# Patient Record
Sex: MALE | Race: WHITE | Employment: FULL TIME | URBAN - METROPOLITAN AREA
[De-identification: names, ages, dates, MRNs, and addresses within clinical notes are randomized per-mention and may not be internally consistent; named-entity substitution may affect disease eponyms.]

---

## 2017-01-30 ENCOUNTER — OFFICE VISIT (OUTPATIENT)
Dept: FAMILY MEDICINE CLINIC | Age: 37
End: 2017-01-30

## 2017-01-30 DIAGNOSIS — E11.9 TYPE 2 DIABETES MELLITUS WITHOUT COMPLICATION, WITHOUT LONG-TERM CURRENT USE OF INSULIN (HCC): Primary | ICD-10-CM

## 2017-01-30 DIAGNOSIS — I10 ESSENTIAL HYPERTENSION: ICD-10-CM

## 2017-01-30 DIAGNOSIS — F32.89 OTHER DEPRESSION: ICD-10-CM

## 2017-01-30 DIAGNOSIS — D69.3 ACUTE ITP (HCC): ICD-10-CM

## 2017-01-30 PROCEDURE — 99214 OFFICE O/P EST MOD 30 MIN: CPT | Performed by: FAMILY MEDICINE

## 2017-01-30 RX ORDER — BENAZEPRIL HYDROCHLORIDE 40 MG/1
40 TABLET, FILM COATED ORAL DAILY
Qty: 90 TABLET | Refills: 1 | Status: SHIPPED | OUTPATIENT
Start: 2017-01-30 | End: 2017-08-25 | Stop reason: SDUPTHER

## 2017-01-30 RX ORDER — AMLODIPINE BESYLATE 5 MG/1
5 TABLET ORAL DAILY
Qty: 90 TABLET | Refills: 1 | Status: SHIPPED | OUTPATIENT
Start: 2017-01-30 | End: 2017-02-28 | Stop reason: SDUPTHER

## 2017-01-31 VITALS
HEART RATE: 82 BPM | HEIGHT: 76 IN | TEMPERATURE: 98 F | OXYGEN SATURATION: 97 % | SYSTOLIC BLOOD PRESSURE: 150 MMHG | DIASTOLIC BLOOD PRESSURE: 90 MMHG | BODY MASS INDEX: 38.36 KG/M2 | WEIGHT: 315 LBS

## 2017-01-31 ASSESSMENT — ENCOUNTER SYMPTOMS
DIARRHEA: 0
ABDOMINAL DISTENTION: 0
ABDOMINAL PAIN: 0
BLOOD IN STOOL: 0
SHORTNESS OF BREATH: 0
COUGH: 0
CONSTIPATION: 0
CHEST TIGHTNESS: 0
ANAL BLEEDING: 0

## 2017-02-28 ENCOUNTER — OFFICE VISIT (OUTPATIENT)
Dept: FAMILY MEDICINE CLINIC | Age: 37
End: 2017-02-28

## 2017-02-28 VITALS
WEIGHT: 315 LBS | SYSTOLIC BLOOD PRESSURE: 170 MMHG | HEART RATE: 88 BPM | OXYGEN SATURATION: 97 % | DIASTOLIC BLOOD PRESSURE: 102 MMHG | HEIGHT: 76 IN | BODY MASS INDEX: 38.36 KG/M2

## 2017-02-28 DIAGNOSIS — E11.65 TYPE 2 DIABETES MELLITUS WITH HYPERGLYCEMIA, WITH LONG-TERM CURRENT USE OF INSULIN (HCC): Primary | ICD-10-CM

## 2017-02-28 DIAGNOSIS — Z79.4 TYPE 2 DIABETES MELLITUS WITH HYPERGLYCEMIA, WITH LONG-TERM CURRENT USE OF INSULIN (HCC): Primary | ICD-10-CM

## 2017-02-28 DIAGNOSIS — I10 ESSENTIAL HYPERTENSION: ICD-10-CM

## 2017-02-28 PROCEDURE — 99214 OFFICE O/P EST MOD 30 MIN: CPT | Performed by: FAMILY MEDICINE

## 2017-02-28 RX ORDER — CLONIDINE HYDROCHLORIDE 0.1 MG/1
0.1 TABLET ORAL 2 TIMES DAILY
Qty: 60 TABLET | Refills: 1 | Status: SHIPPED | OUTPATIENT
Start: 2017-02-28 | End: 2017-04-07

## 2017-02-28 RX ORDER — AMLODIPINE BESYLATE 10 MG/1
10 TABLET ORAL DAILY
Qty: 30 TABLET | Refills: 1 | Status: SHIPPED | OUTPATIENT
Start: 2017-02-28 | End: 2017-08-25

## 2017-02-28 ASSESSMENT — ENCOUNTER SYMPTOMS
VISUAL CHANGE: 0
BLURRED VISION: 0

## 2017-03-01 RX ORDER — BLOOD-GLUCOSE METER
EACH MISCELLANEOUS
Qty: 1 DEVICE | Refills: 0 | Status: SHIPPED | OUTPATIENT
Start: 2017-03-01 | End: 2019-02-18

## 2017-03-02 ENCOUNTER — TELEPHONE (OUTPATIENT)
Dept: FAMILY MEDICINE CLINIC | Age: 37
End: 2017-03-02

## 2017-04-05 ENCOUNTER — OFFICE VISIT (OUTPATIENT)
Dept: FAMILY MEDICINE CLINIC | Age: 37
End: 2017-04-05

## 2017-04-05 DIAGNOSIS — I10 ESSENTIAL HYPERTENSION: Primary | ICD-10-CM

## 2017-04-05 PROCEDURE — 99213 OFFICE O/P EST LOW 20 MIN: CPT | Performed by: FAMILY MEDICINE

## 2017-04-07 VITALS
BODY MASS INDEX: 38.36 KG/M2 | HEART RATE: 93 BPM | OXYGEN SATURATION: 97 % | HEIGHT: 76 IN | WEIGHT: 315 LBS | SYSTOLIC BLOOD PRESSURE: 130 MMHG | DIASTOLIC BLOOD PRESSURE: 80 MMHG

## 2017-04-07 ASSESSMENT — ENCOUNTER SYMPTOMS
CHEST TIGHTNESS: 0
SHORTNESS OF BREATH: 0
GASTROINTESTINAL NEGATIVE: 1
COUGH: 0

## 2017-04-24 ENCOUNTER — TELEPHONE (OUTPATIENT)
Dept: FAMILY MEDICINE CLINIC | Age: 37
End: 2017-04-24

## 2017-04-26 ENCOUNTER — OFFICE VISIT (OUTPATIENT)
Dept: FAMILY MEDICINE CLINIC | Age: 37
End: 2017-04-26

## 2017-04-26 VITALS
HEART RATE: 83 BPM | OXYGEN SATURATION: 96 % | DIASTOLIC BLOOD PRESSURE: 88 MMHG | SYSTOLIC BLOOD PRESSURE: 138 MMHG | HEIGHT: 76 IN | BODY MASS INDEX: 38.36 KG/M2 | WEIGHT: 315 LBS

## 2017-04-26 DIAGNOSIS — F32.89 OTHER DEPRESSION: Primary | ICD-10-CM

## 2017-04-26 DIAGNOSIS — E11.65 TYPE 2 DIABETES MELLITUS WITH HYPERGLYCEMIA, WITH LONG-TERM CURRENT USE OF INSULIN (HCC): ICD-10-CM

## 2017-04-26 DIAGNOSIS — Z79.4 TYPE 2 DIABETES MELLITUS WITH HYPERGLYCEMIA, WITH LONG-TERM CURRENT USE OF INSULIN (HCC): ICD-10-CM

## 2017-04-26 DIAGNOSIS — D69.3 CHRONIC ITP (IDIOPATHIC THROMBOCYTOPENIA) (HCC): ICD-10-CM

## 2017-04-26 LAB
A/G RATIO: 1.6 (ref 1.1–2.2)
ALBUMIN SERPL-MCNC: 4 G/DL (ref 3.4–5)
ALP BLD-CCNC: 76 U/L (ref 40–129)
ALT SERPL-CCNC: 50 U/L (ref 10–40)
ANION GAP SERPL CALCULATED.3IONS-SCNC: 14 MMOL/L (ref 3–16)
AST SERPL-CCNC: 43 U/L (ref 15–37)
BILIRUB SERPL-MCNC: 1 MG/DL (ref 0–1)
BUN BLDV-MCNC: 12 MG/DL (ref 7–20)
CALCIUM SERPL-MCNC: 8.6 MG/DL (ref 8.3–10.6)
CHLORIDE BLD-SCNC: 99 MMOL/L (ref 99–110)
CO2: 26 MMOL/L (ref 21–32)
CREAT SERPL-MCNC: 0.5 MG/DL (ref 0.9–1.3)
CREATININE URINE: 183.5 MG/DL (ref 39–259)
GFR AFRICAN AMERICAN: >60
GFR NON-AFRICAN AMERICAN: >60
GLOBULIN: 2.5 G/DL
GLUCOSE BLD-MCNC: 151 MG/DL (ref 70–99)
MICROALBUMIN UR-MCNC: <1.2 MG/DL
MICROALBUMIN/CREAT UR-RTO: NORMAL MG/G (ref 0–30)
POTASSIUM SERPL-SCNC: 4 MMOL/L (ref 3.5–5.1)
SODIUM BLD-SCNC: 139 MMOL/L (ref 136–145)
TOTAL PROTEIN: 6.5 G/DL (ref 6.4–8.2)

## 2017-04-26 PROCEDURE — 99214 OFFICE O/P EST MOD 30 MIN: CPT | Performed by: FAMILY MEDICINE

## 2017-04-26 PROCEDURE — 36415 COLL VENOUS BLD VENIPUNCTURE: CPT | Performed by: FAMILY MEDICINE

## 2017-04-26 RX ORDER — VENLAFAXINE HYDROCHLORIDE 150 MG/1
150 CAPSULE, EXTENDED RELEASE ORAL DAILY
Qty: 30 CAPSULE | Refills: 1 | Status: SHIPPED | OUTPATIENT
Start: 2017-04-26 | End: 2017-08-08 | Stop reason: SDUPTHER

## 2017-04-26 ASSESSMENT — ENCOUNTER SYMPTOMS
SHORTNESS OF BREATH: 0
ABDOMINAL PAIN: 0
CHEST TIGHTNESS: 0
CONSTIPATION: 0
DIARRHEA: 0
ABDOMINAL DISTENTION: 0
BLOOD IN STOOL: 0
ANAL BLEEDING: 0
COUGH: 0

## 2017-04-27 LAB
ESTIMATED AVERAGE GLUCOSE: 157.1 MG/DL
HBA1C MFR BLD: 7.1 %

## 2017-05-25 ENCOUNTER — OFFICE VISIT (OUTPATIENT)
Dept: FAMILY MEDICINE CLINIC | Age: 37
End: 2017-05-25

## 2017-05-25 VITALS
DIASTOLIC BLOOD PRESSURE: 88 MMHG | BODY MASS INDEX: 43.46 KG/M2 | WEIGHT: 315 LBS | HEART RATE: 78 BPM | OXYGEN SATURATION: 96 % | SYSTOLIC BLOOD PRESSURE: 160 MMHG

## 2017-05-25 DIAGNOSIS — E11.65 TYPE 2 DIABETES MELLITUS WITH HYPERGLYCEMIA, WITH LONG-TERM CURRENT USE OF INSULIN (HCC): Primary | ICD-10-CM

## 2017-05-25 DIAGNOSIS — I10 ESSENTIAL HYPERTENSION: ICD-10-CM

## 2017-05-25 DIAGNOSIS — Z79.4 TYPE 2 DIABETES MELLITUS WITH HYPERGLYCEMIA, WITH LONG-TERM CURRENT USE OF INSULIN (HCC): Primary | ICD-10-CM

## 2017-05-25 LAB — HBA1C MFR BLD: 6.9 %

## 2017-05-25 PROCEDURE — 99214 OFFICE O/P EST MOD 30 MIN: CPT | Performed by: FAMILY MEDICINE

## 2017-05-25 PROCEDURE — 83036 HEMOGLOBIN GLYCOSYLATED A1C: CPT | Performed by: FAMILY MEDICINE

## 2017-05-25 RX ORDER — CHLORTHALIDONE 25 MG/1
25 TABLET ORAL DAILY
Qty: 30 TABLET | Refills: 1 | Status: SHIPPED | OUTPATIENT
Start: 2017-05-25 | End: 2017-08-25

## 2017-05-25 ASSESSMENT — ENCOUNTER SYMPTOMS
VISUAL CHANGE: 0
BLURRED VISION: 0

## 2017-06-09 ENCOUNTER — TELEPHONE (OUTPATIENT)
Dept: FAMILY MEDICINE CLINIC | Age: 37
End: 2017-06-09

## 2017-06-09 DIAGNOSIS — F32.89 OTHER DEPRESSION: Primary | ICD-10-CM

## 2017-08-09 RX ORDER — VENLAFAXINE HYDROCHLORIDE 150 MG/1
CAPSULE, EXTENDED RELEASE ORAL
Qty: 30 CAPSULE | Refills: 0 | Status: SHIPPED | OUTPATIENT
Start: 2017-08-09 | End: 2017-08-25

## 2017-08-25 ENCOUNTER — TELEPHONE (OUTPATIENT)
Dept: FAMILY MEDICINE CLINIC | Age: 37
End: 2017-08-25

## 2017-08-25 ENCOUNTER — OFFICE VISIT (OUTPATIENT)
Dept: FAMILY MEDICINE CLINIC | Age: 37
End: 2017-08-25

## 2017-08-25 VITALS
BODY MASS INDEX: 42.97 KG/M2 | OXYGEN SATURATION: 98 % | DIASTOLIC BLOOD PRESSURE: 88 MMHG | HEART RATE: 88 BPM | SYSTOLIC BLOOD PRESSURE: 136 MMHG | WEIGHT: 315 LBS

## 2017-08-25 DIAGNOSIS — E11.65 TYPE 2 DIABETES MELLITUS WITH HYPERGLYCEMIA, WITH LONG-TERM CURRENT USE OF INSULIN (HCC): Primary | ICD-10-CM

## 2017-08-25 DIAGNOSIS — Z79.4 TYPE 2 DIABETES MELLITUS WITH HYPERGLYCEMIA, WITH LONG-TERM CURRENT USE OF INSULIN (HCC): Primary | ICD-10-CM

## 2017-08-25 LAB
A/G RATIO: 1.7 (ref 1.1–2.2)
ALBUMIN SERPL-MCNC: 4.3 G/DL (ref 3.4–5)
ALP BLD-CCNC: 76 U/L (ref 40–129)
ALT SERPL-CCNC: 55 U/L (ref 10–40)
ANION GAP SERPL CALCULATED.3IONS-SCNC: 15 MMOL/L (ref 3–16)
AST SERPL-CCNC: 45 U/L (ref 15–37)
BILIRUB SERPL-MCNC: 1.5 MG/DL (ref 0–1)
BUN BLDV-MCNC: 12 MG/DL (ref 7–20)
CALCIUM SERPL-MCNC: 9 MG/DL (ref 8.3–10.6)
CHLORIDE BLD-SCNC: 98 MMOL/L (ref 99–110)
CO2: 26 MMOL/L (ref 21–32)
CREAT SERPL-MCNC: 0.6 MG/DL (ref 0.9–1.3)
GFR AFRICAN AMERICAN: >60
GFR NON-AFRICAN AMERICAN: >60
GLOBULIN: 2.6 G/DL
GLUCOSE BLD-MCNC: 181 MG/DL (ref 70–99)
POTASSIUM SERPL-SCNC: 4.5 MMOL/L (ref 3.5–5.1)
SODIUM BLD-SCNC: 139 MMOL/L (ref 136–145)
TOTAL PROTEIN: 6.9 G/DL (ref 6.4–8.2)

## 2017-08-25 PROCEDURE — 36415 COLL VENOUS BLD VENIPUNCTURE: CPT | Performed by: FAMILY MEDICINE

## 2017-08-25 PROCEDURE — 99213 OFFICE O/P EST LOW 20 MIN: CPT | Performed by: FAMILY MEDICINE

## 2017-08-25 RX ORDER — BENAZEPRIL HYDROCHLORIDE 40 MG/1
40 TABLET, FILM COATED ORAL DAILY
Qty: 90 TABLET | Refills: 3 | Status: SHIPPED | OUTPATIENT
Start: 2017-08-25 | End: 2019-03-19 | Stop reason: SDUPTHER

## 2017-08-25 RX ORDER — VENLAFAXINE HYDROCHLORIDE 37.5 MG/1
CAPSULE, EXTENDED RELEASE ORAL
Qty: 42 CAPSULE | Refills: 0 | Status: SHIPPED | OUTPATIENT
Start: 2017-08-25 | End: 2017-09-21 | Stop reason: SDUPTHER

## 2017-08-25 ASSESSMENT — ENCOUNTER SYMPTOMS
BLOOD IN STOOL: 0
ANAL BLEEDING: 0
DIARRHEA: 0
ABDOMINAL PAIN: 0
CHEST TIGHTNESS: 0
SHORTNESS OF BREATH: 0
ABDOMINAL DISTENTION: 0
COUGH: 0
CONSTIPATION: 0

## 2017-08-26 LAB
ESTIMATED AVERAGE GLUCOSE: 162.8 MG/DL
HBA1C MFR BLD: 7.3 %

## 2017-09-21 ENCOUNTER — TELEPHONE (OUTPATIENT)
Dept: FAMILY MEDICINE CLINIC | Age: 37
End: 2017-09-21

## 2017-09-21 RX ORDER — VENLAFAXINE HYDROCHLORIDE 37.5 MG/1
CAPSULE, EXTENDED RELEASE ORAL
Qty: 42 CAPSULE | Refills: 0 | Status: CANCELLED | OUTPATIENT
Start: 2017-09-21 | End: 2017-10-21

## 2017-09-22 RX ORDER — VENLAFAXINE HYDROCHLORIDE 37.5 MG/1
75 CAPSULE, EXTENDED RELEASE ORAL DAILY
Qty: 60 CAPSULE | Refills: 2 | Status: SHIPPED | OUTPATIENT
Start: 2017-09-22 | End: 2017-09-25 | Stop reason: SDUPTHER

## 2017-09-25 ENCOUNTER — OFFICE VISIT (OUTPATIENT)
Dept: FAMILY MEDICINE CLINIC | Age: 37
End: 2017-09-25

## 2017-09-25 VITALS
DIASTOLIC BLOOD PRESSURE: 102 MMHG | WEIGHT: 315 LBS | TEMPERATURE: 98 F | OXYGEN SATURATION: 98 % | BODY MASS INDEX: 43.38 KG/M2 | SYSTOLIC BLOOD PRESSURE: 142 MMHG | HEART RATE: 83 BPM

## 2017-09-25 DIAGNOSIS — E11.65 TYPE 2 DIABETES MELLITUS WITH HYPERGLYCEMIA, WITH LONG-TERM CURRENT USE OF INSULIN (HCC): Primary | ICD-10-CM

## 2017-09-25 DIAGNOSIS — Z79.4 TYPE 2 DIABETES MELLITUS WITH HYPERGLYCEMIA, WITH LONG-TERM CURRENT USE OF INSULIN (HCC): Primary | ICD-10-CM

## 2017-09-25 PROCEDURE — 99214 OFFICE O/P EST MOD 30 MIN: CPT | Performed by: FAMILY MEDICINE

## 2017-09-25 RX ORDER — VENLAFAXINE HYDROCHLORIDE 75 MG/1
75 CAPSULE, EXTENDED RELEASE ORAL DAILY
Qty: 30 CAPSULE | Refills: 11 | Status: SHIPPED | OUTPATIENT
Start: 2017-09-25 | End: 2020-02-14

## 2017-09-26 ASSESSMENT — ENCOUNTER SYMPTOMS
BLURRED VISION: 0
VISUAL CHANGE: 0

## 2017-10-26 ENCOUNTER — OFFICE VISIT (OUTPATIENT)
Dept: FAMILY MEDICINE CLINIC | Age: 37
End: 2017-10-26

## 2017-10-26 VITALS
BODY MASS INDEX: 42.6 KG/M2 | WEIGHT: 315 LBS | HEART RATE: 81 BPM | SYSTOLIC BLOOD PRESSURE: 152 MMHG | DIASTOLIC BLOOD PRESSURE: 98 MMHG | OXYGEN SATURATION: 97 %

## 2017-10-26 DIAGNOSIS — F32.89 OTHER DEPRESSION: ICD-10-CM

## 2017-10-26 DIAGNOSIS — Z79.4 TYPE 2 DIABETES MELLITUS WITH HYPERGLYCEMIA, WITH LONG-TERM CURRENT USE OF INSULIN (HCC): Primary | ICD-10-CM

## 2017-10-26 DIAGNOSIS — E11.65 TYPE 2 DIABETES MELLITUS WITH HYPERGLYCEMIA, WITH LONG-TERM CURRENT USE OF INSULIN (HCC): Primary | ICD-10-CM

## 2017-10-26 DIAGNOSIS — I10 ESSENTIAL HYPERTENSION: ICD-10-CM

## 2017-10-26 DIAGNOSIS — D69.3 ACUTE ITP (HCC): ICD-10-CM

## 2017-10-26 LAB — HBA1C MFR BLD: 6.7 %

## 2017-10-26 PROCEDURE — 83036 HEMOGLOBIN GLYCOSYLATED A1C: CPT | Performed by: FAMILY MEDICINE

## 2017-10-26 PROCEDURE — 1036F TOBACCO NON-USER: CPT | Performed by: FAMILY MEDICINE

## 2017-10-26 PROCEDURE — G8484 FLU IMMUNIZE NO ADMIN: HCPCS | Performed by: FAMILY MEDICINE

## 2017-10-26 PROCEDURE — G8427 DOCREV CUR MEDS BY ELIG CLIN: HCPCS | Performed by: FAMILY MEDICINE

## 2017-10-26 PROCEDURE — G8417 CALC BMI ABV UP PARAM F/U: HCPCS | Performed by: FAMILY MEDICINE

## 2017-10-26 PROCEDURE — 3044F HG A1C LEVEL LT 7.0%: CPT | Performed by: FAMILY MEDICINE

## 2017-10-26 PROCEDURE — 99214 OFFICE O/P EST MOD 30 MIN: CPT | Performed by: FAMILY MEDICINE

## 2017-10-26 RX ORDER — NAPROXEN 500 MG/1
500 TABLET ORAL 2 TIMES DAILY WITH MEALS
Qty: 60 TABLET | Refills: 3 | Status: SHIPPED | OUTPATIENT
Start: 2017-10-26 | End: 2019-02-18

## 2017-10-26 RX ORDER — METHOCARBAMOL 750 MG/1
750 TABLET, FILM COATED ORAL 3 TIMES DAILY PRN
Qty: 30 TABLET | Refills: 1 | Status: SHIPPED | OUTPATIENT
Start: 2017-10-26 | End: 2017-11-05

## 2017-10-26 NOTE — PROGRESS NOTES
Subjective:      Patient ID: Emely Mcnally is a 42 y.o. male. HPI: 42 y.o. in for   Chief Complaint   Patient presents with    Diabetes    the patient is currently taking 100 units of [place] daily. His sugar stays around 160. Last A1c was 7.3. His urine microalbumin is up-to-date. He is not getting hypoglycemic episodes. His foot exam is up-to-date. But he is complaining of worsening neuropathy. The patient weighs 350 pounds and his BMI is 42.60 kg/m². His weight is rather stable. He has tried partially lose weight. History of ITP, acute: Seems to have resolved. His last platelet count was decent. He is still following up with hematology. No bleeds. He recently cut his hand. It was a pretty good cut and stopped bleeding on its own. Hypertension: Blood pressure is still elevated. The patient is asymptomatic. He definitely thinks he gets white coat. His numbers look better at home. He is not consistently running less than 140 over less than 90 though. No chest pain. No shortness of breath. No vision changes. No stroke symptoms. No leg swelling. Depression: He is getting used to all the new diagnoses. The Effexor XR is helping. He is still taking it. There is no SI/HI. Review of Systems   Constitutional: Negative for chills, fatigue and fever. HENT: Negative for ear pain, hearing loss and postnasal drip. Eyes: Negative for discharge. Respiratory: Negative for cough, chest tightness and shortness of breath. Cardiovascular: Negative for chest pain and palpitations. Gastrointestinal: Negative for abdominal distention, abdominal pain, anal bleeding, blood in stool, constipation and diarrhea. Genitourinary: Negative for difficulty urinating and dysuria. Musculoskeletal: Positive for back pain. Skin: Negative for rash.      BP (!) 152/98 (Site: Left Arm, Position: Sitting)   Pulse 81   Wt (!) 350 lb (158.8 kg)   SpO2 97%   BMI 42.60 kg/m²    Objective:   Physical Exam Constitutional: He appears well-developed and well-nourished. No distress. HENT:   Head: Normocephalic and atraumatic. Mouth/Throat: No oropharyngeal exudate. Eyes: Conjunctivae and EOM are normal. Right eye exhibits no discharge. Left eye exhibits no discharge. No scleral icterus. Neck: Normal range of motion. Neck supple. No tracheal deviation present. No thyromegaly present. Cardiovascular: Normal rate, regular rhythm and normal heart sounds. Exam reveals no gallop and no friction rub. No murmur heard. Pulmonary/Chest: Effort normal and breath sounds normal. No respiratory distress. He has no wheezes. He has no rales. He exhibits no tenderness. Lymphadenopathy:     He has no cervical adenopathy. Skin: No rash noted. He is not diaphoretic. Nursing note and vitals reviewed. Assessment:      Greater than 3 chronic issues, see below      Plan:        Jey Pickett was seen today for diabetes. Diagnoses and all orders for this visit:    Type 2 diabetes mellitus with hyperglycemia, with long-term current use of insulin ([address]): the patient's fasting glucose in the morning is not ideal but it is good enough. His repeat A1c today is 6.7  He needs to solely focus on losing weight. After discussion with the patient about the Bydureon and causing mild bruising on his belly and getting the  \"Bydureon  Bump,\"  I'm going to switch him to Trulicity if his insurance will cover it. -     POCT glycosylated hemoglobin (Hb A1C)    Essential hypertension: elevated today but blood pressures running better at home. No adjustments for now. I want him to work on weight loss. I want him to work on low-salt diet. Patient's back was also bothering him today in part of the reason why his blood pressure is likely elevated    Other depression: doing well on Effexor. No changes. Acute ITP ([address]): stable. Last platelet count was much improved. Keep follow-up with hematology.     Other orders  -     Dulaglutide

## 2017-10-27 ASSESSMENT — ENCOUNTER SYMPTOMS
CHEST TIGHTNESS: 0
ABDOMINAL PAIN: 0
BACK PAIN: 1
EYE DISCHARGE: 0
CONSTIPATION: 0
BLOOD IN STOOL: 0
ABDOMINAL DISTENTION: 0
SHORTNESS OF BREATH: 0
DIARRHEA: 0
ANAL BLEEDING: 0
COUGH: 0

## 2017-11-14 ENCOUNTER — OFFICE VISIT (OUTPATIENT)
Dept: FAMILY MEDICINE CLINIC | Age: 37
End: 2017-11-14

## 2017-11-14 DIAGNOSIS — R11.2 NON-INTRACTABLE VOMITING WITH NAUSEA, UNSPECIFIED VOMITING TYPE: ICD-10-CM

## 2017-11-14 DIAGNOSIS — D69.3 ACUTE ITP (HCC): ICD-10-CM

## 2017-11-14 DIAGNOSIS — F32.89 OTHER DEPRESSION: ICD-10-CM

## 2017-11-14 DIAGNOSIS — R19.7 DIARRHEA, UNSPECIFIED TYPE: Primary | ICD-10-CM

## 2017-11-14 DIAGNOSIS — E11.65 TYPE 2 DIABETES MELLITUS WITH HYPERGLYCEMIA, WITH LONG-TERM CURRENT USE OF INSULIN (HCC): ICD-10-CM

## 2017-11-14 DIAGNOSIS — I10 ESSENTIAL HYPERTENSION: ICD-10-CM

## 2017-11-14 DIAGNOSIS — Z23 NEED FOR INFLUENZA VACCINATION: ICD-10-CM

## 2017-11-14 DIAGNOSIS — Z79.4 TYPE 2 DIABETES MELLITUS WITH HYPERGLYCEMIA, WITH LONG-TERM CURRENT USE OF INSULIN (HCC): ICD-10-CM

## 2017-11-14 PROCEDURE — G8417 CALC BMI ABV UP PARAM F/U: HCPCS | Performed by: FAMILY MEDICINE

## 2017-11-14 PROCEDURE — 3044F HG A1C LEVEL LT 7.0%: CPT | Performed by: FAMILY MEDICINE

## 2017-11-14 PROCEDURE — G8484 FLU IMMUNIZE NO ADMIN: HCPCS | Performed by: FAMILY MEDICINE

## 2017-11-14 PROCEDURE — 1036F TOBACCO NON-USER: CPT | Performed by: FAMILY MEDICINE

## 2017-11-14 PROCEDURE — 90630 INFLUENZA, QUADV, 18-64 YRS, ID, PF, MICRO INJ, 0.1ML (FLUZONE QUADV, PF): CPT | Performed by: FAMILY MEDICINE

## 2017-11-14 PROCEDURE — G8427 DOCREV CUR MEDS BY ELIG CLIN: HCPCS | Performed by: FAMILY MEDICINE

## 2017-11-14 PROCEDURE — 99214 OFFICE O/P EST MOD 30 MIN: CPT | Performed by: FAMILY MEDICINE

## 2017-11-14 PROCEDURE — 90471 IMMUNIZATION ADMIN: CPT | Performed by: FAMILY MEDICINE

## 2017-11-14 RX ORDER — ONDANSETRON 4 MG/1
4 TABLET, ORALLY DISINTEGRATING ORAL EVERY 8 HOURS PRN
Qty: 20 TABLET | Refills: 0 | Status: SHIPPED | OUTPATIENT
Start: 2017-11-14 | End: 2017-12-04

## 2017-11-16 VITALS
HEART RATE: 101 BPM | OXYGEN SATURATION: 97 % | WEIGHT: 315 LBS | BODY MASS INDEX: 40.8 KG/M2 | TEMPERATURE: 98.3 F | SYSTOLIC BLOOD PRESSURE: 130 MMHG | DIASTOLIC BLOOD PRESSURE: 80 MMHG

## 2017-11-16 ASSESSMENT — ENCOUNTER SYMPTOMS
CHEST TIGHTNESS: 0
ANAL BLEEDING: 0
VOMITING: 1
ABDOMINAL DISTENTION: 0
EYE DISCHARGE: 0
SHORTNESS OF BREATH: 0
ABDOMINAL PAIN: 0
COUGH: 0
DIARRHEA: 1
NAUSEA: 1
BLOOD IN STOOL: 0
CONSTIPATION: 0

## 2017-11-16 NOTE — PROGRESS NOTES
Vaccine Information Sheet, \"Influenza - Inactivated\"  given to Eduardo Wisdom, or parent/legal guardian of  Eduardo Wisdom and verbalized understanding. Patient responses:    Have you ever had a reaction to a flu vaccine? No  Are you able to eat eggs without adverse effects? Yes  Do you have any current illness? No  Have you ever had [place] Syndrome? No    Flu vaccine given per order. Please see immunization tab.
pain, hearing loss and postnasal drip. Eyes: Negative for discharge. Respiratory: Negative for cough, chest tightness and shortness of breath. Cardiovascular: Negative for chest pain and palpitations. Gastrointestinal: Positive for diarrhea, nausea and vomiting. Negative for abdominal distention, abdominal pain, anal bleeding, blood in stool and constipation. Genitourinary: Negative for difficulty urinating and dysuria. Musculoskeletal: Negative. Skin: Negative for rash. /80   Pulse 101   Temp 98.3 °F (36.8 °C) (Oral)   Wt (!) 335 lb 3.2 oz (152 kg)   SpO2 97%   BMI 40.80 kg/m²    Objective:   Physical Exam   Constitutional: He appears well-developed and well-nourished. No distress. HENT:   Head: Normocephalic and atraumatic. Mouth/Throat: No oropharyngeal exudate. Eyes: Conjunctivae and EOM are normal. Right eye exhibits no discharge. Left eye exhibits no discharge. No scleral icterus. Neck: Normal range of motion. Neck supple. No tracheal deviation present. No thyromegaly present. Cardiovascular: Normal rate, regular rhythm and normal heart sounds. Exam reveals no gallop and no friction rub. No murmur heard. Pulmonary/Chest: Effort normal and breath sounds normal. No respiratory distress. He has no wheezes. He has no rales. He exhibits no tenderness. Abdominal: Soft. Bowel sounds are normal. He exhibits no distension and no mass. There is no tenderness. There is no rebound and no guarding. Lymphadenopathy:     He has no cervical adenopathy. Skin: No rash noted. He is not diaphoretic. Nursing note and vitals reviewed. Assessment:      Greater than 3 chronic medical conditions with GI problems likely associated with Bydureon      Plan:          Keny Miller was seen today for nausea & vomiting, diarrhea and cough. Diagnoses and all orders for this visit:    Diarrhea, unspecified type: I suspect this is secondary to the Bydureon. DC this medication now.  Okay to

## 2017-12-04 ENCOUNTER — OFFICE VISIT (OUTPATIENT)
Dept: FAMILY MEDICINE CLINIC | Age: 37
End: 2017-12-04

## 2017-12-04 VITALS
OXYGEN SATURATION: 97 % | SYSTOLIC BLOOD PRESSURE: 116 MMHG | DIASTOLIC BLOOD PRESSURE: 72 MMHG | BODY MASS INDEX: 40.66 KG/M2 | WEIGHT: 315 LBS | HEART RATE: 78 BPM

## 2017-12-04 DIAGNOSIS — Z79.4 TYPE 2 DIABETES MELLITUS WITH HYPERGLYCEMIA, WITH LONG-TERM CURRENT USE OF INSULIN (HCC): Primary | ICD-10-CM

## 2017-12-04 DIAGNOSIS — E11.65 TYPE 2 DIABETES MELLITUS WITH HYPERGLYCEMIA, WITH LONG-TERM CURRENT USE OF INSULIN (HCC): Primary | ICD-10-CM

## 2017-12-04 LAB
A/G RATIO: 1.4 (ref 1.1–2.2)
ALBUMIN SERPL-MCNC: 4.3 G/DL (ref 3.4–5)
ALP BLD-CCNC: 77 U/L (ref 40–129)
ALT SERPL-CCNC: 52 U/L (ref 10–40)
ANION GAP SERPL CALCULATED.3IONS-SCNC: 15 MMOL/L (ref 3–16)
AST SERPL-CCNC: 50 U/L (ref 15–37)
BILIRUB SERPL-MCNC: 1.5 MG/DL (ref 0–1)
BUN BLDV-MCNC: 8 MG/DL (ref 7–20)
CALCIUM SERPL-MCNC: 9.4 MG/DL (ref 8.3–10.6)
CHLORIDE BLD-SCNC: 97 MMOL/L (ref 99–110)
CO2: 30 MMOL/L (ref 21–32)
CREAT SERPL-MCNC: 0.7 MG/DL (ref 0.9–1.3)
CREATININE URINE: 112 MG/DL (ref 39–259)
GFR AFRICAN AMERICAN: >60
GFR NON-AFRICAN AMERICAN: >60
GLOBULIN: 3.1 G/DL
GLUCOSE BLD-MCNC: 103 MG/DL (ref 70–99)
MICROALBUMIN UR-MCNC: <1.2 MG/DL
MICROALBUMIN/CREAT UR-RTO: NORMAL MG/G (ref 0–30)
POTASSIUM SERPL-SCNC: 4.2 MMOL/L (ref 3.5–5.1)
SODIUM BLD-SCNC: 142 MMOL/L (ref 136–145)
TOTAL PROTEIN: 7.4 G/DL (ref 6.4–8.2)
TSH REFLEX: 1.09 UIU/ML (ref 0.27–4.2)

## 2017-12-04 PROCEDURE — 3044F HG A1C LEVEL LT 7.0%: CPT | Performed by: FAMILY MEDICINE

## 2017-12-04 PROCEDURE — 36415 COLL VENOUS BLD VENIPUNCTURE: CPT | Performed by: FAMILY MEDICINE

## 2017-12-04 PROCEDURE — G8484 FLU IMMUNIZE NO ADMIN: HCPCS | Performed by: FAMILY MEDICINE

## 2017-12-04 PROCEDURE — G8427 DOCREV CUR MEDS BY ELIG CLIN: HCPCS | Performed by: FAMILY MEDICINE

## 2017-12-04 PROCEDURE — G8417 CALC BMI ABV UP PARAM F/U: HCPCS | Performed by: FAMILY MEDICINE

## 2017-12-04 PROCEDURE — 1036F TOBACCO NON-USER: CPT | Performed by: FAMILY MEDICINE

## 2017-12-04 PROCEDURE — 99213 OFFICE O/P EST LOW 20 MIN: CPT | Performed by: FAMILY MEDICINE

## 2017-12-04 ASSESSMENT — ENCOUNTER SYMPTOMS
CHEST TIGHTNESS: 0
NAUSEA: 1
SHORTNESS OF BREATH: 0
ABDOMINAL PAIN: 1
COUGH: 0
DIARRHEA: 1
ABDOMINAL DISTENTION: 0
ANAL BLEEDING: 0
CONSTIPATION: 0
BLOOD IN STOOL: 0
EYE DISCHARGE: 0

## 2017-12-04 NOTE — PROGRESS NOTES
Negative for rash. /72 (Site: Right Arm, Position: Sitting)   Pulse 78   Wt (!) 334 lb (151.5 kg)   SpO2 97%   BMI 40.66 kg/m²    Objective:   Physical Exam   Constitutional: He appears well-developed and well-nourished. No distress. HENT:   Head: Normocephalic and atraumatic. Mouth/Throat: No oropharyngeal exudate. Eyes: Conjunctivae and EOM are normal. Right eye exhibits no discharge. Left eye exhibits no discharge. No scleral icterus. Neck: Normal range of motion. Neck supple. No tracheal deviation present. No thyromegaly present. Cardiovascular: Normal rate, regular rhythm and normal heart sounds. Exam reveals no gallop and no friction rub. No murmur heard. Pulmonary/Chest: Effort normal and breath sounds normal. No respiratory distress. He has no wheezes. He has no rales. He exhibits no tenderness. Lymphadenopathy:     He has no cervical adenopathy. Skin: No rash noted. He is not diaphoretic. Nursing note and vitals reviewed. Assessment:      Diabetes and morbid obesity. Plan:        The patient has lost about 25 pounds in the last several months. But in the last month he has only lost 1 pound. We discussed this at length. Start counting calories. And cut back by a maximum of 10% per day per week until losing 1 pound per week. Far as the diabetes goes: Weight loss is going to improve things further. Stay off the trulicity for now. Continue the metformin and basal bolus insulin therapy. Anju Phan was seen today for other.     Diagnoses and all orders for this visit:    Type 2 diabetes mellitus with hyperglycemia, with long-term current use of insulin ([facility])  -     Hemoglobin A1C  -     Comprehensive Metabolic Panel  -     TSH with Reflex  -     MICROALBUMIN / CREATININE URINE RATIO

## 2017-12-05 LAB
ESTIMATED AVERAGE GLUCOSE: 134.1 MG/DL
HBA1C MFR BLD: 6.3 %

## 2018-01-18 ENCOUNTER — TELEPHONE (OUTPATIENT)
Dept: FAMILY MEDICINE CLINIC | Age: 38
End: 2018-01-18

## 2019-02-18 ENCOUNTER — APPOINTMENT (OUTPATIENT)
Dept: GENERAL RADIOLOGY | Age: 39
End: 2019-02-18
Payer: COMMERCIAL

## 2019-02-18 ENCOUNTER — HOSPITAL ENCOUNTER (EMERGENCY)
Age: 39
Discharge: HOME | End: 2019-02-18
Attending: EMERGENCY MEDICINE
Payer: COMMERCIAL

## 2019-02-18 VITALS
RESPIRATION RATE: 18 BRPM | TEMPERATURE: 98.4 F | BODY MASS INDEX: 38.36 KG/M2 | DIASTOLIC BLOOD PRESSURE: 120 MMHG | WEIGHT: 315 LBS | HEART RATE: 70 BPM | SYSTOLIC BLOOD PRESSURE: 177 MMHG | HEIGHT: 76 IN | OXYGEN SATURATION: 92 %

## 2019-02-18 DIAGNOSIS — Z86.2 HISTORY OF ITP: ICD-10-CM
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
## 2019-02-18 LAB
A/G RATIO: 1.2 (ref 1.1–2.2)
ALBUMIN SERPL-MCNC: 3.9 G/DL (ref 3.4–5)
ALP BLD-CCNC: 86 U/L (ref 40–129)
ALT SERPL-CCNC: 48 U/L (ref 10–40)
ANION GAP SERPL CALCULATED.3IONS-SCNC: 9 MMOL/L (ref 3–16)
AST SERPL-CCNC: 39 U/L (ref 15–37)
BASOPHILS ABSOLUTE: 0 K/UL (ref 0–0.2)
BASOPHILS RELATIVE PERCENT: 0.9 %
BILIRUB SERPL-MCNC: 1.5 MG/DL (ref 0–1)
BUN BLDV-MCNC: 9 MG/DL (ref 7–20)
CALCIUM SERPL-MCNC: 9.2 MG/DL (ref 8.3–10.6)
CHLORIDE BLD-SCNC: 97 MMOL/L (ref 99–110)
CO2: 30 MMOL/L (ref 21–32)
CREAT SERPL-MCNC: 0.6 MG/DL (ref 0.9–1.3)
EOSINOPHILS ABSOLUTE: 0.2 K/UL (ref 0–0.6)
EOSINOPHILS RELATIVE PERCENT: 5.5 %
GFR AFRICAN AMERICAN: >60
GFR NON-AFRICAN AMERICAN: >60
GLOBULIN: 3.3 G/DL
GLUCOSE BLD-MCNC: 358 MG/DL (ref 70–99)
HCT VFR BLD CALC: 48 % (ref 40.5–52.5)
HEMATOLOGY PATH CONSULT: NORMAL
HEMATOLOGY PATH CONSULT: YES
HEMOGLOBIN: 16.6 G/DL (ref 13.5–17.5)
LYMPHOCYTES ABSOLUTE: 1.3 K/UL (ref 1–5.1)
LYMPHOCYTES RELATIVE PERCENT: 33.2 %
MCH RBC QN AUTO: 29 PG (ref 26–34)
MCHC RBC AUTO-ENTMCNC: 34.6 G/DL (ref 31–36)
MCV RBC AUTO: 83.8 FL (ref 80–100)
MONOCYTES ABSOLUTE: 0.4 K/UL (ref 0–1.3)
MONOCYTES RELATIVE PERCENT: 10.8 %
NEUTROPHILS ABSOLUTE: 1.9 K/UL (ref 1.7–7.7)
NEUTROPHILS RELATIVE PERCENT: 49.6 %
PDW BLD-RTO: 14.3 % (ref 12.4–15.4)
PLATELET # BLD: 17 K/UL (ref 135–450)
PLATELET SLIDE REVIEW: ABNORMAL
PMV BLD AUTO: 9 FL (ref 5–10.5)
POTASSIUM REFLEX MAGNESIUM: 4.5 MMOL/L (ref 3.5–5.1)
RBC # BLD: 5.73 M/UL (ref 4.2–5.9)
SLIDE REVIEW: ABNORMAL
SODIUM BLD-SCNC: 136 MMOL/L (ref 136–145)
TOTAL PROTEIN: 7.2 G/DL (ref 6.4–8.2)
WBC # BLD: 3.9 K/UL (ref 4–11)

## 2019-02-18 PROCEDURE — 6370000000 HC RX 637 (ALT 250 FOR IP): Performed by: PHYSICIAN ASSISTANT

## 2019-02-18 PROCEDURE — 73610 X-RAY EXAM OF ANKLE: CPT

## 2019-02-18 PROCEDURE — 99283 EMERGENCY DEPT VISIT LOW MDM: CPT

## 2019-02-18 PROCEDURE — 80053 COMPREHEN METABOLIC PANEL: CPT

## 2019-02-18 PROCEDURE — 85025 COMPLETE CBC W/AUTO DIFF WBC: CPT

## 2019-02-18 RX ORDER — DOXYCYCLINE HYCLATE 100 MG
100 TABLET ORAL ONCE
Status: COMPLETED | OUTPATIENT
Start: 2019-02-18 | End: 2019-02-18

## 2019-02-18 RX ORDER — DOXYCYCLINE 100 MG/1
100 TABLET ORAL 2 TIMES DAILY
Qty: 20 TABLET | Refills: 0 | Status: SHIPPED | OUTPATIENT
Start: 2019-02-18 | End: 2019-02-28

## 2019-02-18 RX ADMIN — DOXYCYCLINE HYCLATE 100 MG: 100 TABLET, COATED ORAL at 14:26

## 2019-02-18 ASSESSMENT — ENCOUNTER SYMPTOMS
EYES NEGATIVE: 1
COLOR CHANGE: 1
RESPIRATORY NEGATIVE: 1
GASTROINTESTINAL NEGATIVE: 1

## 2019-02-18 ASSESSMENT — PAIN SCALES - GENERAL: PAINLEVEL_OUTOF10: 4

## 2019-03-01 ENCOUNTER — HOSPITAL ENCOUNTER (EMERGENCY)
Age: 39
Discharge: HOME | End: 2019-03-01
Attending: EMERGENCY MEDICINE
Payer: COMMERCIAL

## 2019-03-01 VITALS
RESPIRATION RATE: 17 BRPM | SYSTOLIC BLOOD PRESSURE: 155 MMHG | DIASTOLIC BLOOD PRESSURE: 92 MMHG | OXYGEN SATURATION: 98 % | HEIGHT: 76 IN | HEART RATE: 72 BPM | WEIGHT: 311 LBS | BODY MASS INDEX: 37.87 KG/M2 | TEMPERATURE: 98.4 F

## 2019-03-01 DIAGNOSIS — R73.9 HYPERGLYCEMIA: Primary | ICD-10-CM

## 2019-03-01 DIAGNOSIS — Z86.2 HISTORY OF ITP: ICD-10-CM

## 2019-03-01 LAB
A/G RATIO: 1.1 (ref 1.1–2.2)
ALBUMIN SERPL-MCNC: 3.5 G/DL (ref 3.4–5)
ALP BLD-CCNC: 82 U/L (ref 40–129)
ALT SERPL-CCNC: 38 U/L (ref 10–40)
ANION GAP SERPL CALCULATED.3IONS-SCNC: 11 MMOL/L (ref 3–16)
ANION GAP SERPL CALCULATED.3IONS-SCNC: 9 MMOL/L (ref 3–16)
AST SERPL-CCNC: 46 U/L (ref 15–37)
BASOPHILS ABSOLUTE: 0.1 K/UL (ref 0–0.2)
BASOPHILS RELATIVE PERCENT: 2.2 %
BILIRUB SERPL-MCNC: 1.6 MG/DL (ref 0–1)
BILIRUBIN URINE: NEGATIVE
BLOOD, URINE: NEGATIVE
BUN BLDV-MCNC: 22 MG/DL (ref 7–20)
BUN BLDV-MCNC: 22 MG/DL (ref 7–20)
CALCIUM SERPL-MCNC: 8.1 MG/DL (ref 8.3–10.6)
CALCIUM SERPL-MCNC: 8.3 MG/DL (ref 8.3–10.6)
CHLORIDE BLD-SCNC: 95 MMOL/L (ref 99–110)
CHLORIDE BLD-SCNC: 95 MMOL/L (ref 99–110)
CHP ED QC CHECK: NORMAL
CHP ED QC CHECK: NORMAL
CLARITY: CLEAR
CO2: 23 MMOL/L (ref 21–32)
CO2: 26 MMOL/L (ref 21–32)
COLOR: YELLOW
CREAT SERPL-MCNC: 0.6 MG/DL (ref 0.9–1.3)
CREAT SERPL-MCNC: 0.6 MG/DL (ref 0.9–1.3)
EOSINOPHILS ABSOLUTE: 0.1 K/UL (ref 0–0.6)
EOSINOPHILS RELATIVE PERCENT: 1 %
GFR AFRICAN AMERICAN: >60
GFR AFRICAN AMERICAN: >60
GFR NON-AFRICAN AMERICAN: >60
GFR NON-AFRICAN AMERICAN: >60
GLOBULIN: 3.3 G/DL
GLUCOSE BLD-MCNC: 249 MG/DL
GLUCOSE BLD-MCNC: 249 MG/DL (ref 70–99)
GLUCOSE BLD-MCNC: 255 MG/DL (ref 70–99)
GLUCOSE BLD-MCNC: 269 MG/DL
GLUCOSE BLD-MCNC: 269 MG/DL (ref 70–99)
GLUCOSE BLD-MCNC: 314 MG/DL (ref 70–99)
GLUCOSE BLD-MCNC: 316 MG/DL (ref 70–99)
GLUCOSE URINE: >=1000 MG/DL
HCT VFR BLD CALC: 50.9 % (ref 40.5–52.5)
HEMOGLOBIN: 17.6 G/DL (ref 13.5–17.5)
KETONES, URINE: 15 MG/DL
LEUKOCYTE ESTERASE, URINE: NEGATIVE
LYMPHOCYTES ABSOLUTE: 1.4 K/UL (ref 1–5.1)
LYMPHOCYTES RELATIVE PERCENT: 22.3 %
MCH RBC QN AUTO: 29.5 PG (ref 26–34)
MCHC RBC AUTO-ENTMCNC: 34.5 G/DL (ref 31–36)
MCV RBC AUTO: 85.3 FL (ref 80–100)
MICROSCOPIC EXAMINATION: ABNORMAL
MONOCYTES ABSOLUTE: 0.4 K/UL (ref 0–1.3)
MONOCYTES RELATIVE PERCENT: 6.9 %
NEUTROPHILS ABSOLUTE: 4.1 K/UL (ref 1.7–7.7)
NEUTROPHILS RELATIVE PERCENT: 67.6 %
NITRITE, URINE: NEGATIVE
PDW BLD-RTO: 14.5 % (ref 12.4–15.4)
PERFORMED ON: ABNORMAL
PH UA: 6
PLATELET # BLD: 26 K/UL (ref 135–450)
PLATELET SLIDE REVIEW: ABNORMAL
PMV BLD AUTO: 9.4 FL (ref 5–10.5)
POTASSIUM REFLEX MAGNESIUM: 5.3 MMOL/L (ref 3.5–5.1)
POTASSIUM SERPL-SCNC: 5.2 MMOL/L (ref 3.5–5.1)
PROTEIN UA: NEGATIVE MG/DL
RBC # BLD: 5.97 M/UL (ref 4.2–5.9)
SLIDE REVIEW: ABNORMAL
SODIUM BLD-SCNC: 129 MMOL/L (ref 136–145)
SODIUM BLD-SCNC: 130 MMOL/L (ref 136–145)
SPECIFIC GRAVITY UA: 1.02
TOTAL PROTEIN: 6.8 G/DL (ref 6.4–8.2)
URINE REFLEX TO CULTURE: ABNORMAL
URINE TYPE: ABNORMAL
UROBILINOGEN, URINE: 1 E.U./DL
WBC # BLD: 6.1 K/UL (ref 4–11)

## 2019-03-01 PROCEDURE — 2580000003 HC RX 258: Performed by: PHYSICIAN ASSISTANT

## 2019-03-01 PROCEDURE — 80053 COMPREHEN METABOLIC PANEL: CPT

## 2019-03-01 PROCEDURE — 85025 COMPLETE CBC W/AUTO DIFF WBC: CPT

## 2019-03-01 PROCEDURE — 96372 THER/PROPH/DIAG INJ SC/IM: CPT

## 2019-03-01 PROCEDURE — 99285 EMERGENCY DEPT VISIT HI MDM: CPT

## 2019-03-01 PROCEDURE — 81003 URINALYSIS AUTO W/O SCOPE: CPT

## 2019-03-01 PROCEDURE — 6370000000 HC RX 637 (ALT 250 FOR IP): Performed by: EMERGENCY MEDICINE

## 2019-03-01 PROCEDURE — 96360 HYDRATION IV INFUSION INIT: CPT

## 2019-03-01 PROCEDURE — 93005 ELECTROCARDIOGRAM TRACING: CPT | Performed by: PHYSICIAN ASSISTANT

## 2019-03-01 RX ORDER — 0.9 % SODIUM CHLORIDE 0.9 %
1000 INTRAVENOUS SOLUTION INTRAVENOUS ONCE
Status: COMPLETED | OUTPATIENT
Start: 2019-03-01 | End: 2019-03-01

## 2019-03-01 RX ORDER — PREDNISONE 20 MG/1
60 TABLET ORAL DAILY
COMMUNITY
End: 2019-04-04 | Stop reason: ALTCHOICE

## 2019-03-01 RX ADMIN — INSULIN HUMAN 6 UNITS: 100 INJECTION, SOLUTION PARENTERAL at 20:55

## 2019-03-01 RX ADMIN — SODIUM CHLORIDE 1000 ML: 9 INJECTION, SOLUTION INTRAVENOUS at 17:53

## 2019-03-01 RX ADMIN — INSULIN HUMAN 4 UNITS: 100 INJECTION, SOLUTION PARENTERAL at 18:32

## 2019-03-01 ASSESSMENT — ENCOUNTER SYMPTOMS
GASTROINTESTINAL NEGATIVE: 1
RESPIRATORY NEGATIVE: 1
EYES NEGATIVE: 1

## 2019-03-02 LAB
EKG ATRIAL RATE: 65 BPM
EKG DIAGNOSIS: NORMAL
EKG P AXIS: 41 DEGREES
EKG P-R INTERVAL: 136 MS
EKG Q-T INTERVAL: 436 MS
EKG QRS DURATION: 108 MS
EKG QTC CALCULATION (BAZETT): 453 MS
EKG R AXIS: 14 DEGREES
EKG T AXIS: 0 DEGREES
EKG VENTRICULAR RATE: 65 BPM

## 2019-03-02 PROCEDURE — 93010 ELECTROCARDIOGRAM REPORT: CPT | Performed by: INTERNAL MEDICINE

## 2019-03-04 ENCOUNTER — NURSE TRIAGE (OUTPATIENT)
Dept: OTHER | Facility: CLINIC | Age: 39
End: 2019-03-04

## 2019-03-05 ENCOUNTER — OFFICE VISIT (OUTPATIENT)
Dept: FAMILY MEDICINE CLINIC | Age: 39
End: 2019-03-05
Payer: COMMERCIAL

## 2019-03-05 VITALS
HEART RATE: 78 BPM | BODY MASS INDEX: 37.75 KG/M2 | WEIGHT: 310 LBS | HEIGHT: 76 IN | SYSTOLIC BLOOD PRESSURE: 150 MMHG | DIASTOLIC BLOOD PRESSURE: 80 MMHG | OXYGEN SATURATION: 98 %

## 2019-03-05 DIAGNOSIS — Z79.4 TYPE 2 DIABETES MELLITUS WITH HYPERGLYCEMIA, WITH LONG-TERM CURRENT USE OF INSULIN (HCC): Primary | ICD-10-CM

## 2019-03-05 DIAGNOSIS — E11.65 TYPE 2 DIABETES MELLITUS WITH HYPERGLYCEMIA, WITH LONG-TERM CURRENT USE OF INSULIN (HCC): Primary | ICD-10-CM

## 2019-03-05 DIAGNOSIS — I10 ESSENTIAL HYPERTENSION: ICD-10-CM

## 2019-03-05 DIAGNOSIS — D69.3 ACUTE ITP (HCC): ICD-10-CM

## 2019-03-05 LAB — HBA1C MFR BLD: 13.1 %

## 2019-03-05 PROCEDURE — 2022F DILAT RTA XM EVC RTNOPTHY: CPT | Performed by: FAMILY MEDICINE

## 2019-03-05 PROCEDURE — 83036 HEMOGLOBIN GLYCOSYLATED A1C: CPT | Performed by: FAMILY MEDICINE

## 2019-03-05 PROCEDURE — G8419 CALC BMI OUT NRM PARAM NOF/U: HCPCS | Performed by: FAMILY MEDICINE

## 2019-03-05 PROCEDURE — 3046F HEMOGLOBIN A1C LEVEL >9.0%: CPT | Performed by: FAMILY MEDICINE

## 2019-03-05 PROCEDURE — 99214 OFFICE O/P EST MOD 30 MIN: CPT | Performed by: FAMILY MEDICINE

## 2019-03-05 PROCEDURE — 1036F TOBACCO NON-USER: CPT | Performed by: FAMILY MEDICINE

## 2019-03-05 PROCEDURE — G8427 DOCREV CUR MEDS BY ELIG CLIN: HCPCS | Performed by: FAMILY MEDICINE

## 2019-03-05 PROCEDURE — G8484 FLU IMMUNIZE NO ADMIN: HCPCS | Performed by: FAMILY MEDICINE

## 2019-03-05 RX ORDER — HYDROCHLOROTHIAZIDE 25 MG/1
12.5 TABLET ORAL EVERY MORNING
Qty: 30 TABLET | Refills: 1 | Status: SHIPPED | OUTPATIENT
Start: 2019-03-05 | End: 2019-07-03 | Stop reason: SDUPTHER

## 2019-03-05 ASSESSMENT — ENCOUNTER SYMPTOMS: NAUSEA: 0

## 2019-03-06 ENCOUNTER — TELEPHONE (OUTPATIENT)
Dept: FAMILY MEDICINE CLINIC | Age: 39
End: 2019-03-06

## 2019-03-09 PROBLEM — E66.9 OBESITY: Status: ACTIVE | Noted: 2019-03-09

## 2019-03-19 ENCOUNTER — OFFICE VISIT (OUTPATIENT)
Dept: FAMILY MEDICINE CLINIC | Age: 39
End: 2019-03-19
Payer: COMMERCIAL

## 2019-03-19 VITALS
WEIGHT: 303 LBS | BODY MASS INDEX: 41.04 KG/M2 | DIASTOLIC BLOOD PRESSURE: 80 MMHG | RESPIRATION RATE: 16 BRPM | OXYGEN SATURATION: 99 % | HEART RATE: 77 BPM | HEIGHT: 72 IN | SYSTOLIC BLOOD PRESSURE: 136 MMHG

## 2019-03-19 DIAGNOSIS — Z79.4 TYPE 2 DIABETES MELLITUS WITH HYPERGLYCEMIA, WITH LONG-TERM CURRENT USE OF INSULIN (HCC): Primary | ICD-10-CM

## 2019-03-19 DIAGNOSIS — E11.65 TYPE 2 DIABETES MELLITUS WITH HYPERGLYCEMIA, WITH LONG-TERM CURRENT USE OF INSULIN (HCC): Primary | ICD-10-CM

## 2019-03-19 DIAGNOSIS — I10 ESSENTIAL HYPERTENSION: ICD-10-CM

## 2019-03-19 DIAGNOSIS — E66.01 CLASS 3 SEVERE OBESITY DUE TO EXCESS CALORIES WITHOUT SERIOUS COMORBIDITY WITH BODY MASS INDEX (BMI) OF 40.0 TO 44.9 IN ADULT (HCC): ICD-10-CM

## 2019-03-19 PROCEDURE — 99214 OFFICE O/P EST MOD 30 MIN: CPT | Performed by: FAMILY MEDICINE

## 2019-03-19 PROCEDURE — 3046F HEMOGLOBIN A1C LEVEL >9.0%: CPT | Performed by: FAMILY MEDICINE

## 2019-03-19 PROCEDURE — 2022F DILAT RTA XM EVC RTNOPTHY: CPT | Performed by: FAMILY MEDICINE

## 2019-03-19 PROCEDURE — 1036F TOBACCO NON-USER: CPT | Performed by: FAMILY MEDICINE

## 2019-03-19 PROCEDURE — G8427 DOCREV CUR MEDS BY ELIG CLIN: HCPCS | Performed by: FAMILY MEDICINE

## 2019-03-19 PROCEDURE — G8417 CALC BMI ABV UP PARAM F/U: HCPCS | Performed by: FAMILY MEDICINE

## 2019-03-19 PROCEDURE — G8484 FLU IMMUNIZE NO ADMIN: HCPCS | Performed by: FAMILY MEDICINE

## 2019-03-19 RX ORDER — BENAZEPRIL HYDROCHLORIDE 40 MG/1
40 TABLET, FILM COATED ORAL DAILY
Qty: 90 TABLET | Refills: 3 | Status: SHIPPED | OUTPATIENT
Start: 2019-03-19 | End: 2019-06-06 | Stop reason: SDUPTHER

## 2019-03-20 LAB
CREATININE URINE: 135.1 MG/DL (ref 39–259)
MICROALBUMIN UR-MCNC: <1.2 MG/DL
MICROALBUMIN/CREAT UR-RTO: NORMAL MG/G (ref 0–30)

## 2019-03-21 ENCOUNTER — HOSPITAL ENCOUNTER (OUTPATIENT)
Age: 39
Discharge: HOME | End: 2019-03-21
Payer: COMMERCIAL

## 2019-03-21 ENCOUNTER — HOSPITAL ENCOUNTER (OUTPATIENT)
Dept: CT IMAGING | Age: 39
Discharge: HOME | End: 2019-03-21
Payer: COMMERCIAL

## 2019-03-21 DIAGNOSIS — D69.3 IMMUNE THROMBOCYTOPENIC PURPURA (HCC): ICD-10-CM

## 2019-03-21 PROCEDURE — 74177 CT ABD & PELVIS W/CONTRAST: CPT

## 2019-03-21 PROCEDURE — 6360000004 HC RX CONTRAST MEDICATION: Performed by: INTERNAL MEDICINE

## 2019-03-21 PROCEDURE — 71260 CT THORAX DX C+: CPT

## 2019-03-21 RX ADMIN — IOPAMIDOL 75 ML: 755 INJECTION, SOLUTION INTRAVENOUS at 11:10

## 2019-03-28 ENCOUNTER — TELEPHONE (OUTPATIENT)
Dept: PULMONOLOGY | Age: 39
End: 2019-03-28

## 2019-03-28 DIAGNOSIS — R91.1 RIGHT UPPER LOBE PULMONARY NODULE: Primary | ICD-10-CM

## 2019-03-28 NOTE — TELEPHONE ENCOUNTER
Scheduled patient for PET CT on 4/2/19 1:00 scan time 12:30 arrival time @ [facility]. Will need to fax order, notes, testing to [phone]. Patient will need to be informed on time/date/prep of PET and fua that is scheduled for 4/4/19 @ 215. I called [phone] (H) Left message asking patient to return call to office. I called [phone] (M) Left message asking patient to return call to office. Order pending.

## 2019-03-28 NOTE — TELEPHONE ENCOUNTER
Order pended. Patient's wife returned call and notified of date, time, prep, and location. Wife verbalized understanding.

## 2019-03-28 NOTE — TELEPHONE ENCOUNTER
Received referral from [facility]. Dr. Radha Ashley reviewed and wants pt to have PET CT and see after PET if [facility] has not already ordered. Staff spoke with the pt and no he has not had PEt ct ordered.  Will need to schedule at [facility] or On license of [facility] and schedule f/u after

## 2019-04-01 NOTE — TELEPHONE ENCOUNTER
I called and spoke with pt's wife and let her know to keep appt as scheduled with Dr. Mandy Mejia. He will examine pt and determine if PET need to be completed. I called and cancelled PET ct.

## 2019-04-01 NOTE — TELEPHONE ENCOUNTER
I called [phone] and tried to do peer to peer. Insurance wants to know exact reasoning why ct chest is not sufficient to determine medical management.

## 2019-04-01 NOTE — TELEPHONE ENCOUNTER
[facility] called states that [place] denied needs peer to peer. Gully is faxing over denial for peer to peer info. PET is [facility] for 4/2/19.

## 2019-04-04 ENCOUNTER — OFFICE VISIT (OUTPATIENT)
Dept: PULMONOLOGY | Age: 39
End: 2019-04-04
Payer: COMMERCIAL

## 2019-04-04 VITALS
SYSTOLIC BLOOD PRESSURE: 134 MMHG | BODY MASS INDEX: 37.8 KG/M2 | RESPIRATION RATE: 18 BRPM | HEART RATE: 84 BPM | TEMPERATURE: 98.2 F | DIASTOLIC BLOOD PRESSURE: 82 MMHG | WEIGHT: 310.4 LBS | OXYGEN SATURATION: 97 % | HEIGHT: 76 IN

## 2019-04-04 DIAGNOSIS — D69.3 IDIOPATHIC THROMBOCYTOPENIA (HCC): ICD-10-CM

## 2019-04-04 DIAGNOSIS — R91.1 RIGHT UPPER LOBE PULMONARY NODULE: Primary | ICD-10-CM

## 2019-04-04 PROCEDURE — 99243 OFF/OP CNSLTJ NEW/EST LOW 30: CPT | Performed by: INTERNAL MEDICINE

## 2019-04-04 PROCEDURE — G8417 CALC BMI ABV UP PARAM F/U: HCPCS | Performed by: INTERNAL MEDICINE

## 2019-04-04 PROCEDURE — G8427 DOCREV CUR MEDS BY ELIG CLIN: HCPCS | Performed by: INTERNAL MEDICINE

## 2019-04-04 NOTE — LETTER
[facility] Pulmonary, Critical Care, and Sleep  [address], [address] [place] [address]  Phone: [phone]  Fax: [phone]    April 4, 2019     Patient: Boyd Velazco   MR Number: [id number]   YOB: 1980   Date of Visit: 4/4/2019     Dear Dr. Tuan Holbrook: Thank you for the request for consultation for Olu Kitchen to me for the evaluation. Below are the relevant portions of my assessment and plan of care. Assessment:       · RUL 9 mm nodule on CT 3/21/19. DDx granuloma,  infection and malignancy. Patient is low risk for malignancy given age and lack of significant smoking history. Will follow-up radiographically  · ITP followed by Dr. Tuan Holbrook  · No significant smoking history         Plan:       Options of Bx, resection and watchful waiting were discussed with patient. I recommend radiographic follow up CT chest 3 months. Patient feels comfortable with this approach   If you have questions, please do not hesitate to call me. I look forward to following Toy Moritz along with you.     Sincerely,        Marcus Sifuentes MD

## 2019-04-04 NOTE — PROGRESS NOTES
[facility]                                                                    CHIEF COMPLAINT: Pulmonary nodules    Consulting provider: Dr. Idalia Cameron    HPI:   Patient has CT chest/abdomen 3/21/19 to further evaluate thrombocytopenia. CT reviewed by me and showed 9 mm right upper lobe nodule, cirrhosis with splenomegaly. PET scan was not approved. Not sure what makes better or worse. Not able to assess severity. No FH for lung cancer and no personal history of cancer   Smoked for few months as teenage in the past   No weight loss  Good appetite  No IBD or O2    No FH for lung cancer    Past Medical History:   Diagnosis Date    Arthritis     left knee    Chronic lumbar pain     DDD (degenerative disc disease), lumbar     MRI 2012    Depression     Glucose intolerance (impaired glucose tolerance)     History of ITP     Hypertension     Idiopathic thrombocytopenic purpura (HCC)     Type 2 diabetes mellitus without complication, without long-term current use of insulin ([facility] 08/30/2016       Past Surgical History:    No past surgical history on file. Allergies:  is allergic to other and pcn [penicillins]. Social History:    TOBACCO:   reports that he has never smoked. He has never used smokeless tobacco.  ETOH:   reports that he does not drink alcohol.       Family History:       Problem Relation Age of Onset    Diabetes Mother     High Blood Pressure Mother     Depression Mother     Depression Father     High Blood Pressure Father     Other Father         sleep apnea       Current Medications:    Current Outpatient Medications:     benazepril (LOTENSIN) 40 MG tablet, Take 1 tablet by mouth daily, Disp: 90 tablet, Rfl: 3    Insulin Pen Needle ([employer] PEN NEEDLES) 31G X 6 MM MISC, 1 each by Does not apply route daily, Disp: 100 each, Rfl: 3    insulin glargine (LANTUS SOLOSTAR) 100 UNIT/ML injection pen, Inject 35 Units into the skin nightly, Disp: 5 pen, Rfl: 3    insulin aspart (NOVOLOG PENFILL) 100 UNIT/ML injection cartridge, Inject 11 Units into the skin 3 times daily (before meals), Disp: 5 Cartridge, Rfl: 3    hydrochlorothiazide (HYDRODIURIL) 25 MG tablet, Take 0.5 tablets by mouth every morning (Patient taking differently: Take 25 mg by mouth every morning ), Disp: 30 tablet, Rfl: 1    venlafaxine (EFFEXOR XR) 75 MG extended release capsule, Take 1 capsule by mouth daily (Patient taking differently: Take 37.5 mg by mouth daily ), Disp: 30 capsule, Rfl: 11    metFORMIN (GLUCOPHAGE) 500 MG tablet, TAKE ONE TABLET BY MOUTH TWICE A DAY WITH MEALS (Patient taking differently: Take 1,000 mg by mouth 2 times daily TAKE ONE TABLET BY MOUTH TWICE A DAY WITH MEALS), Disp: 180 tablet, Rfl: 3      REVIEW OF SYSTEMS:  Constitutional: Negative for fever  HENT: Negative for sore throat  Eyes: Negative for redness   Respiratory: Negative for dyspnea, cough  Cardiovascular: Negative for chest pain  Gastrointestinal: Negative for vomiting, diarrhea   Genitourinary: Negative for hematuria   Musculoskeletal: Negative for arthralgias   Skin: Negative for rash  Neurological: Negative for syncope  Hematological: Negative for adenopathy  Psychiatric/Behavorial: Negative for anxiety      Objective:   PHYSICAL EXAM:    Blood pressure 134/82, pulse 84, temperature 98.2 °F (36.8 °C), temperature source Oral, resp. rate 18, height 6' 4\" (1.93 m), weight (!) 310 lb 6.4 oz (140.8 kg), SpO2 97 %.' on RA  Gen: No distress. Eyes: PERRL. No sclera icterus. No conjunctival injection. ENT: No discharge. Pharynx clear. Neck: Trachea midline. No obvious mass. Resp: No accessory muscle use. No crackles. No wheezes. No rhonchi. No dullness on percussion. Good air entry. CV: Regular rate. Regular rhythm. No murmur or rub. No edema. GI: Non-tender. Non-distended. No hernia. Skin: Warm and dry. No nodule on exposed extremities.    Lymph: No cervical LAD. No supraclavicular LAD. M/S: No cyanosis. No joint deformity. No clubbing. Neuro: Awake. Alert. Moves all four extremities. Psych: Oriented x 3. No anxiety. DATA reviewed by me:   CT chest 3/21/19 imaging reviewed by me and showed  9 mm right upper lobe nodule      Assessment:       · RUL 9 mm nodule on CT 3/21/19. DDx granuloma,  infection and malignancy. Patient is low risk for malignancy given age and lack of significant smoking history. Will follow-up radiographically  · ITP followed by Dr. Ramon Can  · No significant smoking history         Plan:       Options of Bx, resection and watchful waiting were discussed with patient. I recommend radiographic follow up CT chest 3 months.   Patient feels comfortable with this approach

## 2019-04-09 ENCOUNTER — INITIAL CONSULT (OUTPATIENT)
Dept: GASTROENTEROLOGY | Age: 39
End: 2019-04-09
Payer: COMMERCIAL

## 2019-04-09 VITALS
BODY MASS INDEX: 38.11 KG/M2 | DIASTOLIC BLOOD PRESSURE: 80 MMHG | SYSTOLIC BLOOD PRESSURE: 128 MMHG | HEIGHT: 76 IN | WEIGHT: 313 LBS

## 2019-04-09 DIAGNOSIS — K74.60 CIRRHOSIS OF LIVER WITHOUT ASCITES, UNSPECIFIED HEPATIC CIRRHOSIS TYPE (HCC): Primary | ICD-10-CM

## 2019-04-09 PROCEDURE — 99204 OFFICE O/P NEW MOD 45 MIN: CPT | Performed by: INTERNAL MEDICINE

## 2019-04-09 PROCEDURE — G8427 DOCREV CUR MEDS BY ELIG CLIN: HCPCS | Performed by: INTERNAL MEDICINE

## 2019-04-09 PROCEDURE — 1036F TOBACCO NON-USER: CPT | Performed by: INTERNAL MEDICINE

## 2019-04-09 PROCEDURE — G8417 CALC BMI ABV UP PARAM F/U: HCPCS | Performed by: INTERNAL MEDICINE

## 2019-04-09 NOTE — PATIENT INSTRUCTIONS
[address] ,  [address] [facility]  Phone: [phone]   TOF:[phone]  [address] [facility],  [address] [facility], [address] [place] [address]  Phone: [phone]   NHD:[phone]    CIRRHOSIS OVERVIEW - Cirrhosis is the term used to describe a diseased liver that has been severely scarred, usually due to many years of continuous injury. The most common causes include longstanding alcohol abuse, chronic hepatitis B or hepatitis C, and nonalcoholic steatohepatitis (a condition in which fat builds up in the liver, and the liver becomes inflamed). Although cirrhosis cannot be cured, there are a number of things you can do to prevent the disease from worsening. This topic discusses the symptoms, diagnosis, and treatment of cirrhosis. Related topics are discussed separately. (See \"Patient information: Hepatitis B\" and \"Patient information: Hepatitis C\" and \"Patient information: Alcohol use - when is drinking a problem? \".)    CIRRHOSIS CAUSES - The liver is a large organ (weighing about three pounds) that is located in the right upper abdomen, beneath the rib cage (figure 1). It performs many functions that are essential to life. The liver is able to repair itself when it has been injured. However, the process of healing involves the creation of scar tissue. Thus, repeated or continuous injury to the liver (such as occurs with heavy alcohol use) can cause significant scarring in the liver. The body is able to tolerate a partially scarred liver without serious consequences. Eventually, the scarring can become so severe that the liver is no longer able to perform its normal functions. Some of the most common causes of liver injury include:        Longstanding alcohol abuse (see \"Patient information: Alcohol use - when is drinking a problem? \")      Chronic hepatitis (B or C) (see \"Patient information: Hepatitis B\" and \"Patient information: Hepatitis C\")      Nonalcoholic steatohepatitis (see \"Patient information: Nonalcoholic steatohepatitis (OWEN)\")    CIRRHOSIS SYMPTOMS - People with cirrhosis may or may not have symptoms early in the course of the disease. Some of the more common symptoms include:        Scarring makes it difficult for blood to flow through the liver. As a result, veins in other areas outside of the liver become abnormally expanded. Abnormally expanded blood vessels are referred to as varices. One place where varices are commonly found is in the esophagus, the swallowing tube connecting the mouth with the stomach (figure 2). When the pressure in the varices reaches a certain level, the varices can burst, which can cause massive bleeding (known as variceal bleeding). Body fluids accumulate as a result of liver scarring and a decreased ability to manufacture blood proteins. Fluid is typically seen in the legs (edema) and abdomen (ascites) and sometimes in the lung (pleural effusion). (See \"Patient information: Edema (swelling)\". )        Ascites causes the abdomen to enlarge as fluid accumulates, which can cause shortness of breath and a feeling of fullness. The fluid provides an environment where bacteria can grow, increasing the risk of infection. Patients with cirrhosis can have easy bruising and bleeding. Once bleeding starts (such as with variceal bleeding), it can be severe. Hepatic encephalopathy is a condition that develops when the liver is unable to break down toxins normally found in the bloodstream, such as ammonia. In this condition, confusion or even coma are caused by toxins that build up in the blood. In the early stages, there may be mild symptoms, such as difficulty sleeping or sleeping too much. Advanced hepatic encephalopathy can cause confusion, delirium, and even coma. Hepatic encephalopathy can develop suddenly and may become a medical emergency.       Patients with cirrhosis have a weakened immune system and are at increased risk of infections. Malnutrition is common in patients with cirrhosis. Malnutrition can cause loss of muscle in various areas of the body. Many people with advanced cirrhosis have jaundice (yellowed skin or whites of the eyes). People with cirrhosis are at increased risk for developing liver cancer (hepatocellular carcinoma). Cirrhosis can cause fatigue and in some cases itching. CIRRHOSIS DIAGNOSIS - Testing is performed to confirm the diagnosis of cirrhosis, determine the underlying cause, determine the severity of cirrhosis, and monitor for complications. (See \"Diagnostic approach to the patient with cirrhosis\". )    Liver biopsy - The best way to confirm the diagnosis of cirrhosis is a liver biopsy. This procedure is discussed in depth in a separate article. (See \"Patient information: Liver biopsy\". )    Imaging tests - Imaging tests, such as ultrasound, may be recommended to evaluate the condition of the liver or determine if there are cirrhosis-related complications. However, imaging tests are not usually performed to diagnose cirrhosis. Blood tests - Blood tests may be performed to help determine the underlying cause of cirrhosis and to monitor the liver function over time. CIRRHOSIS TREATMENT - There have been major advances in the treatment of cirrhosis in the past few decades. In particular, it has become easier to recognize, prevent, and treat complications of cirrhosis. (See \"Overview of the complications, prognosis, and management of cirrhosis\". )    People with cirrhosis should see their healthcare provider regularly for monitoring and treatment of cirrhosis complications. Although cirrhosis cannot be cured, several treatments are available to minimize cirrhosis-related complications. Other treatments are recommended to help prevent complications.     Avoid substances that can injure the liver - People with cirrhosis should avoid consuming substances that can further damage the liver. The most common of these is alcohol. You should talk to your healthcare provider before taking any new medication (including prescription and non-prescription drugs, herbs, vitamins, or dietary supplements). Acetaminophen (Tylenol®) is a nonprescription medication that can further injure the liver in people with cirrhosis. The exact amount of acetaminophen that is safe in cirrhosis is uncertain; some experts recommend that patients not use more than 650 mg per dose every 4 to 6 hours, with no more than 2000 mg per day. However, even low doses may not be safe for those who drink alcohol. Use of acetaminophen should be discussed with a healthcare provider. Screen for and treat varices - People with cirrhosis should undergo an upper endoscopy to determine if varices (expanded blood vessels) are present in the esophagus, the tube that connects the mouth and stomach (figure 2). Screening for and treatment of varices is discussed in detail in a separate topic review. (See \"Patient information: Screening for esophageal varices\" and \"Prediction of variceal hemorrhage in patients with cirrhosis\" and \"Primary prophylaxis against variceal hemorrhage in patients with cirrhosis\".)    Vaccination - People with cirrhosis should be vaccinated against hepatitis A and B. Pneumococcal vaccine and yearly influenza vaccine are also recommended. (See \"Patient information: Adult vaccines\" and \"Patient information: Influenza prevention\" and \"Patient information: Pneumonia prevention\". )    Treat ascites and edema - Ascites and edema can lead to complications, particularly infection. Ascites can also cause a person to feel short of breath or full. Thus, treatment is usually recommended to reduce the amount of fluid that collects in the lower legs and abdomen. Treatment usually involves taking one or more diuretic pills (fluid pills) and following a low-sodium (salt) diet.  (See 'Dietary advice' below and \"Initial therapy of ascites in patients with cirrhosis\". )    Paracentesis - Some people do not get adequate relief from edema and ascites with fluid pills alone. In this case, periodic drainage of the fluid (paracentesis) may be required. This is done by inserting a needle into the abdomen and withdrawing a large amount of fluid from the space around the abdominal organs. The procedure can usually be performed in a doctor's office. Following paracentesis, it is important to continue taking your diuretic medication and to limit the amount of sodium you consume (see 'Dietary advice' below). TIPS - A TIPS (transjugular intrahepatic portosystemic shunts) procedure may be recommended to treat ascites if diuretics, paracentesis, and changes in diet are not completely successful in relieving ascites. (See \"Indications and contraindications to the use of transjugular intrahepatic portosystemic shunts\".)    During the procedure, a radiologist places a device within the liver to reduce the blood pressure in the portal vein (in the liver) (figure 2). The procedure is usually performed with local anesthesia and sedation, and takes between one and three hours. Most patients remain in the hospital for one to three nights after the procedure. Screen for hepatocellular carcinoma - People with cirrhosis should have tests to detect hepatocellular carcinoma (cancer of the liver). Testing usually requires an ultrasound examination of the liver every six months. Consider liver transplantation - Not all patients with cirrhosis will require a liver transplantation, and many are not eligible for one. However, because the waiting list for liver transplantation is lengthy (up to two years in some regions), it is important to know if liver transplantation is a reasonable option while you are still relatively healthy.  (See 'Liver transplantation for cirrhosis' below.)    Screen for encephalopathy - People with cirrhosis can develop confusion, which is sometimes subtle. Detecting confusion is important since treatment is available, and the confusion itself can lead to serious problems (eg, an automobile accident). A change in the sleep pattern (insomnia or sleeping too much) may be an early sign. Treatment may include a medication called lactulose (which will cause softening of the stool) or an antibiotic. Dietary advice - People with advanced cirrhosis may require a specialized diet that includes lower amounts of salt. Salt restriction is usually recommended for people with early cirrhosis who tend to accumulate fluid. Protein restriction is usually not necessary. A healthcare provider or dietitian can help to determine if dietary changes are needed. A detailed discussion about a low-sodium diet is available separately. (See \"Patient information: Low sodium diet\". )    The benefit of vitamins, antioxidants and other supplements on the underlying liver disease has not been established. Several herbal therapies have been reported as having a benefit in patients with cirrhosis. None have clearly been proven to be effective, although some continue to be studied. Most experts do not recommend vitamins, herbs, or other supplements for people with cirrhosis. Exercise - Exercise is generally safe for people without advanced-stage cirrhosis. Exercise may increase the risk of variceal bleeding in patients with advanced disease (such as those who have ascites or varices). Thus, check with your healthcare provider regarding the risks and benefits of exercise. LIVER TRANSPLANTATION FOR CIRRHOSIS - Liver transplantation involves replacing a diseased liver with a healthy liver. The majority of donated livers come from people who have suffered brain death for one reason or another. More recently, living donors have been able to donate a portion of their liver.     More than 80 percent of people will be alive one year after liver transplantation, and the majority of these will be alive five years after transplantation. This is compared with almost certain death in patients with very advanced cirrhosis who do not undergo transplantation. (See \"Model for End-stage Liver Disease (MELD)\". )    The prognosis after transplantation depends in part upon the underlying cause of the liver disease, some of which recur following transplantation. As an example, most people who undergo transplantation for hepatitis C will develop recurrent hepatitis C after transplantation. Other major concerns following transplantation are the risk of anti-rejection drugs used to suppress the immune system, which have many side effects, and the risk of rejection of the transplanted organ. The transplantation process is elaborate, involving an extensive screening process for eligibility. Thus, not all patients with cirrhosis are eligible, and only those with the most advanced, severe cirrhosis are placed on the transplant registry. Furthermore, not all patients with cirrhosis will require a transplantation since the disease can remain relatively stable for many years, particularly if the cause of the cirrhosis (eg, alcohol) is eliminated. Treatment centers that perform liver transplantation are listed in table 1 (table 1). (See \"Patient selection for liver transplantation\".)    WHERE TO GET MORE INFORMATION - Your healthcare provider is the best source of information for questions and concerns related to your medical problem. This article will be updated as needed every four months on our Web site ([email or web address]). Related topics for patients, as well as selected articles written for healthcare professionals, are also available. Some of the most relevant are listed below. The following organizations also provide reliable health information. [employer]          (www.nlm.nih.gov/medlineplus/healthtopics. html)        [employer]omatic Data of Diabetes and Digestive and Kidney Diseases          (www.niddk.nih.gov/)        American Gastroenterological Association          (medicalance.com)        The [address] [facility]          ([email or web address] [email or web address]. [email or web address] PHYSICIANS    [facility] ,  [address] [facility]  Phone: [phone] [facility],  [address] [facility], [address] [place] [address]  Phone: [phone]    What is nonalcoholic steatohepatitis? - Nonalcoholic steatohepatitis (OWEN) is a long-term condition that affects the liver. The liver is a big organ in the upper right side of the belly (figure 1). When people have OWEN, fat tissue builds up in their liver, and the liver gets inflamed. People who drink too much alcohol can get a condition similar to OWEN. But OWEN happens in people who do not drink alcohol or drink only a little alcohol. What causes OWEN? - Doctors do not know what causes OWEN. They do know that OWEN happens more often in some people, such as those who:        Are overweight      Have a condition called diabetes mellitus, which causes blood sugar levels to get too high      Have high cholesterol      Take certain medicines    What are the symptoms of OWEN? - Most people with OWEN have no symptoms. Your doctor or nurse might suspect that you have OWEN from the results of your routine blood tests. Will I need more tests? - Yes. If your doctor or nurse suspects that you have OWEN, you will likely have:        More blood tests      An imaging test of the liver, such as an ultrasound, CT, or MRI scan - Imaging tests create pictures of the inside of the body. A liver biopsy - During this test, a doctor removes a small sample of tissue from the liver. Then another doctor looks at the sample under a microscope to see if OWEN is present.  A liver biopsy is the only test that can tell for sure if you have OWEN. How is OWEN treated? - There is no cure for OWEN. If you take a medicine that could be causing OWEN, your doctor will stop or change that medicine. In addition, your doctor will treat your other medical conditions. For example, he or she can:        Help you create a weight loss plan, if you are overweight - If your doctor recommends that you lose weight, it's important to do this slowly. Do not lose more than 3.5 pounds (1.6 kilograms) a week. Treat your high blood sugar, if you have high blood sugar      Treat your high cholesterol, if you have high cholesterol    Does OWEN get worse over time? - OWEN does not usually get worse over time. But sometimes, it leads to serious scarring of the liver, called \"cirrhosis. \" Cirrhosis can cause different symptoms, such as swelling in the legs, trouble breathing, or feeling tired. If you get cirrhosis, your doctor will talk with you about different possible treatments. Do I need to follow up with my doctor? - Yes. People who have OWEN need to see their doctor for regular check-ups. Your doctor will do follow-up tests on a regular basis. These usually include blood tests. INTRODUCTION - Nonalcoholic steatohepatitis (OWEN) is a condition that causes inflammation and accumulation of fat and fibrous tissue in the liver. Although a similar condition can occur in people who abuse alcohol, OWEN occurs in those who do drink little to no alcohol. The exact cause of OWEN is unknown. However, it is seen more frequently in people with certain medical conditions such as diabetes, obesity, and insulin resistance. It is not clear how many people have OWEN because it causes no symptoms. However, OWEN is diagnosed in about 7 to 9 percent of people in the [place] who have a liver biopsy. Most people are between the ages of 36 and 61 years, although the condition can also occur in children over the age of 8 years.  OWEN is seen more often in women than in men. The cause of OWEN is not clear, although research is ongoing in an attempt to find effective treatments. At the present time, treatment of OWEN focuses on controlling some of the medical conditions associated with it (such as diabetes and obesity) and monitoring for progression. CONDITIONS ASSOCIATED WITH NONALCOHOLIC STEATOHEPATITIS - Although the cause of OWEN is unknown, it is most frequently seen in people with one of more of the following conditions. Obesity - More than 70 percent of people with OWEN are obese. Most obese people with OWEN are between 10 and 40 percent heavier than their ideal body weight. Diabetes - Up to 76 percent of people with OWEN have type 2 diabetes. Hyperlipidemia - About 20 to 80 percent of people with OWEN have hyperlipidemia (high blood triglyceride levels and/or high blood cholesterol levels). Insulin resistance - Insulin resistance refers to a state in which the body does not respond adequately to insulin. Insulin resistance often occurs in people with hyperlipidemia who are obese; this group of symptoms is known as the metabolic syndrome and is frequently seen in people with OWEN. Drugs and toxins - Several drugs used to treat medical conditions have been linked to OWEN, including amiodarone (Corderone®, Pacerone®), tamoxifen (Nolvadex®, Tamone®), perhexiline maleate (Pexhid®), steroids (eg, prednisone, hydrocortisone), and synthetic estrogens. Pesticides that are toxic to cells have also been linked to OWEN. NONALCOHOLIC STEATOHEPATITIS SYMPTOMS - Most people with OWEN have no symptoms. Rarely, OWEN is diagnosed in people with fatigue, a general feeling of being unwell, and a vague discomfort in their upper right abdomen, although it is not clear if these symptoms are related to OWEN. NONALCOHOLIC STEATOHEPATITIS DIAGNOSIS - OWEN is most often discovered during routine laboratory testing.  Additional tests help confirm the presence disease. A healthcare provider or nutritionist can provide an individualized weight loss plan. Treatment of insulin resistance - Several drugs are available for people with insulin resistance, and they are being studied in patients with OWEN. Their role is not yet proven. More information about treatments for insulin resistance is available in a separate topic review. Miscellaneous drugs - Several new drugs are being tested in patients with OWEN but none has yet proven to be beneficial in large, long-term studies. NONALCOHOLIC STEATOHEPATITIS PROGNOSIS - OWEN is typically a chronic condition (ie, it persists for many years). It is difficult to predict the course of OWEN in an individual. Few factors have been useful in predicting the course of this condition, although features in the liver biopsy can be helpful. The good news is most people with OWEN will not develop serious liver problems. One study showed that most people with OWEN live as long as those without it. Furthermore, liver function tests are stable over time in most people with OWEN. However, OWEN can progress in some people. One study that tracked liver damage over time showed that the condition improved in about 3 percent of people, remained stable in 54 percent of people, and worsened in 43 percent of people [1]. The most serious complication of OWEN is cirrhosis, which occurs when the liver becomes severely scarred. In one study, between 8 and 26 percent of people with WOEN developed cirrhosis [1]. Older diabetic women may be at increased risk. WHERE TO GET MORE INFORMATION - Your healthcare provider is the best source of information for questions and concerns related to your medical problem. This article will be updated as needed every four months on our web site ([email or web address]). The following organizations also provide reliable health information.         [employer] (www.nlm.nih.gov/medlineplus/healthtopics. html)        [employer] Data of Diabetes and Digestive and Kidney Diseases          (www.niddk.nih.gov)        The American Association for the Study of Liver Diseases          (www.aasld. org)        The [address] [place] [address]          (www.liverfoundation. org)

## 2019-04-09 NOTE — LETTER
Standing Expiration Date:   5/9/2019    TSH with Reflex     Standing Status:   Future     Standing Expiration Date:   5/9/2019    Mitochondrial antibodies, M2     Standing Status:   Future     Standing Expiration Date:   5/9/2019    Hepatitis Panel, Acute     Standing Status:   Future     Standing Expiration Date:   5/9/2019       If you have questions, please do not hesitate to call me. I look forward to following Bailey Wooten along with you.     Sincerely,        Carisa Thao [facility] 4/9/19 3:39 PM

## 2019-04-09 NOTE — LETTER
[address] ,  [address] [facility]  Phone: [phone]   [phone] [facility] [address], [address]  [place], [place]  Phone: [phone]   PQS:[phone]    04/10/19    Patient:Antoni Whiting  MR FAMHDO:[id number]  YOB: 1980  Date of Visit:4/10/19    Dear Dr. Dayna Huang MD    Thank you for the request for consultation for Heidy Janusz to me for the evaluation of   Chief Complaint   Patient presents with   [address]     NP- cirrhosis   . Below are the relevant portions of my assessment and plan of care. FINAL DIAGNOSIS/Assessment   Diagnosis Orders   1. Cirrhosis of liver without ascites, unspecified hepatic cirrhosis type (HCC)  Maricarmen Titer IgG by IFA Reflex    Ceruloplasmin    F-actin (smooth muscle) antibody w/ reflex to titer    Ferritin    Iron and TIBC    IgA    Tissue Transglutaminase, IgA    TSH with Reflex    US Gallbladder Ruq    Mitochondrial antibodies, M2    Hepatitis Panel, Acute    Protime-INR       VISIT ORDERS/Plan  Orders Placed This Encounter   Procedures    US Gallbladder Ruq     Standing Status:   Future     Standing Expiration Date:   2019    Maricarmen Titer IgG by IFA Reflex     Standing Status:   Future     Standing Expiration Date:   2019    Ceruloplasmin     Standing Status:   Future     Standing Expiration Date:   2019    F-actin (smooth muscle) antibody w/ reflex to titer     Standing Status:   Future     Standing Expiration Date:   2019    Ferritin     Standing Status:   Future     Standing Expiration Date:   2019    Iron and TIBC     Standing Status:   Future     Standing Expiration Date:   2019     Order Specific Question:   Is Patient Fasting? Answer:   no     Order Specific Question:   No of Hours?      Answer:   4    IgA     Standing Status:   Future     Standing Expiration Date:   2019    Tissue Transglutaminase, IgA     Standing Status:   Future Standing Expiration Date:   5/9/2019    TSH with Reflex     Standing Status:   Future     Standing Expiration Date:   5/9/2019    Mitochondrial antibodies, M2     Standing Status:   Future     Standing Expiration Date:   5/9/2019    Hepatitis Panel, Acute     Standing Status:   Future     Standing Expiration Date:   5/9/2019    Protime-INR     Standing Status:   Future     Standing Expiration Date:   5/10/2019     Order Specific Question:   Daily Coumadin Dose? Answer:   none-elevated lft's   Discussed the need for routine follow up for cirrhosis at a minimum of every 6 months to monitor for risk factor such as ascites, bleeding, hepatoma, encephalopathy including need for routing labs, imaging and endoscopy. I suggested diagnostic EGD to screen for varices as long as his plt count is > 40k. If significant varices then could repeat with banding using preprocedural doptelet to decrease bleeding risk. Since this generally requires multiple sessions to decrease variceal size, repeated prescriptions for doptelet would be very expensive. Thrombocytopenia secondary to portal hypertensive related splenomegaly has been shown to increase plt count but likely not become common or standard of care given high morbidity in these patients. If you have questions, please do not hesitate to call me. I look forward to following Bailey Wooten along with you.     Sincerely,        Carisa Thao [facility] 4/10/19 8:26 AM

## 2019-04-09 NOTE — PROGRESS NOTES
09/29/16 (!) 347 lb (157.4 kg)   09/14/16 (!) 337 lb (152.9 kg)   09/07/16 (!) 347 lb (157.4 kg)   08/19/16 (!) 338 lb (153.3 kg)   08/30/16 (!) 341 lb (154.7 kg)   08/26/16 (!) 338 lb (153.3 kg)   08/05/16 (!) 339 lb 9.6 oz (154 kg)   08/01/16 (!) 342 lb (155.1 kg)   07/27/16 (!) 342 lb (155.1 kg)   07/26/16 (!) 347 lb (157.4 kg)   07/22/16 (!) 347 lb (157.4 kg)   07/18/16 (!) 347 lb (157.4 kg)   07/12/16 (!) 353 lb 12.8 oz (160.5 kg)   07/12/16 (!) 356 lb (161.5 kg)   09/10/15 (!) 375 lb (170.1 kg)   03/23/15 (!) 361 lb (163.7 kg)   02/25/14 (!) 360 lb (163.3 kg)   03/19/13 (!) 374 lb (169.6 kg)   08/07/12 (!) 358 lb (162.4 kg)   04/23/12 (!) 370 lb 9.6 oz (168.1 kg)   03/01/12 (!) 374 lb (169.6 kg)       No components found for: HGBA1C  BP Readings from Last 3 Encounters:   04/09/19 128/80   04/04/19 134/82   03/19/19 136/80     Health Maintenance   Topic Date Due    Pneumococcal 0-64 years Vaccine (1 of 1 - PPSV23) 01/12/1984    Diabetic foot exam  01/12/1988    Diabetic retinal exam  01/12/1988    Lipid screen  01/12/1988    HIV screen  01/12/1993    Hepatitis B Vaccine (1 of 3 - Risk 3-dose series) 01/12/1997    DTaP/Tdap/Td vaccine (1 - Tdap) 01/12/1997    A1C test (Diabetic or Prediabetic)  06/05/2019    Flu vaccine (Season Ended) 09/01/2019    Potassium monitoring  03/01/2020    Creatinine monitoring  03/01/2020    Diabetic microalbuminuria test  03/19/2020       No components found for: [facility]     PAST MEDICAL HISTORY     Past Medical History:   Diagnosis Date    Arthritis     left knee    Chronic lumbar pain     DDD (degenerative disc disease), lumbar     MRI 2012    Depression     Glucose intolerance (impaired glucose tolerance)     History of ITP     Hypertension     Idiopathic thrombocytopenic purpura (HCC)     Type 2 diabetes mellitus without complication, without long-term current use of insulin ([facility] [address] 08/30/2016     FAMILY HISTORY     Family History   Problem Relation Age each by Does not apply route daily 100 each 3    insulin glargine (LANTUS SOLOSTAR) 100 UNIT/ML injection pen Inject 35 Units into the skin nightly 5 pen 3    insulin aspart (NOVOLOG PENFILL) 100 UNIT/ML injection cartridge Inject 11 Units into the skin 3 times daily (before meals) 5 Cartridge 3    hydrochlorothiazide (HYDRODIURIL) 25 MG tablet Take 0.5 tablets by mouth every morning (Patient taking differently: Take 25 mg by mouth every morning ) 30 tablet 1    venlafaxine (EFFEXOR XR) 75 MG extended release capsule Take 1 capsule by mouth daily (Patient taking differently: Take 37.5 mg by mouth daily ) 30 capsule 11    metFORMIN (GLUCOPHAGE) 500 MG tablet TAKE ONE TABLET BY MOUTH TWICE A DAY WITH MEALS (Patient taking differently: Take 1,000 mg by mouth 2 times daily TAKE ONE TABLET BY MOUTH TWICE A DAY WITH MEALS) 180 tablet 3     ALLERGIES     Allergies   Allergen Reactions    Other      Insulin shot but pt is unsure the name    Pcn [Penicillins] Hives and Rash     IMMUNIZATIONS     Immunization History   Administered Date(s) Administered    Influenza, Intradermal, Quadrivalent, Preservative Free 11/14/2017     REVIEW OF SYSTEMS   See HPI for further details and pertinent postiives. Negative for the following:  Constitutional: Negative for weight change. Negative for appetite change and fatigue. HENT: Negative for nosebleeds, sore throat, mouth sores, and voice change. Respiratory: Negative for cough, choking and chest tightness. Cardiovascular: Negative for chest pain   Gastrointestinal: See HPI  Musculoskeletal: Negative for arthralgias. Skin: Negative for pallor. Neurological: Negative for weakness and light-headedness. Hematological: Negative for adenopathy. Does not bruise/bleed easily. Psychiatric/Behavioral: Negative for suicidal ideas.    PHYSICAL EXAM   VITAL SIGNS: /80 (Site: Right Upper Arm)   Ht 6' 4\" (1.93 m)   Wt (!) 313 lb (142 kg)   BMI 38.10 kg/m²   Wt Readings from Last 3 Encounters:   04/09/19 (!) 313 lb (142 kg)   04/04/19 (!) 310 lb 6.4 oz (140.8 kg)   03/19/19 (!) 303 lb (137.4 kg)     Constitutional: Well developed, Well nourished, No acute distress, Non-toxic appearance. HENT: Normocephalic, Atraumatic, Bilateral external ears normal, Oropharynx moist, No oral exudates, Nose normal.   Eyes: Conjunctiva normal, No discharge. Neck: Normal range of motion, No tenderness, Supple, No stridor. Lymphatic: No cervical, subclavian, or axillary lymphadenopathy. Cardiovascular: Normal heart rate, Normal rhythm, No murmurs, No rubs, No gallops. Thorax & Lungs: Normal breath sounds, No respiratory distress, No wheezing, No chest tenderness. No gynecomastia. Abdomen: scars consistent with stated surgeries, no hernias, no HSM, soft NTND    Rectal:  Deferred. Skin: Warm, Dry, No erythema, No rash. No bruising. No spider hemangiomas. Back: No tenderness, No CVA tenderness. Lower Extremities: Intact distal pulses, No edema, No tenderness, No cyanosis, No clubbing. Neurologic: Alert & oriented x 3, Normal motor function, Normal sensory function, No focal deficits noted. No asterixis. RADIOLOGY/PROCEDURES       FINAL IMPRESSION     Orders Placed This Encounter   Procedures    US Gallbladder Ruq     Standing Status:   Future     Standing Expiration Date:   5/9/2019    Maricarmen Titer IgG by IFA Reflex     Standing Status:   Future     Standing Expiration Date:   5/9/2019    Ceruloplasmin     Standing Status:   Future     Standing Expiration Date:   5/9/2019    F-actin (smooth muscle) antibody w/ reflex to titer     Standing Status:   Future     Standing Expiration Date:   5/9/2019    Ferritin     Standing Status:   Future     Standing Expiration Date:   5/9/2019    Iron and TIBC     Standing Status:   Future     Standing Expiration Date:   5/9/2019     Order Specific Question:   Is Patient Fasting? Answer:   no     Order Specific Question:   No of Hours?      Answer:   4  IgA     Standing Status:   Future     Standing Expiration Date:   5/9/2019    Tissue Transglutaminase, IgA     Standing Status:   Future     Standing Expiration Date:   5/9/2019    TSH with Reflex     Standing Status:   Future     Standing Expiration Date:   5/9/2019    Mitochondrial antibodies, M2     Standing Status:   Future     Standing Expiration Date:   5/9/2019    Hepatitis Panel, Acute     Standing Status:   Future     Standing Expiration Date:   5/9/2019    Protime-INR     Standing Status:   Future     Standing Expiration Date:   5/10/2019     Order Specific Question:   Daily Coumadin Dose? Answer:   none-elevated lft's     Vijay Orellana was seen today for establish care. Diagnoses and all orders for this visit:    Cirrhosis of liver without ascites, unspecified hepatic cirrhosis type [facility]  -     Maricarmen Titer IgG by IFA Reflex; Future  -     Ceruloplasmin; Future  -     F-actin (smooth muscle) antibody w/ reflex to titer; Future  -     Ferritin; Future  -     Iron and TIBC; Future  -     IgA; Future  -     Tissue Transglutaminase, IgA; Future  -     TSH with Reflex; Future  -     US Gallbladder Ruq; Future  -     Mitochondrial antibodies, M2; Future  -     Hepatitis Panel, Acute; Future  -     Protime-INR; Future      Discussed the need for routine follow up for cirrhosis at a minimum of every 6 months to monitor for risk factor such as ascites, bleeding, hepatoma, encephalopathy including need for routing labs, imaging and endoscopy. I suggested diagnostic EGD to screen for varices as long as his plt count is > 40k. If significant varices then could repeat with banding using preprocedural doptelet to decrease bleeding risk. Since this generally requires multiple sessions to decrease variceal size, repeated prescriptions for doptelet would be very expensive.   Thrombocytopenia secondary to portal hypertensive related splenomegaly has been shown to increase plt count but likely not become common or standard of care given high morbidity in these patients. ORDERED FUTURE/PENDING TESTS     Lab Frequency Next Occurrence   PET CT SKULL BASE TO MID THIGH Once 04/02/2019   CT Chest WO Contrast Once 06/21/2019       FOLLOWUP   Return in about 6 months (around 10/9/2019), or probable egd if platlet count ok.           Liliana CARTAGENA 4/9/19 3:13 PM    CC:  Loraine Reid MD

## 2019-05-02 ENCOUNTER — OFFICE VISIT (OUTPATIENT)
Dept: FAMILY MEDICINE CLINIC | Age: 39
End: 2019-05-02
Payer: COMMERCIAL

## 2019-05-02 VITALS
HEART RATE: 73 BPM | WEIGHT: 315 LBS | DIASTOLIC BLOOD PRESSURE: 90 MMHG | OXYGEN SATURATION: 98 % | SYSTOLIC BLOOD PRESSURE: 142 MMHG | TEMPERATURE: 97.9 F | BODY MASS INDEX: 39.12 KG/M2

## 2019-05-02 DIAGNOSIS — E11.65 TYPE 2 DIABETES MELLITUS WITH HYPERGLYCEMIA, WITH LONG-TERM CURRENT USE OF INSULIN (HCC): Primary | ICD-10-CM

## 2019-05-02 DIAGNOSIS — Z79.4 TYPE 2 DIABETES MELLITUS WITH HYPERGLYCEMIA, WITH LONG-TERM CURRENT USE OF INSULIN (HCC): Primary | ICD-10-CM

## 2019-05-02 DIAGNOSIS — E66.01 CLASS 3 SEVERE OBESITY DUE TO EXCESS CALORIES WITHOUT SERIOUS COMORBIDITY WITH BODY MASS INDEX (BMI) OF 40.0 TO 44.9 IN ADULT (HCC): ICD-10-CM

## 2019-05-02 DIAGNOSIS — I10 ESSENTIAL HYPERTENSION: ICD-10-CM

## 2019-05-02 PROCEDURE — 2022F DILAT RTA XM EVC RTNOPTHY: CPT | Performed by: FAMILY MEDICINE

## 2019-05-02 PROCEDURE — G8427 DOCREV CUR MEDS BY ELIG CLIN: HCPCS | Performed by: FAMILY MEDICINE

## 2019-05-02 PROCEDURE — G8417 CALC BMI ABV UP PARAM F/U: HCPCS | Performed by: FAMILY MEDICINE

## 2019-05-02 PROCEDURE — 1036F TOBACCO NON-USER: CPT | Performed by: FAMILY MEDICINE

## 2019-05-02 PROCEDURE — 99214 OFFICE O/P EST MOD 30 MIN: CPT | Performed by: FAMILY MEDICINE

## 2019-05-02 PROCEDURE — 3046F HEMOGLOBIN A1C LEVEL >9.0%: CPT | Performed by: FAMILY MEDICINE

## 2019-05-02 RX ORDER — AMLODIPINE BESYLATE 5 MG/1
5 TABLET ORAL DAILY
Qty: 30 TABLET | Refills: 3 | Status: SHIPPED | OUTPATIENT
Start: 2019-05-02 | End: 2019-09-05 | Stop reason: SDUPTHER

## 2019-05-02 NOTE — PROGRESS NOTES
differently: Take 1,000 mg by mouth 2 times daily TAKE ONE TABLET BY MOUTH TWICE A DAY WITH MEALS Yes Arben MD Regulo        Social History     Tobacco Use    Smoking status: Never Smoker    Smokeless tobacco: Never Used   Substance Use Topics    Alcohol use: No        Vitals:    05/02/19 1051   BP: (!) 142/90   Site: Left Upper Arm   Pulse: 73   Temp: 97.9 °F (36.6 °C)   TempSrc: Oral   SpO2: 98%   Weight: (!) 321 lb 6.4 oz (145.8 kg)     Estimated body mass index is 39.12 kg/m² as calculated from the following:    Height as of 4/9/19: 6' 4\" (1.93 m). Weight as of this encounter: 321 lb 6.4 oz (145.8 kg). Physical Exam  Constitutional: appears well-developed and well-nourished. No distress. HENT:   Head: Normocephalic and atraumatic   Eyes: EOM are normal. Right eye exhibits no discharge. Left eye exhibits no discharge   Pulmonary/Chest: Effort normal   Skin: Patient is not diaphoretic     ASSESSMENT/PLAN:  Dalila Harley was seen today for 1 month follow-up. Diagnoses and all orders for this visit:    Type 2 diabetes mellitus with hyperglycemia, with long-term current use of insulin (HCC) increase lantus, cont metformin, had a low of 70 so keeping novolog the same, if a1c above goal in 1 month consider adding an oral medication  -     insulin glargine (LANTUS SOLOSTAR) 100 UNIT/ML injection pen; Inject 37 Units into the skin nightly  - return in 1 month for a1c check    Essential hypertension  uncontrolled, adding norvasc    -     amLODIPine (NORVASC) 5 MG tablet; Take 1 tablet by mouth daily    Class 3 severe obesity due to excess calories without serious comorbidity with body mass index (BMI) of 40.0 to 44.9 in [facility])  Diet, exercise      Return in about 1 month (around 6/2/2019). An electronic signature was used to authenticate this note.     --Julianne Burdick MD on 5/2/2019 at 1:13 PM

## 2019-06-06 ENCOUNTER — OFFICE VISIT (OUTPATIENT)
Dept: FAMILY MEDICINE CLINIC | Age: 39
End: 2019-06-06
Payer: COMMERCIAL

## 2019-06-06 VITALS
HEIGHT: 76 IN | DIASTOLIC BLOOD PRESSURE: 88 MMHG | RESPIRATION RATE: 16 BRPM | WEIGHT: 315 LBS | OXYGEN SATURATION: 97 % | TEMPERATURE: 98.1 F | HEART RATE: 82 BPM | SYSTOLIC BLOOD PRESSURE: 138 MMHG | BODY MASS INDEX: 38.36 KG/M2

## 2019-06-06 DIAGNOSIS — E11.65 TYPE 2 DIABETES MELLITUS WITH HYPERGLYCEMIA, WITH LONG-TERM CURRENT USE OF INSULIN (HCC): Primary | ICD-10-CM

## 2019-06-06 DIAGNOSIS — I10 ESSENTIAL HYPERTENSION: ICD-10-CM

## 2019-06-06 DIAGNOSIS — E66.01 CLASS 3 SEVERE OBESITY DUE TO EXCESS CALORIES WITHOUT SERIOUS COMORBIDITY WITH BODY MASS INDEX (BMI) OF 40.0 TO 44.9 IN ADULT (HCC): ICD-10-CM

## 2019-06-06 DIAGNOSIS — Z79.4 TYPE 2 DIABETES MELLITUS WITH HYPERGLYCEMIA, WITH LONG-TERM CURRENT USE OF INSULIN (HCC): Primary | ICD-10-CM

## 2019-06-06 LAB — HBA1C MFR BLD: 7.3 %

## 2019-06-06 PROCEDURE — G8427 DOCREV CUR MEDS BY ELIG CLIN: HCPCS | Performed by: FAMILY MEDICINE

## 2019-06-06 PROCEDURE — G8417 CALC BMI ABV UP PARAM F/U: HCPCS | Performed by: FAMILY MEDICINE

## 2019-06-06 PROCEDURE — 99214 OFFICE O/P EST MOD 30 MIN: CPT | Performed by: FAMILY MEDICINE

## 2019-06-06 PROCEDURE — 3045F PR MOST RECENT HEMOGLOBIN A1C LEVEL 7.0-9.0%: CPT | Performed by: FAMILY MEDICINE

## 2019-06-06 PROCEDURE — 83036 HEMOGLOBIN GLYCOSYLATED A1C: CPT | Performed by: FAMILY MEDICINE

## 2019-06-06 PROCEDURE — 1036F TOBACCO NON-USER: CPT | Performed by: FAMILY MEDICINE

## 2019-06-06 PROCEDURE — 2022F DILAT RTA XM EVC RTNOPTHY: CPT | Performed by: FAMILY MEDICINE

## 2019-06-06 RX ORDER — BENAZEPRIL HYDROCHLORIDE 40 MG/1
40 TABLET, FILM COATED ORAL DAILY
Qty: 90 TABLET | Refills: 3 | Status: SHIPPED | OUTPATIENT
Start: 2019-06-06 | End: 2020-04-07

## 2019-06-06 ASSESSMENT — ENCOUNTER SYMPTOMS: BACK PAIN: 1

## 2019-06-26 ENCOUNTER — TELEPHONE (OUTPATIENT)
Dept: PULMONOLOGY | Age: 39
End: 2019-06-26

## 2019-07-01 ENCOUNTER — HOSPITAL ENCOUNTER (OUTPATIENT)
Dept: CT IMAGING | Age: 39
Discharge: HOME | End: 2019-07-01
Payer: COMMERCIAL

## 2019-07-01 DIAGNOSIS — R91.1 RIGHT UPPER LOBE PULMONARY NODULE: ICD-10-CM

## 2019-07-01 PROCEDURE — 71250 CT THORAX DX C-: CPT

## 2019-07-02 NOTE — TELEPHONE ENCOUNTER
Notes recorded by Virgilio Bence, MA on 7/1/2019 at 2:26 PM EDT  Patient notified stable nodule and will discuss at next visit on 9/23/19 and repeat ct in 6 months patient verbalized understanding.  ------    Notes recorded by Virgilio Bence, MA on 7/1/2019 at 2:19 PM EDT  Patient called with message left for patient to call back to office.      ------    Notes recorded by Tonia Kaiser MD on 7/1/2019 at 1:43 PM EDT  Notify patient stable nodules  Will discuss next visit  Repeat CT chest in 6 months

## 2019-07-03 DIAGNOSIS — E87.1 HYPONATREMIA: Primary | ICD-10-CM

## 2019-09-05 ENCOUNTER — OFFICE VISIT (OUTPATIENT)
Dept: FAMILY MEDICINE CLINIC | Age: 39
End: 2019-09-05
Payer: COMMERCIAL

## 2019-09-05 VITALS
OXYGEN SATURATION: 98 % | HEIGHT: 76 IN | BODY MASS INDEX: 38.36 KG/M2 | WEIGHT: 315 LBS | HEART RATE: 71 BPM | DIASTOLIC BLOOD PRESSURE: 92 MMHG | SYSTOLIC BLOOD PRESSURE: 148 MMHG

## 2019-09-05 DIAGNOSIS — I10 ESSENTIAL HYPERTENSION: ICD-10-CM

## 2019-09-05 DIAGNOSIS — E11.65 TYPE 2 DIABETES MELLITUS WITH HYPERGLYCEMIA, WITH LONG-TERM CURRENT USE OF INSULIN (HCC): Primary | ICD-10-CM

## 2019-09-05 DIAGNOSIS — E66.01 CLASS 3 SEVERE OBESITY DUE TO EXCESS CALORIES WITHOUT SERIOUS COMORBIDITY WITH BODY MASS INDEX (BMI) OF 40.0 TO 44.9 IN ADULT (HCC): ICD-10-CM

## 2019-09-05 DIAGNOSIS — Z79.4 TYPE 2 DIABETES MELLITUS WITH HYPERGLYCEMIA, WITH LONG-TERM CURRENT USE OF INSULIN (HCC): Primary | ICD-10-CM

## 2019-09-05 DIAGNOSIS — M79.671 PAIN OF RIGHT HEEL: ICD-10-CM

## 2019-09-05 LAB — HBA1C MFR BLD: 8.7 %

## 2019-09-05 PROCEDURE — 1036F TOBACCO NON-USER: CPT | Performed by: FAMILY MEDICINE

## 2019-09-05 PROCEDURE — G8427 DOCREV CUR MEDS BY ELIG CLIN: HCPCS | Performed by: FAMILY MEDICINE

## 2019-09-05 PROCEDURE — G8417 CALC BMI ABV UP PARAM F/U: HCPCS | Performed by: FAMILY MEDICINE

## 2019-09-05 PROCEDURE — 3045F PR MOST RECENT HEMOGLOBIN A1C LEVEL 7.0-9.0%: CPT | Performed by: FAMILY MEDICINE

## 2019-09-05 PROCEDURE — 99214 OFFICE O/P EST MOD 30 MIN: CPT | Performed by: FAMILY MEDICINE

## 2019-09-05 PROCEDURE — 83036 HEMOGLOBIN GLYCOSYLATED A1C: CPT | Performed by: FAMILY MEDICINE

## 2019-09-05 PROCEDURE — 2022F DILAT RTA XM EVC RTNOPTHY: CPT | Performed by: FAMILY MEDICINE

## 2019-09-05 RX ORDER — AMLODIPINE BESYLATE 5 MG/1
5 TABLET ORAL DAILY
Qty: 90 TABLET | Refills: 3 | Status: SHIPPED | OUTPATIENT
Start: 2019-09-05 | End: 2021-01-11

## 2019-09-12 ENCOUNTER — OFFICE VISIT (OUTPATIENT)
Dept: FAMILY MEDICINE CLINIC | Age: 39
End: 2019-09-12
Payer: COMMERCIAL

## 2019-09-12 VITALS
HEIGHT: 76 IN | WEIGHT: 315 LBS | OXYGEN SATURATION: 98 % | RESPIRATION RATE: 16 BRPM | DIASTOLIC BLOOD PRESSURE: 78 MMHG | BODY MASS INDEX: 38.36 KG/M2 | HEART RATE: 74 BPM | SYSTOLIC BLOOD PRESSURE: 140 MMHG

## 2019-09-12 DIAGNOSIS — E11.65 TYPE 2 DIABETES MELLITUS WITH HYPERGLYCEMIA, WITH LONG-TERM CURRENT USE OF INSULIN (HCC): Primary | ICD-10-CM

## 2019-09-12 DIAGNOSIS — Z79.4 TYPE 2 DIABETES MELLITUS WITH HYPERGLYCEMIA, WITH LONG-TERM CURRENT USE OF INSULIN (HCC): Primary | ICD-10-CM

## 2019-09-12 DIAGNOSIS — E66.01 CLASS 2 SEVERE OBESITY DUE TO EXCESS CALORIES WITH SERIOUS COMORBIDITY AND BODY MASS INDEX (BMI) OF 39.0 TO 39.9 IN ADULT (HCC): ICD-10-CM

## 2019-09-12 DIAGNOSIS — I10 ESSENTIAL HYPERTENSION: ICD-10-CM

## 2019-09-12 PROCEDURE — 2022F DILAT RTA XM EVC RTNOPTHY: CPT | Performed by: FAMILY MEDICINE

## 2019-09-12 PROCEDURE — G8427 DOCREV CUR MEDS BY ELIG CLIN: HCPCS | Performed by: FAMILY MEDICINE

## 2019-09-12 PROCEDURE — G8417 CALC BMI ABV UP PARAM F/U: HCPCS | Performed by: FAMILY MEDICINE

## 2019-09-12 PROCEDURE — 1036F TOBACCO NON-USER: CPT | Performed by: FAMILY MEDICINE

## 2019-09-12 PROCEDURE — 99214 OFFICE O/P EST MOD 30 MIN: CPT | Performed by: FAMILY MEDICINE

## 2019-09-12 PROCEDURE — 3045F PR MOST RECENT HEMOGLOBIN A1C LEVEL 7.0-9.0%: CPT | Performed by: FAMILY MEDICINE

## 2019-09-23 ENCOUNTER — TELEPHONE (OUTPATIENT)
Dept: PULMONOLOGY | Age: 39
End: 2019-09-23

## 2020-02-14 ENCOUNTER — OFFICE VISIT (OUTPATIENT)
Dept: FAMILY MEDICINE CLINIC | Age: 40
End: 2020-02-14
Payer: COMMERCIAL

## 2020-02-14 VITALS
WEIGHT: 303 LBS | OXYGEN SATURATION: 97 % | HEART RATE: 83 BPM | DIASTOLIC BLOOD PRESSURE: 74 MMHG | HEIGHT: 76 IN | SYSTOLIC BLOOD PRESSURE: 120 MMHG | BODY MASS INDEX: 36.9 KG/M2

## 2020-02-14 LAB — HBA1C MFR BLD: 9.3 %

## 2020-02-14 PROCEDURE — G8417 CALC BMI ABV UP PARAM F/U: HCPCS | Performed by: FAMILY MEDICINE

## 2020-02-14 PROCEDURE — 1036F TOBACCO NON-USER: CPT | Performed by: FAMILY MEDICINE

## 2020-02-14 PROCEDURE — 3046F HEMOGLOBIN A1C LEVEL >9.0%: CPT | Performed by: FAMILY MEDICINE

## 2020-02-14 PROCEDURE — 2022F DILAT RTA XM EVC RTNOPTHY: CPT | Performed by: FAMILY MEDICINE

## 2020-02-14 PROCEDURE — G8484 FLU IMMUNIZE NO ADMIN: HCPCS | Performed by: FAMILY MEDICINE

## 2020-02-14 PROCEDURE — 83036 HEMOGLOBIN GLYCOSYLATED A1C: CPT | Performed by: FAMILY MEDICINE

## 2020-02-14 PROCEDURE — 99214 OFFICE O/P EST MOD 30 MIN: CPT | Performed by: FAMILY MEDICINE

## 2020-02-14 PROCEDURE — G8427 DOCREV CUR MEDS BY ELIG CLIN: HCPCS | Performed by: FAMILY MEDICINE

## 2020-02-14 RX ORDER — DULOXETIN HYDROCHLORIDE 30 MG/1
30 CAPSULE, DELAYED RELEASE ORAL DAILY
Qty: 30 CAPSULE | Refills: 3 | Status: SHIPPED | OUTPATIENT
Start: 2020-02-14 | End: 2020-06-25

## 2020-02-14 RX ORDER — VENLAFAXINE 25 MG/1
25 TABLET ORAL DAILY
Qty: 90 TABLET | Refills: 3 | Status: SHIPPED | OUTPATIENT
Start: 2020-02-14 | End: 2021-02-26 | Stop reason: SDUPTHER

## 2020-02-14 NOTE — PROGRESS NOTES
2020     Eva Patel (:  1980)is a 41 y.o. male, here for evaluation of the following medical concerns:    CC: DMII    HPI    Type 2 diabetes mellitus with hyperglycemia, with long-term current use of insulin ([facility]  Quit taking short and long insulin and metformin  A1c of 9.3 on today's visit    Essential hypertension  Controlled, taking BP meds as prescribed    Class 2 severe obesity due to excess calories with serious comorbidity and body mass index (BMI) of 39.0 to 39.9 in adult ([facility] [address]  BMI of 36    Depression  would like to try a different medication, has a hard time coming off of effexor    Review of Systems   Endocrine: Negative for polyuria. Neurological: Positive for numbness. In feet, bilaterally      Prior to Visit Medications    Medication Sig Taking?  Authorizing Provider   insulin glargine (LANTUS SOLOSTAR) 100 UNIT/ML injection pen Inject 41 Units into the skin nightly Yes Renetta Matute MD   insulin aspart (NOVOLOG FLEXPEN) 100 UNIT/ML injection pen Inject 11 Units into the skin 3 times daily (before meals) Yes Renetta Matute MD   metFORMIN (GLUCOPHAGE) 1000 MG tablet Take 2 tablets by mouth daily Shahnaz Chaitanya Yes Renetta Matute MD   DULoxetine (CYMBALTA) 30 MG extended release capsule Take 1 capsule by mouth daily Yes Renetta Matute MD   venlafaxine (EFFEXOR) 25 MG tablet Take 1 tablet by mouth daily Yes Renetta Matute MD   amLODIPine (NORVASC) 5 MG tablet Take 1 tablet by mouth daily Yes Renetta Matute MD   hydrochlorothiazide (HYDRODIURIL) 25 MG tablet Take 1 tablet by mouth every morning Yes Renetta Matute MD   benazepril (LOTENSIN) 40 MG tablet Take 1 tablet by mouth daily Yes Renetta Matute MD   Insulin Pen Needle ([employer] PEN NEEDLES) 31G X 6 MM MISC 1 each by Does not apply route daily Yes Renetta Matute MD        Social History     Tobacco Use    Smoking status: Never Smoker    Smokeless tobacco: Never Used   Substance Use Topics    Alcohol use: No        Vitals:    02/14/20 1411   BP: 120/74   Site: Right Upper Arm   Position: Sitting   Cuff Size: Large Adult   Pulse: 83   SpO2: 97%   Weight: (!) 303 lb (137.4 kg)   Height: 6' 4\" (1.93 m)     Estimated body mass index is 36.88 kg/m² as calculated from the following:    Height as of this encounter: 6' 4\" (1.93 m). Weight as of this encounter: 303 lb (137.4 kg). Physical Exam  Constitutional: appears obese. No distress. HENT:   Head: Normocephalic and atraumatic   Eyes: EOM are normal. Right eye exhibits no discharge. Left eye exhibits no discharge   Pulmonary: Effort normal, CTAB, no W/R/R  Cardio: RRR, no M/R/G  Skin: Patient is not diaphoretic     ASSESSMENT/PLAN:  Blu Chavez was seen today for 3 month follow-up, hypertension and diabetes. Diagnoses and all orders for this visit:    Type 2 diabetes mellitus with hyperglycemia, with long-term current use of insulin ([address]  Not at goal, restarting insulin and metformin  -     insulin glargine (LANTUS SOLOSTAR) 100 UNIT/ML injection pen; Inject 41 Units into the skin nightly  -     insulin aspart (NOVOLOG FLEXPEN) 100 UNIT/ML injection pen; Inject 11 Units into the skin 3 times daily (before meals)  -     metFORMIN (GLUCOPHAGE) 1000 MG tablet; Take 2 tablets by mouth daily TAKE ONE TABLET BY MOUTH TWICE A DAY WITH MEALS    Essential hypertension  Controlled, continue taking BP medications as prescribed    Class 2 severe obesity due to excess calories with serious comorbidity and body mass index (BMI) of 39.0 to 39.9 in adult [facility])    Depression, unspecified depression type  Ok to start 30mg of cymbalta daily, and decrease to 25mg of effexor daily. After 1 wk cut effexor pill in half and take for 1 week, then stop effexor  -     DULoxetine (CYMBALTA) 30 MG extended release capsule; Take 1 capsule by mouth daily  -     venlafaxine (EFFEXOR) 25 MG tablet;  Take 1 tablet by mouth daily    Return in about 3 months (around 5/14/2020) for a1c check. An electronic signature was used to authenticate this note.     --Renetta Matute MD on 2/14/2020 at 2:54 PM

## 2020-10-26 RX ORDER — BENAZEPRIL HYDROCHLORIDE 40 MG/1
TABLET, FILM COATED ORAL
Qty: 30 TABLET | Refills: 4 | Status: SHIPPED | OUTPATIENT
Start: 2020-10-26 | End: 2021-05-10

## 2020-10-26 NOTE — TELEPHONE ENCOUNTER
Refill Request     Last Seen: 2/14/2020    Last Written: 4/7/2020    Next Appointment:   No future appointments.           Requested Prescriptions     Pending Prescriptions Disp Refills    benazepril (LOTENSIN) 40 MG tablet [Pharmacy Med Name: BENAZEPRIL HCL 40 MG TABLET] 30 tablet 4     Sig: TAKE ONE TABLET BY MOUTH DAILY

## 2021-01-11 ENCOUNTER — OFFICE VISIT (OUTPATIENT)
Dept: FAMILY MEDICINE CLINIC | Age: 41
End: 2021-01-11
Payer: COMMERCIAL

## 2021-01-11 VITALS
HEIGHT: 76 IN | DIASTOLIC BLOOD PRESSURE: 78 MMHG | BODY MASS INDEX: 35.95 KG/M2 | WEIGHT: 295.2 LBS | TEMPERATURE: 97.7 F | HEART RATE: 82 BPM | OXYGEN SATURATION: 99 % | SYSTOLIC BLOOD PRESSURE: 128 MMHG

## 2021-01-11 DIAGNOSIS — Z79.4 TYPE 2 DIABETES MELLITUS WITH HYPERGLYCEMIA, WITH LONG-TERM CURRENT USE OF INSULIN (HCC): Primary | ICD-10-CM

## 2021-01-11 DIAGNOSIS — Z11.59 ENCOUNTER FOR HEPATITIS C SCREENING TEST FOR LOW RISK PATIENT: ICD-10-CM

## 2021-01-11 DIAGNOSIS — Z11.4 SCREENING FOR HIV (HUMAN IMMUNODEFICIENCY VIRUS): ICD-10-CM

## 2021-01-11 DIAGNOSIS — I10 ESSENTIAL HYPERTENSION: ICD-10-CM

## 2021-01-11 DIAGNOSIS — Z13.29 SCREENING FOR THYROID DISORDER: ICD-10-CM

## 2021-01-11 DIAGNOSIS — E11.65 TYPE 2 DIABETES MELLITUS WITH HYPERGLYCEMIA, WITH LONG-TERM CURRENT USE OF INSULIN (HCC): Primary | ICD-10-CM

## 2021-01-11 PROCEDURE — G8417 CALC BMI ABV UP PARAM F/U: HCPCS | Performed by: NURSE PRACTITIONER

## 2021-01-11 PROCEDURE — G8484 FLU IMMUNIZE NO ADMIN: HCPCS | Performed by: NURSE PRACTITIONER

## 2021-01-11 PROCEDURE — 1036F TOBACCO NON-USER: CPT | Performed by: NURSE PRACTITIONER

## 2021-01-11 PROCEDURE — G8427 DOCREV CUR MEDS BY ELIG CLIN: HCPCS | Performed by: NURSE PRACTITIONER

## 2021-01-11 PROCEDURE — 36415 COLL VENOUS BLD VENIPUNCTURE: CPT | Performed by: NURSE PRACTITIONER

## 2021-01-11 PROCEDURE — 99213 OFFICE O/P EST LOW 20 MIN: CPT | Performed by: NURSE PRACTITIONER

## 2021-01-11 PROCEDURE — 3046F HEMOGLOBIN A1C LEVEL >9.0%: CPT | Performed by: NURSE PRACTITIONER

## 2021-01-11 PROCEDURE — 2022F DILAT RTA XM EVC RTNOPTHY: CPT | Performed by: NURSE PRACTITIONER

## 2021-01-11 ASSESSMENT — PATIENT HEALTH QUESTIONNAIRE - PHQ9
SUM OF ALL RESPONSES TO PHQ9 QUESTIONS 1 & 2: 0
SUM OF ALL RESPONSES TO PHQ QUESTIONS 1-9: 0
SUM OF ALL RESPONSES TO PHQ QUESTIONS 1-9: 0
1. LITTLE INTEREST OR PLEASURE IN DOING THINGS: 0
2. FEELING DOWN, DEPRESSED OR HOPELESS: 0

## 2021-01-11 NOTE — PROGRESS NOTES
Patient: Zay Freeman is a 41 y.o. male who presents today with the following Chief Complaint(s):  Chief Complaint   Patient presents with   Sangeetha Baker Established New Doctor     Follow up          HPI-this is a 42-year-old male patient establishing care here today. He has a history of diabetes in which he told me he is not taking any of his insulin or oral antidiabetic's for quite some time now due to weight loss. He does not check his sugars and I explained to him the risks of this behavior. He does have a history also of hypertension in which he was taking Benzapril 40 mg tablet, hydrochlorothiazide tablet, amlodipine. He stopped taking the hydrochlorothiazide and the amlodipine and is only taking the Benzapril due to his current weight loss. His blood pressure is stable but again I explained to him the risks of his behavior of just discontinuing medication without notifying his primary care physician. He stated he is eating good and getting great exercise.   We talked about his current care gaps he does not want the Tdap or flu shot today  He states that he gets his eyes checked  Current Outpatient Medications   Medication Sig Dispense Refill    benazepril (LOTENSIN) 40 MG tablet TAKE ONE TABLET BY MOUTH DAILY 30 tablet 4    venlafaxine (EFFEXOR) 25 MG tablet Take 1 tablet by mouth daily 90 tablet 3    insulin glargine (LANTUS SOLOSTAR) 100 UNIT/ML injection pen Inject 41 Units into the skin nightly (Patient not taking: Reported on 1/11/2021) 5 pen 5    insulin aspart (NOVOLOG FLEXPEN) 100 UNIT/ML injection pen Inject 11 Units into the skin 3 times daily (before meals) (Patient not taking: Reported on 1/11/2021) 5 pen 3    metFORMIN (GLUCOPHAGE) 1000 MG tablet Take 2 tablets by mouth daily TAKE ONE TABLET BY MOUTH TWICE A DAY WITH MEALS (Patient not taking: Reported on 1/11/2021) 180 tablet 3    Insulin Pen Needle ([employer] PEN NEEDLES) 31G X 6 MM MISC 1 each by Does not apply route daily (Patient not taking: Reported on 1/11/2021) 100 each 3     No current facility-administered medications for this visit. Patient's past medical history, surgical history, family history, medications,  and allergies  were all reviewed and updated as appropriate today. Review of Systems   All other systems reviewed and are negative. Physical Exam  Vitals signs and nursing note reviewed. Constitutional:       Appearance: He is well-developed. He is obese. HENT:      Head: Normocephalic. Right Ear: Tympanic membrane and external ear normal.      Left Ear: Tympanic membrane and external ear normal.      Nose: Nose normal.      Mouth/Throat:      Mouth: Mucous membranes are moist.      Pharynx: Oropharynx is clear. Eyes:      Conjunctiva/sclera: Conjunctivae normal.      Pupils: Pupils are equal, round, and reactive to light. Neck:      Musculoskeletal: Normal range of motion. Cardiovascular:      Rate and Rhythm: Normal rate and regular rhythm. Heart sounds: Normal heart sounds. No murmur. Pulmonary:      Effort: Pulmonary effort is normal.      Breath sounds: Normal breath sounds. No wheezing. Abdominal:      General: Bowel sounds are normal.      Palpations: Abdomen is soft. There is no mass. Musculoskeletal: Normal range of motion. Lymphadenopathy:      Cervical: No cervical adenopathy. Skin:     General: Skin is warm and dry. Neurological:      General: No focal deficit present. Mental Status: He is alert and oriented to person, place, and time. Psychiatric:         Mood and Affect: Mood normal.         Behavior: Behavior normal.         Thought Content: Thought content normal.         Judgment: Judgment normal.       Vitals:    01/11/21 1601   BP: 128/78   Pulse: 82   Temp: 97.7 °F (36.5 °C)   SpO2: 99%       Assessment:  Encounter Diagnoses   Name Primary?     Type 2 diabetes mellitus with hyperglycemia, with long-term current use of insulin ([facility] [address] Yes    Essential hypertension     Screening for HIV (human immunodeficiency virus)     Screening for thyroid disorder        Controlled SubstancesMonitoring:  NA    Plan:  1. Type 2 diabetes mellitus with hyperglycemia, with long-term current use of insulin (HCC)  Noncompliant-took himself all all medications  - Hemoglobin A1C  - Lipid Panel  Depending on his lab work if he is willing to go back on his medications he will need to be seen within a month or 2    2. Essential hypertension  Noncompliant-took himself off hydrochlorothiazide and amlodipine  - CBC Auto Differential  - Comprehensive Metabolic Panel    3. Screening for HIV (human immunodeficiency virus)  - HIV Screen    4. Screening for thyroid disorder  - TSH with Reflex    5. Screening for Hep C  Hep C BETTY Arango    Reviewed treatment plan with patient. Patient verbalized understanding to treatment plan and questions were answered. [address]  [place], [address] [place] [address]

## 2021-01-12 LAB
A/G RATIO: 1.3 (ref 1.1–2.2)
ALBUMIN SERPL-MCNC: 3.7 G/DL (ref 3.4–5)
ALP BLD-CCNC: 90 U/L (ref 40–129)
ALT SERPL-CCNC: 46 U/L (ref 10–40)
ANION GAP SERPL CALCULATED.3IONS-SCNC: 14 MMOL/L (ref 3–16)
AST SERPL-CCNC: 41 U/L (ref 15–37)
BILIRUB SERPL-MCNC: 1.5 MG/DL (ref 0–1)
BUN BLDV-MCNC: 13 MG/DL (ref 7–20)
CALCIUM SERPL-MCNC: 9 MG/DL (ref 8.3–10.6)
CHLORIDE BLD-SCNC: 96 MMOL/L (ref 99–110)
CHOLESTEROL, TOTAL: 184 MG/DL (ref 0–199)
CO2: 21 MMOL/L (ref 21–32)
CREAT SERPL-MCNC: 0.6 MG/DL (ref 0.9–1.3)
GFR AFRICAN AMERICAN: >60
GFR NON-AFRICAN AMERICAN: >60
GLOBULIN: 2.9 G/DL
GLUCOSE BLD-MCNC: 371 MG/DL (ref 70–99)
HDLC SERPL-MCNC: 40 MG/DL (ref 40–60)
HIV AG/AB: NORMAL
HIV ANTIGEN: NORMAL
HIV-1 ANTIBODY: NORMAL
HIV-2 AB: NORMAL
LDL CHOLESTEROL CALCULATED: 110 MG/DL
POTASSIUM SERPL-SCNC: 4.6 MMOL/L (ref 3.5–5.1)
SODIUM BLD-SCNC: 131 MMOL/L (ref 136–145)
TOTAL PROTEIN: 6.6 G/DL (ref 6.4–8.2)
TRIGL SERPL-MCNC: 170 MG/DL (ref 0–150)
TSH REFLEX: 1.54 UIU/ML (ref 0.27–4.2)
VLDLC SERPL CALC-MCNC: 34 MG/DL

## 2021-01-13 DIAGNOSIS — Z79.4 TYPE 2 DIABETES MELLITUS WITH HYPERGLYCEMIA, WITH LONG-TERM CURRENT USE OF INSULIN (HCC): ICD-10-CM

## 2021-01-13 DIAGNOSIS — E11.65 TYPE 2 DIABETES MELLITUS WITH HYPERGLYCEMIA, WITH LONG-TERM CURRENT USE OF INSULIN (HCC): ICD-10-CM

## 2021-01-13 RX ORDER — INSULIN ASPART 100 [IU]/ML
11 INJECTION, SOLUTION INTRAVENOUS; SUBCUTANEOUS
Qty: 5 PEN | Refills: 0 | Status: SHIPPED | OUTPATIENT
Start: 2021-01-13 | End: 2021-03-23 | Stop reason: DRUGHIGH

## 2021-01-13 RX ORDER — INSULIN GLARGINE 100 [IU]/ML
41 INJECTION, SOLUTION SUBCUTANEOUS NIGHTLY
Qty: 5 PEN | Refills: 0 | Status: SHIPPED | OUTPATIENT
Start: 2021-01-13 | End: 2021-03-04 | Stop reason: SDUPTHER

## 2021-01-13 NOTE — PROGRESS NOTES
Spoke to spouse per result note. Spouse is going to relay message and have patient start back on medication. We scheduled patient back for a follow up in 1 month. Patient needs new Rx's due to previous Rx's being outdated. Per spouse patient had previously discussed changing medications. Will discuss at [address]

## 2021-02-23 ENCOUNTER — TELEPHONE (OUTPATIENT)
Dept: FAMILY MEDICINE CLINIC | Age: 41
End: 2021-02-23

## 2021-02-23 NOTE — TELEPHONE ENCOUNTER
Left message on patients machine to have fasting labs drawn prior to VV with [facility] on Thursday 2/25/21. [facility] Nothing to eat or drink 8 hours prior (may have water/black coffee). (Ok to leave message on machine per HIPPA).

## 2021-02-26 ENCOUNTER — TELEPHONE (OUTPATIENT)
Dept: FAMILY MEDICINE CLINIC | Age: 41
End: 2021-02-26

## 2021-02-26 DIAGNOSIS — F32.A DEPRESSION, UNSPECIFIED DEPRESSION TYPE: ICD-10-CM

## 2021-02-26 RX ORDER — VENLAFAXINE 25 MG/1
25 TABLET ORAL DAILY
Qty: 90 TABLET | Refills: 3 | Status: SHIPPED | OUTPATIENT
Start: 2021-02-26 | End: 2021-03-23 | Stop reason: ALTCHOICE

## 2021-03-03 DIAGNOSIS — E11.65 TYPE 2 DIABETES MELLITUS WITH HYPERGLYCEMIA, WITH LONG-TERM CURRENT USE OF INSULIN (HCC): ICD-10-CM

## 2021-03-03 DIAGNOSIS — Z79.4 TYPE 2 DIABETES MELLITUS WITH HYPERGLYCEMIA, WITH LONG-TERM CURRENT USE OF INSULIN (HCC): ICD-10-CM

## 2021-03-03 NOTE — TELEPHONE ENCOUNTER
----- Message from Trang George sent at 3/3/2021  4:38 PM EST -----  Subject: Refill Request    QUESTIONS  Name of Medication? insulin glargine (LANTUS SOLOSTAR) 100 UNIT/ML   injection pen  Patient-reported dosage and instructions? 1 a night   How many days do you have left? 0  Preferred Pharmacy? [facility]  Pharmacy phone number (if available)? [phone]  ---------------------------------------------------------------------------  --------------    Name of Medication? insulin aspart (NOVOLOG FLEXPEN) 100 UNIT/ML injection   pen  Patient-reported dosage and instructions? 3 times a day   How many days do you have left? 0  Preferred Pharmacy? [facility]  Pharmacy phone number (if available)? [phone]  ---------------------------------------------------------------------------  --------------  CALL BACK INFO  What is the best way for the office to contact you? OK to leave message on   voicemail  Preferred Call Back Phone Number?  [phone]

## 2021-03-04 RX ORDER — INSULIN GLARGINE 100 [IU]/ML
41 INJECTION, SOLUTION SUBCUTANEOUS NIGHTLY
Qty: 5 PEN | Refills: 0 | Status: SHIPPED | OUTPATIENT
Start: 2021-03-04 | End: 2021-03-23 | Stop reason: DRUGHIGH

## 2021-03-05 NOTE — TELEPHONE ENCOUNTER
Left message on both cell and home phone numbers to return call and schedule video visit and also to inform Dalila Farnsworth if he is checking his BS and what the results are.  ([facility] per [email or web address])

## 2021-03-12 ENCOUNTER — HOSPITAL ENCOUNTER (OUTPATIENT)
Age: 41
Discharge: HOME | End: 2021-03-12
Payer: COMMERCIAL

## 2021-03-12 DIAGNOSIS — I10 ESSENTIAL HYPERTENSION: ICD-10-CM

## 2021-03-12 DIAGNOSIS — Z11.59 ENCOUNTER FOR HEPATITIS C SCREENING TEST FOR LOW RISK PATIENT: ICD-10-CM

## 2021-03-12 DIAGNOSIS — E11.65 TYPE 2 DIABETES MELLITUS WITH HYPERGLYCEMIA, WITH LONG-TERM CURRENT USE OF INSULIN (HCC): ICD-10-CM

## 2021-03-12 DIAGNOSIS — Z11.4 SCREENING FOR HIV (HUMAN IMMUNODEFICIENCY VIRUS): ICD-10-CM

## 2021-03-12 DIAGNOSIS — Z13.29 SCREENING FOR THYROID DISORDER: ICD-10-CM

## 2021-03-12 DIAGNOSIS — Z79.4 TYPE 2 DIABETES MELLITUS WITH HYPERGLYCEMIA, WITH LONG-TERM CURRENT USE OF INSULIN (HCC): ICD-10-CM

## 2021-03-12 LAB
A/G RATIO: 1.2 (ref 1.1–2.2)
ALBUMIN SERPL-MCNC: 3.7 G/DL (ref 3.4–5)
ALP BLD-CCNC: 72 U/L (ref 40–129)
ALT SERPL-CCNC: 39 U/L (ref 10–40)
ANION GAP SERPL CALCULATED.3IONS-SCNC: 8 MMOL/L (ref 3–16)
AST SERPL-CCNC: 35 U/L (ref 15–37)
BASOPHILS ABSOLUTE: 0 K/UL (ref 0–0.2)
BASOPHILS RELATIVE PERCENT: 0.8 %
BILIRUB SERPL-MCNC: 1.5 MG/DL (ref 0–1)
BUN BLDV-MCNC: 11 MG/DL (ref 7–20)
CALCIUM SERPL-MCNC: 8.3 MG/DL (ref 8.3–10.6)
CHLORIDE BLD-SCNC: 98 MMOL/L (ref 99–110)
CO2: 28 MMOL/L (ref 21–32)
CREAT SERPL-MCNC: 0.7 MG/DL (ref 0.9–1.3)
EOSINOPHILS ABSOLUTE: 0.2 K/UL (ref 0–0.6)
EOSINOPHILS RELATIVE PERCENT: 4.6 %
GFR AFRICAN AMERICAN: >60
GFR NON-AFRICAN AMERICAN: >60
GLOBULIN: 3 G/DL
GLUCOSE BLD-MCNC: 249 MG/DL (ref 70–99)
HCT VFR BLD CALC: 46.5 % (ref 40.5–52.5)
HEMOGLOBIN: 16.1 G/DL (ref 13.5–17.5)
HEPATITIS C ANTIBODY INTERPRETATION: NORMAL
LYMPHOCYTES ABSOLUTE: 1.3 K/UL (ref 1–5.1)
LYMPHOCYTES RELATIVE PERCENT: 31 %
MCH RBC QN AUTO: 29.9 PG (ref 26–34)
MCHC RBC AUTO-ENTMCNC: 34.6 G/DL (ref 31–36)
MCV RBC AUTO: 86.4 FL (ref 80–100)
MONOCYTES ABSOLUTE: 0.4 K/UL (ref 0–1.3)
MONOCYTES RELATIVE PERCENT: 9.6 %
NEUTROPHILS ABSOLUTE: 2.3 K/UL (ref 1.7–7.7)
NEUTROPHILS RELATIVE PERCENT: 54 %
PDW BLD-RTO: 13.8 % (ref 12.4–15.4)
PLATELET # BLD: 30 K/UL (ref 135–450)
PMV BLD AUTO: 9 FL (ref 5–10.5)
POTASSIUM SERPL-SCNC: 4.2 MMOL/L (ref 3.5–5.1)
RBC # BLD: 5.38 M/UL (ref 4.2–5.9)
SODIUM BLD-SCNC: 134 MMOL/L (ref 136–145)
TOTAL PROTEIN: 6.7 G/DL (ref 6.4–8.2)
TSH REFLEX FT4: 0.73 UIU/ML (ref 0.27–4.2)
WBC # BLD: 4.3 K/UL (ref 4–11)

## 2021-03-12 PROCEDURE — 36415 COLL VENOUS BLD VENIPUNCTURE: CPT

## 2021-03-12 PROCEDURE — 86803 HEPATITIS C AB TEST: CPT

## 2021-03-12 PROCEDURE — 85025 COMPLETE CBC W/AUTO DIFF WBC: CPT

## 2021-03-12 PROCEDURE — 80053 COMPREHEN METABOLIC PANEL: CPT

## 2021-03-12 PROCEDURE — 87390 HIV-1 AG IA: CPT

## 2021-03-12 PROCEDURE — 86702 HIV-2 ANTIBODY: CPT

## 2021-03-12 PROCEDURE — 84443 ASSAY THYROID STIM HORMONE: CPT

## 2021-03-12 PROCEDURE — 83036 HEMOGLOBIN GLYCOSYLATED A1C: CPT

## 2021-03-12 PROCEDURE — 86701 HIV-1ANTIBODY: CPT

## 2021-03-13 LAB
ESTIMATED AVERAGE GLUCOSE: 211.6 MG/DL
HBA1C MFR BLD: 9 %
HIV AG/AB: NORMAL
HIV ANTIGEN: NORMAL
HIV-1 ANTIBODY: NORMAL
HIV-2 AB: NORMAL

## 2021-03-23 ENCOUNTER — OFFICE VISIT (OUTPATIENT)
Dept: FAMILY MEDICINE CLINIC | Age: 41
End: 2021-03-23
Payer: COMMERCIAL

## 2021-03-23 VITALS
SYSTOLIC BLOOD PRESSURE: 130 MMHG | HEART RATE: 89 BPM | BODY MASS INDEX: 37.1 KG/M2 | DIASTOLIC BLOOD PRESSURE: 84 MMHG | WEIGHT: 304.8 LBS | OXYGEN SATURATION: 98 % | TEMPERATURE: 97.3 F

## 2021-03-23 DIAGNOSIS — E11.65 TYPE 2 DIABETES MELLITUS WITH HYPERGLYCEMIA, WITH LONG-TERM CURRENT USE OF INSULIN (HCC): Primary | ICD-10-CM

## 2021-03-23 DIAGNOSIS — F32.89 OTHER DEPRESSION: ICD-10-CM

## 2021-03-23 DIAGNOSIS — Z79.4 TYPE 2 DIABETES MELLITUS WITH HYPERGLYCEMIA, WITH LONG-TERM CURRENT USE OF INSULIN (HCC): Primary | ICD-10-CM

## 2021-03-23 PROBLEM — E78.6 LOW HDL (UNDER 40): Status: ACTIVE | Noted: 2018-05-05

## 2021-03-23 PROBLEM — Z86.2 HISTORY OF ITP: Status: ACTIVE | Noted: 2018-05-05

## 2021-03-23 PROBLEM — D69.6 THROMBOCYTOPENIA (HCC): Status: ACTIVE | Noted: 2018-05-05

## 2021-03-23 PROBLEM — R06.83 SNORING: Status: ACTIVE | Noted: 2018-06-05

## 2021-03-23 PROCEDURE — 1036F TOBACCO NON-USER: CPT | Performed by: NURSE PRACTITIONER

## 2021-03-23 PROCEDURE — 99214 OFFICE O/P EST MOD 30 MIN: CPT | Performed by: NURSE PRACTITIONER

## 2021-03-23 PROCEDURE — G8484 FLU IMMUNIZE NO ADMIN: HCPCS | Performed by: NURSE PRACTITIONER

## 2021-03-23 PROCEDURE — 2022F DILAT RTA XM EVC RTNOPTHY: CPT | Performed by: NURSE PRACTITIONER

## 2021-03-23 PROCEDURE — G8417 CALC BMI ABV UP PARAM F/U: HCPCS | Performed by: NURSE PRACTITIONER

## 2021-03-23 PROCEDURE — G8427 DOCREV CUR MEDS BY ELIG CLIN: HCPCS | Performed by: NURSE PRACTITIONER

## 2021-03-23 PROCEDURE — 3052F HG A1C>EQUAL 8.0%<EQUAL 9.0%: CPT | Performed by: NURSE PRACTITIONER

## 2021-03-23 RX ORDER — BUPROPION HYDROCHLORIDE 150 MG/1
150 TABLET, EXTENDED RELEASE ORAL DAILY
Qty: 30 TABLET | Refills: 2 | Status: SHIPPED | OUTPATIENT
Start: 2021-03-23 | End: 2021-06-28

## 2021-03-23 RX ORDER — INSULIN GLARGINE 100 [IU]/ML
50 INJECTION, SOLUTION SUBCUTANEOUS NIGHTLY
Qty: 5 PEN | Refills: 2 | Status: SHIPPED | OUTPATIENT
Start: 2021-03-23 | End: 2021-05-04 | Stop reason: SDUPTHER

## 2021-03-23 RX ORDER — INSULIN ASPART 100 [IU]/ML
15 INJECTION, SOLUTION INTRAVENOUS; SUBCUTANEOUS
Qty: 5 PEN | Refills: 2 | Status: SHIPPED | OUTPATIENT
Start: 2021-03-23 | End: 2021-05-04 | Stop reason: SDUPTHER

## 2021-03-23 NOTE — PROGRESS NOTES
Patient: Nabil Wood is a 35 y.o. male who presents today with the following Chief Complaint(s):  Chief Complaint   Patient presents with    Diabetes     Follow Up         HPI-this is a 67-year-old male patient following up with his diabetes  He had labs completed back on March 12 I reviewed all those with him his hemoglobin A1c is 9 so I will be doing dose adjustments to his insulin. He denies no hypoglycemic reactions or any reactions from the medication that he takes currently. I did adjust his Metformin because he was not taking it as it was prescribed but it is correct now. He checks his sugar fasting in the morning he states and it runs from 235 to 245 mg/dL. The reason for him not taking the Metformin as prescribed he states he gets sick to his stomach so he only takes it once a day. He states that he will be getting insurance next month so I will be following up with him in 2 months and I will be adding either Jardiance or Januvia to his medication regimen. He also has a history of depression he was taking Effexor 25 mg tablet but he does not want to take this anymore he wants to go back on Wellbutrin in which he previously took so I will be making that switch today. He was instructed to dose reduce the Effexor but he can go ahead and start the Wellbutrin. He states that he does see a hematologist and he will be make an appointment because his platelets were at 30.   He was encouraged to follow through with this  Current Outpatient Medications   Medication Sig Dispense Refill    insulin glargine (LANTUS SOLOSTAR) 100 UNIT/ML injection pen Inject 50 Units into the skin nightly 5 pen 2    insulin aspart (NOVOLOG FLEXPEN) 100 UNIT/ML injection pen Inject 15 Units into the skin 3 times daily (before meals) 5 pen 2    buPROPion (WELLBUTRIN SR) 150 MG extended release tablet Take 1 tablet by mouth daily 30 tablet 2    metFORMIN (GLUCOPHAGE) 1000 MG tablet Take 1 tablet by mouth daily (with breakfast) 90 tablet 0    benazepril (LOTENSIN) 40 MG tablet TAKE ONE TABLET BY MOUTH DAILY 30 tablet 4     No current facility-administered medications for this visit. Patient's past medical history, surgical history, family history, medications,  and allergies  were all reviewed and updated as appropriate today. Review of Systems    Physical Exam  Vitals signs and nursing note reviewed. Constitutional:       Appearance: He is well-developed. He is obese. HENT:      Head: Normocephalic. Right Ear: Tympanic membrane and external ear normal.      Left Ear: Tympanic membrane and external ear normal.      Nose: Nose normal.      Mouth/Throat:      Mouth: Mucous membranes are moist.      Pharynx: Oropharynx is clear. No oropharyngeal exudate or posterior oropharyngeal erythema. Eyes:      Conjunctiva/sclera: Conjunctivae normal.      Pupils: Pupils are equal, round, and reactive to light. Neck:      Musculoskeletal: Normal range of motion. Cardiovascular:      Rate and Rhythm: Normal rate and regular rhythm. Heart sounds: Normal heart sounds. No murmur. Pulmonary:      Effort: Pulmonary effort is normal.      Breath sounds: Normal breath sounds. No wheezing. Abdominal:      General: Bowel sounds are normal.      Palpations: Abdomen is soft. There is no mass. Musculoskeletal: Normal range of motion. Lymphadenopathy:      Cervical: No cervical adenopathy. Skin:     General: Skin is warm and dry. Neurological:      Mental Status: He is alert and oriented to person, place, and time. Psychiatric:         Mood and Affect: Mood normal.         Behavior: Behavior normal.         Thought Content: Thought content normal.         Judgment: Judgment normal.       Vitals:    03/23/21 1501   BP: 130/84   Pulse: 89   Temp: 97.3 °F (36.3 °C)   SpO2: 98%       Assessment:  Encounter Diagnoses   Name Primary?     Type 2 diabetes mellitus with hyperglycemia, with long-term current use of

## 2021-04-27 DIAGNOSIS — E11.65 TYPE 2 DIABETES MELLITUS WITH HYPERGLYCEMIA, WITH LONG-TERM CURRENT USE OF INSULIN (HCC): ICD-10-CM

## 2021-04-27 DIAGNOSIS — Z79.4 TYPE 2 DIABETES MELLITUS WITH HYPERGLYCEMIA, WITH LONG-TERM CURRENT USE OF INSULIN (HCC): ICD-10-CM

## 2021-05-04 DIAGNOSIS — E11.65 TYPE 2 DIABETES MELLITUS WITH HYPERGLYCEMIA, WITH LONG-TERM CURRENT USE OF INSULIN (HCC): ICD-10-CM

## 2021-05-04 DIAGNOSIS — Z79.4 TYPE 2 DIABETES MELLITUS WITH HYPERGLYCEMIA, WITH LONG-TERM CURRENT USE OF INSULIN (HCC): ICD-10-CM

## 2021-05-04 RX ORDER — INSULIN ASPART 100 [IU]/ML
15 INJECTION, SOLUTION INTRAVENOUS; SUBCUTANEOUS
Qty: 5 PEN | Refills: 2 | Status: SHIPPED | OUTPATIENT
Start: 2021-05-04 | End: 2021-06-11 | Stop reason: DRUGHIGH

## 2021-05-04 RX ORDER — INSULIN GLARGINE 100 [IU]/ML
50 INJECTION, SOLUTION SUBCUTANEOUS NIGHTLY
Qty: 5 PEN | Refills: 2 | Status: SHIPPED | OUTPATIENT
Start: 2021-05-04 | End: 2021-06-11 | Stop reason: DRUGHIGH

## 2021-05-04 NOTE — TELEPHONE ENCOUNTER
LOV 3.23.2021    No future visit     The patient would like to refill insulin glargine (LANTUS SOLOSTAR) 100 UNIT/ML injection pen.     insulin aspart (NOVOLOG FLEXPEN) 100 UNIT/ML injection pen    Send to [address] [place] [address]

## 2021-05-08 DIAGNOSIS — I10 ESSENTIAL HYPERTENSION: ICD-10-CM

## 2021-05-10 RX ORDER — BENAZEPRIL HYDROCHLORIDE 40 MG/1
TABLET, FILM COATED ORAL
Qty: 30 TABLET | Refills: 3 | Status: SHIPPED | OUTPATIENT
Start: 2021-05-10 | End: 2021-09-17 | Stop reason: SDUPTHER

## 2021-06-04 ENCOUNTER — HOSPITAL ENCOUNTER (INPATIENT)
Age: 41
LOS: 2 days | Discharge: HOME | DRG: 813 | End: 2021-06-06
Attending: EMERGENCY MEDICINE | Admitting: HOSPITALIST
Payer: COMMERCIAL

## 2021-06-04 DIAGNOSIS — Z86.2 HISTORY OF ITP: ICD-10-CM

## 2021-06-04 DIAGNOSIS — D69.6 THROMBOCYTOPENIA (HCC): Primary | ICD-10-CM

## 2021-06-04 PROBLEM — D69.3 ACUTE IDIOPATHIC THROMBOCYTOPENIC PURPURA (HCC): Status: ACTIVE | Noted: 2021-06-04

## 2021-06-04 LAB
A/G RATIO: 1.3 (ref 1.1–2.2)
ABO/RH: NORMAL
ALBUMIN SERPL-MCNC: 3.9 G/DL (ref 3.4–5)
ALP BLD-CCNC: 91 U/L (ref 40–129)
ALT SERPL-CCNC: 37 U/L (ref 10–40)
ANION GAP SERPL CALCULATED.3IONS-SCNC: 7 MMOL/L (ref 3–16)
ANTIBODY SCREEN: NORMAL
AST SERPL-CCNC: 32 U/L (ref 15–37)
BASOPHILS ABSOLUTE: 0 K/UL (ref 0–0.2)
BASOPHILS RELATIVE PERCENT: 0.9 %
BILIRUB SERPL-MCNC: 1.5 MG/DL (ref 0–1)
BUN BLDV-MCNC: 12 MG/DL (ref 7–20)
CALCIUM SERPL-MCNC: 9 MG/DL (ref 8.3–10.6)
CHLORIDE BLD-SCNC: 96 MMOL/L (ref 99–110)
CO2: 31 MMOL/L (ref 21–32)
CREAT SERPL-MCNC: 0.7 MG/DL (ref 0.9–1.3)
EOSINOPHILS ABSOLUTE: 0.2 K/UL (ref 0–0.6)
EOSINOPHILS RELATIVE PERCENT: 4.2 %
GFR AFRICAN AMERICAN: >60
GFR NON-AFRICAN AMERICAN: >60
GLOBULIN: 3 G/DL
GLUCOSE BLD-MCNC: 404 MG/DL (ref 70–99)
GLUCOSE BLD-MCNC: 561 MG/DL (ref 70–99)
HCT VFR BLD CALC: 46.9 % (ref 40.5–52.5)
HEMATOLOGY PATH CONSULT: YES
HEMOGLOBIN: 16 G/DL (ref 13.5–17.5)
INFLUENZA A: NOT DETECTED
INFLUENZA B: NOT DETECTED
LYMPHOCYTES ABSOLUTE: 1.6 K/UL (ref 1–5.1)
LYMPHOCYTES RELATIVE PERCENT: 38.4 %
MCH RBC QN AUTO: 30.2 PG (ref 26–34)
MCHC RBC AUTO-ENTMCNC: 34.1 G/DL (ref 31–36)
MCV RBC AUTO: 88.4 FL (ref 80–100)
MONOCYTES ABSOLUTE: 0.5 K/UL (ref 0–1.3)
MONOCYTES RELATIVE PERCENT: 11.2 %
NEUTROPHILS ABSOLUTE: 1.9 K/UL (ref 1.7–7.7)
NEUTROPHILS RELATIVE PERCENT: 45.3 %
PDW BLD-RTO: 13.6 % (ref 12.4–15.4)
PERFORMED ON: ABNORMAL
PLATELET # BLD: 3 K/UL (ref 135–450)
PLATELET SLIDE REVIEW: ABNORMAL
PMV BLD AUTO: 12.1 FL (ref 5–10.5)
POIKILOCYTES: ABNORMAL
POTASSIUM REFLEX MAGNESIUM: 4.4 MMOL/L (ref 3.5–5.1)
RBC # BLD: 5.31 M/UL (ref 4.2–5.9)
SARS-COV-2 RNA, RT PCR: NOT DETECTED
SLIDE REVIEW: ABNORMAL
SODIUM BLD-SCNC: 134 MMOL/L (ref 136–145)
TOTAL PROTEIN: 6.9 G/DL (ref 6.4–8.2)
WBC # BLD: 4.2 K/UL (ref 4–11)

## 2021-06-04 PROCEDURE — 1200000000 HC SEMI PRIVATE

## 2021-06-04 PROCEDURE — 99282 EMERGENCY DEPT VISIT SF MDM: CPT

## 2021-06-04 PROCEDURE — P9035 PLATELET PHERES LEUKOREDUCED: HCPCS

## 2021-06-04 PROCEDURE — 80053 COMPREHEN METABOLIC PANEL: CPT

## 2021-06-04 PROCEDURE — 86900 BLOOD TYPING SEROLOGIC ABO: CPT

## 2021-06-04 PROCEDURE — 85025 COMPLETE CBC W/AUTO DIFF WBC: CPT

## 2021-06-04 PROCEDURE — 36415 COLL VENOUS BLD VENIPUNCTURE: CPT

## 2021-06-04 PROCEDURE — 86850 RBC ANTIBODY SCREEN: CPT

## 2021-06-04 PROCEDURE — 2580000003 HC RX 258: Performed by: EMERGENCY MEDICINE

## 2021-06-04 PROCEDURE — 86901 BLOOD TYPING SEROLOGIC RH(D): CPT

## 2021-06-04 PROCEDURE — 87636 SARSCOV2 & INF A&B AMP PRB: CPT

## 2021-06-04 PROCEDURE — 83036 HEMOGLOBIN GLYCOSYLATED A1C: CPT

## 2021-06-04 PROCEDURE — 6360000002 HC RX W HCPCS: Performed by: EMERGENCY MEDICINE

## 2021-06-04 RX ORDER — POLYETHYLENE GLYCOL 3350 17 G/17G
17 POWDER, FOR SOLUTION ORAL DAILY PRN
Status: DISCONTINUED | OUTPATIENT
Start: 2021-06-04 | End: 2021-06-04

## 2021-06-04 RX ORDER — DEXTROSE MONOHYDRATE 25 G/50ML
12.5 INJECTION, SOLUTION INTRAVENOUS PRN
Status: DISCONTINUED | OUTPATIENT
Start: 2021-06-04 | End: 2021-06-06 | Stop reason: HOSPADM

## 2021-06-04 RX ORDER — NICOTINE POLACRILEX 4 MG
15 LOZENGE BUCCAL PRN
Status: DISCONTINUED | OUTPATIENT
Start: 2021-06-04 | End: 2021-06-06 | Stop reason: HOSPADM

## 2021-06-04 RX ORDER — ACETAMINOPHEN 650 MG/1
650 SUPPOSITORY RECTAL EVERY 6 HOURS PRN
Status: DISCONTINUED | OUTPATIENT
Start: 2021-06-04 | End: 2021-06-06 | Stop reason: HOSPADM

## 2021-06-04 RX ORDER — BUPROPION HYDROCHLORIDE 150 MG/1
150 TABLET, EXTENDED RELEASE ORAL DAILY
Status: DISCONTINUED | OUTPATIENT
Start: 2021-06-05 | End: 2021-06-06 | Stop reason: HOSPADM

## 2021-06-04 RX ORDER — DEXTROSE MONOHYDRATE 50 MG/ML
100 INJECTION, SOLUTION INTRAVENOUS PRN
Status: DISCONTINUED | OUTPATIENT
Start: 2021-06-04 | End: 2021-06-06 | Stop reason: HOSPADM

## 2021-06-04 RX ORDER — ONDANSETRON 2 MG/ML
4 INJECTION INTRAMUSCULAR; INTRAVENOUS EVERY 6 HOURS PRN
Status: DISCONTINUED | OUTPATIENT
Start: 2021-06-04 | End: 2021-06-06 | Stop reason: HOSPADM

## 2021-06-04 RX ORDER — SODIUM CHLORIDE 0.9 % (FLUSH) 0.9 %
5-40 SYRINGE (ML) INJECTION EVERY 12 HOURS SCHEDULED
Status: DISCONTINUED | OUTPATIENT
Start: 2021-06-04 | End: 2021-06-06 | Stop reason: HOSPADM

## 2021-06-04 RX ORDER — ONDANSETRON 4 MG/1
4 TABLET, ORALLY DISINTEGRATING ORAL EVERY 8 HOURS PRN
Status: DISCONTINUED | OUTPATIENT
Start: 2021-06-04 | End: 2021-06-06 | Stop reason: HOSPADM

## 2021-06-04 RX ORDER — ACETAMINOPHEN 325 MG/1
650 TABLET ORAL EVERY 6 HOURS PRN
Status: DISCONTINUED | OUTPATIENT
Start: 2021-06-04 | End: 2021-06-06 | Stop reason: HOSPADM

## 2021-06-04 RX ORDER — SODIUM CHLORIDE 9 MG/ML
25 INJECTION, SOLUTION INTRAVENOUS PRN
Status: DISCONTINUED | OUTPATIENT
Start: 2021-06-04 | End: 2021-06-06 | Stop reason: HOSPADM

## 2021-06-04 RX ORDER — INSULIN GLARGINE 100 [IU]/ML
50 INJECTION, SOLUTION SUBCUTANEOUS NIGHTLY
Status: DISCONTINUED | OUTPATIENT
Start: 2021-06-04 | End: 2021-06-06 | Stop reason: HOSPADM

## 2021-06-04 RX ORDER — LISINOPRIL 20 MG/1
40 TABLET ORAL DAILY
Status: DISCONTINUED | OUTPATIENT
Start: 2021-06-05 | End: 2021-06-06 | Stop reason: HOSPADM

## 2021-06-04 RX ORDER — SODIUM CHLORIDE 0.9 % (FLUSH) 0.9 %
5-40 SYRINGE (ML) INJECTION PRN
Status: DISCONTINUED | OUTPATIENT
Start: 2021-06-04 | End: 2021-06-06 | Stop reason: HOSPADM

## 2021-06-04 RX ORDER — SODIUM CHLORIDE 9 MG/ML
INJECTION, SOLUTION INTRAVENOUS PRN
Status: COMPLETED | OUTPATIENT
Start: 2021-06-04 | End: 2021-06-05

## 2021-06-04 RX ADMIN — DEXAMETHASONE SODIUM PHOSPHATE 40 MG: 4 INJECTION, SOLUTION INTRAMUSCULAR; INTRAVENOUS at 22:57

## 2021-06-04 ASSESSMENT — ENCOUNTER SYMPTOMS
RESPIRATORY NEGATIVE: 1
GASTROINTESTINAL NEGATIVE: 1

## 2021-06-05 PROBLEM — E66.01 MORBID OBESITY (HCC): Status: ACTIVE | Noted: 2019-03-09

## 2021-06-05 PROBLEM — K75.81 NASH (NONALCOHOLIC STEATOHEPATITIS): Status: ACTIVE | Noted: 2021-06-05

## 2021-06-05 PROBLEM — K74.60 CIRRHOSIS OF LIVER (HCC): Status: ACTIVE | Noted: 2021-06-05

## 2021-06-05 LAB
ALBUMIN SERPL-MCNC: 3.7 G/DL (ref 3.4–5)
ALP BLD-CCNC: 87 U/L (ref 40–129)
ALT SERPL-CCNC: 38 U/L (ref 10–40)
ANION GAP SERPL CALCULATED.3IONS-SCNC: 8 MMOL/L (ref 3–16)
AST SERPL-CCNC: 33 U/L (ref 15–37)
BASOPHILS ABSOLUTE: 0 K/UL (ref 0–0.2)
BASOPHILS RELATIVE PERCENT: 0.6 %
BILIRUB SERPL-MCNC: 1.5 MG/DL (ref 0–1)
BILIRUBIN DIRECT: 0.3 MG/DL (ref 0–0.3)
BILIRUBIN, INDIRECT: 1.2 MG/DL (ref 0–1)
BLOOD BANK DISPENSE STATUS: NORMAL
BLOOD BANK DISPENSE STATUS: NORMAL
BLOOD BANK PRODUCT CODE: NORMAL
BLOOD BANK PRODUCT CODE: NORMAL
BPU ID: NORMAL
BPU ID: NORMAL
BUN BLDV-MCNC: 13 MG/DL (ref 7–20)
CALCIUM SERPL-MCNC: 8.7 MG/DL (ref 8.3–10.6)
CHLORIDE BLD-SCNC: 102 MMOL/L (ref 99–110)
CO2: 23 MMOL/L (ref 21–32)
CREAT SERPL-MCNC: 0.6 MG/DL (ref 0.9–1.3)
DESCRIPTION BLOOD BANK: NORMAL
DESCRIPTION BLOOD BANK: NORMAL
EOSINOPHILS ABSOLUTE: 0 K/UL (ref 0–0.6)
EOSINOPHILS RELATIVE PERCENT: 0.6 %
ESTIMATED AVERAGE GLUCOSE: 237.4 MG/DL
GFR AFRICAN AMERICAN: >60
GFR NON-AFRICAN AMERICAN: >60
GLUCOSE BLD-MCNC: 304 MG/DL (ref 70–99)
GLUCOSE BLD-MCNC: 325 MG/DL (ref 70–99)
GLUCOSE BLD-MCNC: 361 MG/DL (ref 70–99)
GLUCOSE BLD-MCNC: 363 MG/DL (ref 70–99)
GLUCOSE BLD-MCNC: 411 MG/DL (ref 70–99)
HBA1C MFR BLD: 9.9 %
HCT VFR BLD CALC: 45.8 % (ref 40.5–52.5)
HEMATOLOGY PATH CONSULT: NO
HEMOGLOBIN: 16 G/DL (ref 13.5–17.5)
LYMPHOCYTES ABSOLUTE: 0.9 K/UL (ref 1–5.1)
LYMPHOCYTES RELATIVE PERCENT: 23.4 %
MCH RBC QN AUTO: 30.1 PG (ref 26–34)
MCHC RBC AUTO-ENTMCNC: 34.9 G/DL (ref 31–36)
MCV RBC AUTO: 86.4 FL (ref 80–100)
MONOCYTES ABSOLUTE: 0.1 K/UL (ref 0–1.3)
MONOCYTES RELATIVE PERCENT: 1.8 %
NEUTROPHILS ABSOLUTE: 2.7 K/UL (ref 1.7–7.7)
NEUTROPHILS RELATIVE PERCENT: 73.6 %
PDW BLD-RTO: 13.8 % (ref 12.4–15.4)
PERFORMED ON: ABNORMAL
PLATELET # BLD: 5 K/UL (ref 135–450)
PMV BLD AUTO: 9.9 FL (ref 5–10.5)
POTASSIUM REFLEX MAGNESIUM: 4.2 MMOL/L (ref 3.5–5.1)
RBC # BLD: 5.3 M/UL (ref 4.2–5.9)
SODIUM BLD-SCNC: 133 MMOL/L (ref 136–145)
TOTAL PROTEIN: 6.7 G/DL (ref 6.4–8.2)
WBC # BLD: 3.7 K/UL (ref 4–11)

## 2021-06-05 PROCEDURE — 6370000000 HC RX 637 (ALT 250 FOR IP): Performed by: HOSPITALIST

## 2021-06-05 PROCEDURE — P9035 PLATELET PHERES LEUKOREDUCED: HCPCS

## 2021-06-05 PROCEDURE — 6360000002 HC RX W HCPCS: Performed by: INTERNAL MEDICINE

## 2021-06-05 PROCEDURE — 80076 HEPATIC FUNCTION PANEL: CPT

## 2021-06-05 PROCEDURE — 6360000002 HC RX W HCPCS: Performed by: EMERGENCY MEDICINE

## 2021-06-05 PROCEDURE — 2580000003 HC RX 258: Performed by: HOSPITALIST

## 2021-06-05 PROCEDURE — 2580000003 HC RX 258: Performed by: EMERGENCY MEDICINE

## 2021-06-05 PROCEDURE — 85025 COMPLETE CBC W/AUTO DIFF WBC: CPT

## 2021-06-05 PROCEDURE — 36430 TRANSFUSION BLD/BLD COMPNT: CPT

## 2021-06-05 PROCEDURE — 1200000000 HC SEMI PRIVATE

## 2021-06-05 PROCEDURE — 36415 COLL VENOUS BLD VENIPUNCTURE: CPT

## 2021-06-05 PROCEDURE — 80048 BASIC METABOLIC PNL TOTAL CA: CPT

## 2021-06-05 PROCEDURE — 99233 SBSQ HOSP IP/OBS HIGH 50: CPT | Performed by: INTERNAL MEDICINE

## 2021-06-05 RX ORDER — SODIUM CHLORIDE 9 MG/ML
INJECTION, SOLUTION INTRAVENOUS PRN
Status: DISCONTINUED | OUTPATIENT
Start: 2021-06-05 | End: 2021-06-06 | Stop reason: HOSPADM

## 2021-06-05 RX ADMIN — Medication 10 ML: at 22:10

## 2021-06-05 RX ADMIN — INSULIN GLARGINE 50 UNITS: 100 INJECTION, SOLUTION SUBCUTANEOUS at 20:34

## 2021-06-05 RX ADMIN — BUPROPION HYDROCHLORIDE 150 MG: 150 TABLET, EXTENDED RELEASE ORAL at 08:14

## 2021-06-05 RX ADMIN — Medication 10 ML: at 08:14

## 2021-06-05 RX ADMIN — SODIUM CHLORIDE: 9 INJECTION, SOLUTION INTRAVENOUS at 09:48

## 2021-06-05 RX ADMIN — SODIUM CHLORIDE 25 ML: 9 INJECTION, SOLUTION INTRAVENOUS at 11:31

## 2021-06-05 RX ADMIN — INSULIN LISPRO 3 UNITS: 100 INJECTION, SOLUTION INTRAVENOUS; SUBCUTANEOUS at 20:35

## 2021-06-05 RX ADMIN — LISINOPRIL 40 MG: 20 TABLET ORAL at 08:14

## 2021-06-05 RX ADMIN — INSULIN GLARGINE 50 UNITS: 100 INJECTION, SOLUTION SUBCUTANEOUS at 00:30

## 2021-06-05 RX ADMIN — IMMUNE GLOBULIN INTRAVENOUS (HUMAN) 30 G: 5 INJECTION, SOLUTION INTRAVENOUS at 11:35

## 2021-06-05 RX ADMIN — INSULIN LISPRO 3 UNITS: 100 INJECTION, SOLUTION INTRAVENOUS; SUBCUTANEOUS at 00:35

## 2021-06-05 RX ADMIN — DEXAMETHASONE SODIUM PHOSPHATE 40 MG: 4 INJECTION, SOLUTION INTRAMUSCULAR; INTRAVENOUS at 22:09

## 2021-06-05 ASSESSMENT — PAIN SCALES - GENERAL
PAINLEVEL_OUTOF10: 0

## 2021-06-05 NOTE — ED NOTES
2121- Hospitalist was perfect served regarding admission.    2133-  returned call and spoke with [facility]   06/04/21 2122       [facility]   06/04/21 2136

## 2021-06-05 NOTE — PROGRESS NOTES
IV site left upper arm has infiltrated; the area is red, swollen, cool to touch and patient is reporting pain. Stopped transfusion. Attempted to restart IV in left hand; unsuccessful. Patient states that his veins tend to collapse and almost always ultrasound has to be used. Contacted clinical nurse. IV removed; scant bleeding noted; dressing applied.

## 2021-06-05 NOTE — PROGRESS NOTES
Admitted to [place] [id number] at this time. Report received from Shannon Glasgow, [facility]. See admission information. Decadron infusing w/o diff. Platelets are ready and will be infused asap.

## 2021-06-05 NOTE — PROGRESS NOTES
This is a copy of the last [facility] note:        Patient Name: Mariela Tay  Patient : 1980  Patient MRN: [id number]    Primary Oncologist: Levy Cameron  Referring Physician: Althea Mayo ([facility])    Date of Service: 2021      Chief Complaint  Acute idiopathic thrombocytopenic purpura (disorder)      Problem List  Acute idiopathic thrombocytopenic purpura (disorder)  Chronic low back pain (finding)  Cirrhosis of liver (disorder)  Depressive disorder (disorder)  Diabetes mellitus type 2 (disorder)  Hypertensive disorder, systemic arterial (disorder)  Solitary pulmonary nodule  Splenomegaly    HPI      Hypertension I10   Depression F32.9   Chronic lumbar pain M54.5, G89.29   Acute ITP (HCC) D69.3   Type 2 diabetes mellitus without complication, without long-term current use of insulin (HCC) E11.9     Previous Therapies    1. Steroids  2. IVIG  3. Rituxan    Current Therapy  Rituximab IV Q7D Cycle Length: 7 Number Cycles: 4 Start: C1D1 on 2019 Assoc Dx: Acute idiopathic thrombocytopenic purpura (disorder) LOT: 2nd Line 2019 C4 D1  Rituximab IV,  mg  Interval History    Here for follow up and lab check. No bleeding or bruising. Review of Systems  Constitutional:  No weight loss, No fever, No chills, No night sweats. Energy level good.   Eyes:  No impairment or change in vision  ENT / Mouth:  No pain, abnormal ulceration, bleeding, nasal drip or change in voice or hearing  Cardiovascular:  No chest pain, palpitations, new edema, or calf discomfort  Respiratory:  No pain, hemoptysis, change to breathing  Breast:  No pain, discharge, change in appearance or texture  Gastrointestinal:  No pain, cramping, jaundice, change to eating and bowel habits  Urinary:  No pain, bleeding or change in continence  Genitalia: No pain, bleeding or discharge  Musculoskeletal:  No redness, pain, edema or weakness  Skin:  No pruritus, rash, change to nodules or lesions  Neurologic:  No discomfort, change in mental status, speech, sensory or motor activity  Psychiatric:  No change in concentration or change to affect or mood  Endocrine:  No hot flashes, increased thirst, or change to urine production  Hematologic: No petechiae, ecchymosis or bleeding  Lymphatic:  No lymphadenopathy or lymphedema  Allergy / Immunologic:  No eczema, hives, frequent or recurrent infections      Vital Signs  Blood pressure: 128/80, R arm, Regular, Pulse: 78, Temperature: 97.6 F, Respirations: 12, O2 sat: , Pain Scale: 0, Height: 76 in, Weight: 305.8 lb, BSA: 2.73, BMI: 37.22 kg/m2    Physical Exam    CONSTITUTIONAL: awake, alert, cooperative, no apparent distress   EYES: pupils equal, round and reactive to light, sclera clear and conjunctiva normal  ENT: Normocephalic, without obvious abnormality, atraumatic  NECK: supple, symmetrical, no jugular venous distension and no carotid bruits   HEMATOLOGIC/LYMPHATIC: no cervical, supraclavicular or axillary lymphadenopathy   LUNGS: no increased work of breathing and clear to auscultation   CARDIOVASCULAR: regular rate and rhythm, normal S1 and S2, no murmur noted  ABDOMEN: normal bowel sounds x 4, soft, non-distended, non-tender, no masses palpated, no hepatosplenomegaly   MUSCULOSKELETAL: full range of motion noted, tone is normal  NEUROLOGIC: awake, alert, oriented to name, place and time. Motor skills grossly intact.    SKIN: ecchymosis  EXTREMITIES: no LE edema       Labs  CBC  Lab Results 04/14/2021 03/12/2021 02/14/2020 02/11/2020 11/07/2019 08/09/2019    CBC                 WBC x 10^3/uL 5.1     5.6 4.9 4.3      RBC x 10^6/uL 5.56     5.89 5.67 5.75      HGB g/dL 16.5     17.4 16.9 16.8      HCT % 46.1     48.0 47.0 47.3      MCV fL 82.9     81.5 82.9 82.3      MCH pg 29.7     29.5 29.8 29.2      MCHC g/dL 35.8     36.3 36.0 35.5      RDW-CV, % 13.9     14.0 14.2 13.6      PLT x 10^3/uL 62.0 (L)     56.0 (L) 56.0 (L) 56.0 (L)      Anayeli % 49.5     55.2 57.4 52.3      LY % 35.5     29.9 28.0 31.9      MO % 9.9     8.6 9.9 11.2      EO % 4.5     5.9 4.3 4.4      BA % 0.6     0.4 0.4 0.2      Anayeli # (ANC) x 10^3/uL 2.51     3.09 2.83 2.23      LY # x 10^3/uL 1.80     1.67 1.38 1.36      MO # x 10^3/uL 0.50     0.48 0.49 0.48      EO # x 10^3/uL 0.23     0.33 0.21 0.19      BA # x 10^3/uL 0.03     0.02 0.02 0.01  CMP  Lab Results 04/14/2021 03/12/2021 02/14/2020 02/11/2020 11/07/2019 08/09/2019    Chemistries                                   Imaging    CT CAP 3/21/19: IMPRESSION:  1. Indeterminate 9 mm right upper lobe pulmonary nodule. 2. Cirrhosis with splenomegaly. OCM - Patient Care Management    Pain, if applicable:        Performance Status: ECOG 0 Normal activity. Fully active, able to carry on all pre-disease performance without restriction. (Date: 04/14/2021)     Depression Status: Was screened; Outcome positive: No; Screening Date: 04/14/2021; Screening Tool: Patient Health Questionnaire (PHQ9)    Psycho-social PHQ-9 Follow-up Plan (if applicable):     Research    Would you like this patient screened for clinical trial eligibility? If yes, please enter the order for \"Research: Screen for Eligibility\"    Assessment & Plan  1. Thrombocytopenia, suspected ITP  2. Strong (+) Anti-plt Ab     - plt count 3 on initial admission, no obvious bleeding  - WBC and Hgb normal, no known previous blood disorders  - suspect ITP based on overall clinical picture, strongly positive plt Ab  - initially transfused plts with count < 10  - received IVIG and solumederol while inpatient  - then received steroids and eventually Rituxan  - tapered off steroids and s/p Rituxan  - plts low but stable at 50 (baseline in 50-70 range) - 1/2019  - plt dropped to 15 2/2019, possibly exacerbated by infection/abx  - he had not responded at all to steroids  - Now s/p 4 weeks Rituxan   - Plts now stable at baseline, 62 today  - see pt in 6 months or sooner if symptomatic    3.  Diabetes  A1C 11.3  Has significant issues with glucose with steroids during first treatment course 3 years ago  Now managing diabetes with insulin, dietary changes  BS trending down     4. Cirrhosis/Splenomegaly  Noted on most recent CT scan  ? splenomegaly is ITP related vs Cirrhosis related  He may also have some underlying component of consumptive thrombocytopenia from cirrhosis   Now sees GI Dr. Ranjeet Draper    5. Pulmonary Nodule  - seen by pulmonary  - last chest CT stable per pt      Time Based Itemization    A total of  minutes was spent on today's patient encounter. If applicable, non-patient-facing activities:     (   )   Preparing to see the patient and reviewing records     (   )   Individual interpretation of results      (   )   Discussion or coordination of care with other health care professionals     (   )   Ordering of unique tests, medications, or procedures     (   )   Documentation within the EHR   . Recent imaging and labs were reviewed and discussed with the patient. I have recommended that the patient follow CDC guidelines for prevention of COVID-19 infection. Therese Cameron MD  [facility]  Phone: [phone]  Fax: [phone]  Online: [email or web address]     Note Recipients:          Electronically signed by Therese Cameron MD 04/14/2021 16:19 EDT

## 2021-06-05 NOTE — PLAN OF CARE
Problem: SAFETY  Goal: Free from accidental physical injury  6/5/2021 0959 by Rocky Moreno RN  Outcome: Ongoing  6/5/2021 0106 by Gisel Murdokc RN  Outcome: Ongoing     Problem: DAILY CARE  Goal: Daily care needs are met  6/5/2021 0959 by Rocky Moreno RN  Outcome: Ongoing  6/5/2021 0106 by Gisel Murdock RN  Outcome: Ongoing

## 2021-06-05 NOTE — CONSULTS
[facility]    Consult Note      Requesting Physician:  Dr. Guillermo Solano:  ITP        HISTORY OF PRESENT ILLNESS:      Mr. Shila Campos  is a 73-year-old male who is known to have ITP. Previously he received IgG and Solu-Medrol followed by Rituxan as an outpatient. It is also questionable whether he has cirrhosis and a component of consumption from cirrhosis of the liver. He was last seen in the office in April with a platelet count of 91,102. He came to the office late yesterday afternoon was noted to have a platelet count of 5904. Past Medical History:    Past Medical History:   Diagnosis Date    Arthritis     left knee    Chronic lumbar pain     DDD (degenerative disc disease), lumbar     MRI 2012    Depression     Glucose intolerance (impaired glucose tolerance)     History of ITP     Hypertension     Idiopathic thrombocytopenic purpura (HCC)     Type 2 diabetes mellitus without complication, without long-term current use of insulin ([facility] [address] 08/30/2016     Past Surgical History:    History reviewed. No pertinent surgical history.     Current Medications:    Current Facility-Administered Medications   Medication Dose Route Frequency Provider Last Rate Last Admin    0.9 % sodium chloride infusion   Intravenous PRN Maria Flower MD        0.9 % sodium chloride infusion   Intravenous PRN Maria Flower  mL/hr at 06/05/21 0948 New Bag at 06/05/21 0948    lisinopril (PRINIVIL;ZESTRIL) tablet 40 mg  40 mg Oral Daily Maria Flower MD   40 mg at 06/05/21 0814    buPROPion [facility]) extended release tablet 150 mg  150 mg Oral Daily Maria Flower MD   150 mg at 06/05/21 0814    insulin glargine (LANTUS) injection vial 50 Units  50 Units Subcutaneous Nightly Maria Flower MD   50 Units at 06/05/21 0030    glucose (GLUTOSE) 40 % oral gel 15 g  15 g Oral PRN Ricard Mcardle, MD        dextrose 50 % IV solution  12.5 g Intravenous PRN Ricard Mcardle, MD        glucagon (rDNA) injection 1 mg  1 mg Intramuscular PRN Ricard Mcardle, MD        dextrose 5 % solution  100 mL/hr Intravenous PRN Ricard Mcardle, MD        insulin lispro (HUMALOG) injection vial 0-6 Units  0-6 Units Subcutaneous TID WC Ricard Mcardle, MD   4 Units at 06/05/21 0814    insulin lispro (HUMALOG) injection vial 0-3 Units  0-3 Units Subcutaneous Nightly Ricard Mcardle, MD   3 Units at 06/05/21 0035    sodium chloride flush 0.9 % injection 5-40 mL  5-40 mL Intravenous 2 times per day Ricard Mcardle, MD   10 mL at 06/05/21 0814    sodium chloride flush 0.9 % injection 5-40 mL  5-40 mL Intravenous PRN Ricard Mcardle, MD        0.9 % sodium chloride infusion  25 mL Intravenous PRN Ricard Mcardle, MD        ondansetron (ZOFRAN-ODT) disintegrating tablet 4 mg  4 mg Oral Q8H PRN Ricard Mcardle, MD        Or    ondansetron [facility]) injection 4 mg  4 mg Intravenous Q6H PRN Ricard Mcardle, MD        acetaminophen (TYLENOL) tablet 650 mg  650 mg Oral Q6H PRN Ricard Mcardle, MD        Or    acetaminophen (TYLENOL) suppository 650 mg  650 mg Rectal Q6H PRN Ricard Mcardle, MD        dexamethasone (DECADRON) 40 mg in sodium chloride 0.9 % IVPB  40 mg Intravenous Daily Yael Resendez MD   Stopped at 06/04/21 3443     Allergies:     Allergies   Allergen Reactions    Trilyte [Peg 3350-Kcl-Na Bicarb-Nacl]     Trulicity [Dulaglutide]     Pcn [Penicillins] Hives and Rash       Social History:   Social History     Socioeconomic History    Marital status:      Spouse name: Not on file    Number of children: Not on file    Years of education: Not on file    Highest education level: Not on file   Occupational History    Not on file   Tobacco Use    Smoking status: Never Smoker    Smokeless tobacco: Never Used   Vaping Use    Vaping Use: Never used   Substance and Sexual Activity    Alcohol use: No    Drug use: No    Sexual activity: Yes     Partners: Female   Other Topics Concern    Not on file   Social History Narrative    Not on file     Social Determinants of Health     Financial Resource Strain:     Difficulty of Paying Living Expenses:    Food Insecurity:     Worried About Running Out of Food in the Last Year:     [address] in the Last Year:    Transportation Needs:     Lack of Transportation (Medical):  Lack of Transportation (Non-Medical):    Physical Activity:     Days of Exercise per Week:     Minutes of Exercise per Session:    Stress:     Feeling of Stress :    Social Connections:     Frequency of Communication with Friends and Family:     Frequency of Social Gatherings with Friends and Family:     Attends Jew Services:     Active Member of Clubs or Organizations:     Attends Club or Organization Meetings:     Marital Status:    Intimate Partner Violence:     Fear of Current or Ex-Partner:     Emotionally Abused:     Physically Abused:     Sexually Abused:           Family History:     Family History   Problem Relation Age of Onset    Diabetes Mother     High Blood Pressure Mother     Depression Mother     Depression Father     High Blood Pressure Father     Other Father         sleep apnea     REVIEW OF SYSTEMS:      Constitutional:  No weight loss, No fever, No chills, No night sweats. Energy level good.   Eyes:  No impairment or change in vision  ENT / Mouth:  No pain, abnormal ulceration, bleeding, nasal drip or change in voice or hearing  Cardiovascular:  No chest pain, palpitations, new edema, or calf discomfort  Respiratory:  No pain, hemoptysis, change to breathing  Breast:  No pain, discharge, change in appearance or texture  Gastrointestinal:  No pain, cramping, jaundice, change to eating and bowel habits  Urinary:  No pain, bleeding or change in continence  Genitalia: No pain, bleeding or discharge  Musculoskeletal:  No redness, pain, edema or weakness  Skin:  No pruritus, rash, change to nodules or lesions  Neurologic:  No discomfort, change in mental status, speech, sensory or motor activity  Psychiatric:  No change in concentration or change to affect or mood  Endocrine:  No hot flashes, increased thirst, or change to urine production  Hematologic: No petechiae, ecchymosis or bleeding  Lymphatic:  No lymphadenopathy or lymphedema  Allergy / Immunologic:  No eczema, hives, frequent or recurrent infections    PHYSICAL EXAM:      Vitals:  /87   Pulse 102   Temp 97.7 °F (36.5 °C) (Oral)   Resp 18   Ht 6' 4\" (1.93 m)   Wt 299 lb 1.6 oz (135.7 kg)   SpO2 95%   BMI 36.41 kg/m²   CONSTITUTIONAL: awake, alert, cooperative, no apparent distress   EYES: pupils equal, round and reactive to light, sclera clear and conjunctiva normal  ENT: Normocephalic, without obvious abnormality, atraumatic  NECK: supple, symmetrical, no jugular venous distension and no carotid bruits   HEMATOLOGIC/LYMPHATIC: no cervical, supraclavicular or axillary lymphadenopathy   LUNGS: no increased work of breathing and clear to auscultation   CARDIOVASCULAR: regular rate and rhythm, normal S1 and S2, no murmur noted  ABDOMEN: normal bowel sounds x 4, soft, non-distended, non-tender, no masses palpated, no hepatosplenomgaly   MUSCULOSKELETAL: full range of motion noted, tone is normal  NEUROLOGIC: awake, alert, oriented to name, place and time. Motor skills grossly intact. SKIN: Normal skin color, texture, turgor and no jaundice. appears intact   EXTREMITIES: no LE edema       DATA:    PT/INR:    Recent Labs     06/04/21 2030 06/05/21  0553   PROT 6.9 6.7     PTT:  No results for input(s): APTT in the last 72 hours.   CMP:    Lab Results   Component Value Date     06/05/2021    K 4.2 06/05/2021     06/05/2021    CO2 23 06/05/2021    BUN 13 06/05/2021    PROT 6.7 06/05/2021    PROT 6.7 10/27/2016     :  No results found for: MG  Phosphorus:  No components found for: PO4  Calcium:  No components found for: CA  CBC:    Lab Results   Component Value Date    WBC 3.7 06/05/2021    RBC 5.30 06/05/2021    RBC 5.35 07/27/2017    HGB 16.0 06/05/2021    HCT 45.8 06/05/2021    MCV 86.4 06/05/2021    RDW 13.8 06/05/2021    PLT 5 06/05/2021     DIFF:  Lab Results   Component Value Date    MCV 86.4 06/05/2021    RDW 13.8 06/05/2021      [email or web address]  Uric Acid:  @labcrnt(URIC)@    Radiology Review:          Problem List  Patient Active Problem List   Diagnosis    Hypertension    Depression    Chronic lumbar pain    Acute ITP (HCC)    Type 2 diabetes mellitus with hyperglycemia, with long-term current use of insulin (HCC)    Obesity    History of ITP    Low HDL (under 40)    MARCO ANTONIO (obstructive sleep apnea)    Snoring    Thrombocytopenia (HCC)    Acute idiopathic thrombocytopenic purpura ([facility] [address]       IMPRESSION/RECOMMENDATIONS:    ITP with a very strong antiplatelet antibody:  -Previous response to steroids, IgG and then Rituxan  -Unfortunately, his baseline platelet count is probably in the 60,000 range due to cirrhosis which certainly complicates matters  -I agree with steroids due to his low platelet count and will add IgG  -If this is ineffective, he may need Rituxan again as an outpatient  -Unlike traditional patients who may have their spleen removed for ITP, he is not a candidate due to his cirrhosis. OWEN:  -cause of cirrhosis    Diabetes:  -Sliding scale per hospitalist  -This will likely be somewhat of an issue given his high-dose steroids      Thank you for asking me to see the patient.        Raeksh Reid MD  Please Contact Through Perfect Serve

## 2021-06-05 NOTE — ED PROVIDER NOTES
[address] ED  EMERGENCY DEPARTMENT ENCOUNTER        Pt Name: Vernell Meneses  MRN: [id number]  [place] 1980  Date of evaluation: 6/4/2021  Provider: Cm Perkins PA-C  PCP: COLIN Dobbins CNP  Note Started: 8:11 PM EDT        I have seen and evaluated this patient with my supervising physician Sohail Monk MD.    [facility]       Chief Complaint   Patient presents with    Abnormal Lab     pt states he had labs done today was told to come to ER for abnormal platelet level       HISTORY OF PRESENT ILLNESS   (Location, Timing/Onset, Context/Setting, Quality, Duration, Modifying Factors, Severity, Associated Signs and Symptoms)  Note limiting factors. Vernell Meneses is a 35 y.o. male who presents with a Chief Complaint of \"low platelets\". Patient has a history of ITP. Also has a history of sleep apnea, diabetes hypertension depression. Patient brought in today by private vehicle with complaints of thrombocytopenia. States that he had routine blood work at his hematologist, Dr. Federico Street showed a low platelet level today. He was told to come in immediately to the ED. He does report petechiae to his upper and lower extremities. Onset of symptoms over the past several days. Duration symptoms have been persistent since onset. Context includes low platelet count. No aggravating complaints. No alleviating complaints. He otherwise denies any other complaints at this time. He reports that he has been on steroids in the past.  He has not tried anything at home for symptomatic relief. He states that he has had transfusions in the past.    Nursing Notes were all reviewed and agreed with or any disagreements were addressed in the HPI. REVIEW OF SYSTEMS    (2-9 systems for level 4, 10 or more for level 5)     Review of Systems   Constitutional: Negative. Respiratory: Negative. Cardiovascular: Negative. Gastrointestinal: Negative.     Genitourinary: 7 for level 4, 8 or more for level 5)     ED Triage Vitals [06/04/21 1910]   BP Temp Temp Source Pulse Resp SpO2 Height Weight   (!) 158/87 98.1 °F (36.7 °C) Oral 94 18 97 % 6' 4\" (1.93 m) (!) 302 lb (137 kg)       Physical Exam  Vitals and nursing note reviewed. Constitutional:       General: He is awake. He is not in acute distress. Appearance: Normal appearance. He is well-developed. He is not ill-appearing, toxic-appearing or diaphoretic. HENT:      Head: Normocephalic and atraumatic. Nose: Nose normal.   Eyes:      General:         Right eye: No discharge. Left eye: No discharge. Cardiovascular:      Rate and Rhythm: Normal rate and regular rhythm. Heart sounds: Normal heart sounds. No murmur heard. No gallop. Pulmonary:      Effort: Pulmonary effort is normal. No respiratory distress. Breath sounds: Normal breath sounds. No wheezing or rales. Chest:      Chest wall: No tenderness. Musculoskeletal:         General: No deformity. Normal range of motion. Cervical back: Normal range of motion and neck supple. Skin:     General: Skin is warm and dry. Findings: Petechiae present. Comments: Diffuse petechia noted to upper and lower extremities. Neurological:      General: No focal deficit present. Mental Status: He is alert and oriented to person, place, and time. GCS: GCS eye subscore is 4. GCS verbal subscore is 5. GCS motor subscore is 6. Psychiatric:         Behavior: Behavior normal. Behavior is cooperative.          DIAGNOSTIC RESULTS   LABS:    Labs Reviewed   CBC WITH AUTO DIFFERENTIAL - Abnormal; Notable for the following components:       Result Value    Platelets 3 (*)     MPV 12.1 (*)     Poikilocytes Occasional (*)     All other components within normal limits    Narrative:     Qian PINTO tel. [phone],  Hematology results called to and read back by Doc Bee RN, 06/04/2021  21:53, by [id number]  Performed at:  [facility]) - [facility] [address],  [place], [facility]   Phone [phone]   COMPREHENSIVE METABOLIC PANEL W/ REFLEX TO MG FOR LOW K - Abnormal; Notable for the following components:    Sodium 134 (*)     Chloride 96 (*)     Glucose 561 (*)     CREATININE 0.7 (*)     Total Bilirubin 1.5 (*)     All other components within normal limits    Narrative:     Performed at:  [facility] [address],  [place], [facility]   Phone [phone] & INFLUENZA COMBO   TYPE AND SCREEN    Narrative:     Performed at:  [facility] [address],  [place], [facility]   Phone [phone]   PREPARE PLATELETS       All other labs were within normal range or not returned as of this dictation. EKG: All EKG's are interpreted by the Emergency Department Physician in the absence of a cardiologist.  Please see their note for interpretation of EKG. RADIOLOGY:   Non-plain film images such as CT, Ultrasound and MRI are read by the radiologist. Plain radiographic images are visualized and preliminarily interpreted by the ED Provider with the below findings:        Interpretation per the Radiologist below, if available at the time of this note:    No orders to display     No results found.         PROCEDURES   Unless otherwise noted below, none     Procedures    CRITICAL CARE TIME   N/A    CONSULTS:  IP CONSULT TO HOSPITALIST  IP CONSULT TO HEM/ONC      EMERGENCY DEPARTMENT COURSE and DIFFERENTIAL DIAGNOSIS/MDM:   Vitals:    Vitals:    06/04/21 1910   BP: (!) 158/87   Pulse: 94   Resp: 18   Temp: 98.1 °F (36.7 °C)   TempSrc: Oral   SpO2: 97%   Weight: (!) 302 lb (137 kg)   Height: 6' 4\" (1.93 m)       Patient was given the following medications:  Medications   dexamethasone (DECADRON) 40 mg in sodium chloride 0.9 % IVPB (has no administration in time range)   0.9 % sodium chloride infusion (has no administration in time range) Patient brought in today by private vehicle after he had routine blood work done by his hematologist which showed thrombocytopenia. Patient does have a known history of ITP. On exam patient is alert oriented afebrile breathing on room air satting at 97%. Nontoxic. No acute respiratory distress. Old labs and records reviewed. Patient was seen by myself as well as my attending Dr. Kandy Kline. We did receive a call from patient's hematologist who recommended IV dexamethasone as well as platelets. Plan at this time will be to admit to the hospitalist.  I did consult the hospitalist, Dr. Janene Marte who did accept this admission. Patient received IV dexamethasone as well as platelet transfusion while here in the ED. BC shows no acute leukocytosis. He did have a platelet level of 3. No acute electrolyte abnormalities. He did have a blood sugar of 561. No anion gap. Kidney function unremarkable. Patient was stable at time of admission. FINAL IMPRESSION      1. Thrombocytopenia ([address]    2. History of ITP          DISPOSITION/PLAN   DISPOSITION Admitted 06/04/2021 09:49:50 PM      PATIENT REFERRED TO:  No follow-up provider specified.     DISCHARGE MEDICATIONS:  New Prescriptions    No medications on file       DISCONTINUED MEDICATIONS:  Discontinued Medications    No medications on file              (Please note that portions of this note were completed with a voice recognition program.  Efforts were made to edit the dictations but occasionally words are mis-transcribed.)    Maria C Jeff PA-C (electronically signed)            Maria C Jeff PA-C  06/04/21 2743

## 2021-06-05 NOTE — PROGRESS NOTES
Infusion of platelets resumed. IV restarted in R forearm via ultrasound. No s/s allergic reaction noted. Call light in reach.

## 2021-06-05 NOTE — PROGRESS NOTES
Patient admitted to room _203___ from ER. Patient oriented to room, call light, bed rails, phone, lights and bathroom. Patient instructed about the schedule of the day including: vital sign frequency, lab draws, possible tests, frequency of MD and staff rounds, daily weights, I &O's and prescribed diet. Bed alarm deferred patient low fall risk and refuses alarm. Bed locked, in lowest position, side rails up 2/4, call light within reach. Refusing SCD's    Recliner Assessment:     Patient is able to demonstrate the ability to move from a reclining position to an upright position within the recliner. 4 Eyes Skin Assessment     The patient is being assess for   Admission    I agree that 2 RN's have performed a thorough Head to Toe Skin Assessment on the patient. ALL assessment sites listed below have been assessed. Areas assessed for pressure by both nurses:   [x]   Head, Face, and Ears   [x]   Shoulders, Back, and Chest, Abdomen:  Petechiae/purpura to abdomen  [x]   Arms, Elbows, and Hands:  Petechiae to arms  [x]   Coccyx, Sacrum, and Ischium  [x]   Legs, Feet, and Heels:  Petechia to legs        Skin Assessed Under all Medical Devices by both nurses:  NA            All Mepilex Borders were peeled back and area peeked at by both nurses:  No: NA  Please list where Mepilex Borders are located:  NA             **SHARE this note so that the co-signing nurse is able to place an eSignature**    Co-signer eSignature: Electronically signed by Sudheer Hensley RN on 6/5/21 at 6:58 AM EDT    Does the Patient have Skin Breakdown related to pressure?   No        Gonzalo Prevention initiated:  No   Wound Care Orders initiated:  No      [facility] nurse consulted for Pressure Injury (Stage 3,4, Unstageable, DTI, NWPT, Complex wounds)and New or Established Ostomies:  No      Primary Nurse eSignature: Electronically signed by Elsy Aguilar RN on 6/5/21 at 2:11 AM EDT

## 2021-06-05 NOTE — ED PROVIDER NOTES
I independently examined and evaluated Jann Geronimo. In brief, patient is a 68-year-old male with history of ITP presents to the emergency department from oncology clinic for low platelet count. I received the call from Dr. Bishop Ross, who is his oncologist.  Patient has history of ITP and was found to have platelet of 2 at the clinic today and was sent over to the emergency department so he could receive dexamethasone 40 mg IV and 1 unit transfusion of platelet. Repeat labs obtained. Patient reports reports having received transfusions before and denies having any questions about the transfusion. Hospitalist consulted for admission. Admit. All diagnostic, treatment, and disposition decisions were made by myself in conjunction with the advanced practice provider. For all further details of the patient's emergency department visit, please see the advanced practice provider's documentation. Comment: Please note this report has been produced using speech recognition software and may contain errors related to that system including errors in grammar, punctuation, and spelling, as well as words and phrases that may be inappropriate. If there are any questions or concerns please feel free to contact the dictating provider for clarification.        Ethelle Barthel, MD  06/06/21 6859

## 2021-06-05 NOTE — PROGRESS NOTES
Pt a/o. Am assessment completed see flow sheet. Pt denies any pain at this time. Pt resting quietly, advised pt that we will do another unit of platelets today. Call light within reach. Will continue to monitor.

## 2021-06-05 NOTE — H&P
Hospital Medicine History & Physical      PCP: COLIN Mohan CNP    Date of Admission: 6/4/2021    Date of Service: Pt seen/examined on 6/4/2021 and Admitted to Inpatient with expected LOS greater than two midnights due to medical therapy. Chief Complaint:  petechia      History Of Present Illness:     35 y.o. malenotes onset of petechia last week, 2 days following a tattoo. He denies fever, chills, sob, urti, abdominal pain, n/v, change in stools , blood in urine. He noted diffuse petechia similar to those noted when he was first diagnosed with ITP 7 years ago. He contacted his Hematologist, who advised ER consult after outpatient labs revealed thrombocytopenia. Past Medical History:          Diagnosis Date    Arthritis     left knee    Chronic lumbar pain     DDD (degenerative disc disease), lumbar     MRI 2012    Depression     Glucose intolerance (impaired glucose tolerance)     History of ITP     Hypertension     Idiopathic thrombocytopenic purpura (HCC)     Type 2 diabetes mellitus without complication, without long-term current use of insulin ([facility] 08/30/2016       Past Surgical History:      History reviewed. No pertinent surgical history. Medications Prior to Admission:      Prior to Admission medications    Medication Sig Start Date End Date Taking?  Authorizing Provider   benazepril (LOTENSIN) 40 MG tablet TAKE ONE TABLET BY MOUTH DAILY 5/10/21   COLIN Mohan CNP   insulin aspart (NOVOLOG FLEXPEN) 100 UNIT/ML injection pen Inject 15 Units into the skin 3 times daily (before meals) 5/4/21   COLIN Mohan CNP   insulin glargine (LANTUS SOLOSTAR) 100 UNIT/ML injection pen Inject 50 Units into the skin nightly 5/4/21   COLIN Mohan CNP   metFORMIN (GLUCOPHAGE) 1000 MG tablet TAKE ONE TABLET BY MOUTH TWICE A DAY WITH MEALS 4/27/21   COLIN Mohan CNP   buPROPion [facility]) 150 MG extended release tablet Take 1 06/04/21 2030   AST 32   ALT 37   BILITOT 1.5*   ALKPHOS 91     No results for input(s): INR in the last 72 hours. No results for input(s): Meldon Kumar in the last 72 hours. Urinalysis:      Lab Results   Component Value Date    NITRU Negative 03/01/2019    BLOODU Negative 03/01/2019    SPECGRAV 1.020 03/01/2019    GLUCOSEU >=1000 03/01/2019       Radiology:          No orders to display       ASSESSMENT:    Active Hospital Problems    Diagnosis Date Noted    Acute idiopathic thrombocytopenic purpura ([facility] [address] [D69.3] 06/04/2021         PLAN:    1) ITP  - IV decadron  - plt transfusion  - no signs / symptoms of active bleeding    2) DM  - lantus with corrective ISS    3) HTN  - continue home meds    DVT Prophylaxis: scd  Diet: ADULT DIET; Regular; 3 carb choices (45 gm/meal)  Code Status: Full Elieser Wheat MD    Thank you COLIN Shell - DEEJAY for the opportunity to be involved in this patient's care. If you have any questions or concerns please feel free to contact me at [phone].

## 2021-06-05 NOTE — PROGRESS NOTES
Daily    insulin glargine  50 Units Subcutaneous Nightly    insulin lispro  0-6 Units Subcutaneous TID WC    insulin lispro  0-3 Units Subcutaneous Nightly    sodium chloride flush  5-40 mL Intravenous 2 times per day    dexamethasone  40 mg Intravenous Daily       Continuous Infusions:   sodium chloride      dextrose      sodium chloride         PRN Meds:  sodium chloride, glucose, dextrose, glucagon (rDNA), dextrose, sodium chloride flush, sodium chloride, ondansetron **OR** ondansetron, acetaminophen **OR** acetaminophen      Data:  CBC:   Recent Labs     06/04/21 2030 06/05/21  0553   WBC 4.2 3.7*   HGB 16.0 16.0   HCT 46.9 45.8   MCV 88.4 86.4   PLT 3* 5*     BMP:   Recent Labs     06/04/21 2030 06/05/21  0553   * 133*   K 4.4 4.2   CL 96* 102   CO2 31 23   BUN 12 13   CREATININE 0.7* 0.6*     LIVER PROFILE:   Recent Labs     06/04/21 2030 06/05/21  0553   AST 32 33   ALT 37 38   BILIDIR  --  0.3   BILITOT 1.5* 1.5*   ALKPHOS 91 87     PT/INR: No results for input(s): PROTIME, INR in the last 72 hours. Cultures:       Assessment:  Principal Problem:    Acute idiopathic thrombocytopenic purpura (HCC)  Active Problems:    Hypertension    Type 2 diabetes mellitus with hyperglycemia, with long-term current use of insulin (HCC)    Morbid obesity (HCC)    OWEN (nonalcoholic steatohepatitis)    Cirrhosis of liver (HCC)  Resolved Problems:    * No resolved hospital problems. *      Plan:    #Acute idiopathic thrombocytopenic purpura. Patient has had this for 7 years. His last platelet count was 34,016. He came to the ER with petechia and his platelet count was 5830. He was given 40 mg of IV Decadron and this has been continued. Hematology consultation was obtained. He will did receive a dose of IVIG. We will continue to monitor his platelet count closely. #Diabetes mellitus type 2 with hyperglycemia. He takes Lantus 50 units at night and 15 units of NovoLog 3 times a day.   I will add high-dose sliding scale. I expect his sugars to run high because of steroids. We will adjust appropriately. #Morbid Obesity  - Body mass index is 36.41 kg/m². - Complicating assessment and treatment. Placing patient at risk for multiple co-morbidities as well as early death and contributing to the patient's presentation.   - Counseled on weight loss. #Hypertension. We will continue with benazepril. #Major depression. Continue with Wellbutrin. #OWEN/cirrhosis of the liver. Patient has nonalcoholic steatohepatitis causing cirrhosis of the liver. He has mild splenomegaly. #SCD for DVT prophylaxis.     Prince Thomas MD, MD 6/5/2021 10:19 AM

## 2021-06-06 VITALS
TEMPERATURE: 97.2 F | HEIGHT: 76 IN | BODY MASS INDEX: 36.42 KG/M2 | HEART RATE: 94 BPM | WEIGHT: 299.1 LBS | OXYGEN SATURATION: 97 % | DIASTOLIC BLOOD PRESSURE: 83 MMHG | RESPIRATION RATE: 18 BRPM | SYSTOLIC BLOOD PRESSURE: 137 MMHG

## 2021-06-06 LAB
BASOPHILS ABSOLUTE: 0 K/UL (ref 0–0.2)
BASOPHILS RELATIVE PERCENT: 0.1 %
EOSINOPHILS ABSOLUTE: 0 K/UL (ref 0–0.6)
EOSINOPHILS RELATIVE PERCENT: 0 %
GLUCOSE BLD-MCNC: 299 MG/DL (ref 70–99)
GLUCOSE BLD-MCNC: 391 MG/DL (ref 70–99)
GLUCOSE BLD-MCNC: 409 MG/DL (ref 70–99)
HCT VFR BLD CALC: 44.2 % (ref 40.5–52.5)
HEMOGLOBIN: 15.4 G/DL (ref 13.5–17.5)
LYMPHOCYTES ABSOLUTE: 0.7 K/UL (ref 1–5.1)
LYMPHOCYTES RELATIVE PERCENT: 8.4 %
MCH RBC QN AUTO: 30.4 PG (ref 26–34)
MCHC RBC AUTO-ENTMCNC: 34.9 G/DL (ref 31–36)
MCV RBC AUTO: 87.1 FL (ref 80–100)
MONOCYTES ABSOLUTE: 0.1 K/UL (ref 0–1.3)
MONOCYTES RELATIVE PERCENT: 1.7 %
NEUTROPHILS ABSOLUTE: 7.2 K/UL (ref 1.7–7.7)
NEUTROPHILS RELATIVE PERCENT: 89.8 %
PDW BLD-RTO: 13.9 % (ref 12.4–15.4)
PERFORMED ON: ABNORMAL
PLATELET # BLD: 15 K/UL (ref 135–450)
PMV BLD AUTO: 9.6 FL (ref 5–10.5)
RBC # BLD: 5.07 M/UL (ref 4.2–5.9)
WBC # BLD: 8 K/UL (ref 4–11)

## 2021-06-06 PROCEDURE — 6360000002 HC RX W HCPCS: Performed by: INTERNAL MEDICINE

## 2021-06-06 PROCEDURE — 36415 COLL VENOUS BLD VENIPUNCTURE: CPT

## 2021-06-06 PROCEDURE — 6360000002 HC RX W HCPCS: Performed by: EMERGENCY MEDICINE

## 2021-06-06 PROCEDURE — 2580000003 HC RX 258: Performed by: HOSPITALIST

## 2021-06-06 PROCEDURE — 99239 HOSP IP/OBS DSCHRG MGMT >30: CPT | Performed by: INTERNAL MEDICINE

## 2021-06-06 PROCEDURE — 2580000003 HC RX 258: Performed by: EMERGENCY MEDICINE

## 2021-06-06 PROCEDURE — 6370000000 HC RX 637 (ALT 250 FOR IP): Performed by: HOSPITALIST

## 2021-06-06 PROCEDURE — 85025 COMPLETE CBC W/AUTO DIFF WBC: CPT

## 2021-06-06 RX ORDER — DEXAMETHASONE 4 MG/1
40 TABLET ORAL ONCE
Qty: 10 TABLET | Refills: 0 | Status: SHIPPED | OUTPATIENT
Start: 2021-06-07 | End: 2021-06-07

## 2021-06-06 RX ADMIN — IMMUNE GLOBULIN INTRAVENOUS (HUMAN) 30 G: 5 INJECTION, SOLUTION INTRAVENOUS at 09:36

## 2021-06-06 RX ADMIN — BUPROPION HYDROCHLORIDE 150 MG: 150 TABLET, EXTENDED RELEASE ORAL at 08:11

## 2021-06-06 RX ADMIN — Medication 10 ML: at 08:11

## 2021-06-06 RX ADMIN — DEXAMETHASONE SODIUM PHOSPHATE 40 MG: 4 INJECTION, SOLUTION INTRAMUSCULAR; INTRAVENOUS at 12:40

## 2021-06-06 RX ADMIN — LISINOPRIL 40 MG: 20 TABLET ORAL at 08:10

## 2021-06-06 RX ADMIN — SODIUM CHLORIDE 25 ML: 9 INJECTION, SOLUTION INTRAVENOUS at 12:38

## 2021-06-06 ASSESSMENT — PAIN SCALES - GENERAL
PAINLEVEL_OUTOF10: 0
PAINLEVEL_OUTOF10: 0

## 2021-06-06 NOTE — PROGRESS NOTES
See PM shift assessment. Decadron infusing at this time. States that when his platelets get to 10, he will likely be dc/d home. Pleasant and cooperative. Call light in reach.

## 2021-06-06 NOTE — CARE COORDINATION
Review of chart screened for potential discharge planning needs. Contact with __pt___(pt/support person)   Role of discharge planner explained with verbalized understanding. No Needs identified by pt/support person for barriers to discharge. Readmission Risk Score:7%  No discharge needs noted not following. MD and bedside RN please notify CM if needs arise for any discharge interventions.

## 2021-06-06 NOTE — PROGRESS NOTES
Decadron completed early per MD. Prescriptions and discharge instructions given. Pt verbalized understanding/ denies questions/ needs at this time. Transport called to transport pt to vehicle for discharge home.

## 2021-06-06 NOTE — PROGRESS NOTES
ONCOLOGY HEMATOLOGY CARE PROGRESS NOTE      SUBJECTIVE:     The patient states that he feels fine. He denies bruising or bleeding. ROS:     Constitutional:  No weight loss, No fever, No chills, No night sweats. Energy level good. Eyes:  No impairment or change in vision  ENT / Mouth:  No pain, abnormal ulceration, bleeding, nasal drip or change in voice or hearing  Cardiovascular:  No chest pain, palpitations, new edema, or calf discomfort  Respiratory:  No pain, hemoptysis, change to breathing  Breast:  No pain, discharge, change in appearance or texture  Gastrointestinal:  No pain, cramping, jaundice, change to eating and bowel habits  Urinary:  No pain, bleeding or change in continence  Genitalia: No pain, bleeding or discharge  Musculoskeletal:  No redness, pain, edema or weakness  Skin:  No pruritus, rash, change to nodules or lesions  Neurologic:  No discomfort, change in mental status, speech, sensory or motor activity  Psychiatric:  No change in concentration or change to affect or mood  Endocrine:  No hot flashes, increased thirst, or change to urine production  Hematologic: No petechiae, ecchymosis or bleeding  Lymphatic:  No lymphadenopathy or lymphedema  Allergy / Immunologic:  No eczema, hives, frequent or recurrent infections    OBJECTIVE        Physical    VITALS:  /85   Pulse 95   Temp 98 °F (36.7 °C) (Oral)   Resp 18   Ht 6' 4\" (1.93 m)   Wt 299 lb 1.6 oz (135.7 kg)   SpO2 95%   BMI 36.41 kg/m²   TEMPERATURE:  Current - Temp: 98 °F (36.7 °C); Max - Temp  Av.6 °F (36.4 °C)  Min: 97 °F (36.1 °C)  Max: 98.3 °F (36.8 °C)  PULSE OXIMETRY RANGE: SpO2  Av.5 %  Min: 94 %  Max: 97 %  24HR INTAKE/OUTPUT:      Intake/Output Summary (Last 24 hours) at 2021 0846  Last data filed at 2021 1003  Gross per 24 hour   Intake 333 ml   Output --   Net 333 ml       CONSTITUTIONAL: awake, alert, cooperative, no apparent distress.  He is morbidly obese. EYES: pupils equal, round and reactive to light, sclera clear and conjunctiva normal  ENT: Normocephalic, without obvious abnormality, atraumatic  NECK: supple, symmetrical, no jugular venous distension and no carotid bruits   HEMATOLOGIC/LYMPHATIC: no cervical, supraclavicular or axillary lymphadenopathy   LUNGS: no increased work of breathing and clear to auscultation   CARDIOVASCULAR: regular rate and rhythm, normal S1 and S2, no murmur noted  ABDOMEN: normal bowel sounds x 4, soft, non-distended, non-tender, no masses palpated, no hepatosplenomgaly   MUSCULOSKELETAL: full range of motion noted, tone is normal  NEUROLOGIC: awake, alert, oriented to name, place and time. Motor skills grossly intact. SKIN: Normal skin color, texture, turgor and no jaundice.  appears intact   EXTREMITIES: no LE edema       Data      Recent Labs     06/04/21 2030 06/05/21  0553   WBC 4.2 3.7*   HGB 16.0 16.0   HCT 46.9 45.8   PLT 3* 5*   MCV 88.4 86.4        Recent Labs     06/04/21 2030 06/05/21  0553   * 133*   K 4.4 4.2   CL 96* 102   CO2 31 23   BUN 12 13   CREATININE 0.7* 0.6*     Recent Labs     06/04/21 2030 06/05/21  0553   AST 32 33   ALT 37 45   BILIDIR  --  0.3   BILITOT 1.5* 1.5*   ALKPHOS 91 87       Magnesium:  No results found for: MG      Problem List  Patient Active Problem List   Diagnosis    Hypertension    Depression    Chronic lumbar pain    Acute ITP (HCC)    Type 2 diabetes mellitus with hyperglycemia, with long-term current use of insulin (HCC)    Morbid obesity (HCC)    History of ITP    Low HDL (under 40)    MARCO ANTONIO (obstructive sleep apnea)    Snoring    Thrombocytopenia (HCC)    Acute idiopathic thrombocytopenic purpura ([facility] [address]    OWEN (nonalcoholic steatohepatitis)    Cirrhosis of liver (HCC)       ASSESSMENT AND PLAN:    Thrombocytopenia:  -The patient has OWEN with a normal platelet count of around 60,000 due to cirrhosis  -He also has ITP and was admitted with a

## 2021-06-06 NOTE — DISCHARGE SUMMARY
liver.  He has mild splenomegaly.     #SCD for DVT prophylaxis. Procedures (Please Review Full Report for Details)  N/A    Consults    Hem/Onc      Physical Exam at Discharge:    BP (!) 145/88   Pulse 96   Temp 97.1 °F (36.2 °C) (Oral)   Resp 18   Ht 6' 4\" (1.93 m)   Wt 299 lb 1.6 oz (135.7 kg)   SpO2 96%   BMI 36.41 kg/m²     General appearance: alert, appears stated age and cooperative  Head: Normocephalic, without obvious abnormality, atraumatic  Eyes: conjunctivae/corneas clear. PERRL, EOM's intact. Neck: no adenopathy, no carotid bruit, no JVD, supple, symmetrical, trachea midline and thyroid not enlarged, symmetric, no tenderness/mass/nodules  Lungs: clear to auscultation bilaterally  Heart: regular rate and rhythm, S1, S2 normal, no murmur, click, rub or gallop  Abdomen: soft, non-tender; bowel sounds normal; no masses, spleen is palpable under the left costal margin. Extremities: extremities normal, atraumatic, no cyanosis or edema  Pulses: 2+ and symmetric  Skin: Skin color, texture, turgor normal.  He has multiple petechiae in his lower extremity.   Neurologic: Grossly normal    CBC:   Recent Labs     06/04/21 2030 06/05/21  0553 06/06/21  0913   WBC 4.2 3.7* 8.0   HGB 16.0 16.0 15.4   HCT 46.9 45.8 44.2   MCV 88.4 86.4 87.1   PLT 3* 5* 15*     BMP:   Recent Labs     06/04/21 2030 06/05/21  0553   * 133*   K 4.4 4.2   CL 96* 102   CO2 31 23   BUN 12 13   CREATININE 0.7* 0.6*     LIVER PROFILE:   Recent Labs     06/04/21 2030 06/05/21  0553   AST 32 33   ALT 37 38   BILIDIR  --  0.3   BILITOT 1.5* 1.5*   ALKPHOS 91 87       U/A:    Lab Results   Component Value Date    COLORU Yellow 03/01/2019    CLARITYU Clear 03/01/2019    SPECGRAV 1.020 03/01/2019    LEUKOCYTESUR Negative 03/01/2019    BLOODU Negative 03/01/2019    GLUCOSEU >=1000 03/01/2019       CULTURES  COVID/Influenza: not detected    RADIOLOGY  No orders to display         Discharge Medications     Medication List      START taking these medications    dexamethasone 4 MG tablet  Commonly known as: DECADRON  Take 10 tablets by mouth once for 1 dose  Start taking on: June 7, 2021        Zoila Owen taking these medications    benazepril 40 MG tablet  Commonly known as: LOTENSIN  TAKE ONE TABLET BY MOUTH DAILY     buPROPion 150 MG extended release tablet  Commonly known as: Wellbutrin SR  Take 1 tablet by mouth daily     Lantus SoloStar 100 UNIT/ML injection pen  Generic drug: insulin glargine  Inject 50 Units into the skin nightly     metFORMIN 1000 MG tablet  Commonly known as: GLUCOPHAGE  TAKE ONE TABLET BY MOUTH TWICE A DAY WITH MEALS     NovoLOG FlexPen 100 UNIT/ML injection pen  Generic drug: insulin aspart  Inject 15 Units into the skin 3 times daily (before meals)           Where to Get Your Medications      These medications were sent to [address] [place] [address] [address]    Phone: [phone]   · dexamethasone 4 MG tablet           Discharged in stable condition to home. Follow Up: Follow up with PCP.       More than 30  mts spent      Samantha No MD 6/6/2021 5:07 PM

## 2021-06-07 ENCOUNTER — TELEPHONE (OUTPATIENT)
Dept: FAMILY MEDICINE CLINIC | Age: 41
End: 2021-06-07

## 2021-06-07 LAB — HEMATOLOGY PATH CONSULT: NORMAL

## 2021-06-07 NOTE — TELEPHONE ENCOUNTER
----- Message from Esa Alba sent at 6/7/2021  9:10 AM EDT -----  Subject: Hospital Follow Up    QUESTIONS  What hospital was the Patient Discharged from? [facility]  Date of Discharge? 2021-06-06  Discharge Location? Home  Reason for hospitalization as patient stated? Went to ER, then admitted. Has ITP, had petechia. Went to rheumatologist to check platelets, was   9225. Was given 2x platelet infusions, 3x steroid infusions, which raised   blood sugar, so they raised insulin. Will take 40 mg steroid pill today. What question does the patient have, if applicable? Wants to inform doctor   about these health changes and schedule a f/u appt.  ---------------------------------------------------------------------------  --------------  CALL BACK INFO  What is the best way for the office to contact you? OK to leave message on   voicemail  Preferred Call Back Phone Number? [phone]  ---------------------------------------------------------------------------  --------------  SCRIPT ANSWERS  Relationship to Patient? Self  Appointment reason? Well Care/Follow Ups  Select a Well Care/Follow Ups appointment reason? Adult Hospital Follow Up   [Inpatient Discharge, [address]  (Has the patient been discharged from the hospital within 2 business days   AND does not have a Telephone Encounter  Follow Up From [address] [place] [address]   documented in [address]?)?  Yes

## 2021-06-11 ENCOUNTER — OFFICE VISIT (OUTPATIENT)
Dept: FAMILY MEDICINE CLINIC | Age: 41
End: 2021-06-11
Payer: COMMERCIAL

## 2021-06-11 VITALS
WEIGHT: 294.6 LBS | TEMPERATURE: 97.8 F | HEART RATE: 98 BPM | HEIGHT: 76 IN | BODY MASS INDEX: 35.87 KG/M2 | DIASTOLIC BLOOD PRESSURE: 74 MMHG | SYSTOLIC BLOOD PRESSURE: 126 MMHG

## 2021-06-11 DIAGNOSIS — Z79.4 TYPE 2 DIABETES MELLITUS WITH HYPERGLYCEMIA, WITH LONG-TERM CURRENT USE OF INSULIN (HCC): ICD-10-CM

## 2021-06-11 DIAGNOSIS — Z86.2 HX OF IDIOPATHIC THROMBOCYTOPENIC PURPURA: Primary | ICD-10-CM

## 2021-06-11 DIAGNOSIS — E11.65 TYPE 2 DIABETES MELLITUS WITH HYPERGLYCEMIA, WITH LONG-TERM CURRENT USE OF INSULIN (HCC): ICD-10-CM

## 2021-06-11 PROCEDURE — 3046F HEMOGLOBIN A1C LEVEL >9.0%: CPT | Performed by: NURSE PRACTITIONER

## 2021-06-11 PROCEDURE — 1036F TOBACCO NON-USER: CPT | Performed by: NURSE PRACTITIONER

## 2021-06-11 PROCEDURE — G8417 CALC BMI ABV UP PARAM F/U: HCPCS | Performed by: NURSE PRACTITIONER

## 2021-06-11 PROCEDURE — 99214 OFFICE O/P EST MOD 30 MIN: CPT | Performed by: NURSE PRACTITIONER

## 2021-06-11 PROCEDURE — 1111F DSCHRG MED/CURRENT MED MERGE: CPT | Performed by: NURSE PRACTITIONER

## 2021-06-11 PROCEDURE — 2022F DILAT RTA XM EVC RTNOPTHY: CPT | Performed by: NURSE PRACTITIONER

## 2021-06-11 PROCEDURE — G8427 DOCREV CUR MEDS BY ELIG CLIN: HCPCS | Performed by: NURSE PRACTITIONER

## 2021-06-11 RX ORDER — INSULIN ASPART 100 [IU]/ML
25 INJECTION, SOLUTION INTRAVENOUS; SUBCUTANEOUS
Qty: 5 PEN | Refills: 2 | Status: SHIPPED | OUTPATIENT
Start: 2021-06-11 | End: 2021-07-06 | Stop reason: ALTCHOICE

## 2021-06-11 RX ORDER — INSULIN GLARGINE 100 [IU]/ML
60 INJECTION, SOLUTION SUBCUTANEOUS NIGHTLY
Qty: 5 PEN | Refills: 2 | Status: SHIPPED | OUTPATIENT
Start: 2021-06-11 | End: 2021-07-06 | Stop reason: ALTCHOICE

## 2021-06-11 RX ORDER — DEXAMETHASONE 4 MG/1
4 TABLET ORAL
COMMUNITY
Start: 2021-06-07 | End: 2021-07-28

## 2021-06-11 RX ORDER — DULOXETIN HYDROCHLORIDE 30 MG/1
CAPSULE, DELAYED RELEASE ORAL
COMMUNITY
Start: 2021-05-30 | End: 2021-06-11

## 2021-06-11 ASSESSMENT — ENCOUNTER SYMPTOMS: ABDOMINAL PAIN: 1

## 2021-06-11 NOTE — PROGRESS NOTES
Patient: Alycia Luciano is a 35 y.o. male who presents today with the following Chief Complaint(s):  Chief Complaint   Patient presents with    Follow-Up from Hospital         [facility]-this is a 73-year-old male patient following up after hospitalization with ITP. He states that he got a new tattoo and Friday of last week he noticed he was starting to get petechiae. He just resaw the hematologist today and his platelets are up to 31,818. He has been checking his blood sugars often because he is on steroids right now they are averaging in the 300s. The hematologist has adjusted his insulin already he currently is taking Lantus 60 units and NovoLog 25 units 3 times a day with meals. He was instructed to continue this and to call me by next Wednesday with blood sugars. He has already started decreasing the steroids so I would imagine his blood sugars will start coming down. He has bruising noted on his abdomen and his bilateral arms from the hospital.  He does complain of abdominal pain and this is consistent with the ITP. Current Outpatient Medications   Medication Sig Dispense Refill    dexamethasone (DECADRON) 4 MG tablet 4 mg       insulin glargine (LANTUS SOLOSTAR) 100 UNIT/ML injection pen Inject 60 Units into the skin nightly 5 pen 2    insulin aspart (NOVOLOG FLEXPEN) 100 UNIT/ML injection pen Inject 25 Units into the skin 3 times daily (before meals) 5 pen 2    benazepril (LOTENSIN) 40 MG tablet TAKE ONE TABLET BY MOUTH DAILY 30 tablet 3    metFORMIN (GLUCOPHAGE) 1000 MG tablet TAKE ONE TABLET BY MOUTH TWICE A DAY WITH MEALS 180 tablet 0    buPROPion (WELLBUTRIN SR) 150 MG extended release tablet Take 1 tablet by mouth daily 30 tablet 2     No current facility-administered medications for this visit. Patient's past medical history, surgical history, family history, medications,  and allergies  were all reviewed and updated as appropriate today.       Review of Systems   Gastrointestinal: Positive for abdominal pain. All other systems reviewed and are negative. Physical Exam  Vitals and nursing note reviewed. Constitutional:       Appearance: He is well-developed. He is obese. HENT:      Head: Normocephalic. Right Ear: External ear normal.      Left Ear: External ear normal.      Nose: Nose normal.      Mouth/Throat:      Mouth: Mucous membranes are moist.      Pharynx: Oropharynx is clear. Eyes:      Conjunctiva/sclera: Conjunctivae normal.      Pupils: Pupils are equal, round, and reactive to light. Cardiovascular:      Rate and Rhythm: Normal rate and regular rhythm. Heart sounds: Normal heart sounds. No murmur heard. Pulmonary:      Effort: Pulmonary effort is normal.      Breath sounds: Normal breath sounds. No wheezing or rhonchi. Abdominal:      General: Bowel sounds are normal.      Palpations: Abdomen is soft. There is no mass. Tenderness: There is no abdominal tenderness. Musculoskeletal:         General: Normal range of motion. Cervical back: Normal range of motion. Skin:     General: Skin is warm and dry. Findings: Bruising (To bilateral upper extremities and lower abdomen) present. Neurological:      General: No focal deficit present. Mental Status: He is alert and oriented to person, place, and time. Psychiatric:         Mood and Affect: Mood normal.         Behavior: Behavior normal.         Thought Content: Thought content normal.         Judgment: Judgment normal.       Vitals:    06/11/21 1359   BP: 126/74   Pulse: 98   Temp: 97.8 °F (36.6 °C)       Assessment:  Encounter Diagnoses   Name Primary?  Hx of idiopathic thrombocytopenic purpura Yes    Type 2 diabetes mellitus with hyperglycemia, with long-term current use of insulin ([address]        Controlled SubstancesMonitoring:  NA    Plan:  1. Hx of idiopathic thrombocytopenic purpura  Problem  - MO DISCHARGE MEDS RECONCILED W/ CURRENT OUTPATIENT MED LIST    2.  Type 2 diabetes mellitus with hyperglycemia, with long-term current use of insulin ([facility])  Problem  - insulin glargine (LANTUS SOLOSTAR) 100 UNIT/ML injection pen; Inject 60 Units into the skin nightly  Dispense: 5 pen; Refill: 2  - insulin aspart (NOVOLOG FLEXPEN) 100 UNIT/ML injection pen; Inject 25 Units into the skin 3 times daily (before meals)  Dispense: 5 pen; Refill: 2  He is to call me by next Wednesday with his blood sugar results as he decreases the Decadron. We will adjust insulin as necessary    Patrick Cheng APRN - CNP, FNP    Reviewed treatment plan with patient. Patient verbalized understanding to treatment plan and questions were answered. [address] [facility] [address]  [place], [address] [place]

## 2021-06-15 DIAGNOSIS — Z79.4 TYPE 2 DIABETES MELLITUS WITH HYPERGLYCEMIA, WITH LONG-TERM CURRENT USE OF INSULIN (HCC): ICD-10-CM

## 2021-06-15 DIAGNOSIS — E11.65 TYPE 2 DIABETES MELLITUS WITH HYPERGLYCEMIA, WITH LONG-TERM CURRENT USE OF INSULIN (HCC): ICD-10-CM

## 2021-06-15 NOTE — TELEPHONE ENCOUNTER
----- Message from Judith Schultz sent at 6/15/2021 11:33 AM EDT -----  Subject: Message to Provider    QUESTIONS  Information for Provider? Nabil Sung called to let her know his hematologist   put him back on steroids which will cause his blood sugar to be high. He   also needs a refill on insulin.   ---------------------------------------------------------------------------  --------------  CALL BACK INFO  What is the best way for the office to contact you? OK to leave message on   voicemail  Preferred Call Back Phone Number? [phone]  ---------------------------------------------------------------------------  --------------  SCRIPT ANSWERS  Relationship to Patient?  Self

## 2021-06-15 NOTE — TELEPHONE ENCOUNTER
Patient was just seen 6/11/21 and received refills on insulin. Patient should not need any refills on insulin. [facility] on other information in message below.

## 2021-06-17 ENCOUNTER — HOSPITAL ENCOUNTER (OUTPATIENT)
Dept: CT IMAGING | Age: 41
Discharge: HOME | End: 2021-06-17
Payer: COMMERCIAL

## 2021-06-17 DIAGNOSIS — R91.1 PULMONARY NODULE: ICD-10-CM

## 2021-06-17 DIAGNOSIS — K74.60 CIRRHOSIS OF LIVER WITHOUT ASCITES, UNSPECIFIED HEPATIC CIRRHOSIS TYPE (HCC): ICD-10-CM

## 2021-06-17 DIAGNOSIS — R16.1 SPLENOMEGALY: ICD-10-CM

## 2021-06-17 PROCEDURE — 71260 CT THORAX DX C+: CPT

## 2021-06-17 PROCEDURE — 6360000004 HC RX CONTRAST MEDICATION: Performed by: NURSE PRACTITIONER

## 2021-06-17 RX ADMIN — IOPAMIDOL 75 ML: 755 INJECTION, SOLUTION INTRAVENOUS at 10:00

## 2021-06-17 RX ADMIN — IOHEXOL 50 ML: 240 INJECTION, SOLUTION INTRATHECAL; INTRAVASCULAR; INTRAVENOUS; ORAL at 10:00

## 2021-06-23 ENCOUNTER — HOSPITAL ENCOUNTER (OUTPATIENT)
Dept: NURSING | Age: 41
End: 2021-06-23

## 2021-06-28 ENCOUNTER — TELEPHONE (OUTPATIENT)
Dept: FAMILY MEDICINE CLINIC | Age: 41
End: 2021-06-28

## 2021-06-28 NOTE — TELEPHONE ENCOUNTER
Requesting Refill  DULoxetine (CYMBALTA) 30 MG extended release capsule     Last Office Visit  -   6/11/21  Next Office Visit  -  9/17/21    Last Filled  -    Last UDS -    Contract -

## 2021-07-01 ENCOUNTER — TELEPHONE (OUTPATIENT)
Dept: FAMILY MEDICINE CLINIC | Age: 41
End: 2021-07-01

## 2021-07-01 DIAGNOSIS — E11.65 TYPE 2 DIABETES MELLITUS WITH HYPERGLYCEMIA, WITH LONG-TERM CURRENT USE OF INSULIN (HCC): Primary | ICD-10-CM

## 2021-07-01 DIAGNOSIS — Z79.4 TYPE 2 DIABETES MELLITUS WITH HYPERGLYCEMIA, WITH LONG-TERM CURRENT USE OF INSULIN (HCC): Primary | ICD-10-CM

## 2021-07-06 RX ORDER — INSULIN GLARGINE 100 [IU]/ML
60 INJECTION, SOLUTION SUBCUTANEOUS NIGHTLY
Qty: 5 PEN | Refills: 5 | Status: SHIPPED | OUTPATIENT
Start: 2021-07-06 | End: 2021-09-17 | Stop reason: SDUPTHER

## 2021-07-06 RX ORDER — INSULIN LISPRO 100 [IU]/ML
25 INJECTION, SOLUTION INTRAVENOUS; SUBCUTANEOUS
Qty: 22.5 ML | Refills: 3 | Status: SHIPPED | OUTPATIENT
Start: 2021-07-06 | End: 2021-11-01

## 2021-07-22 NOTE — COMMUNICATION BODY
Assessment:       · RUL 9 mm nodule on CT 3/21/19. DDx granuloma,  infection and malignancy. Patient is low risk for malignancy given age and lack of significant smoking history. Will follow-up radiographically  · ITP followed by Dr. Kathryn Carranza  · No significant smoking history         Plan:       Options of Bx, resection and watchful waiting were discussed with patient. I recommend radiographic follow up CT chest 3 months.   Patient feels comfortable with this approach Detail Level: Zone Dapsone Counseling: I discussed with the patient the risks of dapsone including but not limited to hemolytic anemia, agranulocytosis, rashes, methemoglobinemia, kidney failure, peripheral neuropathy, headaches, GI upset, and liver toxicity. Patients who start dapsone require monitoring including baseline LFTs and weekly CBCs for the first month, then every month thereafter. The patient verbalized understanding of the proper use and possible adverse effects of dapsone. All of the patient's questions and concerns were addressed. High Dose Vitamin A Counseling: Side effects reviewed, pt to contact office should one occur. Erythromycin Counseling:  I discussed with the patient the risks of erythromycin including but not limited to GI upset, allergic reaction, drug rash, diarrhea, increase in liver enzymes, and yeast infections. Use Enhanced Medication Counseling?: No Tazorac Pregnancy And Lactation Text: This medication is not safe during pregnancy. It is unknown if this medication is excreted in breast milk. Sarecycline Counseling: Patient advised regarding possible photosensitivity and discoloration of the teeth, skin, lips, tongue and gums. Patient instructed to avoid sunlight, if possible. When exposed to sunlight, patients should wear protective clothing, sunglasses, and sunscreen. The patient was instructed to call the office immediately if the following severe adverse effects occur:  hearing changes, easy bruising/bleeding, severe headache, or vision changes. The patient verbalized understanding of the proper use and possible adverse effects of sarecycline. All of the patient's questions and concerns were addressed. Topical Sulfur Applications Counseling: Topical Sulfur Counseling: Patient counseled that this medication may cause skin irritation or allergic reactions. In the event of skin irritation, the patient was advised to reduce the amount of the drug applied or use it less frequently. The patient verbalized understanding of the proper use and possible adverse effects of topical sulfur application. All of the patient's questions and concerns were addressed. Bactrim Counseling:  I discussed with the patient the risks of sulfa antibiotics including but not limited to GI upset, allergic reaction, drug rash, diarrhea, dizziness, photosensitivity, and yeast infections. Rarely, more serious reactions can occur including but not limited to aplastic anemia, agranulocytosis, methemoglobinemia, blood dyscrasias, liver or kidney failure, lung infiltrates or desquamative/blistering drug rashes. Spironolactone Pregnancy And Lactation Text: This medication can cause feminization of the male fetus and should be avoided during pregnancy. The active metabolite is also found in breast milk. Minocycline Pregnancy And Lactation Text: This medication is Pregnancy Category D and not consider safe during pregnancy. It is also excreted in breast milk. Benzoyl Peroxide Pregnancy And Lactation Text: This medication is Pregnancy Category C. It is unknown if benzoyl peroxide is excreted in breast milk. High Dose Vitamin A Pregnancy And Lactation Text: High dose vitamin A therapy is contraindicated during pregnancy and breast feeding. Topical Clindamycin Counseling: Patient counseled that this medication may cause skin irritation or allergic reactions. In the event of skin irritation, the patient was advised to reduce the amount of the drug applied or use it less frequently. The patient verbalized understanding of the proper use and possible adverse effects of clindamycin. All of the patient's questions and concerns were addressed. Tetracycline Counseling: Patient counseled regarding possible photosensitivity and increased risk for sunburn. Patient instructed to avoid sunlight, if possible. When exposed to sunlight, patients should wear protective clothing, sunglasses, and sunscreen. The patient was instructed to call the office immediately if the following severe adverse effects occur:  hearing changes, easy bruising/bleeding, severe headache, or vision changes. The patient verbalized understanding of the proper use and possible adverse effects of tetracycline. All of the patient's questions and concerns were addressed. Patient understands to avoid pregnancy while on therapy due to potential birth defects. Topical Retinoid counseling:  Patient advised to apply a pea-sized amount only at bedtime and wait 30 minutes after washing their face before applying. If too drying, patient may add a non-comedogenic moisturizer. The patient verbalized understanding of the proper use and possible adverse effects of retinoids. All of the patient's questions and concerns were addressed. Dapsone Pregnancy And Lactation Text: This medication is Pregnancy Category C and is not considered safe during pregnancy or breast feeding. Topical Sulfur Applications Pregnancy And Lactation Text: This medication is Pregnancy Category C and has an unknown safety profile during pregnancy. It is unknown if this topical medication is excreted in breast milk. Bactrim Pregnancy And Lactation Text: This medication is Pregnancy Category D and is known to cause fetal risk. It is also excreted in breast milk. Erythromycin Pregnancy And Lactation Text: This medication is Pregnancy Category B and is considered safe during pregnancy. It is also excreted in breast milk. Doxycycline Counseling:  Patient counseled regarding possible photosensitivity and increased risk for sunburn. Patient instructed to avoid sunlight, if possible. When exposed to sunlight, patients should wear protective clothing, sunglasses, and sunscreen. The patient was instructed to call the office immediately if the following severe adverse effects occur:  hearing changes, easy bruising/bleeding, severe headache, or vision changes. The patient verbalized understanding of the proper use and possible adverse effects of doxycycline. All of the patient's questions and concerns were addressed. Isotretinoin Counseling: Patient should get monthly blood tests, not donate blood, not drive at night if vision affected, not share medication, and not undergo elective surgery for 6 months after tx completed. Side effects reviewed, pt to contact office should one occur. Topical Clindamycin Pregnancy And Lactation Text: This medication is Pregnancy Category B and is considered safe during pregnancy. It is unknown if it is excreted in breast milk. Minocycline Counseling: Patient advised regarding possible photosensitivity and discoloration of the teeth, skin, lips, tongue and gums. Patient instructed to avoid sunlight, if possible. When exposed to sunlight, patients should wear protective clothing, sunglasses, and sunscreen. The patient was instructed to call the office immediately if the following severe adverse effects occur:  hearing changes, easy bruising/bleeding, severe headache, or vision changes. The patient verbalized understanding of the proper use and possible adverse effects of minocycline. All of the patient's questions and concerns were addressed. Azithromycin Counseling:  I discussed with the patient the risks of azithromycin including but not limited to GI upset, allergic reaction, drug rash, diarrhea, and yeast infections. Topical Retinoid Pregnancy And Lactation Text: This medication is Pregnancy Category C. It is unknown if this medication is excreted in breast milk. Birth Control Pills Counseling: Birth Control Pill Counseling: I discussed with the patient the potential side effects of OCPs including but not limited to increased risk of stroke, heart attack, thrombophlebitis, deep venous thrombosis, hepatic adenomas, breast changes, GI upset, headaches, and depression. The patient verbalized understanding of the proper use and possible adverse effects of OCPs. All of the patient's questions and concerns were addressed. Tazorac Counseling:  Patient advised that medication is irritating and drying. Patient may need to apply sparingly and wash off after an hour before eventually leaving it on overnight. The patient verbalized understanding of the proper use and possible adverse effects of tazorac. All of the patient's questions and concerns were addressed. Isotretinoin Pregnancy And Lactation Text: This medication is Pregnancy Category X and is considered extremely dangerous during pregnancy. It is unknown if it is excreted in breast milk. Benzoyl Peroxide Counseling: Patient counseled that medicine may cause skin irritation and bleach clothing. In the event of skin irritation, the patient was advised to reduce the amount of the drug applied or use it less frequently. The patient verbalized understanding of the proper use and possible adverse effects of benzoyl peroxide. All of the patient's questions and concerns were addressed. Spironolactone Counseling: Patient advised regarding risks of diarrhea, abdominal pain, hyperkalemia, birth defects (for female patients), liver toxicity and renal toxicity. The patient may need blood work to monitor liver and kidney function and potassium levels while on therapy. The patient verbalized understanding of the proper use and possible adverse effects of spironolactone. All of the patient's questions and concerns were addressed. Azithromycin Pregnancy And Lactation Text: This medication is considered safe during pregnancy and is also secreted in breast milk. Birth Control Pills Pregnancy And Lactation Text: This medication should be avoided if pregnant and for the first 30 days post-partum. Doxycycline Pregnancy And Lactation Text: This medication is Pregnancy Category D and not consider safe during pregnancy. It is also excreted in breast milk but is considered safe for shorter treatment courses. Detail Level: Detailed

## 2021-07-28 ENCOUNTER — VIRTUAL VISIT (OUTPATIENT)
Dept: FAMILY MEDICINE CLINIC | Age: 41
End: 2021-07-28
Payer: COMMERCIAL

## 2021-07-28 DIAGNOSIS — R05.9 COUGH: Primary | ICD-10-CM

## 2021-07-28 DIAGNOSIS — B34.9 VIRAL ILLNESS: ICD-10-CM

## 2021-07-28 PROCEDURE — 99213 OFFICE O/P EST LOW 20 MIN: CPT | Performed by: NURSE PRACTITIONER

## 2021-07-28 PROCEDURE — G8417 CALC BMI ABV UP PARAM F/U: HCPCS | Performed by: NURSE PRACTITIONER

## 2021-07-28 PROCEDURE — 1036F TOBACCO NON-USER: CPT | Performed by: NURSE PRACTITIONER

## 2021-07-28 PROCEDURE — G8427 DOCREV CUR MEDS BY ELIG CLIN: HCPCS | Performed by: NURSE PRACTITIONER

## 2021-07-28 RX ORDER — BENZONATATE 200 MG/1
200 CAPSULE ORAL 3 TIMES DAILY PRN
Qty: 30 CAPSULE | Refills: 0 | Status: ON HOLD
Start: 2021-07-28 | End: 2021-08-08 | Stop reason: HOSPADM

## 2021-07-28 RX ORDER — PREDNISONE 20 MG/1
TABLET ORAL
Status: ON HOLD | COMMUNITY
Start: 2021-07-07 | End: 2021-08-08 | Stop reason: HOSPADM

## 2021-07-28 ASSESSMENT — ENCOUNTER SYMPTOMS
SINUS PRESSURE: 0
RHINORRHEA: 1
SORE THROAT: 0
WHEEZING: 0
SHORTNESS OF BREATH: 0
SINUS PAIN: 0
COUGH: 1

## 2021-07-28 NOTE — PROGRESS NOTES
Patient: Gina Hebert is a 35 y.o. male who presents today with the following Chief Complaint(s):  Chief Complaint   Patient presents with    Cough    Chills     Consented to a virtual visit    HPI-  Saratha Osgood is a 24-year-old male presenting today with complaints of cough, chest congestion, and rhinorrhea x2 weeks. Cough is productive with clear mucus. Symptoms have caused patient to miss work. Patient denies fever, shortness of breath, sore throat, chest pain. Patient denies known COVID-19 exposure and has not been tested for Covid. Patient does not have ability to check oxygen saturations at home. Patient states his sugars have been normal. He states he has been able to keep them controlled while ill. Patient is taking chronic prednisone for history of ITP. Current Outpatient Medications   Medication Sig Dispense Refill    predniSONE (DELTASONE) 20 MG tablet       benzonatate (TESSALON) 200 MG capsule Take 1 capsule by mouth 3 times daily as needed for Cough 30 capsule 0    insulin glargine (BASAGLAR KWIKPEN) 100 UNIT/ML injection pen Inject 60 Units into the skin nightly 5 pen 5    insulin lispro, 1 Unit Dial, (HUMALOG KWIKPEN) 100 UNIT/ML SOPN Inject 25 Units into the skin 3 times daily (before meals) 22.5 mL 3    metFORMIN (GLUCOPHAGE) 1000 MG tablet TAKE ONE TABLET BY MOUTH TWICE A DAY WITH MEALS 180 tablet 1    benazepril (LOTENSIN) 40 MG tablet TAKE ONE TABLET BY MOUTH DAILY 30 tablet 3     No current facility-administered medications for this visit. Patient's past medical history, surgical history, family history, medications,  and allergies  were all reviewed and updated as appropriate today. Review of Systems   Constitutional: Negative for fever. HENT: Positive for congestion and rhinorrhea. Negative for sinus pressure, sinus pain and sore throat. Respiratory: Positive for cough (productive clear). Negative for shortness of breath and wheezing.     Cardiovascular: Negative for chest pain. All other systems reviewed and are negative. Physical Exam  Nursing note reviewed. Constitutional:       Appearance: Normal appearance. HENT:      Head: Normocephalic. Nose: Nose normal.      Mouth/Throat:      Mouth: Mucous membranes are moist.   Eyes:      Conjunctiva/sclera: Conjunctivae normal.   Pulmonary:      Effort: Pulmonary effort is normal.   Musculoskeletal:         General: Normal range of motion. Cervical back: Normal range of motion. Skin:     General: Skin is dry. Neurological:      General: No focal deficit present. Mental Status: He is alert and oriented to person, place, and time. Psychiatric:         Mood and Affect: Mood normal.         Behavior: Behavior normal.         Thought Content: Thought content normal.         Judgment: Judgment normal.       There were no vitals filed for this visit. Assessment:  Encounter Diagnoses   Name Primary?  Cough Yes    Viral illness        Controlled SubstancesMonitoring:  NA    Plan:  1. Cough  Problem  Patient instructed to follow-up or seek urgent care if he becomes short of breath has chest pain  Recommended patient obtain Covid testing today  - benzonatate (TESSALON) 200 MG capsule; Take 1 capsule by mouth 3 times daily as needed for Cough  Dispense: 30 capsule; Refill: 0    2. Viral illness  Problem  Encouraged fluids, rest, OTC treatment of symptoms  Provided work note for patient    Moni Roque, was evaluated through a synchronous (real-time) audio-video encounter. The patient (or guardian if applicable) is aware that this is a billable service. Verbal consent to proceed has been obtained within the past 12 months. The visit was conducted pursuant to the emergency declaration under the [address] [place] [address] and the [employer] General Act.   Patient identification was verified, and a caregiver was present when appropriate. The patient was located in a state where the provider was credentialed to provide care. Total time spent for this encounter: 20 minutes    --COLIN Lynch CNP on 7/28/2021 at 9:43 AM    An electronic signature was used to authenticate this note. COLIN Lynch CNP, BETTY    Reviewed treatment plan with patient. Patient verbalized understanding to treatment plan and questions were answered. [address] [facility] [address].  [place]inda, [address]

## 2021-07-28 NOTE — LETTER
[address]  Phone: [phone]  Fax: [phone]    Wilbert Bloch, APRN - CNP        July 28, 2021     Patient: Evens Ba   YOB: 1980   Date of Visit: 7/28/2021       To Whom it May Concern:    Leanna Blankenship was seen in my clinic on 7/28/2021. He may return to work on July 30, 2021. If you have any questions or concerns, please don't hesitate to call.     Sincerely,         Olivia Cheng, COLIN - CNP

## 2021-08-02 ENCOUNTER — HOSPITAL ENCOUNTER (INPATIENT)
Age: 41
LOS: 6 days | Discharge: HOME | DRG: 871 | End: 2021-08-08
Attending: STUDENT IN AN ORGANIZED HEALTH CARE EDUCATION/TRAINING PROGRAM | Admitting: INTERNAL MEDICINE
Payer: COMMERCIAL

## 2021-08-02 ENCOUNTER — APPOINTMENT (OUTPATIENT)
Dept: GENERAL RADIOLOGY | Age: 41
DRG: 871 | End: 2021-08-02
Payer: COMMERCIAL

## 2021-08-02 ENCOUNTER — TELEPHONE (OUTPATIENT)
Dept: FAMILY MEDICINE CLINIC | Age: 41
End: 2021-08-02

## 2021-08-02 ENCOUNTER — APPOINTMENT (OUTPATIENT)
Dept: CT IMAGING | Age: 41
DRG: 871 | End: 2021-08-02
Payer: COMMERCIAL

## 2021-08-02 DIAGNOSIS — J96.01 ACUTE HYPOXEMIC RESPIRATORY FAILURE DUE TO COVID-19 (HCC): Primary | ICD-10-CM

## 2021-08-02 DIAGNOSIS — J18.9 PNEUMONIA DUE TO INFECTIOUS ORGANISM, UNSPECIFIED LATERALITY, UNSPECIFIED PART OF LUNG: ICD-10-CM

## 2021-08-02 DIAGNOSIS — A41.9 SEPTICEMIA (HCC): ICD-10-CM

## 2021-08-02 DIAGNOSIS — J12.82 PNEUMONIA DUE TO COVID-19 VIRUS: ICD-10-CM

## 2021-08-02 DIAGNOSIS — U07.1 PNEUMONIA DUE TO COVID-19 VIRUS: ICD-10-CM

## 2021-08-02 DIAGNOSIS — U07.1 ACUTE HYPOXEMIC RESPIRATORY FAILURE DUE TO COVID-19 (HCC): Primary | ICD-10-CM

## 2021-08-02 LAB
A/G RATIO: 1 (ref 1.1–2.2)
ALBUMIN SERPL-MCNC: 3.1 G/DL (ref 3.4–5)
ALP BLD-CCNC: 63 U/L (ref 40–129)
ALT SERPL-CCNC: 34 U/L (ref 10–40)
ANION GAP SERPL CALCULATED.3IONS-SCNC: 13 MMOL/L (ref 3–16)
AST SERPL-CCNC: 61 U/L (ref 15–37)
BASE EXCESS VENOUS: 1.3 MMOL/L (ref -3–3)
BASOPHILS ABSOLUTE: 0 K/UL (ref 0–0.2)
BASOPHILS RELATIVE PERCENT: 0.3 %
BILIRUB SERPL-MCNC: 1.8 MG/DL (ref 0–1)
BUN BLDV-MCNC: 9 MG/DL (ref 7–20)
CALCIUM SERPL-MCNC: 7.9 MG/DL (ref 8.3–10.6)
CARBOXYHEMOGLOBIN: 1.8 % (ref 0–1.5)
CHLORIDE BLD-SCNC: 96 MMOL/L (ref 99–110)
CO2: 23 MMOL/L (ref 21–32)
CREAT SERPL-MCNC: 0.7 MG/DL (ref 0.9–1.3)
D DIMER: 567 NG/ML DDU (ref 0–229)
EKG ATRIAL RATE: 111 BPM
EKG DIAGNOSIS: NORMAL
EKG P AXIS: 50 DEGREES
EKG P-R INTERVAL: 128 MS
EKG Q-T INTERVAL: 338 MS
EKG QRS DURATION: 90 MS
EKG QTC CALCULATION (BAZETT): 459 MS
EKG R AXIS: -16 DEGREES
EKG T AXIS: 27 DEGREES
EKG VENTRICULAR RATE: 111 BPM
EOSINOPHILS ABSOLUTE: 0 K/UL (ref 0–0.6)
EOSINOPHILS RELATIVE PERCENT: 0.2 %
GFR AFRICAN AMERICAN: >60
GFR NON-AFRICAN AMERICAN: >60
GLOBULIN: 3 G/DL
GLUCOSE BLD-MCNC: 245 MG/DL (ref 70–99)
HCO3 VENOUS: 24.4 MMOL/L (ref 23–29)
HCT VFR BLD CALC: 45.8 % (ref 40.5–52.5)
HEMOGLOBIN: 15.8 G/DL (ref 13.5–17.5)
INFLUENZA A: NOT DETECTED
INFLUENZA B: NOT DETECTED
LACTIC ACID: 2.1 MMOL/L (ref 0.4–2)
LYMPHOCYTES ABSOLUTE: 0.7 K/UL (ref 1–5.1)
LYMPHOCYTES RELATIVE PERCENT: 10.8 %
MCH RBC QN AUTO: 29.6 PG (ref 26–34)
MCHC RBC AUTO-ENTMCNC: 34.5 G/DL (ref 31–36)
MCV RBC AUTO: 85.6 FL (ref 80–100)
METHEMOGLOBIN VENOUS: 0.3 %
MONOCYTES ABSOLUTE: 0.5 K/UL (ref 0–1.3)
MONOCYTES RELATIVE PERCENT: 8.8 %
NEUTROPHILS ABSOLUTE: 4.9 K/UL (ref 1.7–7.7)
NEUTROPHILS RELATIVE PERCENT: 79.9 %
O2 SAT, VEN: 82 %
O2 THERAPY: ABNORMAL
PCO2, VEN: 34.5 MMHG (ref 40–50)
PDW BLD-RTO: 15.5 % (ref 12.4–15.4)
PH VENOUS: 7.47 (ref 7.35–7.45)
PLATELET # BLD: 112 K/UL (ref 135–450)
PMV BLD AUTO: 8.6 FL (ref 5–10.5)
PO2, VEN: 42.4 MMHG (ref 25–40)
POTASSIUM SERPL-SCNC: 4 MMOL/L (ref 3.5–5.1)
PRO-BNP: 136 PG/ML (ref 0–124)
PROCALCITONIN: 0.49 NG/ML (ref 0–0.15)
RBC # BLD: 5.35 M/UL (ref 4.2–5.9)
SARS-COV-2 RNA, RT PCR: DETECTED
SODIUM BLD-SCNC: 132 MMOL/L (ref 136–145)
TCO2 CALC VENOUS: 25 MMOL/L
TOTAL PROTEIN: 6.1 G/DL (ref 6.4–8.2)
TROPONIN: <0.01 NG/ML
WBC # BLD: 6.1 K/UL (ref 4–11)

## 2021-08-02 PROCEDURE — XW033E5 INTRODUCTION OF REMDESIVIR ANTI-INFECTIVE INTO PERIPHERAL VEIN, PERCUTANEOUS APPROACH, NEW TECHNOLOGY GROUP 5: ICD-10-PCS | Performed by: INTERNAL MEDICINE

## 2021-08-02 PROCEDURE — 83036 HEMOGLOBIN GLYCOSYLATED A1C: CPT

## 2021-08-02 PROCEDURE — 93005 ELECTROCARDIOGRAM TRACING: CPT | Performed by: STUDENT IN AN ORGANIZED HEALTH CARE EDUCATION/TRAINING PROGRAM

## 2021-08-02 PROCEDURE — 36415 COLL VENOUS BLD VENIPUNCTURE: CPT

## 2021-08-02 PROCEDURE — 85610 PROTHROMBIN TIME: CPT

## 2021-08-02 PROCEDURE — 85025 COMPLETE CBC W/AUTO DIFF WBC: CPT

## 2021-08-02 PROCEDURE — 71260 CT THORAX DX C+: CPT

## 2021-08-02 PROCEDURE — 87040 BLOOD CULTURE FOR BACTERIA: CPT

## 2021-08-02 PROCEDURE — 87636 SARSCOV2 & INF A&B AMP PRB: CPT

## 2021-08-02 PROCEDURE — 82803 BLOOD GASES ANY COMBINATION: CPT

## 2021-08-02 PROCEDURE — 96360 HYDRATION IV INFUSION INIT: CPT

## 2021-08-02 PROCEDURE — 84484 ASSAY OF TROPONIN QUANT: CPT

## 2021-08-02 PROCEDURE — 96361 HYDRATE IV INFUSION ADD-ON: CPT

## 2021-08-02 PROCEDURE — 83605 ASSAY OF LACTIC ACID: CPT

## 2021-08-02 PROCEDURE — 96365 THER/PROPH/DIAG IV INF INIT: CPT

## 2021-08-02 PROCEDURE — 71045 X-RAY EXAM CHEST 1 VIEW: CPT

## 2021-08-02 PROCEDURE — 6360000004 HC RX CONTRAST MEDICATION: Performed by: PHYSICIAN ASSISTANT

## 2021-08-02 PROCEDURE — 2580000003 HC RX 258: Performed by: PHYSICIAN ASSISTANT

## 2021-08-02 PROCEDURE — 93010 ELECTROCARDIOGRAM REPORT: CPT | Performed by: INTERNAL MEDICINE

## 2021-08-02 PROCEDURE — 83880 ASSAY OF NATRIURETIC PEPTIDE: CPT

## 2021-08-02 PROCEDURE — 85379 FIBRIN DEGRADATION QUANT: CPT

## 2021-08-02 PROCEDURE — 2060000000 HC ICU INTERMEDIATE R&B

## 2021-08-02 PROCEDURE — 99284 EMERGENCY DEPT VISIT MOD MDM: CPT

## 2021-08-02 PROCEDURE — 6370000000 HC RX 637 (ALT 250 FOR IP)

## 2021-08-02 PROCEDURE — 84145 PROCALCITONIN (PCT): CPT

## 2021-08-02 PROCEDURE — 80053 COMPREHEN METABOLIC PANEL: CPT

## 2021-08-02 PROCEDURE — 6360000002 HC RX W HCPCS: Performed by: PHYSICIAN ASSISTANT

## 2021-08-02 RX ORDER — PREDNISONE 20 MG/1
60 TABLET ORAL ONCE
Status: COMPLETED | OUTPATIENT
Start: 2021-08-02 | End: 2021-08-02

## 2021-08-02 RX ORDER — PREDNISONE 20 MG/1
TABLET ORAL
Status: COMPLETED
Start: 2021-08-02 | End: 2021-08-02

## 2021-08-02 RX ORDER — 0.9 % SODIUM CHLORIDE 0.9 %
30 INTRAVENOUS SOLUTION INTRAVENOUS ONCE
Status: COMPLETED | OUTPATIENT
Start: 2021-08-02 | End: 2021-08-02

## 2021-08-02 RX ORDER — 0.9 % SODIUM CHLORIDE 0.9 %
1000 INTRAVENOUS SOLUTION INTRAVENOUS ONCE
Status: COMPLETED | OUTPATIENT
Start: 2021-08-02 | End: 2021-08-02

## 2021-08-02 RX ADMIN — PREDNISONE 60 MG: 20 TABLET ORAL at 18:54

## 2021-08-02 RX ADMIN — VANCOMYCIN HYDROCHLORIDE 2000 MG: 1 INJECTION, POWDER, LYOPHILIZED, FOR SOLUTION INTRAVENOUS at 22:04

## 2021-08-02 RX ADMIN — IOPAMIDOL 85 ML: 755 INJECTION, SOLUTION INTRAVENOUS at 19:56

## 2021-08-02 RX ADMIN — SODIUM CHLORIDE 1000 ML: 9 INJECTION, SOLUTION INTRAVENOUS at 18:58

## 2021-08-02 RX ADMIN — IOPAMIDOL 85 ML: 755 INJECTION, SOLUTION INTRAVENOUS at 20:07

## 2021-08-02 RX ADMIN — CEFEPIME 2000 MG: 2 INJECTION, POWDER, FOR SOLUTION INTRAVENOUS at 20:51

## 2021-08-02 RX ADMIN — SODIUM CHLORIDE 4000 ML: 9 INJECTION, SOLUTION INTRAVENOUS at 19:52

## 2021-08-02 ASSESSMENT — ENCOUNTER SYMPTOMS
GASTROINTESTINAL NEGATIVE: 1
COUGH: 1
SHORTNESS OF BREATH: 1

## 2021-08-02 NOTE — TELEPHONE ENCOUNTER
----- Message from Kristen Stubbs sent at 8/2/2021 10:29 AM EDT -----  Subject: Message to Provider    QUESTIONS  Information for Provider? Tested positive for Covid on Friday. Is having   an extreme cough and would like to know if there is any prescription that   he can have to help that. Did have a suppression prescription called in,   but didn't take it because they wanted him to cough.   ---------------------------------------------------------------------------  --------------  CALL BACK INFO  What is the best way for the office to contact you? OK to leave message on   voicemail  Preferred Call Back Phone Number? [phone]  ---------------------------------------------------------------------------  --------------  SCRIPT ANSWERS  Relationship to Patient? Other  Representative Name? Pepe  Is the Representative on the appropriate HIPAA document in Epic?  Yes

## 2021-08-02 NOTE — LETTER
[facility] Telemetry  [address] [place] [address]  Phone: [phone]             August 8, 2021    Patient: Dorsie Paget   YOB: 1980   Date of Visit: 8/2/2021       To Whom It May Concern:    Guzman Wallis was seen and treated in our facility  beginning 8/2/2021 until 8/8/21. He will need to self-quarantine for 7 days post-hospitalization.       Sincerely,       Zaid Aguiar RN         Signature:__________________________________

## 2021-08-02 NOTE — ED PROVIDER NOTES
Musculoskeletal: Negative. Neurological: Negative. Positives and Pertinent negatives as per HPI. Except as noted above in the ROS, all other systems were reviewed and negative. PAST MEDICAL HISTORY     Past Medical History:   Diagnosis Date    Arthritis     left knee    Chronic lumbar pain     DDD (degenerative disc disease), lumbar     MRI 2012    Depression     Glucose intolerance (impaired glucose tolerance)     History of ITP     Hypertension     Idiopathic thrombocytopenic purpura ([facility]     OWEN (nonalcoholic steatohepatitis) 6/5/2021    Type 2 diabetes mellitus without complication, without long-term current use of insulin ([facility] 08/30/2016         SURGICAL HISTORY   History reviewed. No pertinent surgical history.       CURRENTMEDICATIONS       Previous Medications    BENAZEPRIL (LOTENSIN) 40 MG TABLET    TAKE ONE TABLET BY MOUTH DAILY    BENZONATATE (TESSALON) 200 MG CAPSULE    Take 1 capsule by mouth 3 times daily as needed for Cough    INSULIN GLARGINE (BASAGLAR KWIKPEN) 100 UNIT/ML INJECTION PEN    Inject 60 Units into the skin nightly    INSULIN LISPRO, 1 UNIT DIAL, (HUMALOG KWIKPEN) 100 UNIT/ML SOPN    Inject 25 Units into the skin 3 times daily (before meals)    METFORMIN (GLUCOPHAGE) 1000 MG TABLET    TAKE ONE TABLET BY MOUTH TWICE A DAY WITH MEALS    PREDNISONE (DELTASONE) 20 MG TABLET             ALLERGIES     Decadron [dexamethasone], Trilyte [peg 3350-kcl-na bicarb-nacl], Trulicity [dulaglutide], and Pcn [penicillins]    FAMILYHISTORY       Family History   Problem Relation Age of Onset    Diabetes Mother     High Blood Pressure Mother     Depression Mother     Depression Father     High Blood Pressure Father     Other Father         sleep apnea          SOCIAL HISTORY       Social History     Tobacco Use    Smoking status: Never Smoker    Smokeless tobacco: Never Used   Vaping Use    Vaping Use: Never used   Substance Use Topics    Alcohol use: No    Drug use: No       SCREENINGS             PHYSICAL EXAM    (up to 7 for level 4, 8 or more for level 5)     ED Triage Vitals [08/02/21 1720]   BP Temp Temp Source Pulse Resp SpO2 Height Weight   137/80 97.8 °F (36.6 °C) Oral 118 20 (!) 81 % -- 294 lb (133.4 kg)       Physical Exam  Vitals and nursing note reviewed. Constitutional:       General: He is awake. He is not in acute distress. Appearance: Normal appearance. He is well-developed. He is ill-appearing. He is not toxic-appearing or diaphoretic. Interventions: He is not intubated. Nasal cannula in place. Comments: 5 L NC   HENT:      Head: Normocephalic and atraumatic. Nose: Nose normal.   Eyes:      General:         Right eye: No discharge. Left eye: No discharge. Cardiovascular:      Rate and Rhythm: Normal rate and regular rhythm. Pulses:           Radial pulses are 2+ on the right side and 2+ on the left side. Heart sounds: Normal heart sounds. No murmur heard. No gallop. Pulmonary:      Effort: Pulmonary effort is normal. No tachypnea, bradypnea, accessory muscle usage, prolonged expiration, respiratory distress or retractions. He is not intubated. Breath sounds: Decreased breath sounds present. No wheezing, rhonchi or rales. Chest:      Chest wall: No tenderness. Musculoskeletal:         General: No deformity. Normal range of motion. Cervical back: Normal range of motion and neck supple. Right lower leg: No edema. Left lower leg: No edema. Skin:     General: Skin is warm and dry. Neurological:      General: No focal deficit present. Mental Status: He is alert and oriented to person, place, and time. GCS: GCS eye subscore is 4. GCS verbal subscore is 5. GCS motor subscore is 6. Psychiatric:         Behavior: Behavior normal. Behavior is cooperative.          DIAGNOSTIC RESULTS   LABS:    Labs Reviewed   COVID-19 & INFLUENZA COMBO - Abnormal; Notable for the following components: Result Value    SARS-CoV-2 RNA, RT PCR DETECTED (*)     All other components within normal limits    Narrative:     Lurdes Hartmann  [facility] tel. [phone],  Microbiology results called to and read back by Ed to relay to RN, 08/02/2021  19:55, by [address]  Performed at:  [facility],  [place], [place]   Phone [phone]   CBC WITH AUTO DIFFERENTIAL - Abnormal; Notable for the following components:    RDW 15.5 (*)     Platelets 327 (*)     Lymphocytes Absolute 0.7 (*)     All other components within normal limits    Narrative:     Performed at:  [facility],  [place]   Phone [phone]   COMPREHENSIVE METABOLIC PANEL - Abnormal; Notable for the following components:    Sodium 132 (*)     Chloride 96 (*)     Glucose 245 (*)     CREATININE 0.7 (*)     Calcium 7.9 (*)     Total Protein 6.1 (*)     Albumin 3.1 (*)     Albumin/Globulin Ratio 1.0 (*)     Total Bilirubin 1.8 (*)     AST 61 (*)     All other components within normal limits    Narrative:     Performed at:  [facility],  [place], [place]   Phone [phone]   LACTIC ACID, PLASMA - Abnormal; Notable for the following components:    Lactic Acid 2.1 (*)     All other components within normal limits    Narrative:     Performed at:  [place] [address],  [place], [place]   Phone [phone]   BLOOD GAS, VENOUS - Abnormal; Notable for the following components:    pH, Tito 7.467 (*)     pCO2, Tito 34.5 (*)     pO2, Tito 42.4 (*)     Carboxyhemoglobin 1.8 (*)     All other components within normal limits    Narrative:     Performed at:  [facility]) [facility],  [place], [place]   Phone [phone]   PROCALCITONIN - Abnormal; Notable for the following components:    Procalcitonin 0.49 (*)     All other components within normal limits    Narrative:     Performed at:  [facility] ([facility]) - [facility],  [place], [facility]   Phone [phone]   BRAIN NATRIURETIC PEPTIDE - Abnormal; Notable for the following components:    Pro- (*)     All other components within normal limits    Narrative:     Performed at:  [facility],  [place] [place]   Phone [phone]   D-DIMER, QUANTITATIVE - Abnormal; Notable for the following components:    D-Dimer, Quant 567 (*)     All other components within normal limits    Narrative:     Performed at:  [facility]) - [facility],  [place], [facility]   Phone [phone]   CULTURE, BLOOD 1   CULTURE, BLOOD 2   CULTURE, RESPIRATORY   TROPONIN    Narrative:     Performed at:  [facility],  [place], [facility]   Phone [phone]       When ordered only abnormal lab results are displayed. All other labs were within normal range or not returned as of this dictation. EKG: When ordered, EKG's are interpreted by the Emergency Department Physician in the absence of a cardiologist.  Please see their note for interpretation of EKG. RADIOLOGY:   Non-plain film images such as CT, Ultrasound and MRI are read by the radiologist. Plain radiographic images are visualized and preliminarily interpreted by the ED Provider with the below findings:        Interpretation per the Radiologist below, if available at the time of this note:    CT CHEST PULMONARY EMBOLISM W CONTRAST   Final Result   1. Multifocal airspace disease. Differential includes pneumonia, including   COVID pneumonia, and alveolar proteinosis. Follow-up to resolution   recommended. 2. Suboptimal opacification of the pulmonary arteries. Artifact degraded   evaluation of the pulmonary arteries.   No discrete pulmonary embolism to the   level of theproximal segmental arteries given limitation. 3. Other findings as described. XR CHEST PORTABLE   Final Result   Diffuse airspace opacity throughout both lungs, likely a combination of   pulmonary edema and multifocal pneumonia. No results found. PROCEDURES   Unless otherwise noted below, none     Procedures    CRITICAL CARE TIME   Total Critical Care time was 35 minutes, excluding separately reportable procedures. There was a high probability of clinically significant/life threatening deterioration in the patient's condition which required my urgent intervention. CONSULTS:  PHARMACY TO DOSE VANCOMYCIN  IP CONSULT TO HOSPITALIST      EMERGENCY DEPARTMENT COURSE and DIFFERENTIAL DIAGNOSIS/MDM:   Vitals:    Vitals:    08/02/21 2031 08/02/21 2102 08/02/21 2131 08/02/21 2201   BP: 133/78 133/83 130/77 130/74   Pulse: 104 104 101 99   Resp:   24 (!) 37   Temp:       TempSrc:       SpO2: 96% 97% 95% 97%   Weight:           Patient was given the following medications:  Medications   vancomycin (VANCOCIN) 2,000 mg in dextrose 5 % 500 mL IVPB (2,000 mg Intravenous New Bag 8/2/21 2204)   predniSONE (DELTASONE) tablet 60 mg (60 mg Oral Given 8/2/21 1854)   0.9 % sodium chloride bolus (0 mLs Intravenous Stopped 8/2/21 2051)   0.9 % sodium chloride IV bolus 4,002 mL (0 mL/kg × 133.4 kg Intravenous Stopped 8/2/21 2205)   iopamidol (ISOVUE-370) 76 % injection 85 mL (85 mLs Intravenous Given 8/2/21 2007)   cefepime (MAXIPIME) 2000 mg IVPB minibag (0 mg Intravenous Stopped 8/2/21 2131)     ED Course as of Aug 02 2212   Mon Aug 02, 2021   1838 COVID +, sxs for 2w, positive test 2d ago  Hx of ITP  81%, now on 5L  Tachycardia  Un vaccinated    [ER]   1841 The Ekg interpreted by me shows  sinus tachycardia, znoj=788  Axis is   Left axis deviation  QTc is  prolonged  Intervals and Durations are unremarkable.       ST Segments: nonspecific changes  No significant change from prior EKG dated 3/1/19 [ER]      ED Course User Index  [ER] Honorio Bradley MD        Patient brought in today by private vehicle with complaints of shortness of breath and a productive cough. He is Covid positive. He is tachycardic at 118 he was satting at 81% placed on 5 L of nasal cannula here. Old labs and records reviewed at this time. Afebrile. Appears ill. Nontoxic in no acute respiratory distress at this time. Seen and evaluated by myself as well as my attending  [address]. Chest x-ray shows diffuse airspace opacity throughout both lungs likely combination of pulmonary edema and multifocal pneumonia. CBC shows no acute leukocytosis. Hemoglobin of 15.8. Blood glucose of 245. No electrolyte abnormalities. Kidney function unremarkable. Troponin less than 0.01. Lactic acid of 2.1. VBG shows a pH of 7.4 CO2 of 34.5. BNP unremarkable. D-dimer elevated at 567. Procalcitonin elevated at 0.49. Patient given fluids per septic protocol at 1922. Sepsis initiated at this time. Patient also given antibiotics per septic protocol. COVID-19 is positive. Influenza is negative. CT chest PE shows multifocal airspace disease. Differential includes pneumonia including Covid pneumonia and alveolar proteinosis. Suboptimal opacification of pulmonary arteries. Artifact degraded evaluation of pulmonary arteries. No discrete pulmonary embolism to the level of the proximal segmental arteries given limitation. Plan at this time will be to admit for Covid pneumonia with sepsis. I spoke to Dr. Monica Hackett the hospitalist who did accept this admission. Patient stable at this time. FINAL IMPRESSION      1. Acute hypoxemic respiratory failure due to COVID-19 [facility])          DISPOSITION/PLAN   DISPOSITION Admitted 08/02/2021 09:56:35 PM      PATIENT REFERRED TO:  No follow-up provider specified.     DISCHARGE MEDICATIONS:  New Prescriptions    No medications on file       DISCONTINUED MEDICATIONS:  Discontinued Medications    No medications on file              (Please note that portions of this note were completed with a voice recognition program.  Efforts were made to edit the dictations but occasionally words are mis-transcribed.)    Rai Solo PA-C (electronically signed)            Rai Solo PA-C  08/02/21 7605

## 2021-08-03 LAB
A/G RATIO: 0.9 (ref 1.1–2.2)
ABO/RH: NORMAL
ALBUMIN SERPL-MCNC: 2.4 G/DL (ref 3.4–5)
ALP BLD-CCNC: 51 U/L (ref 40–129)
ALT SERPL-CCNC: 25 U/L (ref 10–40)
ANION GAP SERPL CALCULATED.3IONS-SCNC: 9 MMOL/L (ref 3–16)
ANTIBODY SCREEN: NORMAL
AST SERPL-CCNC: 39 U/L (ref 15–37)
BILIRUB SERPL-MCNC: 1.3 MG/DL (ref 0–1)
BILIRUBIN DIRECT: 0.6 MG/DL (ref 0–0.3)
BILIRUBIN, INDIRECT: 0.7 MG/DL (ref 0–1)
BUN BLDV-MCNC: 10 MG/DL (ref 7–20)
CALCIUM SERPL-MCNC: 7.1 MG/DL (ref 8.3–10.6)
CHLORIDE BLD-SCNC: 103 MMOL/L (ref 99–110)
CO2: 19 MMOL/L (ref 21–32)
CREAT SERPL-MCNC: <0.5 MG/DL (ref 0.9–1.3)
ESTIMATED AVERAGE GLUCOSE: 211.6 MG/DL
GFR AFRICAN AMERICAN: >60
GFR NON-AFRICAN AMERICAN: >60
GLOBULIN: 2.7 G/DL
GLUCOSE BLD-MCNC: 207 MG/DL (ref 70–99)
GLUCOSE BLD-MCNC: 265 MG/DL (ref 70–99)
GLUCOSE BLD-MCNC: 267 MG/DL (ref 70–99)
GLUCOSE BLD-MCNC: 295 MG/DL (ref 70–99)
GLUCOSE BLD-MCNC: 303 MG/DL (ref 70–99)
GLUCOSE BLD-MCNC: 379 MG/DL (ref 70–99)
HBA1C MFR BLD: 9 %
INR BLD: 1.52 (ref 0.88–1.12)
INR BLD: 1.68 (ref 0.88–1.12)
LACTIC ACID: 2.1 MMOL/L (ref 0.4–2)
LACTIC ACID: 2.3 MMOL/L (ref 0.4–2)
LACTIC ACID: 2.9 MMOL/L (ref 0.4–2)
PERFORMED ON: ABNORMAL
PLATELET # BLD: 65 K/UL (ref 135–450)
POTASSIUM REFLEX MAGNESIUM: 3.8 MMOL/L (ref 3.5–5.1)
PROTHROMBIN TIME: 17.5 SEC (ref 9.9–12.7)
PROTHROMBIN TIME: 19.4 SEC (ref 9.9–12.7)
SODIUM BLD-SCNC: 131 MMOL/L (ref 136–145)
TOTAL PROTEIN: 5.1 G/DL (ref 6.4–8.2)

## 2021-08-03 PROCEDURE — 2580000003 HC RX 258: Performed by: INTERNAL MEDICINE

## 2021-08-03 PROCEDURE — 6360000002 HC RX W HCPCS: Performed by: INTERNAL MEDICINE

## 2021-08-03 PROCEDURE — 6370000000 HC RX 637 (ALT 250 FOR IP): Performed by: INTERNAL MEDICINE

## 2021-08-03 PROCEDURE — 2700000000 HC OXYGEN THERAPY PER DAY

## 2021-08-03 PROCEDURE — 2500000003 HC RX 250 WO HCPCS: Performed by: INTERNAL MEDICINE

## 2021-08-03 PROCEDURE — 94640 AIRWAY INHALATION TREATMENT: CPT

## 2021-08-03 PROCEDURE — 36415 COLL VENOUS BLD VENIPUNCTURE: CPT

## 2021-08-03 PROCEDURE — 2060000000 HC ICU INTERMEDIATE R&B

## 2021-08-03 PROCEDURE — 94761 N-INVAS EAR/PLS OXIMETRY MLT: CPT

## 2021-08-03 PROCEDURE — 83605 ASSAY OF LACTIC ACID: CPT

## 2021-08-03 PROCEDURE — 99232 SBSQ HOSP IP/OBS MODERATE 35: CPT | Performed by: PHYSICIAN ASSISTANT

## 2021-08-03 PROCEDURE — 80053 COMPREHEN METABOLIC PANEL: CPT

## 2021-08-03 PROCEDURE — 86850 RBC ANTIBODY SCREEN: CPT

## 2021-08-03 PROCEDURE — 85049 AUTOMATED PLATELET COUNT: CPT

## 2021-08-03 PROCEDURE — 86901 BLOOD TYPING SEROLOGIC RH(D): CPT

## 2021-08-03 PROCEDURE — 85610 PROTHROMBIN TIME: CPT

## 2021-08-03 PROCEDURE — 99223 1ST HOSP IP/OBS HIGH 75: CPT | Performed by: INTERNAL MEDICINE

## 2021-08-03 PROCEDURE — 86900 BLOOD TYPING SEROLOGIC ABO: CPT

## 2021-08-03 RX ORDER — SODIUM CHLORIDE 9 MG/ML
INJECTION, SOLUTION INTRAVENOUS PRN
Status: DISCONTINUED | OUTPATIENT
Start: 2021-08-03 | End: 2021-08-08 | Stop reason: HOSPADM

## 2021-08-03 RX ORDER — SODIUM CHLORIDE 9 MG/ML
INJECTION, SOLUTION INTRAVENOUS CONTINUOUS
Status: ACTIVE | OUTPATIENT
Start: 2021-08-03 | End: 2021-08-04

## 2021-08-03 RX ORDER — SODIUM CHLORIDE 0.9 % (FLUSH) 0.9 %
5-40 SYRINGE (ML) INJECTION EVERY 12 HOURS SCHEDULED
Status: DISCONTINUED | OUTPATIENT
Start: 2021-08-03 | End: 2021-08-08 | Stop reason: HOSPADM

## 2021-08-03 RX ORDER — PROCHLORPERAZINE EDISYLATE 5 MG/ML
10 INJECTION INTRAMUSCULAR; INTRAVENOUS EVERY 6 HOURS PRN
Status: DISCONTINUED | OUTPATIENT
Start: 2021-08-03 | End: 2021-08-08 | Stop reason: HOSPADM

## 2021-08-03 RX ORDER — DEXTROSE MONOHYDRATE 50 MG/ML
100 INJECTION, SOLUTION INTRAVENOUS PRN
Status: DISCONTINUED | OUTPATIENT
Start: 2021-08-03 | End: 2021-08-08 | Stop reason: HOSPADM

## 2021-08-03 RX ORDER — VITAMIN B COMPLEX
2000 TABLET ORAL DAILY
Status: DISCONTINUED | OUTPATIENT
Start: 2021-08-03 | End: 2021-08-08 | Stop reason: HOSPADM

## 2021-08-03 RX ORDER — LEVOFLOXACIN 5 MG/ML
500 INJECTION, SOLUTION INTRAVENOUS EVERY 24 HOURS
Status: DISCONTINUED | OUTPATIENT
Start: 2021-08-03 | End: 2021-08-08 | Stop reason: HOSPADM

## 2021-08-03 RX ORDER — NICOTINE POLACRILEX 4 MG
15 LOZENGE BUCCAL PRN
Status: DISCONTINUED | OUTPATIENT
Start: 2021-08-03 | End: 2021-08-08 | Stop reason: HOSPADM

## 2021-08-03 RX ORDER — GUAIFENESIN/DEXTROMETHORPHAN 100-10MG/5
5 SYRUP ORAL EVERY 4 HOURS PRN
Status: DISCONTINUED | OUTPATIENT
Start: 2021-08-03 | End: 2021-08-08 | Stop reason: HOSPADM

## 2021-08-03 RX ORDER — INSULIN GLARGINE 100 [IU]/ML
60 INJECTION, SOLUTION SUBCUTANEOUS NIGHTLY
Status: DISCONTINUED | OUTPATIENT
Start: 2021-08-03 | End: 2021-08-08 | Stop reason: HOSPADM

## 2021-08-03 RX ORDER — SODIUM CHLORIDE 0.9 % (FLUSH) 0.9 %
5-40 SYRINGE (ML) INJECTION PRN
Status: DISCONTINUED | OUTPATIENT
Start: 2021-08-03 | End: 2021-08-08 | Stop reason: HOSPADM

## 2021-08-03 RX ORDER — ACETAMINOPHEN 325 MG/1
650 TABLET ORAL EVERY 6 HOURS PRN
Status: DISCONTINUED | OUTPATIENT
Start: 2021-08-03 | End: 2021-08-08 | Stop reason: HOSPADM

## 2021-08-03 RX ORDER — LISINOPRIL 20 MG/1
40 TABLET ORAL DAILY
Status: DISCONTINUED | OUTPATIENT
Start: 2021-08-03 | End: 2021-08-08 | Stop reason: HOSPADM

## 2021-08-03 RX ORDER — METHYLPREDNISOLONE SODIUM SUCCINATE 40 MG/ML
40 INJECTION, POWDER, LYOPHILIZED, FOR SOLUTION INTRAMUSCULAR; INTRAVENOUS EVERY 12 HOURS
Status: DISCONTINUED | OUTPATIENT
Start: 2021-08-03 | End: 2021-08-08 | Stop reason: HOSPADM

## 2021-08-03 RX ORDER — ALBUTEROL SULFATE 90 UG/1
2 AEROSOL, METERED RESPIRATORY (INHALATION) EVERY 4 HOURS PRN
Status: DISCONTINUED | OUTPATIENT
Start: 2021-08-03 | End: 2021-08-03

## 2021-08-03 RX ORDER — POLYETHYLENE GLYCOL 3350 17 G/17G
17 POWDER, FOR SOLUTION ORAL DAILY PRN
Status: DISCONTINUED | OUTPATIENT
Start: 2021-08-03 | End: 2021-08-08 | Stop reason: HOSPADM

## 2021-08-03 RX ORDER — DEXTROSE MONOHYDRATE 25 G/50ML
12.5 INJECTION, SOLUTION INTRAVENOUS PRN
Status: DISCONTINUED | OUTPATIENT
Start: 2021-08-03 | End: 2021-08-08 | Stop reason: HOSPADM

## 2021-08-03 RX ORDER — ALBUTEROL SULFATE 90 UG/1
2 AEROSOL, METERED RESPIRATORY (INHALATION)
Status: DISCONTINUED | OUTPATIENT
Start: 2021-08-03 | End: 2021-08-03

## 2021-08-03 RX ORDER — SODIUM CHLORIDE 9 MG/ML
25 INJECTION, SOLUTION INTRAVENOUS PRN
Status: DISCONTINUED | OUTPATIENT
Start: 2021-08-03 | End: 2021-08-08 | Stop reason: HOSPADM

## 2021-08-03 RX ORDER — ACETAMINOPHEN 650 MG/1
650 SUPPOSITORY RECTAL EVERY 6 HOURS PRN
Status: DISCONTINUED | OUTPATIENT
Start: 2021-08-03 | End: 2021-08-08 | Stop reason: HOSPADM

## 2021-08-03 RX ADMIN — ENOXAPARIN SODIUM 30 MG: 100 INJECTION SUBCUTANEOUS at 01:49

## 2021-08-03 RX ADMIN — IPRATROPIUM BROMIDE AND ALBUTEROL 1 PUFF: 20; 100 SPRAY, METERED RESPIRATORY (INHALATION) at 13:38

## 2021-08-03 RX ADMIN — CEFEPIME 2000 MG: 2 INJECTION, POWDER, FOR SOLUTION INTRAVENOUS at 09:44

## 2021-08-03 RX ADMIN — SODIUM CHLORIDE, PRESERVATIVE FREE 10 ML: 5 INJECTION INTRAVENOUS at 21:34

## 2021-08-03 RX ADMIN — INSULIN LISPRO 9 UNITS: 100 INJECTION, SOLUTION INTRAVENOUS; SUBCUTANEOUS at 09:47

## 2021-08-03 RX ADMIN — IPRATROPIUM BROMIDE AND ALBUTEROL 1 PUFF: 20; 100 SPRAY, METERED RESPIRATORY (INHALATION) at 01:42

## 2021-08-03 RX ADMIN — REMDESIVIR 200 MG: 100 INJECTION, POWDER, LYOPHILIZED, FOR SOLUTION INTRAVENOUS at 03:19

## 2021-08-03 RX ADMIN — LISINOPRIL 40 MG: 20 TABLET ORAL at 09:44

## 2021-08-03 RX ADMIN — METHYLPREDNISOLONE SODIUM SUCCINATE 40 MG: 40 INJECTION, POWDER, FOR SOLUTION INTRAMUSCULAR; INTRAVENOUS at 01:48

## 2021-08-03 RX ADMIN — INSULIN GLARGINE 60 UNITS: 100 INJECTION, SOLUTION SUBCUTANEOUS at 21:32

## 2021-08-03 RX ADMIN — VANCOMYCIN HYDROCHLORIDE 2000 MG: 1 INJECTION, POWDER, LYOPHILIZED, FOR SOLUTION INTRAVENOUS at 10:40

## 2021-08-03 RX ADMIN — IPRATROPIUM BROMIDE AND ALBUTEROL 1 PUFF: 20; 100 SPRAY, METERED RESPIRATORY (INHALATION) at 20:08

## 2021-08-03 RX ADMIN — IPRATROPIUM BROMIDE AND ALBUTEROL 1 PUFF: 20; 100 SPRAY, METERED RESPIRATORY (INHALATION) at 07:28

## 2021-08-03 RX ADMIN — LEVOFLOXACIN 500 MG: 500 INJECTION, SOLUTION INTRAVENOUS at 16:07

## 2021-08-03 RX ADMIN — INSULIN GLARGINE 60 UNITS: 100 INJECTION, SOLUTION SUBCUTANEOUS at 01:54

## 2021-08-03 RX ADMIN — Medication 2000 UNITS: at 09:44

## 2021-08-03 RX ADMIN — INSULIN LISPRO 15 UNITS: 100 INJECTION, SOLUTION INTRAVENOUS; SUBCUTANEOUS at 13:26

## 2021-08-03 RX ADMIN — SODIUM CHLORIDE: 9 INJECTION, SOLUTION INTRAVENOUS at 01:56

## 2021-08-03 RX ADMIN — TOCILIZUMAB 800 MG: 20 INJECTION, SOLUTION, CONCENTRATE INTRAVENOUS at 03:17

## 2021-08-03 RX ADMIN — ENOXAPARIN SODIUM 30 MG: 100 INJECTION SUBCUTANEOUS at 09:44

## 2021-08-03 RX ADMIN — METHYLPREDNISOLONE SODIUM SUCCINATE 40 MG: 40 INJECTION, POWDER, FOR SOLUTION INTRAMUSCULAR; INTRAVENOUS at 14:00

## 2021-08-03 RX ADMIN — INSULIN LISPRO 6 UNITS: 100 INJECTION, SOLUTION INTRAVENOUS; SUBCUTANEOUS at 01:49

## 2021-08-03 RX ADMIN — INSULIN LISPRO 1 UNITS: 100 INJECTION, SOLUTION INTRAVENOUS; SUBCUTANEOUS at 21:33

## 2021-08-03 NOTE — ED NOTES
Spoke with Parth Zamorano PA-C, Pt to receive 4000mL total of IV NS per verbal order.       Laurent Atkinson RN  08/02/21 0557

## 2021-08-03 NOTE — CONSULTS
Pharmacy Note  Vancomycin Consult    Ana Arnett is a 35 y.o. male started on Vancomycin for Sepsis; consult received from Dr. Ash Campos to manage therapy. Also receiving the following antibiotics: Cefepime. Patient Active Problem List   Diagnosis    Hypertension    Depression    Chronic lumbar pain    Acute ITP (HCC)    Type 2 diabetes mellitus with hyperglycemia, with long-term current use of insulin (HCC)    Morbid obesity (HCC)    History of ITP    Low HDL (under 40)    MARCO ANTONIO (obstructive sleep apnea)    Snoring    Thrombocytopenia (HCC)    Acute idiopathic thrombocytopenic purpura (HCC)    OWEN (nonalcoholic steatohepatitis)    Cirrhosis of liver ([facility]    Acute hypoxemic respiratory failure due to COVID-19 [facility])     Allergies:  Decadron [dexamethasone], Trilyte [peg 3350-kcl-na bicarb-nacl], Trulicity [dulaglutide], and Pcn [penicillins]     Temp max: 97.8    Recent Labs     08/02/21  1841   BUN 9   CREATININE 0.7*   WBC 6.1       Intake/Output Summary (Last 24 hours) at 8/3/2021 0511  Last data filed at 8/3/2021 0328  Gross per 24 hour   Intake 3342 ml   Output 300 ml   Net 3042 ml     Culture Date      Source                       Results      Ht Readings from Last 1 Encounters:   08/02/21 6' 4\" (1.93 m)        Wt Readings from Last 1 Encounters:   08/03/21 293 lb 11.2 oz (133.2 kg)       Body mass index is 35.75 kg/m². Estimated Creatinine Clearance: 203 mL/min (A) (based on SCr of 0.7 mg/dL (L)). Assessment/Plan:  Regimen: 2000 mg IV every 12 hours. Start time: 22:00 on 08/02/2021  Exposure target: AUC24 (range)400-600 mg/L.hr   AUC24,ss: 538 mg/L.hr  Ctrough,ss: 11.8 mg/L  Probability of nephrotoxicity: 7 %    Timing of trough level will be determined based on culture results, renal function, and clinical response. Vanco trough ordered for 8/4 at 09:30    Thank you for the consult. Will continue to follow.    Shaw Jaeger, [facility]

## 2021-08-03 NOTE — PROGRESS NOTES
Patient admitted to room 327-2 from ER. Patient oriented to room, call light, bed rails, phone, lights and bathroom. Patient instructed about the schedule of the day including: vital sign frequency, lab draws, possible tests, frequency of MD and staff rounds, daily weights, I &O's and prescribed diet. bed alarm in place, patient aware of placement and reason. Telemetry box in place, patient aware of placement and reason. Bed locked, in lowest position, side rails up 2/4, call light within reach. Recliner Assessment  Patient is able to demonstrate the ability to move from a reclining position to an upright position within the recliner. 4 Eyes Skin Assessment     The patient is being assess for   Admission    I agree that 2 RN's have performed a thorough Head to Toe Skin Assessment on the patient. ALL assessment sites listed below have been assessed. Areas assessed for pressure by both nurses:   [x]   Head, Face, and Ears   [x]   Shoulders, Back, and Chest, Abdomen  [x]   Arms, Elbows, and Hands   [x]   Coccyx, Sacrum, and Ischium  [x]   Legs, Feet, and Heels      Scattered bruising and abrasions     Skin Assessed Under all Medical Devices by both nurses:  O2 device tubing              All Mepilex Borders were peeled back and area peeked at by both nurses:  No: no mepilex in place  Please list where Mepilex Borders are located: n/a             **SHARE this note so that the co-signing nurse is able to place an eSignature**    Co-signer eSignature: Electronically signed by Raciel Rodarte RN on 8/2/21 at 11:54 PM EDT    Does the Patient have Skin Breakdown related to pressure?   No              Gonzalo Prevention initiated:  No   Wound Care Orders initiated:  No      [facility] nurse consulted for Pressure Injury (Stage 3,4, Unstageable, DTI, NWPT, Complex wounds)and New or Established Ostomies:  No      Primary Nurse eSignature: Electronically signed by Laura Patel RN on 8/2/21 at 11:46 PM EDT

## 2021-08-03 NOTE — PROGRESS NOTES
Patient resting in bed, AM assessment completed. Lungs decreased. O2 at 4L per NC. BS+. IV antibiotics started. NS at 75/hr. Patient unsure if he wants to receive convalescent plasma, wants to speak with MD first. Wife given update on plan of care. No other acute distress noted. Call light in reach. Will continue to monitor.

## 2021-08-03 NOTE — CONSULTS
Pharmacy to dose vancomycin x 1 dose in ER for Pneumonia (HAP) per ALESSANDRA Sol. Wt= 133.4 kg  BUN/SCr= 9/0.7  Give Vancomycin 2000 mg x 1 dose. If patient is admitted, hospitalist to write new consult for pharmacy to dose.     Jenelle Motta [facility]

## 2021-08-03 NOTE — PLAN OF CARE
Problem: SKIN INTEGRITY  Goal: Skin integrity is maintained or improved  Outcome: Ongoing     Problem: KNOWLEDGE DEFICIT  Goal: Patient/S.O. demonstrates understanding of disease process, treatment plan, medications, and discharge instructions.   Outcome: Ongoing     Problem: DISCHARGE BARRIERS  Goal: Patient's continuum of care needs are met  Outcome: Ongoing

## 2021-08-03 NOTE — PROGRESS NOTES
Progress Note    Admit Date:  8/2/2021    Subjective:  Mr. Laura Sapp feels better overall  - currently on 4L NC     Objective:   Patient Vitals for the past 4 hrs:   BP Temp Temp src Pulse Resp SpO2   08/03/21 1400 (!) 109/56 96.7 °F (35.9 °C) Oral 94 20 91 %   08/03/21 1339 -- -- -- -- 20 94 %            Intake/Output Summary (Last 24 hours) at 8/3/2021 1456  Last data filed at 8/3/2021 1405  Gross per 24 hour   Intake 4796.45 ml   Output 750 ml   Net 4046.45 ml       Physical Exam:    Gen: No distress. Alert. Eyes: PERRL. No sclera icterus. No conjunctival injection. ENT: No discharge. Pharynx clear. Neck: No JVD. Trachea midline. Resp: No accessory muscle use. +crackles in bases. No wheezes. No rhonchi. CV: Regular rate. Regular rhythm. No murmur. No rub. No edema. GI: Non-tender. Non-distended. Normal bowel sounds. Skin: Warm and dry. No nodule on exposed extremities. No rash on exposed extremities. M/S: No cyanosis. No joint deformity. No clubbing. Neuro: Awake. Grossly nonfocal    Psych: Oriented x 3. No anxiety or agitation.        Scheduled Meds:   lisinopril  40 mg Oral Daily    insulin glargine  60 Units Subcutaneous Nightly    sodium chloride flush  5-40 mL Intravenous 2 times per day    insulin lispro  0-18 Units Subcutaneous TID WC    insulin lispro  0-9 Units Subcutaneous Nightly    methylPREDNISolone  40 mg Intravenous Q12H    Vitamin D  2,000 Units Oral Daily    albuterol-ipratropium  1 puff Inhalation TID    [START ON 8/4/2021] remdesivir IVPB  100 mg Intravenous Q24H    [START ON 8/4/2021] enoxaparin  40 mg Subcutaneous Daily    levofloxacin  500 mg Intravenous Q24H       Continuous Infusions:   sodium chloride      dextrose      sodium chloride      sodium chloride Stopped (08/03/21 1040)       PRN Meds:  sodium chloride, guaiFENesin-dextromethorphan, glucose, dextrose, glucagon (rDNA), dextrose, sodium chloride flush, sodium chloride, polyethylene glycol, acetaminophen **OR** acetaminophen, prochlorperazine, albuterol-ipratropium      Data:  CBC:   Recent Labs     08/02/21 1841 08/03/21  0702   WBC 6.1  --    HGB 15.8  --    HCT 45.8  --    MCV 85.6  --    * 65*     BMP:   Recent Labs     08/02/21 1841 08/03/21  0702   * 131*   K 4.0 3.8   CL 96* 103   CO2 23 19*   BUN 9 10   CREATININE 0.7* <0.5*     LIVER PROFILE:   Recent Labs     08/02/21 1841 08/03/21 0702   AST 61* 39*   ALT 34 25   BILIDIR  --  0.6*   BILITOT 1.8* 1.3*   ALKPHOS 63 51     PT/INR:   Recent Labs     08/02/21 1841 08/03/21 0702   PROTIME 17.5* 19.4*   INR 1.52* 1.68*       CULTURES  Blood: pending  Sputum: ordered  COVID 19 PCR: detected       RADIOLOGY  CT CHEST PULMONARY EMBOLISM W CONTRAST   Final Result   1. Multifocal airspace disease. Differential includes pneumonia, including   COVID pneumonia, and alveolar proteinosis. Follow-up to resolution   recommended. 2. Suboptimal opacification of the pulmonary arteries. Artifact degraded   evaluation of the pulmonary arteries. No discrete pulmonary embolism to the   level of theproximal segmental arteries given limitation. 3. Other findings as described. XR CHEST PORTABLE   Final Result   Diffuse airspace opacity throughout both lungs, likely a combination of   pulmonary edema and multifocal pneumonia. Assessment/Plan:    #COVID 19 Pneumonia  #Possible superimposed bacterial infection  #Acute hypoxic respiratory failure  - droplet plus isolation  - on 4L today   - Remdesivir D#1, monitor CRCL and LFTs  - Decadron D#1  - tocilizumab on 8/3/21  - Levaquin D# 1  - pulm cs    #DM2 with hyperglycemia  - Cont home lantus and collin preprandial humalog, add low dose SSI    #Lactic acidosis  - hold metformin, cont IV NS    #History of OWEN Cirrhosis  - close monitoring of LFTs    #TCP  - suspect related to cirrhosis. Plt down to 65k.   Hold lovenox    #Hyponatremia   - Na 131- cont IVF and BG control, BMP daily #HTN  - cont lisinopril    DVT Prophylaxis: SCD (2/2 TCP with plt 65k)  Diet: ADULT DIET;  Regular; 4 carb choices (60 gm/meal)  Code Status: Full Code    Kacie Lacey PA-C  8/3/2021 3:04 PM

## 2021-08-03 NOTE — PROGRESS NOTES
Patient did not take plasma last night, wanted to speak with MD first. Dr. Crescencio Garibay states okay to hold off on plasma for now. ALESSANDRA Ramesh made aware at this time.

## 2021-08-03 NOTE — CONSULTS
Patient is being seen at the request of Harleen Walker MD  for a consultation for respiratory failure    HISTORY OF PRESENT ILLNESS:   35years old with history of diabetes and hypertension presented with shortness of breath for few days. Moderate to severe. Worse with exertion better with resting. Associated with productive cough. Clear sputum. No hemoptysis. Diagnosed with Covid 7/29 and worsening since then. No smoking. He has DM2. Did not take covid vaccine. No recent hospitalization or steroid use. Denies any sick contact or known COVID-19 exposure. Found to be hypoxemic, saturation in the 80s and required up to 5 L O2. PAST MEDICAL HISTORY:  Past Medical History:   Diagnosis Date    Arthritis     left knee    Chronic lumbar pain     COVID-19 08/02/2021    DDD (degenerative disc disease), lumbar     MRI 2012    Depression     Glucose intolerance (impaired glucose tolerance)     History of ITP     Hypertension     [facility]ocytopenic purpura [facility] [address]     OWEN (nonalcoholic steatohepatitis) 06/05/2021    Type 2 diabetes mellitus without complication, without long-term current use of insulin ([facility] [address] 08/30/2016     PAST SURGICAL HISTORY:  History reviewed. No pertinent surgical history. FAMILY HISTORY:  family history includes Depression in his father and mother; Diabetes in his mother; High Blood Pressure in his father and mother; Other in his father. SOCIAL HISTORY:   reports that he has never smoked.  He has never used smokeless tobacco.    Scheduled Meds:   lisinopril  40 mg Oral Daily    insulin glargine  60 Units Subcutaneous Nightly    sodium chloride flush  5-40 mL Intravenous 2 times per day    insulin lispro  0-18 Units Subcutaneous TID     insulin lispro  0-9 Units Subcutaneous Nightly    methylPREDNISolone  40 mg Intravenous Q12H    Vitamin D  2,000 Units Oral Daily    enoxaparin  30 mg Subcutaneous BID    albuterol-ipratropium  1 puff Inhalation TID   Dagmar Salazar ON 8/4/2021] remdesivir IVPB  100 mg Intravenous Q24H    vancomycin  2,000 mg Intravenous Q12H    cefepime  2,000 mg Intravenous Q12H     Continuous Infusions:   sodium chloride      dextrose      sodium chloride      sodium chloride Stopped (08/03/21 1040)     PRN Meds:  sodium chloride, guaiFENesin-dextromethorphan, glucose, dextrose, glucagon (rDNA), dextrose, sodium chloride flush, sodium chloride, polyethylene glycol, acetaminophen **OR** acetaminophen, prochlorperazine, albuterol-ipratropium    ALLERGIES:  Patient is allergic to decadron [dexamethasone], trilyte [peg 3350-kcl-na bicarb-nacl], trulicity [dulaglutide], and pcn [penicillins]. REVIEW OF SYSTEMS:  Constitutional: Negative for fever  HENT: Negative for sore throat  Eyes: Negative for redness   Respiratory: + Dyspnea, cough  Cardiovascular: Negative for chest pain  Gastrointestinal: Negative for vomiting, diarrhea   Genitourinary: Negative for hematuria   Musculoskeletal: + Arthralgias   Skin: Negative for rash  Neurological: Negative for syncope  Hematological: Negative for adenopathy  Psychiatric/Behavorial: Negative for anxiety    PHYSICAL EXAM:  Blood pressure 117/74, pulse 92, temperature 96.5 °F (35.8 °C), temperature source Oral, resp. rate 20, height 6' 4\" (1.93 m), weight 293 lb 11.2 oz (133.2 kg), SpO2 94 %.' on 4 L  Gen: + Distress. Ill-appearing  Eyes: PERRL. No sclera icterus. No conjunctival injection. ENT: No discharge. Pharynx clear. Neck: Trachea midline. No obvious mass. Resp: + Accessory muscle use. Bilateral crackles. No wheezes. No rhonchi. No dullness on percussion. CV: Regular rate. Regular rhythm. No murmur or rub. No edema. GI: Non-tender. Non-distended. No hernia. Skin: Warm and dry. No nodule on exposed extremities. Lymph: No cervical LAD. No supraclavicular LAD. M/S: No cyanosis. No joint deformity. No clubbing. Neuro: Awake. Alert. Moves all four extremities. Psych: Oriented x 3. No anxiety. LABS:  CBC:   Recent Labs     08/02/21 1841 08/03/21  0702   WBC 6.1  --    HGB 15.8  --    HCT 45.8  --    MCV 85.6  --    * 65*     BMP:   Recent Labs     08/02/21 1841 08/03/21  0702   * 131*   K 4.0 3.8   CL 96* 103   CO2 23 19*   BUN 9 10   CREATININE 0.7* <0.5*     LIVER PROFILE:   Recent Labs     08/02/21 1841 08/03/21 0702   AST 61* 39*   ALT 34 25   BILIDIR  --  0.6*   BILITOT 1.8* 1.3*   ALKPHOS 63 51     PT/INR:   Recent Labs     08/02/21 1841 08/03/21 0702   PROTIME 17.5* 19.4*   INR 1.52* 1.68*     APTT: No results for input(s): APTT in the last 72 hours. UA:No results for input(s): NITRITE, COLORU, PHUR, LABCAST, WBCUA, RBCUA, MUCUS, TRICHOMONAS, YEAST, BACTERIA, CLARITYU, SPECGRAV, LEUKOCYTESUR, UROBILINOGEN, BILIRUBINUR, BLOODU, GLUCOSEU, AMORPHOUS in the last 72 hours. Invalid input(s): KETONESU  No results for input(s): PHART, UPC0FVF, PO2ART in the last 72 hours. CTPA 8/2 imaging was reviewed by me and showed   1. Multifocal airspace disease.    2. Suboptimal opacification of the pulmonary arteries. Artifact degraded   evaluation of the pulmonary arteries.  No discrete pulmonary embolism to the   level of theproximal segmental arteries given limitation.    3. Other findings as described      ASSESSMENT:  · Acute hypoxemic respiratory failure  · COVID-19 viral pneumonia  · Elevated procalcitonin  · Abnormal chest x-ray-COVID-19 and cannot exclude superimposed bacterial infection  · History of ITP  · History of OWEN     PLAN:  Supplemental oxygen to maintain SaO2 >92%; wean as tolerated  Continuous pulse oximetry  Droplet plus isolation (surgical mask, eye protection, gown, glove)  IV antibiotics to include Levaquin  Moved to negative pressure room in case needs aerosolized procedure  Avoid NEBs as able-albuterol MDI strongly preferred   Dexamethasone 6 mg for 10 days or until discharge, whichever is shorter  Remdesivir 200 mg IV on day 1 followed by 100 mg daily for 5

## 2021-08-03 NOTE — PROGRESS NOTES
Pt. Admitted to room 327 bed 2. [phone] ([phone]) and [phone] ([phone]). 

## 2021-08-03 NOTE — H&P
Hospital Medicine History & Physical      PCP: COLIN Rojo CNP    Date of Service: Pt seen/examined on 8/2/21 and admitted on 8/2/21 to Inpatient    Chief Complaint   Patient presents with    Cough     pt here for low O2 levels at home, has COVID, complains of cough and SOB hx ITP    Shortness of Breath       History Of Present Illness: The patient is a 35 y.o. male with PMH below, presents with cough, SOB/HUTCHISON, fatigue, myalgias. Pt reports his Sx have been worsening over the last 2-3 days. Associated productive cough. He has not had any N/V/D, abd pain. COVID + in ED today. He is requiring O2 in the ER and is not normally on O2 at home. He has hx of ITP. Past Medical History:        Diagnosis Date    Arthritis     left knee    Chronic lumbar pain     DDD (degenerative disc disease), lumbar     MRI 2012    Depression     Glucose intolerance (impaired glucose tolerance)     History of ITP     Hypertension     [facility]ocytopen[facility] purpura [facility]     OWEN (nonalcoholic steatohepatitis) 6/5/2021    Type 2 diabetes mellitus without complication, without long-term current use of insulin ([facility] 08/30/2016       Past Surgical History:    History reviewed. No pertinent surgical history. Medications Prior to Admission:    Prior to Admission medications    Medication Sig Start Date End Date Taking?  Authorizing Provider   predniSONE (DELTASONE) 20 MG tablet  7/7/21   Historical Provider, MD   benzonatate (TESSALON) 200 MG capsule Take 1 capsule by mouth 3 times daily as needed for Cough 7/28/21 8/4/21  COLIN Rojo CNP   insulin glargine [facility]) 100 UNIT/ML injection pen Inject 60 Units into the skin nightly 7/6/21   COLIN Rojo CNP   insulin lispro, 1 Unit Dial, (HUMALOG KWIKPEN) 100 UNIT/ML SOPN Inject 25 Units into the skin 3 times daily (before meals) 7/6/21 11/3/21  COLIN Rojo CNP   metFORMIN (GLUCOPHAGE) 1000 MG tablet TAKE ONE TABLET BY MOUTH TWICE A DAY WITH MEALS 6/15/21   COLIN Horton CNP   benazepril (LOTENSIN) 40 MG tablet TAKE ONE TABLET BY MOUTH DAILY 5/10/21   COLIN Horton CNP       Allergies:  Decadron [dexamethasone], Trilyte [peg 3350-kcl-na bicarb-nacl], Trulicity [dulaglutide], and Pcn [penicillins]    Social History:    TOBACCO:   reports that he has never smoked. He has never used smokeless tobacco.  ETOH:   reports no history of alcohol use. Family History:  Reviewed in detail and negative for DM, Early CAD, Cancer (except as below). Positive as follows:        Problem Relation Age of Onset    Diabetes Mother     High Blood Pressure Mother     Depression Mother     Depression Father     High Blood Pressure Father     Other Father         sleep apnea       REVIEW OF SYSTEMS:   Pertinent positives/negatives as follows: cough, SOB/HUTCHISON, fatigue, myalgias, and as discussed in HPI, otherwise a complete ROS performed and all other systems are negative. PHYSICAL EXAM PERFORMED:    /77   Pulse 101   Temp 97.8 °F (36.6 °C) (Oral)   Resp 24   Wt 294 lb (133.4 kg)   SpO2 95%   BMI 35.79 kg/m²     GEN:  A&Ox3, appears unwell. HEENT:  NC/AT,EOMI, semi dry MM, no erythema/exudates or visible masses. CVS:  Normal S1,S2. Tachy RRR. Without M/G/R.   LUNG:   Diminished bilat. No wheezes, rales or rhonchi  ABD:  Soft, ND/NT, BS+ x4. Without G/R.  EXT: 2+ pulses, no c/c/e. Brisk cap refill. PSY:  Thought process intact, affect appropriate. ROC:  CN III-XII intact, moves all 4 spontaneously, sensory grossly intact. SKIN: No rash or lesions on visible skin.     Chart review shows recent radiographs:  XR CHEST PORTABLE    Result Date: 8/2/2021  EXAMINATION: ONE XRAY VIEW OF THE CHEST 8/2/2021 5:43 pm COMPARISON: Chest CT 06/17/2021 HISTORY: ORDERING SYSTEM PROVIDED HISTORY: sob TECHNOLOGIST PROVIDED HISTORY: Reason for exam:->sob Reason for Exam: short of breath FINDINGS: A single frontal view of the chest was performed. There is no acute skeletal abnormality. The heart size and mediastinal contours are accentuated by portable technique and low lung volumes, and are felt to be within normal limits. There is diffuse airspace opacity throughout both lungs, likely a combination of pulmonary edema and multifocal pneumonia. There is no evidence of a pneumothorax. Diffuse airspace opacity throughout both lungs, likely a combination of pulmonary edema and multifocal pneumonia. CT CHEST PULMONARY EMBOLISM W CONTRAST    Result Date: 8/2/2021  EXAMINATION: CTA OF THE CHEST 8/2/2021 7:56 pm TECHNIQUE: CTA of the chest was performed after the administration of intravenous contrast.  Multiplanar reformatted images are provided for review. MIP images are provided for review. Dose modulation, iterative reconstruction, and/or weight based adjustment of the mA/kV was utilized to reduce the radiation dose to as low as reasonably achievable. COMPARISON: None. HISTORY: ORDERING SYSTEM PROVIDED HISTORY: SOB TECHNOLOGIST PROVIDED HISTORY: Reason for exam:->SOB Decision Support Exception - unselect if not a suspected or confirmed emergency medical condition->Emergency Medical Condition (MA) Reason for Exam: covid positive, short of breath FINDINGS: Pulmonary Arteries: Pulmonary arteries are within normal limits in size. . Suboptimal opacification of the pulmonary arteries. Artifact degraded evaluation of the pulmonary arteries. No discrete filling defect is identified in the pulmonary arteries to the level of theproximal segmental arteries. Mediastinum: Heart is upper normal limits in size. No pericardial effusion. Aorta is within normal limits in size. No luminal defect is appreciated in the visualized thoracic aorta. Coronary artery calcifications noted. Lungs/pleura: Central airways are patent.  Multifocal ground-glass the confluent airspace disease with regions of interlobular septal thickening. No pleural effusion. No pneumothorax. Soft Tissues/Bones: Prominent mediastinal lymph nodes may be reactive. Scattered degenerative changes noted in the visualized spine without spondylolisthesis. Upper Abdomen: Nodular contour of the liver, consistent with cirrhosis. Splenomegaly. 1. Multifocal airspace disease. Differential includes pneumonia, including COVID pneumonia, and alveolar proteinosis. Follow-up to resolution recommended. 2. Suboptimal opacification of the pulmonary arteries. Artifact degraded evaluation of the pulmonary arteries. No discrete pulmonary embolism to the level of theproximal segmental arteries given limitation. 3. Other findings as described.        EKG 12 Lead  Order: [id number]  Status:  Final result   Visible to patient:  No (not released) Next appt:  09/17/2021 at 03:40 PM [place] [facility] [place] Cheng CNP)   0 Result Notes   Ref Range & Units 8/2/21 1803   Ventricular Rate     Atrial Rate     P-R Interval ms 128    QRS Duration ms 90    Q-T Interval ms 338    QTc Calculation (Bazett) ms 459    P Axis degrees 50    R Axis degrees -16    T Axis degrees 27    Diagnosis  Sinus tachycardiaOtherwise normal ECGWhen compared with ECG of 02-AUG-2021 18:03, (unconfirmed)No significant change was foundConfirmed by Tereza Hodges MD, [address] ([id number]) on 8/2/2021 7:50:57 PM               CBC:  Recent Labs     08/02/21 1841   WBC 6.1   HGB 15.8   HCT 45.8   *        RENAL  Recent Labs     08/02/21 1841   *   K 4.0   CL 96*   CO2 23   BUN 9   CREATININE 0.7*   GLUCOSE 245*     Hemoglobin a1c:  Lab Results   Component Value Date    LABA1C 9.9 06/04/2021    LABA1C 9.0 03/12/2021    LABA1C 9.3 02/14/2020     LFT'S:  Recent Labs     08/02/21 1841   AST 61*   ALT 34   BILITOT 1.8*   ALKPHOS 63     CARDIAC ENZYMES:   Recent Labs     08/02/21 1841   TROPONINI <0.01     Lab Results   Component Value Date    PROBNP 136 (H) 08/02/2021       LACTIC ACID:  Recent Labs     08/02/21  1841   LACTA 2.1*       VBG:  Recent Labs     08/02/21  1841   PHVEN 7.467*   MIT5XPE 34.5*   EXE7QCU 24.4   PO2VEN 42.4*   [id number] 82        PHYSICIAN CERTIFICATION  I certify that Lonnie Crouch is expected to be hospitalized for 2 midnights based on the following assessment and plan:    ASSESSMENT/PLAN:  1. Acute respiratory failure with hypoxia, likely related to COVID PNA +/- bacterial PNA (HCAP), supplemental O2 to maintain SPO2 ? 92%, continuous pulse ox. Satting 81% on RA in the ER. Currently requiring 5 L O2. Patient normally not on O2 at home. Wean as tolerated. Pulm c/s.   2. Sepsis, likely pulm source 2/2 COVID PNA +/- bacterial component. Was in hosp 49h in June. IV vanco and cefepime. F/u cx. PCT positive at 0.49.    3. COVID, Dx 8/2, Sx began 2 days ago. CCP, IBD,  Solumedrol (documented intolerance to decadron), droplet +, Robitussin. Will go ahead and give the following IV remdesivir (hx OWEN) and Tociluzimab/intermediate dose Lovenox  (hx ITP and mild thrombocytopenia currently). Prolonged discussion w/ pharmacist, Fide Villalba, 2/2 aforementioned conditions and use of these agents. After researching this we decided that risk is likely low to proceed. Consensus was, we will need to continue to monitor his PLT counts and LFT's at least daily while he is IP. I will d/w Dr. SOLOMON Lazar 112 in the AM.  4. DM2, poor control, steroids will likely worsen. Hold oral Rx, chk A1c, add High SSI, continue home Lantus dose. 5. Lactic acidosis, 2.1 (abn for ER). IVF and repeats ordered. 6. Hx of OWEN cirrhosis, mildly elevated LFT, see above. 7. Thrombocytopenia, 112, w/ hx ITP. DVT Prophylaxis: Lx  Diet: CC  Code Status: Full Code  PT/OT Eval Status: Will order if needed and as patient condition allows  Dispo - Admit to inpatient PCU w/ tele.      Monica Preciado MD    Thank you COLIN Corrigan - DEEJAY for the opportunity to be involved in this patient's care. If you have any questions or concerns please feel free to contact me via the [employer] at [phone]. This chart was generated using the [address] [place] [address] [address] dictation system. I created this record but it may contain dictation errors given the limitations of this technology.

## 2021-08-03 NOTE — ED NOTES
2050-Perfectserve sent to Dr Elsi Anton  08/02/21 2050 2156-Admitted with no call back     [facility]os [facility]  08/02/21 2209

## 2021-08-03 NOTE — CARE COORDINATION
Case Management Assessment  Initial Evaluation      Patient Name: Taya Pizarro  YOB: 1980  Diagnosis: Acute hypoxemic respiratory failure due to COVID-19 [facility]) [U07.1, J96.01]  Date / Time: 8/2/2021  5:24 PM    Admission status/Date: 08/02/2021 Inpatient   Chart Reviewed: Yes      Patient Interviewed: No -pt did not answer bedside phone (in covid isolation) and his wife answered the cell phone of [phone] that was listed as his number  Family Interviewed:  Yes - Pt's wife Mitch Gracia at [phone]      Hospitalization in the last 30 days:  No      Health Care Decision Maker :  Pt did not answer bedside phone to address. Pt's wife stated pt has no healthcare POA and she is his next of kin.      Met with: pt's wife Balwinder Jeffery conducted  (bedside/phone): phone call     Current PCP: Lorelei SHAW-CNP    Financial  [employer] and [employer]  Precert required for SNF : Y          3 night stay required -  [employer]  Support Systems/Care Needs: Spouse/Significant Other, Parent, Children, Family Members, [employer], Friends/Neighbors  Transportation: self    Meal Preparation: self    Housing  Living Arrangements: pt lives at home with his wife and 9year old child  Steps: 2  Intent for return to present living arrangements: Yes per wife  Identified Issues: Janusz Brown  Active with [address] : No Agency:(Services)  Type of Home Care Services: None  Passport/Waiver : No  :                      Phone Number:    Passport/Waiver Services: n/a          Durable Medical Equiptment   DME Provider: n/a  Equipment:   Walker___Cane___RTS___ BSC___Shower Chair___Hospital Bed___W/C____Other________  02 at ____Liter(s)---wears(frequency)_______ HHN ___ CPAP___ BiPap___   N/A__x__      Home O2 Use :  No    If No for home O2---if presently on O2 during hospitalization:  Yes  if yes CM to follow for potential DC O2 need  Informed of need for care provider

## 2021-08-03 NOTE — CONSULTS
Pharmacy to dose remdesivir and tocilizumab per Dr Redd Leonard. Patient is Covid + and on high flow O2. BUN/SCr = 9/0.7, WBC= 6.1  Baseline ALT/AST = 34/61  Patient has hx of OWEN cirrhosis, but is not decompensated. Give remdesivir 200 mg IV x 1 dose this am, and then give remdesivir 100 mg IV daily x 4 days.     Give tocilizumab 800 mg IV x 1 dose this am.    Ifrah Santiago, [address] [facility]

## 2021-08-03 NOTE — PROGRESS NOTES
RESPIRATORY THERAPY ASSESSMENT    Name:  Fernando Pulido Record Number:  [id number]  Age: 35 y.o. Gender: male  : 1980  Today's Date:  8/3/2021  Room:  /0327-02    Assessment     Is the patient being admitted for a COPD or Asthma exacerbation? No   (If yes the patient will be seen every 4 hours for the first 24 hours and then reassessed)    Patient Admission Diagnosis      Allergies  Allergies   Allergen Reactions    Decadron [Dexamethasone] Other (See Comments)     Weak, lethargic    Trilyte [Peg 3350-Kcl-Na Bicarb-Nacl]     Trulicity [Dulaglutide]     Pcn [Penicillins] Hives and Rash       Minimum Predicted Vital Capacity:               Actual Vital Capacity:                    Pulmonary History: none  Home Oxygen Therapy:  Room air  Home Respiratory Therapy: none   Current Respiratory Therapy: Albuterol/ Atrovent Q4WA, Albuterol prn           Respiratory Severity Index(RSI)   Patients with orders for inhalation medications, oxygen, or any therapeutic treatment modality will be placed on Respiratory Protocol. They will be assessed with the first treatment and at least every 72 hours thereafter. The following severity scale will be used to determine frequency of treatment intervention. Smoking History: No Smoking History = 0    Social History  Social History     Tobacco Use    Smoking status: Never Smoker    Smokeless tobacco: Never Used   Vaping Use    Vaping Use: Never used   Substance Use Topics    Alcohol use: No    Drug use: No       Recent Surgical History: None = 0  Past Surgical History  History reviewed. No pertinent surgical history.     Level of Consciousness: Alert, Oriented, and Cooperative = 0    Level of Activity: Walking unassisted = 0    Respiratory Pattern: Dyspnea with exertion;Irregular pattern;or RR less than 6 = 2    Breath Sounds: Absent bilaterally and/or with wheezes = 3    Sputum   ,  ,    Cough: Strong, spontaneous, non-productive = 0    Vital Signs   /81   Pulse 99   Temp 97.1 °F (36.2 °C) (Oral)   Resp 22   Ht 6' 4\" (1.93 m)   Wt 294 lb 12.8 oz (133.7 kg)   SpO2 92%   BMI 35.88 kg/m²   SPO2 (COPD values may differ): Less than 86% on room air or greater than 92% on FiO2 greater than 50% = 4    Peak Flow (asthma only): not applicable = 0    RSI: 7-11 = TID (three times daily) and Q4hr PRN for dyspnea        Plan       Goals: medication delivery     Patient/caregiver was educated on the proper method of use for Respiratory Care Devices:  Yes      Level of patient/caregiver understanding able to:   ? Verbalize understanding   ? Demonstrate understanding       ? Teach back        ? Needs reinforcement       ? No available caregiver               ? Other:     Response to education:  Good     Is patient being placed on Home Treatment Regimen? No     Does the patient have everything they need prior to discharge? NA     Comments:  Chart reviewed, patient assessed    Plan of Care: Combivent TID, Combivent Q4prn     Electronically signed by Grey Roca RCP on 8/3/2021 at 1:27 AM    Respiratory Protocol Guidelines     1. Assessment and treatment by Respiratory Therapy will be initiated for medication and therapeutic interventions upon initiation of aerosolized medication. 2. Physician will be contacted for respiratory rate (RR) greater than 35 breaths per minute. Therapy will be held for heart rate (HR) greater than 140 beats per minute, pending direction from physician. 3. Bronchodilators will be administered via Metered Dose Inhaler (MDI) with spacer when the following criteria are met:  a. Alert and cooperative     b. HR < 140 bpm  c. RR < 30 bpm                d. Can demonstrate a 2-3 second inspiratory hold  4. Bronchodilators will be administered via Hand Held Nebulizer [facility]) to patients when ANY of the following criteria are met  a. Incognizant or uncooperative          b. Patients treated with HHN at Home        c.  Unable to demonstrate proper use of MDI with spacer     d. RR > 30 bpm   5. Bronchodilators will be delivered via Metered Dose Inhaler (MDI), HHN, Aerogen to intubated patients on mechanical ventilation. 6. Inhalation medication orders will be delivered and/or substituted as outlined below. Aerosolized Medications Ordering and Administration Guidelines:    1. All Medications will be ordered by a physician, and their frequency and/or modality will be adjusted as defined by the patients Respiratory Severity Index (RSI) score. 2. If the patient does not have documented COPD, consider discontinuing anticholinergics when RSI is less than 9.  3. If the bronchospasm worsens (increased RSI), then the bronchodilator frequency can be increased to a maximum of every 4 hours. If greater than every 4 hours is required, the physician will be contacted. 4. If the bronchospasm improves, the frequency of the bronchodilator can be decreased, based on the patient's RSI, but not less than home treatment regimen frequency. 5. Bronchodilator(s) will be discontinued if patient has a RSI less than 9 and has received no scheduled or as needed treatment for 72  Hrs. Patients Ordered on a Mucolytic Agent:    1. Must always be administered with a bronchodilator. 2. Discontinue if patient experiences worsened bronchospasm, or secretions have lessened to the point that the patient is able to clear them with a cough. Anti-inflammatory and Combination Medications:    1. If the patient lacks prior history of lung disease, is not using inhaled anti-inflammatory medication at home, and lacks wheezing by examination or by history for at least 24 hours, contact physician for possible discontinuation.

## 2021-08-03 NOTE — FLOWSHEET NOTE
08/02/21 2315   Vital Signs   Temp 97.1 °F (36.2 °C)   Temp Source Oral   Pulse 99   Heart Rate Source Monitor   Resp 22   /81   Level of Consciousness Alert (0)   MEWS Score 2   Oxygen Therapy   SpO2 92 %   O2 Device Nasal cannula   O2 Flow Rate (L/min) 5 L/min   Height and Weight   Height 6' 4\" (1.93 m)   Weight 294 lb 12.8 oz (133.7 kg)   Weight Method Actual;Bed scale   BSA (Calculated - sq m) 2.68 sq meters   BMI (Calculated) 36   Pt. Resting in bed. Call light in reach. Shift assessment completed see flow sheet. Denies any needs at this time. Will continue to monitor.

## 2021-08-04 LAB
ALBUMIN SERPL-MCNC: 2.4 G/DL (ref 3.4–5)
ALP BLD-CCNC: 52 U/L (ref 40–129)
ALT SERPL-CCNC: 24 U/L (ref 10–40)
ANION GAP SERPL CALCULATED.3IONS-SCNC: 7 MMOL/L (ref 3–16)
AST SERPL-CCNC: 32 U/L (ref 15–37)
BASOPHILS ABSOLUTE: 0 K/UL (ref 0–0.2)
BASOPHILS RELATIVE PERCENT: 0.1 %
BILIRUB SERPL-MCNC: 0.7 MG/DL (ref 0–1)
BILIRUBIN DIRECT: 0.3 MG/DL (ref 0–0.3)
BILIRUBIN, INDIRECT: 0.4 MG/DL (ref 0–1)
BLOOD BANK DISPENSE STATUS: NORMAL
BLOOD BANK PRODUCT CODE: NORMAL
BPU ID: NORMAL
BUN BLDV-MCNC: 13 MG/DL (ref 7–20)
CALCIUM SERPL-MCNC: 7.4 MG/DL (ref 8.3–10.6)
CHLORIDE BLD-SCNC: 108 MMOL/L (ref 99–110)
CO2: 22 MMOL/L (ref 21–32)
CREAT SERPL-MCNC: <0.5 MG/DL (ref 0.9–1.3)
DESCRIPTION BLOOD BANK: NORMAL
EOSINOPHILS ABSOLUTE: 0 K/UL (ref 0–0.6)
EOSINOPHILS RELATIVE PERCENT: 0 %
GFR AFRICAN AMERICAN: >60
GFR NON-AFRICAN AMERICAN: >60
GLUCOSE BLD-MCNC: 147 MG/DL (ref 70–99)
GLUCOSE BLD-MCNC: 193 MG/DL (ref 70–99)
GLUCOSE BLD-MCNC: 227 MG/DL (ref 70–99)
GLUCOSE BLD-MCNC: 240 MG/DL (ref 70–99)
GLUCOSE BLD-MCNC: 244 MG/DL (ref 70–99)
GLUCOSE BLD-MCNC: 265 MG/DL (ref 70–99)
HCT VFR BLD CALC: 40.6 % (ref 40.5–52.5)
HEMOGLOBIN: 13.7 G/DL (ref 13.5–17.5)
INR BLD: 1.66 (ref 0.88–1.12)
LACTIC ACID: 1.6 MMOL/L (ref 0.4–2)
LYMPHOCYTES ABSOLUTE: 0.3 K/UL (ref 1–5.1)
LYMPHOCYTES RELATIVE PERCENT: 6.1 %
MCH RBC QN AUTO: 29.4 PG (ref 26–34)
MCHC RBC AUTO-ENTMCNC: 33.7 G/DL (ref 31–36)
MCV RBC AUTO: 87.3 FL (ref 80–100)
MONOCYTES ABSOLUTE: 0.3 K/UL (ref 0–1.3)
MONOCYTES RELATIVE PERCENT: 5.2 %
NEUTROPHILS ABSOLUTE: 4.6 K/UL (ref 1.7–7.7)
NEUTROPHILS RELATIVE PERCENT: 88.6 %
PDW BLD-RTO: 15.3 % (ref 12.4–15.4)
PERFORMED ON: ABNORMAL
PLATELET # BLD: 69 K/UL (ref 135–450)
PMV BLD AUTO: 8.8 FL (ref 5–10.5)
POTASSIUM REFLEX MAGNESIUM: 4 MMOL/L (ref 3.5–5.1)
PROTHROMBIN TIME: 19.2 SEC (ref 9.9–12.7)
RBC # BLD: 4.65 M/UL (ref 4.2–5.9)
SODIUM BLD-SCNC: 137 MMOL/L (ref 136–145)
TOTAL PROTEIN: 4.9 G/DL (ref 6.4–8.2)
WBC # BLD: 5.2 K/UL (ref 4–11)

## 2021-08-04 PROCEDURE — 2580000003 HC RX 258: Performed by: PHYSICIAN ASSISTANT

## 2021-08-04 PROCEDURE — 36415 COLL VENOUS BLD VENIPUNCTURE: CPT

## 2021-08-04 PROCEDURE — 99233 SBSQ HOSP IP/OBS HIGH 50: CPT | Performed by: INTERNAL MEDICINE

## 2021-08-04 PROCEDURE — 6370000000 HC RX 637 (ALT 250 FOR IP): Performed by: INTERNAL MEDICINE

## 2021-08-04 PROCEDURE — 2060000000 HC ICU INTERMEDIATE R&B

## 2021-08-04 PROCEDURE — 94761 N-INVAS EAR/PLS OXIMETRY MLT: CPT

## 2021-08-04 PROCEDURE — 6370000000 HC RX 637 (ALT 250 FOR IP): Performed by: PHYSICIAN ASSISTANT

## 2021-08-04 PROCEDURE — 6360000002 HC RX W HCPCS: Performed by: INTERNAL MEDICINE

## 2021-08-04 PROCEDURE — 85025 COMPLETE CBC W/AUTO DIFF WBC: CPT

## 2021-08-04 PROCEDURE — 80076 HEPATIC FUNCTION PANEL: CPT

## 2021-08-04 PROCEDURE — 99232 SBSQ HOSP IP/OBS MODERATE 35: CPT | Performed by: INTERNAL MEDICINE

## 2021-08-04 PROCEDURE — 80048 BASIC METABOLIC PNL TOTAL CA: CPT

## 2021-08-04 PROCEDURE — 83605 ASSAY OF LACTIC ACID: CPT

## 2021-08-04 PROCEDURE — 85610 PROTHROMBIN TIME: CPT

## 2021-08-04 PROCEDURE — 94640 AIRWAY INHALATION TREATMENT: CPT

## 2021-08-04 PROCEDURE — 2700000000 HC OXYGEN THERAPY PER DAY

## 2021-08-04 PROCEDURE — 2500000003 HC RX 250 WO HCPCS: Performed by: INTERNAL MEDICINE

## 2021-08-04 PROCEDURE — 2580000003 HC RX 258: Performed by: INTERNAL MEDICINE

## 2021-08-04 RX ORDER — FUROSEMIDE 10 MG/ML
20 INJECTION INTRAMUSCULAR; INTRAVENOUS ONCE
Status: COMPLETED | OUTPATIENT
Start: 2021-08-04 | End: 2021-08-04

## 2021-08-04 RX ADMIN — INSULIN GLARGINE 60 UNITS: 100 INJECTION, SOLUTION SUBCUTANEOUS at 22:04

## 2021-08-04 RX ADMIN — IPRATROPIUM BROMIDE AND ALBUTEROL 1 PUFF: 20; 100 SPRAY, METERED RESPIRATORY (INHALATION) at 07:23

## 2021-08-04 RX ADMIN — REMDESIVIR 100 MG: 100 INJECTION, POWDER, LYOPHILIZED, FOR SOLUTION INTRAVENOUS at 02:59

## 2021-08-04 RX ADMIN — IPRATROPIUM BROMIDE AND ALBUTEROL 1 PUFF: 20; 100 SPRAY, METERED RESPIRATORY (INHALATION) at 14:35

## 2021-08-04 RX ADMIN — INSULIN LISPRO 1 UNITS: 100 INJECTION, SOLUTION INTRAVENOUS; SUBCUTANEOUS at 22:05

## 2021-08-04 RX ADMIN — SODIUM CHLORIDE, PRESERVATIVE FREE 10 ML: 5 INJECTION INTRAVENOUS at 22:04

## 2021-08-04 RX ADMIN — FUROSEMIDE 20 MG: 10 INJECTION, SOLUTION INTRAMUSCULAR; INTRAVENOUS at 14:25

## 2021-08-04 RX ADMIN — LEVOFLOXACIN 500 MG: 500 INJECTION, SOLUTION INTRAVENOUS at 14:32

## 2021-08-04 RX ADMIN — METHYLPREDNISOLONE SODIUM SUCCINATE 40 MG: 40 INJECTION, POWDER, FOR SOLUTION INTRAMUSCULAR; INTRAVENOUS at 01:18

## 2021-08-04 RX ADMIN — SODIUM CHLORIDE 25 ML: 9 INJECTION, SOLUTION INTRAVENOUS at 02:56

## 2021-08-04 RX ADMIN — METHYLPREDNISOLONE SODIUM SUCCINATE 40 MG: 40 INJECTION, POWDER, FOR SOLUTION INTRAMUSCULAR; INTRAVENOUS at 14:24

## 2021-08-04 RX ADMIN — Medication 2000 UNITS: at 09:37

## 2021-08-04 RX ADMIN — SODIUM CHLORIDE: 9 INJECTION, SOLUTION INTRAVENOUS at 00:08

## 2021-08-04 RX ADMIN — IPRATROPIUM BROMIDE AND ALBUTEROL 1 PUFF: 20; 100 SPRAY, METERED RESPIRATORY (INHALATION) at 20:38

## 2021-08-04 RX ADMIN — LISINOPRIL 40 MG: 20 TABLET ORAL at 09:37

## 2021-08-04 NOTE — FLOWSHEET NOTE
08/04/21 0136   Vital Signs   Temp 96.3 °F (35.7 °C)   Temp Source Oral   Pulse 77   Heart Rate Source Monitor   Resp 18   /73   Level of Consciousness Alert (0)   MEWS Score 1   Oxygen Therapy   SpO2 93 %   O2 Device High flow nasal cannula   O2 Flow Rate (L/min) 4 L/min   Height and Weight   Weight 293 lb 4.8 oz (133 kg)   Weight Method Actual;Bed scale   BMI (Calculated) 35.8   pt. Resting in  Bed and vital stable. Will continue to monitor.

## 2021-08-04 NOTE — CARE COORDINATION
INTERDISCIPLINARY PLAN OF CARE CONFERENCE    Date/Time: 8/4/2021 2:57 PM  Completed by: Elias Burrows RN, Case Management      Patient Name:  Taya Pizarro  YOB: 1980  Admitting Diagnosis: Acute hypoxemic respiratory failure due to COVID-19 [facility]) [U07.1, J96.01]     Admit Date/Time:  8/2/2021  5:24 PM    Chart reviewed. Interdisciplinary team contacted or reviewed plan related to patient progress and discharge plans. Disciplines included Case Management, Nursing, and Dietitian. Current Status: INPT, O2, COVID Isolation  PT/OT recommendation for discharge plan of care: n/a    Expected D/C Disposition:  Home  Discharge Plan Comments: Pt is IPTA and plans to return home at discharge. CM following for home O2 and any other discharge needs.     Home O2 in place on admit: No

## 2021-08-04 NOTE — PROGRESS NOTES
IM Progress Note    Admit Date:  8/2/2021    Patient with COVID-19 infection,  Hypoxia  He has not had the vaccine    Patient remains on oxygen 4 to 5 L    Subjective:  Mr. Jewel Jackman feels feels fatigued. No dyspnea at rest but complains of shortness of breath with exertion. Persistent cough not bringing up much sputum, no fevers. Unable to take any deep breaths. - currently on 4L-> 5 L NC   O2 sats are 90% on 5 L    Objective:   Patient Vitals for the past 4 hrs:   Resp SpO2   08/04/21 1436 20 95 %            Intake/Output Summary (Last 24 hours) at 8/4/2021 1437  Last data filed at 8/4/2021 1429  Gross per 24 hour   Intake 965.96 ml   Output 650 ml   Net 315.96 ml       Physical Exam:  Patient is on droplet plus precautions  Gen: No distress. Alert. Awake and well-oriented . Appears ill and fatigued   Eyes: PERRL. No sclera icterus. No conjunctival injection. ENT: No discharge. Pharynx clear. Neck: No JVD. Trachea midline. Resp: No accessory muscle use. Diminished breath sounds, mild crackles at bases. no wheezes. No rhonchi. CV: Regular rate. Regular rhythm. No murmur. No rub. No edema. GI: Non-tender. Non-distended. Normal bowel sounds. Skin: Warm and dry. No nodule on exposed extremities. No rash on exposed extremities. M/S: No cyanosis. No joint deformity. No clubbing. Neuro: Awake. Grossly nonfocal    Psych: Oriented x 3. No anxiety or agitation.        Scheduled Meds:   lisinopril  40 mg Oral Daily    insulin glargine  60 Units Subcutaneous Nightly    sodium chloride flush  5-40 mL Intravenous 2 times per day    methylPREDNISolone  40 mg Intravenous Q12H    Vitamin D  2,000 Units Oral Daily    albuterol-ipratropium  1 puff Inhalation TID    remdesivir IVPB  100 mg Intravenous Q24H    levofloxacin  500 mg Intravenous Q24H    insulin lispro  25 Units Subcutaneous TID WC    insulin lispro  0-6 Units Subcutaneous TID WC    insulin lispro  0-3 Units Subcutaneous Nightly Continuous Infusions:   sodium chloride      dextrose      sodium chloride Stopped (08/04/21 1246)       PRN Meds:  sodium chloride, guaiFENesin-dextromethorphan, glucose, dextrose, glucagon (rDNA), dextrose, sodium chloride flush, sodium chloride, polyethylene glycol, acetaminophen **OR** acetaminophen, prochlorperazine, albuterol-ipratropium      Data:  CBC:   Recent Labs     08/02/21 1841 08/03/21  0702 08/04/21  0525   WBC 6.1  --  5.2   HGB 15.8  --  13.7   HCT 45.8  --  40.6   MCV 85.6  --  87.3   * 65* 69*     BMP:   Recent Labs     08/02/21 1841 08/03/21  0702 08/04/21  0525   * 131* 137   K 4.0 3.8 4.0   CL 96* 103 108   CO2 23 19* 22   BUN 9 10 13   CREATININE 0.7* <0.5* <0.5*     LIVER PROFILE:   Recent Labs     08/02/21 1841 08/03/21  0702 08/04/21  0525   AST 61* 39* 32   ALT 34 25 24   BILIDIR  --  0.6* 0.3   BILITOT 1.8* 1.3* 0.7   ALKPHOS 63 51 52     PT/INR:   Recent Labs     08/02/21 1841 08/03/21  0702 08/04/21  0525   PROTIME 17.5* 19.4* 19.2*   INR 1.52* 1.68* 1.66*       CULTURES  Blood: pending  Sputum: ordered  COVID 19 PCR: detected       RADIOLOGY  CT CHEST PULMONARY EMBOLISM W CONTRAST   Final Result   1. Multifocal airspace disease. Differential includes pneumonia, including   COVID pneumonia, and alveolar proteinosis. Follow-up to resolution   recommended. 2. Suboptimal opacification of the pulmonary arteries. Artifact degraded   evaluation of the pulmonary arteries. No discrete pulmonary embolism to the   level of theproximal segmental arteries given limitation. 3. Other findings as described. XR CHEST PORTABLE   Final Result   Diffuse airspace opacity throughout both lungs, likely a combination of   pulmonary edema and multifocal pneumonia.                Assessment/Plan:    #COVID 19 Pneumonia  #Possible superimposed bacterial infection  #Acute hypoxic respiratory failure  - droplet plus isolation  -Chest x-ray showed multifocal airspace disease  - on O2 4L-> 5L  today . Continue supplemental oxygen. Wean O2 as tolerated  -Continue remdesivir D#2, monitor CRCL and LFTs  -Continue Decadron D#2  - tocilizumab given on 8/3/21  -Continue Levaquin D# 2  -Seen by pulmonology    #DM2 with hyperglycemia  - Cont home lantus and collin preprandial humalog, add low dose SSI    #Lactic acidosis  - hold metformin, cont IV NS  Lactic acidosis resolved    #Sepsis  - present on admission  -Secondary to COVID-19 pneumonia and possibly superimposed bacterial infection. With elevated procalcitonin,Lactic acidosis, tachypnea and tachycardia  -Sepsis resolved now    #History of OWEN Cirrhosis  - close monitoring of LFTs    #TCP  - suspect related to cirrhosis. Plt down to 65k. Hold lovenox. Monitor platelet count. 69K today    #Hyponatremia   - Na 131- cont IVF and BG control, BMP daily     #HTN  - cont lisinopril    DVT Prophylaxis: SCD (2/2 TCP with plt 65k)  Diet: ADULT DIET;  Regular; 4 carb choices (60 gm/meal)  Code Status: Full Code     Ramin Dougherty MD  8/4/2021 2:37 PM

## 2021-08-04 NOTE — PROGRESS NOTES
Patient resting in bed, AM assessment completed. Lungs decreased. O2 increased to 5L, down to 83% on RA while in bathroom. BS+. No acute distress noted. Call light in reach. Will continue to monitor.

## 2021-08-04 NOTE — PROGRESS NOTES
Remdesivir Monitoring: Day 2    Recent Labs     08/02/21 1841 08/02/21  1841 08/03/21  0702 08/03/21  0702 08/04/21  0525   WBC 6.1  --   --   --  5.2   BUN 9  --  10  --  13   CREATININE 0.7*   < > <0.5*   < > <0.5*    < > = values in this interval not displayed. CrCl cannot be calculated (This lab value cannot be used to calculate CrCl because it is not a number: <0.5). Recent Labs     08/02/21 1841 08/03/21  0702 08/04/21  0525   ALT 34 25 24   AST 61* 39* 32     Will begin the 100 mg of remdesivir today x 4 doses. Pharmacy will continue to monitor.      Zhen TabaresD, [facility], 8/4/2021 9:44 AM

## 2021-08-04 NOTE — FLOWSHEET NOTE
08/03/21 2015   Vital Signs   Temp 96.7 °F (35.9 °C)   Temp Source Oral   Pulse 94   Heart Rate Source Monitor   Resp 18   /75   Patient Position Supine   Level of Consciousness Alert (0)   MEWS Score 1   Oxygen Therapy   SpO2 93 %   O2 Device High flow nasal cannula   O2 Flow Rate (L/min) 4 L/min   Pt. Resting in bed. Call light in reach. Shift assessment completed see flow sheet. Denies any needs at this time. Will continue to monitor.

## 2021-08-04 NOTE — PROGRESS NOTES
prochlorperazine, albuterol-ipratropium    Labs:  CBC:   Recent Labs     08/02/21 1841 08/03/21  0702 08/04/21  0525   WBC 6.1  --  5.2   HGB 15.8  --  13.7   HCT 45.8  --  40.6   MCV 85.6  --  87.3   * 65* 69*     BMP:   Recent Labs     08/02/21 1841 08/03/21  0702 08/04/21  0525   * 131* 137   K 4.0 3.8 4.0   CL 96* 103 108   CO2 23 19* 22   BUN 9 10 13   CREATININE 0.7* <0.5* <0.5*     LIVER PROFILE:   Recent Labs     08/02/21 1841 08/03/21 0702 08/04/21  0525   AST 61* 39* 32   ALT 34 25 24   BILIDIR  --  0.6* 0.3   BILITOT 1.8* 1.3* 0.7   ALKPHOS 63 51 52     PT/INR:   Recent Labs     08/02/21 1841 08/03/21  0702 08/04/21  0525   PROTIME 17.5* 19.4* 19.2*   INR 1.52* 1.68* 1.66*     APTT: No results for input(s): APTT in the last 72 hours. UA:No results for input(s): NITRITE, COLORU, PHUR, LABCAST, WBCUA, RBCUA, MUCUS, TRICHOMONAS, YEAST, BACTERIA, CLARITYU, SPECGRAV, LEUKOCYTESUR, UROBILINOGEN, BILIRUBINUR, BLOODU, GLUCOSEU, AMORPHOUS in the last 72 hours. Invalid input(s): KETONESU  No results for input(s): PHART, [id number], PO2ART in the last 72 hours. Cultures:   8/2 BC NGTD  8/2 COVID-19 and influenza detected    Films:  CTPA 8/2 imaging was reviewed by me and showed   1. Multifocal airspace disease.    2. Suboptimal opacification of the pulmonary arteries. Artifact degraded   evaluation of the pulmonary arteries.  No discrete pulmonary embolism to the   level of theproximal segmental arteries given limitation.    3. Other findings as described        ASSESSMENT:  · Acute hypoxemic respiratory failure  · COVID-19 viral pneumonia  · Elevated procalcitonin  · Abnormal chest x-ray-COVID-19 and cannot exclude superimposed bacterial infection  · History of ITP  · History of OWEN      PLAN:  · Supplemental oxygen to maintain SaO2 >92%; wean as tolerated  · Continuous pulse oximetry  · Droplet plus isolation (surgical mask, eye protection, gown, glove)  · IV antibiotics to include Levaquin D#2  · Moved to negative pressure room in case needs aerosolized procedure  · Avoid NEBs as able-albuterol MDI strongly preferred   · Steroid D#3- Solumedrol 40 mg IV   · Remdesivir D#2/5. Monitor LFTs, renal and CBC daily while on medication.   · Post tocilizumab 8/3/2021  · Lasix 20 mg IV x 1   · Monitor glucose closely  · Lovenox to daily and monitor platelet closely

## 2021-08-05 LAB
ALBUMIN SERPL-MCNC: 2.4 G/DL (ref 3.4–5)
ALP BLD-CCNC: 55 U/L (ref 40–129)
ALT SERPL-CCNC: 29 U/L (ref 10–40)
ANION GAP SERPL CALCULATED.3IONS-SCNC: 8 MMOL/L (ref 3–16)
AST SERPL-CCNC: 42 U/L (ref 15–37)
BASOPHILS ABSOLUTE: 0 K/UL (ref 0–0.2)
BASOPHILS RELATIVE PERCENT: 0.2 %
BILIRUB SERPL-MCNC: 0.7 MG/DL (ref 0–1)
BILIRUBIN DIRECT: 0.3 MG/DL (ref 0–0.3)
BILIRUBIN, INDIRECT: 0.4 MG/DL (ref 0–1)
BUN BLDV-MCNC: 18 MG/DL (ref 7–20)
CALCIUM SERPL-MCNC: 7.6 MG/DL (ref 8.3–10.6)
CHLORIDE BLD-SCNC: 106 MMOL/L (ref 99–110)
CO2: 24 MMOL/L (ref 21–32)
CREAT SERPL-MCNC: <0.5 MG/DL (ref 0.9–1.3)
EOSINOPHILS ABSOLUTE: 0 K/UL (ref 0–0.6)
EOSINOPHILS RELATIVE PERCENT: 0 %
GFR AFRICAN AMERICAN: >60
GFR NON-AFRICAN AMERICAN: >60
GLUCOSE BLD-MCNC: 194 MG/DL (ref 70–99)
GLUCOSE BLD-MCNC: 197 MG/DL (ref 70–99)
GLUCOSE BLD-MCNC: 243 MG/DL (ref 70–99)
GLUCOSE BLD-MCNC: 263 MG/DL (ref 70–99)
GLUCOSE BLD-MCNC: 279 MG/DL (ref 70–99)
GLUCOSE BLD-MCNC: 280 MG/DL (ref 70–99)
HCT VFR BLD CALC: 40.2 % (ref 40.5–52.5)
HEMOGLOBIN: 13.7 G/DL (ref 13.5–17.5)
INR BLD: 1.6 (ref 0.88–1.12)
LYMPHOCYTES ABSOLUTE: 0.3 K/UL (ref 1–5.1)
LYMPHOCYTES RELATIVE PERCENT: 4.9 %
MCH RBC QN AUTO: 29.7 PG (ref 26–34)
MCHC RBC AUTO-ENTMCNC: 34 G/DL (ref 31–36)
MCV RBC AUTO: 87.2 FL (ref 80–100)
MONOCYTES ABSOLUTE: 0.3 K/UL (ref 0–1.3)
MONOCYTES RELATIVE PERCENT: 4.6 %
NEUTROPHILS ABSOLUTE: 5 K/UL (ref 1.7–7.7)
NEUTROPHILS RELATIVE PERCENT: 90.3 %
PDW BLD-RTO: 16.2 % (ref 12.4–15.4)
PERFORMED ON: ABNORMAL
PLATELET # BLD: 64 K/UL (ref 135–450)
PMV BLD AUTO: 8.7 FL (ref 5–10.5)
POTASSIUM REFLEX MAGNESIUM: 4.2 MMOL/L (ref 3.5–5.1)
PROTHROMBIN TIME: 18.4 SEC (ref 9.9–12.7)
RBC # BLD: 4.61 M/UL (ref 4.2–5.9)
SODIUM BLD-SCNC: 138 MMOL/L (ref 136–145)
TOTAL PROTEIN: 4.9 G/DL (ref 6.4–8.2)
WBC # BLD: 5.5 K/UL (ref 4–11)

## 2021-08-05 PROCEDURE — 6360000002 HC RX W HCPCS: Performed by: INTERNAL MEDICINE

## 2021-08-05 PROCEDURE — 6370000000 HC RX 637 (ALT 250 FOR IP): Performed by: PHYSICIAN ASSISTANT

## 2021-08-05 PROCEDURE — 94761 N-INVAS EAR/PLS OXIMETRY MLT: CPT

## 2021-08-05 PROCEDURE — 2060000000 HC ICU INTERMEDIATE R&B

## 2021-08-05 PROCEDURE — 94640 AIRWAY INHALATION TREATMENT: CPT

## 2021-08-05 PROCEDURE — 6370000000 HC RX 637 (ALT 250 FOR IP): Performed by: INTERNAL MEDICINE

## 2021-08-05 PROCEDURE — 85610 PROTHROMBIN TIME: CPT

## 2021-08-05 PROCEDURE — 85025 COMPLETE CBC W/AUTO DIFF WBC: CPT

## 2021-08-05 PROCEDURE — 2580000003 HC RX 258: Performed by: INTERNAL MEDICINE

## 2021-08-05 PROCEDURE — 2700000000 HC OXYGEN THERAPY PER DAY

## 2021-08-05 PROCEDURE — 36415 COLL VENOUS BLD VENIPUNCTURE: CPT

## 2021-08-05 PROCEDURE — 99232 SBSQ HOSP IP/OBS MODERATE 35: CPT | Performed by: PHYSICIAN ASSISTANT

## 2021-08-05 PROCEDURE — 80076 HEPATIC FUNCTION PANEL: CPT

## 2021-08-05 PROCEDURE — 99233 SBSQ HOSP IP/OBS HIGH 50: CPT | Performed by: INTERNAL MEDICINE

## 2021-08-05 PROCEDURE — 2500000003 HC RX 250 WO HCPCS: Performed by: INTERNAL MEDICINE

## 2021-08-05 PROCEDURE — 80048 BASIC METABOLIC PNL TOTAL CA: CPT

## 2021-08-05 RX ORDER — FUROSEMIDE 10 MG/ML
40 INJECTION INTRAMUSCULAR; INTRAVENOUS ONCE
Status: COMPLETED | OUTPATIENT
Start: 2021-08-05 | End: 2021-08-05

## 2021-08-05 RX ADMIN — Medication 2000 UNITS: at 09:43

## 2021-08-05 RX ADMIN — REMDESIVIR 100 MG: 100 INJECTION, POWDER, LYOPHILIZED, FOR SOLUTION INTRAVENOUS at 02:21

## 2021-08-05 RX ADMIN — FUROSEMIDE 40 MG: 10 INJECTION, SOLUTION INTRAMUSCULAR; INTRAVENOUS at 14:28

## 2021-08-05 RX ADMIN — METHYLPREDNISOLONE SODIUM SUCCINATE 40 MG: 40 INJECTION, POWDER, FOR SOLUTION INTRAMUSCULAR; INTRAVENOUS at 14:31

## 2021-08-05 RX ADMIN — IPRATROPIUM BROMIDE AND ALBUTEROL 1 PUFF: 20; 100 SPRAY, METERED RESPIRATORY (INHALATION) at 20:40

## 2021-08-05 RX ADMIN — INSULIN LISPRO 2 UNITS: 100 INJECTION, SOLUTION INTRAVENOUS; SUBCUTANEOUS at 21:05

## 2021-08-05 RX ADMIN — METHYLPREDNISOLONE SODIUM SUCCINATE 40 MG: 40 INJECTION, POWDER, FOR SOLUTION INTRAMUSCULAR; INTRAVENOUS at 02:18

## 2021-08-05 RX ADMIN — LISINOPRIL 40 MG: 20 TABLET ORAL at 09:43

## 2021-08-05 RX ADMIN — INSULIN GLARGINE 60 UNITS: 100 INJECTION, SOLUTION SUBCUTANEOUS at 21:05

## 2021-08-05 RX ADMIN — SODIUM CHLORIDE, PRESERVATIVE FREE 10 ML: 5 INJECTION INTRAVENOUS at 20:55

## 2021-08-05 RX ADMIN — IPRATROPIUM BROMIDE AND ALBUTEROL 1 PUFF: 20; 100 SPRAY, METERED RESPIRATORY (INHALATION) at 13:55

## 2021-08-05 RX ADMIN — LEVOFLOXACIN 500 MG: 500 INJECTION, SOLUTION INTRAVENOUS at 14:37

## 2021-08-05 RX ADMIN — SODIUM CHLORIDE, PRESERVATIVE FREE 10 ML: 5 INJECTION INTRAVENOUS at 09:45

## 2021-08-05 RX ADMIN — IPRATROPIUM BROMIDE AND ALBUTEROL 1 PUFF: 20; 100 SPRAY, METERED RESPIRATORY (INHALATION) at 08:05

## 2021-08-05 RX ADMIN — SODIUM CHLORIDE 25 ML: 9 INJECTION, SOLUTION INTRAVENOUS at 14:33

## 2021-08-05 ASSESSMENT — PAIN SCALES - GENERAL
PAINLEVEL_OUTOF10: 0
PAINLEVEL_OUTOF10: 0

## 2021-08-05 NOTE — PLAN OF CARE
Problem: Airway Clearance - Ineffective  Goal: Achieve or maintain patent airway  Outcome: Ongoing     Problem: Gas Exchange - Impaired  Goal: Absence of hypoxia  Outcome: Ongoing  Goal: Promote optimal lung function  Outcome: Ongoing     Problem: Breathing Pattern - Ineffective  Goal: Ability to achieve and maintain a regular respiratory rate  Outcome: Ongoing     Problem:  Body Temperature -  Risk of, Imbalanced  Goal: Ability to maintain a body temperature within defined limits  Outcome: Ongoing  Goal: Will regain or maintain usual level of consciousness  Outcome: Ongoing  Goal: Complications related to the disease process, condition or treatment will be avoided or minimized  Outcome: Ongoing     Problem: Isolation Precautions - Risk of Spread of Infection  Goal: Prevent transmission of infection  Outcome: Ongoing     Problem: Nutrition Deficits  Goal: Optimize nutritional status  Outcome: Ongoing     Problem: Risk for Fluid Volume Deficit  Goal: Maintain normal heart rhythm  Outcome: Ongoing  Goal: Maintain absence of muscle cramping  Outcome: Ongoing  Goal: Maintain normal serum potassium, sodium, calcium, phosphorus, and pH  Outcome: Ongoing     Problem: Loneliness or Risk for Loneliness  Goal: Demonstrate positive use of time alone when socialization is not possible  Outcome: Ongoing     Problem: Fatigue  Goal: Verbalize increase energy and improved vitality  Outcome: Ongoing     Problem: Patient Education: Go to Patient Education Activity  Goal: Patient/Family Education  Outcome: Ongoing     Problem: Falls - Risk of:  Goal: Will remain free from falls  Description: Will remain free from falls  Outcome: Ongoing  Goal: Absence of physical injury  Description: Absence of physical injury  Outcome: Ongoing     Problem: SAFETY  Goal: Free from accidental physical injury  Outcome: Ongoing  Goal: Free from intentional harm  Outcome: Ongoing     Problem: DAILY CARE  Goal: Daily care needs are met  Outcome: Ongoing Problem: PAIN  Goal: Patient's pain/discomfort is manageable  Outcome: Ongoing     Problem: SKIN INTEGRITY  Goal: Skin integrity is maintained or improved  Outcome: Ongoing     Problem: KNOWLEDGE DEFICIT  Goal: Patient/S.O. demonstrates understanding of disease process, treatment plan, medications, and discharge instructions.   Outcome: Ongoing     Problem: DISCHARGE BARRIERS  Goal: Patient's continuum of care needs are met  Outcome: Ongoing

## 2021-08-05 NOTE — PROGRESS NOTES
Pulmonary Progress Note    CC: Respiratory failure    Subjective:   Still with shortness of breath  Hypoxemic requiring up to 10 L O2      Intake/Output Summary (Last 24 hours) at 8/5/2021 0754  Last data filed at 8/4/2021 2204  Gross per 24 hour   Intake 940.23 ml   Output 800 ml   Net 140.23 ml       Exam:   /80   Pulse 83   Temp 96.7 °F (35.9 °C) (Oral)   Resp 18   Ht 6' 4\" (1.93 m)   Wt 292 lb 3.2 oz (132.5 kg)   SpO2 94%   BMI 35.57 kg/m²  on 10LPM   Gen: Mild distress. Ill-appearing  Eyes: PERRL. No sclera icterus. No conjunctival injection. ENT: No discharge. Pharynx clear. Neck: Trachea midline. No obvious mass. Resp: Mild accessory muscle use. Bilateral crackles. No wheezes. No rhonchi. No dullness on percussion. CV: Regular rate. Regular rhythm. No murmur or rub.  1-2+ LE edema. GI: Non-tender. Non-distended. No hernia. Skin: Warm and dry. No nodule on exposed extremities. Lymph: No cervical LAD. No supraclavicular LAD. M/S: No cyanosis. No joint deformity. No clubbing. Neuro: Awake. Alert. Moves all four extremities. Psych: Oriented x 3. No anxiety.      Scheduled Meds:   lisinopril  40 mg Oral Daily    insulin glargine  60 Units Subcutaneous Nightly    sodium chloride flush  5-40 mL Intravenous 2 times per day    methylPREDNISolone  40 mg Intravenous Q12H    Vitamin D  2,000 Units Oral Daily    albuterol-ipratropium  1 puff Inhalation TID    remdesivir IVPB  100 mg Intravenous Q24H    levofloxacin  500 mg Intravenous Q24H    insulin lispro  25 Units Subcutaneous TID WC    insulin lispro  0-6 Units Subcutaneous TID WC    insulin lispro  0-3 Units Subcutaneous Nightly     Continuous Infusions:   sodium chloride      dextrose      sodium chloride Stopped (08/04/21 1246)     PRN Meds:  sodium chloride, guaiFENesin-dextromethorphan, glucose, dextrose, glucagon (rDNA), dextrose, sodium chloride flush, sodium chloride, polyethylene glycol, acetaminophen **OR** acetaminophen, prochlorperazine, albuterol-ipratropium    Labs:  CBC:   Recent Labs     08/02/21  1841 08/02/21  1841 08/03/21 0702 08/04/21 0525 08/05/21 0432   WBC 6.1  --   --  5.2 5.5   HGB 15.8  --   --  13.7 13.7   HCT 45.8  --   --  40.6 40.2*   MCV 85.6  --   --  87.3 87.2   *   < > 65* 69* 64*    < > = values in this interval not displayed. BMP:   Recent Labs     08/03/21 0702 08/04/21 0525 08/05/21 0432   * 137 138   K 3.8 4.0 4.2    108 106   CO2 19* 22 24   BUN 10 13 18   CREATININE <0.5* <0.5* <0.5*     LIVER PROFILE:   Recent Labs     08/03/21 0702 08/04/21 0525 08/05/21 0432   AST 39* 32 42*   ALT 25 24 29   BILIDIR 0.6* 0.3 0.3   BILITOT 1.3* 0.7 0.7   ALKPHOS 51 52 55     PT/INR:   Recent Labs     08/03/21 0702 08/04/21 0525 08/05/21 0432   PROTIME 19.4* 19.2* 18.4*   INR 1.68* 1.66* 1.60*     APTT: No results for input(s): APTT in the last 72 hours. UA:No results for input(s): NITRITE, COLORU, PHUR, LABCAST, WBCUA, RBCUA, MUCUS, TRICHOMONAS, YEAST, BACTERIA, CLARITYU, SPECGRAV, LEUKOCYTESUR, UROBILINOGEN, BILIRUBINUR, BLOODU, GLUCOSEU, AMORPHOUS in the last 72 hours. Invalid input(s): KETONESU  No results for input(s): PHART, SPM1HFZ, PO2ART in the last 72 hours. Cultures:   8/2 BC NGTD  8/2 COVID-19 and influenza detected    Films:  CTPA 8/2 imaging was reviewed by me and showed   1. Multifocal airspace disease.    2. Suboptimal opacification of the pulmonary arteries. Artifact degraded   evaluation of the pulmonary arteries.  No discrete pulmonary embolism to the   level of theproximal segmental arteries given limitation.    3. Other findings as described        ASSESSMENT:  · Acute hypoxemic respiratory failure  · COVID-19 viral pneumonia  · Elevated procalcitonin  · Abnormal chest x-ray-COVID-19 and cannot exclude superimposed bacterial infection  · History of ITP  · History of OWEN      PLAN:  · Supplemental oxygen to maintain SaO2 >92%; wean as

## 2021-08-05 NOTE — PROGRESS NOTES
Pt in bed, eyes closed. No distress noted. Call light in reach. Will continue to monitor.   Iris Damico RN

## 2021-08-05 NOTE — PROGRESS NOTES
Called that patient's spo2 of 74. Patient was in bathroom, oxygen at 5L/min NC.  HR also up in 130s. Patient stated he had his oxygen on the entire time of being in bathroom. Patient back to sitting at side of bed.  spo2 remaining in low 80s and not recovering. L[facility] Snare oxygen increased to 10L HRNC. After 10min, recovered to 93% on 10L HFNC. Will further monitor.

## 2021-08-05 NOTE — PROGRESS NOTES
Pt in bed, awake, A/O X4. Shift assessment complete, night meds given. No C/o pain at this time. . No other needs expressed. Call light in reach. Will monitor.   Rhea Darby RN

## 2021-08-05 NOTE — PROGRESS NOTES
IM Progress Note    Admit Date:  8/2/2021    Patient with COVID-19 infection,  Hypoxia  He has not had the vaccine    Subjective:  Mr. Ashley Eaton feels short of breath with exertion  O2 dropped to 81% on 5L with walking to bathroom     Objective:   Patient Vitals for the past 4 hrs:   BP Temp Temp src Pulse Resp SpO2   08/05/21 1355 -- -- -- -- 20 98 %   08/05/21 1300 (!) 142/87 97.4 °F (36.3 °C) Oral 101 22 91 %            Intake/Output Summary (Last 24 hours) at 8/5/2021 1528  Last data filed at 8/5/2021 1316  Gross per 24 hour   Intake 472 ml   Output --   Net 472 ml       Physical Exam:  Patient is on droplet plus precautions  Gen: No distress. Alert. Awake and well-oriented . Appears ill and fatigued   Eyes: PERRL. No sclera icterus. No conjunctival injection. ENT: No discharge. Pharynx clear. Neck: No JVD. Trachea midline. Resp: No accessory muscle use. Diminished breath sounds    no wheezes. No rhonchi. No crackles   CV: Regular rate. Regular rhythm. No murmur. No rub. No edema. GI: Non-tender. Non-distended. Normal bowel sounds. Skin: Warm and dry. No nodule on exposed extremities. No rash on exposed extremities. M/S: No cyanosis. No joint deformity. No clubbing. Neuro: Awake. Grossly nonfocal    Psych: Oriented x 3. No anxiety or agitation.        Scheduled Meds:   lisinopril  40 mg Oral Daily    insulin glargine  60 Units Subcutaneous Nightly    sodium chloride flush  5-40 mL Intravenous 2 times per day    methylPREDNISolone  40 mg Intravenous Q12H    Vitamin D  2,000 Units Oral Daily    albuterol-ipratropium  1 puff Inhalation TID    remdesivir IVPB  100 mg Intravenous Q24H    levofloxacin  500 mg Intravenous Q24H    insulin lispro  25 Units Subcutaneous TID WC    insulin lispro  0-6 Units Subcutaneous TID WC    insulin lispro  0-3 Units Subcutaneous Nightly       Continuous Infusions:   sodium chloride      dextrose      sodium chloride 25 mL (08/05/21 1433)       PRN disease  - on O2 4L-> 5L  today . Continue supplemental oxygen. Wean O2 as tolerated  -Continue remdesivir D#3, monitor CRCL and LFTs  - solumedrol BID   - tocilizumab given on 8/3/21  -Continue Levaquin D#3  -Seen by pulmonology  - sp IV lasix 40 mg on 8/5    #DM2 with hyperglycemia  - Cont home lantus and collin preprandial humalog, add low dose SSI    #Lactic acidosis  - hold metformin, cont IV NS  Lactic acidosis resolved    #Sepsis  - present on admission  -Secondary to COVID-19 pneumonia and possibly superimposed bacterial infection. With elevated procalcitonin,Lactic acidosis, tachypnea and tachycardia  -Sepsis resolved now    #History of OWEN Cirrhosis  - close monitoring of LFTs    #TCP  - suspect related to cirrhosis. Plt down to 65k. Hold lovenox. Monitor platelet count. 64K today    #Hyponatremia   - resolved monitor closely     #HTN  - cont lisinopril    DVT Prophylaxis: SCD (2/2 TCP with plt 65k)  Diet: ADULT DIET;  Regular; 4 carb choices (60 gm/meal)  Code Status: Full Code     Agnes Perez PA-C  8/5/2021 3:28 PM

## 2021-08-06 LAB
ALBUMIN SERPL-MCNC: 2.6 G/DL (ref 3.4–5)
ALP BLD-CCNC: 58 U/L (ref 40–129)
ALT SERPL-CCNC: 39 U/L (ref 10–40)
ANION GAP SERPL CALCULATED.3IONS-SCNC: 8 MMOL/L (ref 3–16)
AST SERPL-CCNC: 50 U/L (ref 15–37)
BASOPHILS ABSOLUTE: 0.1 K/UL (ref 0–0.2)
BASOPHILS RELATIVE PERCENT: 1.4 %
BILIRUB SERPL-MCNC: 0.6 MG/DL (ref 0–1)
BILIRUBIN DIRECT: 0.3 MG/DL (ref 0–0.3)
BILIRUBIN, INDIRECT: 0.3 MG/DL (ref 0–1)
BLOOD CULTURE, ROUTINE: NORMAL
BUN BLDV-MCNC: 17 MG/DL (ref 7–20)
CALCIUM SERPL-MCNC: 7.7 MG/DL (ref 8.3–10.6)
CHLORIDE BLD-SCNC: 105 MMOL/L (ref 99–110)
CO2: 24 MMOL/L (ref 21–32)
CREAT SERPL-MCNC: 0.6 MG/DL (ref 0.9–1.3)
CULTURE, BLOOD 2: NORMAL
EKG ATRIAL RATE: 73 BPM
EKG DIAGNOSIS: NORMAL
EKG P AXIS: 40 DEGREES
EKG P-R INTERVAL: 146 MS
EKG Q-T INTERVAL: 432 MS
EKG QRS DURATION: 112 MS
EKG QTC CALCULATION (BAZETT): 475 MS
EKG R AXIS: 5 DEGREES
EKG T AXIS: -20 DEGREES
EKG VENTRICULAR RATE: 73 BPM
EOSINOPHILS ABSOLUTE: 0 K/UL (ref 0–0.6)
EOSINOPHILS RELATIVE PERCENT: 0 %
GFR AFRICAN AMERICAN: >60
GFR NON-AFRICAN AMERICAN: >60
GLUCOSE BLD-MCNC: 192 MG/DL (ref 70–99)
GLUCOSE BLD-MCNC: 199 MG/DL (ref 70–99)
GLUCOSE BLD-MCNC: 223 MG/DL (ref 70–99)
GLUCOSE BLD-MCNC: 228 MG/DL (ref 70–99)
GLUCOSE BLD-MCNC: 239 MG/DL (ref 70–99)
GLUCOSE BLD-MCNC: 253 MG/DL (ref 70–99)
HCT VFR BLD CALC: 40.1 % (ref 40.5–52.5)
HEMOGLOBIN: 13.5 G/DL (ref 13.5–17.5)
INR BLD: 1.6 (ref 0.88–1.12)
LYMPHOCYTES ABSOLUTE: 0.3 K/UL (ref 1–5.1)
LYMPHOCYTES RELATIVE PERCENT: 4.2 %
MAGNESIUM: 2.4 MG/DL (ref 1.8–2.4)
MCH RBC QN AUTO: 29.6 PG (ref 26–34)
MCHC RBC AUTO-ENTMCNC: 33.6 G/DL (ref 31–36)
MCV RBC AUTO: 88 FL (ref 80–100)
MONOCYTES ABSOLUTE: 0.4 K/UL (ref 0–1.3)
MONOCYTES RELATIVE PERCENT: 5.9 %
NEUTROPHILS ABSOLUTE: 5.5 K/UL (ref 1.7–7.7)
NEUTROPHILS RELATIVE PERCENT: 88.5 %
PDW BLD-RTO: 16.3 % (ref 12.4–15.4)
PERFORMED ON: ABNORMAL
PLATELET # BLD: 62 K/UL (ref 135–450)
PMV BLD AUTO: 8.3 FL (ref 5–10.5)
POTASSIUM REFLEX MAGNESIUM: 4 MMOL/L (ref 3.5–5.1)
PROTHROMBIN TIME: 18.4 SEC (ref 9.9–12.7)
RBC # BLD: 4.56 M/UL (ref 4.2–5.9)
SODIUM BLD-SCNC: 137 MMOL/L (ref 136–145)
TOTAL PROTEIN: 4.9 G/DL (ref 6.4–8.2)
TROPONIN: <0.01 NG/ML
WBC # BLD: 6.2 K/UL (ref 4–11)

## 2021-08-06 PROCEDURE — 6370000000 HC RX 637 (ALT 250 FOR IP): Performed by: INTERNAL MEDICINE

## 2021-08-06 PROCEDURE — 94761 N-INVAS EAR/PLS OXIMETRY MLT: CPT

## 2021-08-06 PROCEDURE — 80076 HEPATIC FUNCTION PANEL: CPT

## 2021-08-06 PROCEDURE — 99222 1ST HOSP IP/OBS MODERATE 55: CPT | Performed by: INTERNAL MEDICINE

## 2021-08-06 PROCEDURE — 99233 SBSQ HOSP IP/OBS HIGH 50: CPT | Performed by: INTERNAL MEDICINE

## 2021-08-06 PROCEDURE — 93010 ELECTROCARDIOGRAM REPORT: CPT | Performed by: INTERNAL MEDICINE

## 2021-08-06 PROCEDURE — 2500000003 HC RX 250 WO HCPCS: Performed by: INTERNAL MEDICINE

## 2021-08-06 PROCEDURE — 2580000003 HC RX 258: Performed by: INTERNAL MEDICINE

## 2021-08-06 PROCEDURE — 2060000000 HC ICU INTERMEDIATE R&B

## 2021-08-06 PROCEDURE — 99232 SBSQ HOSP IP/OBS MODERATE 35: CPT | Performed by: INTERNAL MEDICINE

## 2021-08-06 PROCEDURE — 83735 ASSAY OF MAGNESIUM: CPT

## 2021-08-06 PROCEDURE — 6360000002 HC RX W HCPCS: Performed by: INTERNAL MEDICINE

## 2021-08-06 PROCEDURE — 85610 PROTHROMBIN TIME: CPT

## 2021-08-06 PROCEDURE — 94640 AIRWAY INHALATION TREATMENT: CPT

## 2021-08-06 PROCEDURE — 93005 ELECTROCARDIOGRAM TRACING: CPT | Performed by: INTERNAL MEDICINE

## 2021-08-06 PROCEDURE — 80048 BASIC METABOLIC PNL TOTAL CA: CPT

## 2021-08-06 PROCEDURE — 2700000000 HC OXYGEN THERAPY PER DAY

## 2021-08-06 PROCEDURE — 84484 ASSAY OF TROPONIN QUANT: CPT

## 2021-08-06 PROCEDURE — 36415 COLL VENOUS BLD VENIPUNCTURE: CPT

## 2021-08-06 PROCEDURE — 85025 COMPLETE CBC W/AUTO DIFF WBC: CPT

## 2021-08-06 RX ORDER — FUROSEMIDE 10 MG/ML
40 INJECTION INTRAMUSCULAR; INTRAVENOUS ONCE
Status: COMPLETED | OUTPATIENT
Start: 2021-08-06 | End: 2021-08-06

## 2021-08-06 RX ADMIN — INSULIN LISPRO 1 UNITS: 100 INJECTION, SOLUTION INTRAVENOUS; SUBCUTANEOUS at 22:24

## 2021-08-06 RX ADMIN — REMDESIVIR 100 MG: 100 INJECTION, POWDER, LYOPHILIZED, FOR SOLUTION INTRAVENOUS at 02:33

## 2021-08-06 RX ADMIN — SODIUM CHLORIDE, PRESERVATIVE FREE 10 ML: 5 INJECTION INTRAVENOUS at 21:36

## 2021-08-06 RX ADMIN — LEVOFLOXACIN 500 MG: 500 INJECTION, SOLUTION INTRAVENOUS at 15:00

## 2021-08-06 RX ADMIN — METHYLPREDNISOLONE SODIUM SUCCINATE 40 MG: 40 INJECTION, POWDER, FOR SOLUTION INTRAMUSCULAR; INTRAVENOUS at 02:05

## 2021-08-06 RX ADMIN — FUROSEMIDE 40 MG: 10 INJECTION, SOLUTION INTRAMUSCULAR; INTRAVENOUS at 17:44

## 2021-08-06 RX ADMIN — LISINOPRIL 40 MG: 20 TABLET ORAL at 10:12

## 2021-08-06 RX ADMIN — METOPROLOL TARTRATE 25 MG: 25 TABLET, FILM COATED ORAL at 21:35

## 2021-08-06 RX ADMIN — SODIUM CHLORIDE, PRESERVATIVE FREE 10 ML: 5 INJECTION INTRAVENOUS at 10:13

## 2021-08-06 RX ADMIN — INSULIN GLARGINE 60 UNITS: 100 INJECTION, SOLUTION SUBCUTANEOUS at 22:23

## 2021-08-06 RX ADMIN — ENOXAPARIN SODIUM 40 MG: 40 INJECTION SUBCUTANEOUS at 17:43

## 2021-08-06 RX ADMIN — Medication 2000 UNITS: at 10:12

## 2021-08-06 RX ADMIN — SODIUM CHLORIDE 25 ML: 9 INJECTION, SOLUTION INTRAVENOUS at 15:09

## 2021-08-06 RX ADMIN — METOPROLOL TARTRATE 25 MG: 25 TABLET, FILM COATED ORAL at 10:12

## 2021-08-06 RX ADMIN — METHYLPREDNISOLONE SODIUM SUCCINATE 40 MG: 40 INJECTION, POWDER, FOR SOLUTION INTRAMUSCULAR; INTRAVENOUS at 13:01

## 2021-08-06 ASSESSMENT — PAIN SCALES - GENERAL
PAINLEVEL_OUTOF10: 0
PAINLEVEL_OUTOF10: 0

## 2021-08-06 NOTE — PLAN OF CARE
Problem: Airway Clearance - Ineffective  Goal: Achieve or maintain patent airway  Note: Patient experiencing shortness of breath when up moving about     Problem: Gas Exchange - Impaired  Goal: Absence of hypoxia  Note: Spo2 maintaining spo2 when at rest       Problem: Breathing Pattern - Ineffective  Goal: Ability to achieve and maintain a regular respiratory rate  Note: Resp easy and even at rest     Problem:  Body Temperature -  Risk of, Imbalanced  Goal: Ability to maintain a body temperature within defined limits  Note: afebrile     Problem: PAIN  Goal: Patient's pain/discomfort is manageable  Note: Denying complaints of pain

## 2021-08-06 NOTE — CARE COORDINATION
INTERDISCIPLINARY PLAN OF CARE CONFERENCE    Date/Time: 8/6/2021 4:06 PM  Completed by: Cezar Hyatt RN, Case Management      Patient Name:  Nitin Greer  YOB: 1980  Admitting Diagnosis: Acute hypoxemic respiratory failure due to COVID-19 [facility]) [U07.1, J96.01]     Admit Date/Time:  8/2/2021  5:24 PM    Chart reviewed. Interdisciplinary team contacted or reviewed plan related to patient progress and discharge plans. Disciplines included Case Management, Nursing, and Dietitian. Current Status: IP 08/02/2021  PT/OT recommendation for discharge plan of care: NA    Expected D/C Disposition:  Home  Confirmed plan with patient by phone  Discharge Plan Comments: Chart reviewed. Met with pt by phone at bedside and explained the role of the CM. Plans to return home. +C19. Currently requiring supplemental O2 high flow at 5 liters. Will need Pulse ox for home and O2 walk testing prior to DC.  CM following       Home O2 in place on admit: No - see above note  Pt informed of need to bring portable home O2 tank on day of discharge for nursing to connect prior to leaving:  Not Indicated  Verbalized agreement/Understanding:  Not Indicated

## 2021-08-06 NOTE — PROGRESS NOTES
Monitor tech called this RN to inform that patient was in [facility]. Before entering room, patient had converted to sinus rhythm. Patient denies feeling chest pain or fluttering of his heart. Denies any new symptoms, patient resting in bed. While going through tele recall, patient looks to have had about 1 minute run of Vtach. Dr. Casper Farnsworth paged.

## 2021-08-06 NOTE — PROGRESS NOTES
IM Progress Note    Admit Date:  8/2/2021    Patient with COVID-19 infection,  Hypoxia  He has not had the vaccine    Was transferred to [facility] for an episode of wide complex tachycardia last night    Subjective:  Mr. Aung Nicholas feels short of breath with exertionf  No recurrent spells of vtach on TELE  Feels better today     Objective:   No data found. Intake/Output Summary (Last 24 hours) at 8/6/2021 1212  Last data filed at 8/6/2021 1018  Gross per 24 hour   Intake 1762 ml   Output 2025 ml   Net -263 ml       Physical Exam:  Patient is on droplet plus precautions  Gen: young obese male No distress. Alert. Awake and well-oriented . Eyes: PERRL. No sclera icterus. No conjunctival injection. ENT: No discharge. Pharynx clear. Neck: No JVD. Trachea midline. Resp: No accessory muscle use. Improving porsha air entry with minimal crackles in bases   CV: Regular rate. Regular rhythm. No murmur. No rub. No edema. GI: Non-tender. Non-distended. Normal bowel sounds. Skin: Warm and dry. No nodule on exposed extremities. No rash on exposed extremities. M/S: No cyanosis. No joint deformity. No clubbing. Neuro: Awake. Grossly nonfocal    Psych: Oriented x 3. No anxiety or agitation.        Scheduled Meds:   albuterol-ipratropium  1 puff Inhalation BID    metoprolol tartrate  25 mg Oral BID    lisinopril  40 mg Oral Daily    insulin glargine  60 Units Subcutaneous Nightly    sodium chloride flush  5-40 mL Intravenous 2 times per day    methylPREDNISolone  40 mg Intravenous Q12H    Vitamin D  2,000 Units Oral Daily    remdesivir IVPB  100 mg Intravenous Q24H    levofloxacin  500 mg Intravenous Q24H    insulin lispro  25 Units Subcutaneous TID WC    insulin lispro  0-6 Units Subcutaneous TID     insulin lispro  0-3 Units Subcutaneous Nightly       Continuous Infusions:   sodium chloride      dextrose      sodium chloride 25 mL (08/05/21 1433)       PRN Meds:  sodium chloride, guaiFENesin-dextromethorphan, glucose, dextrose, glucagon (rDNA), dextrose, sodium chloride flush, sodium chloride, polyethylene glycol, acetaminophen **OR** acetaminophen, prochlorperazine, albuterol-ipratropium      Data:  CBC:   Recent Labs     08/04/21 0525 08/05/21 0432 08/06/21 0212   WBC 5.2 5.5 6.2   HGB 13.7 13.7 13.5   HCT 40.6 40.2* 40.1*   MCV 87.3 87.2 88.0   PLT 69* 64* 62*     BMP:   Recent Labs     08/04/21 0525 08/05/21 0432 08/06/21 0212    138 137   K 4.0 4.2 4.0    106 105   CO2 22 24 24   BUN 13 18 17   CREATININE <0.5* <0.5* 0.6*     LIVER PROFILE:   Recent Labs     08/04/21 0525 08/05/21 0432 08/06/21 0212   AST 32 42* 50*   ALT 24 29 39   BILIDIR 0.3 0.3 0.3   BILITOT 0.7 0.7 0.6   ALKPHOS 52 55 58     PT/INR:   Recent Labs     08/04/21 0525 08/05/21 0432 08/06/21 0212   PROTIME 19.2* 18.4* 18.4*   INR 1.66* 1.60* 1.60*       CULTURES  Blood: NGTD  Sputum: ordered  COVID 19 PCR: detected       RADIOLOGY  CT CHEST PULMONARY EMBOLISM W CONTRAST   Final Result   1. Multifocal airspace disease. Differential includes pneumonia, including   COVID pneumonia, and alveolar proteinosis. Follow-up to resolution   recommended. 2. Suboptimal opacification of the pulmonary arteries. Artifact degraded   evaluation of the pulmonary arteries. No discrete pulmonary embolism to the   level of theproximal segmental arteries given limitation. 3. Other findings as described. XR CHEST PORTABLE   Final Result   Diffuse airspace opacity throughout both lungs, likely a combination of   pulmonary edema and multifocal pneumonia. Assessment/Plan:    #COVID 19 Pneumonia  #Possible superimposed bacterial infection  #Acute hypoxic respiratory failure  - droplet plus isolation  -Chest x-ray showed multifocal airspace disease  - on O2 4L-> 5L  today . Continue supplemental oxygen.   Wean O2 as tolerated  -Continue remdesivir D#4, monitor BMP and LFTs  - solumedrol BID   -

## 2021-08-06 NOTE — PROGRESS NOTES
Pulmonary Progress Note    CC: Respiratory failure    Subjective:   Still requiring 5 L O2-no home O2  No chest pain    Intake/Output Summary (Last 24 hours) at 8/6/2021 0801  Last data filed at 8/6/2021 0226  Gross per 24 hour   Intake 1702 ml   Output 1725 ml   Net -23 ml       Exam:   /84   Pulse 62   Temp 98.4 °F (36.9 °C) (Oral)   Resp 18   Ht 6' 4\" (1.93 m)   Wt 291 lb 12.8 oz (132.4 kg)   SpO2 95%   BMI 35.52 kg/m²  on 10LPM   Gen:  No distress. Ill-appearing  Eyes: PERRL. No sclera icterus. No conjunctival injection. ENT: No discharge. Pharynx clear. Neck: Trachea midline. No obvious mass. Resp:  mild accessory muscle use. Basilar crackles. No wheezes. No rhonchi. No dullness on percussion. CV: Regular rate. Regular rhythm. No murmur or rub. 1+ LE edema. GI: Non-tender. Non-distended. No hernia. Skin: Warm and dry. No nodule on exposed extremities. Lymph: No cervical LAD. No supraclavicular LAD. M/S: No cyanosis. No joint deformity. No clubbing. Neuro: Awake. Alert. Moves all four extremities. Psych: Oriented x 3. No anxiety.      Scheduled Meds:   albuterol-ipratropium  1 puff Inhalation BID    lisinopril  40 mg Oral Daily    insulin glargine  60 Units Subcutaneous Nightly    sodium chloride flush  5-40 mL Intravenous 2 times per day    methylPREDNISolone  40 mg Intravenous Q12H    Vitamin D  2,000 Units Oral Daily    remdesivir IVPB  100 mg Intravenous Q24H    levofloxacin  500 mg Intravenous Q24H    insulin lispro  25 Units Subcutaneous TID WC    insulin lispro  0-6 Units Subcutaneous TID WC    insulin lispro  0-3 Units Subcutaneous Nightly     Continuous Infusions:   sodium chloride      dextrose      sodium chloride 25 mL (08/05/21 1433)     PRN Meds:  sodium chloride, guaiFENesin-dextromethorphan, glucose, dextrose, glucagon (rDNA), dextrose, sodium chloride flush, sodium chloride, polyethylene glycol, acetaminophen **OR** acetaminophen, prochlorperazine, albuterol-ipratropium    Labs:  CBC:   Recent Labs     08/04/21 0525 08/05/21 0432 08/06/21 0212   WBC 5.2 5.5 6.2   HGB 13.7 13.7 13.5   HCT 40.6 40.2* 40.1*   MCV 87.3 87.2 88.0   PLT 69* 64* 62*     BMP:   Recent Labs     08/04/21 0525 08/05/21 0432 08/06/21 0212    138 137   K 4.0 4.2 4.0    106 105   CO2 22 24 24   BUN 13 18 17   CREATININE <0.5* <0.5* 0.6*     LIVER PROFILE:   Recent Labs     08/04/21 0525 08/05/21 0432 08/06/21 0212   AST 32 42* 50*   ALT 24 29 39   BILIDIR 0.3 0.3 0.3   BILITOT 0.7 0.7 0.6   ALKPHOS 52 55 58     PT/INR:   Recent Labs     08/04/21 0525 08/05/21 0432 08/06/21 0212   PROTIME 19.2* 18.4* 18.4*   INR 1.66* 1.60* 1.60*     APTT: No results for input(s): APTT in the last 72 hours. UA:No results for input(s): NITRITE, COLORU, PHUR, LABCAST, WBCUA, RBCUA, MUCUS, TRICHOMONAS, YEAST, BACTERIA, CLARITYU, SPECGRAV, LEUKOCYTESUR, UROBILINOGEN, BILIRUBINUR, BLOODU, GLUCOSEU, AMORPHOUS in the last 72 hours. Invalid input(s): KETONESU  No results for input(s): PHART, UFA5HRF, PO2ART in the last 72 hours. Cultures:   8/2 BC NGTD  8/2 COVID-19 and influenza detected    Films:  CTPA 8/2 imaging was reviewed by me and showed   1. Multifocal airspace disease.    2. Suboptimal opacification of the pulmonary arteries. Artifact degraded   evaluation of the pulmonary arteries.  No discrete pulmonary embolism to the   level of theproximal segmental arteries given limitation.    3. Other findings as described        ASSESSMENT:  · Acute hypoxemic respiratory failure  · COVID-19 viral pneumonia  · Elevated procalcitonin  · Abnormal chest x-ray-COVID-19 and cannot exclude superimposed bacterial infection  · History of ITP  · History of OWEN      PLAN:  · Supplemental oxygen to maintain SaO2 >92%; wean as tolerated  · Continuous pulse oximetry  · Droplet plus isolation (surgical mask, eye protection, gown, glove)  · IV antibiotics to include Levaquin D#4  · Moved to negative pressure room in case needs aerosolized procedure  · Avoid NEBs as able-albuterol MDI strongly preferred   · Steroid D#5- Solumedrol 40 mg IV   · Remdesivir D#4/5. Monitor LFTs, renal and CBC daily while on medication.   · Post tocilizumab 8/3/2021  · Repeat Lasix 40 mg IV x 1   · Monitor glucose closely  · Lovenox daily and monitor platelet closely

## 2021-08-06 NOTE — PROGRESS NOTES
RESPIRATORY THERAPY ASSESSMENT    Name:  Fernando Pulido Record Number:  [id number]  Age: 35 y.o. Gender: male  : 1980  Today's Date:  2021  Room:  /0327-02    Assessment     Is the patient being admitted for a COPD or Asthma exacerbation? No   (If yes the patient will be seen every 4 hours for the first 24 hours and then reassessed)    Patient Admission Diagnosis      Allergies  Allergies   Allergen Reactions    Decadron [Dexamethasone] Other (See Comments)     Weak, lethargic    Trilyte [Peg 3350-Kcl-Na Bicarb-Nacl]     Trulicity [Dulaglutide]     Pcn [Penicillins] Hives and Rash       Minimum Predicted Vital Capacity:               Actual Vital Capacity:                    Pulmonary History:No history  Home Oxygen Therapy:  room air  Home Respiratory Therapy:None   Current Respiratory Therapy:  combivent TID  Treatment Type: MDI  Medications: Albuterol/Ipratropium    Respiratory Severity Index(RSI)   Patients with orders for inhalation medications, oxygen, or any therapeutic treatment modality will be placed on Respiratory Protocol. They will be assessed with the first treatment and at least every 72 hours thereafter. The following severity scale will be used to determine frequency of treatment intervention. Smoking History: No Smoking History = 0    Social History  Social History     Tobacco Use    Smoking status: Never Smoker    Smokeless tobacco: Never Used   Vaping Use    Vaping Use: Never used   Substance Use Topics    Alcohol use: No    Drug use: No       Recent Surgical History: None = 0  Past Surgical History  History reviewed. No pertinent surgical history.     Level of Consciousness: Alert, Oriented, and Cooperative = 0    Level of Activity: Walking unassisted = 0    Respiratory Pattern: Dyspnea with exertion;Irregular pattern;or RR less than 6 = 2    Breath Sounds: Diminshed bilaterally and/or crackles = 2    Sputum  Sputum Color: Clear,  , Sputum How Obtained: Cough on request  Cough: Strong, spontaneous, non-productive = 0    Vital Signs   /84   Pulse 62   Temp 98.4 °F (36.9 °C) (Oral)   Resp 18   Ht 6' 4\" (1.93 m)   Wt 291 lb 12.8 oz (132.4 kg)   SpO2 95%   BMI 35.52 kg/m²   SPO2 (COPD values may differ): 86-87% on room air or greater than 92% on FiO2 35- 50% = 3    Peak Flow (asthma only): not applicable = 0    RSI: 7-8 = BID and Q4HPRN (every four hours as needed) for dyspnea        Plan       Goals: medication delivery and improve oxygenation    Patient/caregiver was educated on the proper method of use for Respiratory Care Devices:  Yes      Level of patient/caregiver understanding able to:   ? Verbalize understanding   ? Demonstrate understanding       ? Teach back        ? Needs reinforcement       ? No available caregiver               ? Other:     Response to education:  Very Good     Is patient being placed on Home Treatment Regimen? No     Does the patient have everything they need prior to discharge? NA     Comments: Patient chart reviewed, patient assessed. Plan of Care: change patient  BId    Electronically signed by Lester Barajas RCP on 8/6/2021 at 6:10 AM    Respiratory Protocol Guidelines     1. Assessment and treatment by Respiratory Therapy will be initiated for medication and therapeutic interventions upon initiation of aerosolized medication. 2. Physician will be contacted for respiratory rate (RR) greater than 35 breaths per minute. Therapy will be held for heart rate (HR) greater than 140 beats per minute, pending direction from physician. 3. Bronchodilators will be administered via Metered Dose Inhaler (MDI) with spacer when the following criteria are met:  a. Alert and cooperative     b. HR < 140 bpm  c. RR < 30 bpm                d. Can demonstrate a 2-3 second inspiratory hold  4.  Bronchodilators will be administered via Hand Held Nebulizer [facility]) to patients when ANY of the following criteria are met  a. Incognizant or uncooperative          b. Patients treated with HHN at Home        c. Unable to demonstrate proper use of MDI with spacer     d. RR > 30 bpm   5. Bronchodilators will be delivered via Metered Dose Inhaler (MDI), HHN, Aerogen to intubated patients on mechanical ventilation. 6. Inhalation medication orders will be delivered and/or substituted as outlined below. Aerosolized Medications Ordering and Administration Guidelines:    1. All Medications will be ordered by a physician, and their frequency and/or modality will be adjusted as defined by the patients Respiratory Severity Index (RSI) score. 2. If the patient does not have documented COPD, consider discontinuing anticholinergics when RSI is less than 9.  3. If the bronchospasm worsens (increased RSI), then the bronchodilator frequency can be increased to a maximum of every 4 hours. If greater than every 4 hours is required, the physician will be contacted. 4. If the bronchospasm improves, the frequency of the bronchodilator can be decreased, based on the patient's RSI, but not less than home treatment regimen frequency. 5. Bronchodilator(s) will be discontinued if patient has a RSI less than 9 and has received no scheduled or as needed treatment for 72  Hrs. Patients Ordered on a Mucolytic Agent:    1. Must always be administered with a bronchodilator. 2. Discontinue if patient experiences worsened bronchospasm, or secretions have lessened to the point that the patient is able to clear them with a cough. Anti-inflammatory and Combination Medications:    1. If the patient lacks prior history of lung disease, is not using inhaled anti-inflammatory medication at home, and lacks wheezing by examination or by history for at least 24 hours, contact physician for possible discontinuation.

## 2021-08-06 NOTE — CONSULTS
pen Inject 60 Units into the skin nightly 7/6/21  Yes COLIN Maradiaga CNP   insulin lispro, 1 Unit Dial, (HUMALOG KWIKPEN) 100 UNIT/ML SOPN Inject 25 Units into the skin 3 times daily (before meals) 7/6/21 11/3/21 Yes COLIN Maradiaga CNP   benazepril (LOTENSIN) 40 MG tablet TAKE ONE TABLET BY MOUTH DAILY 5/10/21  Yes COLIN Maradiaga CNP   metFORMIN (GLUCOPHAGE) 1000 MG tablet TAKE ONE TABLET BY MOUTH TWICE A DAY WITH MEALS 6/15/21   COLIN Maradiaga CNP        Allergies:  Decadron [dexamethasone], Trilyte [peg 3350-kcl-na bicarb-nacl], Trulicity [dulaglutide], and Pcn [penicillins]     Review of Systems:   12 point ROS negative in all areas as listed below except as in [place]  Constitutional, EENT,  GI, , Musculoskeletal, skin, neurological, hematological, endocrine, Psychiatric    Physical Examination:  137/86   R16   P 80   T 96.5  Vitals:    08/06/21 0805   BP:    Pulse:    Resp: 16   Temp:    SpO2: 96%    Weight: 291 lb 12.8 oz (132.4 kg)     Due to the current efforts to prevent transmission of COVID-19 and also the need to preserve PPE for other caregivers, a face-to-face encounter with the patient was not performed. That being said, all relevant records and diagnostic tests were reviewed, including laboratory results and imaging. Please reference any relevant documentation elsewhere. Care will be coordinated with the primary service.       General Appearance:     Head:     Eyes:         Nose:    Throat:    Neck:        Lungs:      Chest Wall:     Heart:     Abdomen:              Extremities:    Pulses:    Skin:    Pysch:    Neurologic:         Labs  CBC:   Lab Results   Component Value Date    WBC 6.2 08/06/2021    RBC 4.56 08/06/2021    RBC 5.35 07/27/2017    HGB 13.5 08/06/2021    HCT 40.1 08/06/2021    MCV 88.0 08/06/2021    RDW 16.3 08/06/2021    PLT 62 08/06/2021     CMP:    Lab Results   Component Value Date     08/06/2021    K 4.0 08/06/2021     08/06/2021    CO2 24 08/06/2021    BUN 17 08/06/2021    CREATININE 0.6 08/06/2021    GFRAA >60 08/06/2021    GFRAA >60 03/01/2012    AGRATIO 0.9 08/03/2021    LABGLOM >60 08/06/2021    GLUCOSE 253 08/06/2021    GLUCOSE 265 10/27/2016    PROT 4.9 08/06/2021    PROT 6.7 10/27/2016    CALCIUM 7.7 08/06/2021    BILITOT 0.6 08/06/2021    ALKPHOS 58 08/06/2021    AST 50 08/06/2021    ALT 39 08/06/2021     PT/INR:  No results found for: PTINR  Lab Results   Component Value Date    TROPONINI <0.01 08/02/2021     Mg 2.4  K 4.0  EKG:  I have reviewed EKG with the following interpretation:  Impression:  Sinus tachycardiaOtherwise normal ECGWhen compared with ECG of 02-AUG-2021 18:03, (unconfirmed)No significant change was foundConfirmed by Maria Elena Pate MD, [address] ([facility]) on 8/2/2021 7:50:57 PM    Assessment  One asymptomatic run of VT in setting of Acute infection related to COVID likely due to subclinical myocarditis  Hypertension   DM  OWEN  Patient Active Problem List   Diagnosis    Hypertension    Depression    Chronic lumbar pain    Acute [facility]P ([facility]    Type 2 diabetes mellitus with hyperglycemia, with long-term current use of insulin (HCC)    Morbid obesity (HCC)    History of ITP    Low HDL (under 40)    MARCO ANTONIO (obstructive sleep apnea)    Snoring    Thrombocytopenia (HCC)    Acute idiopathic thrombocytopenic purpura ([address]    OWEN (nonalcoholic steatohepatitis)    Cirrhosis of liver ([address]    Acute hypoxemic respiratory failure due to COVID-19 ([address]    Acute hypoxemic respiratory failure (HCC)    Pneumonia due to COVID-19 virus    Elevated procalcitonin    Abnormal chest x-ray    Lactic acidosis         Plan:    I had the opportunity to review the clinical symptoms and presentation of Cleaster Sample. I recommend that we start on low dose beta blocker. Recheck Troponin  EKG  I will address the patient's cardiac risk factors and adjusted pharmacologic treatment as needed.  In addition, I have reinforced the need for patient directed risk factor modification. Thank you for allowing to [facility]. Further evaluation will be based upon the patient's clinical course and testing results. All questions and concerns were addressed to the patient/family. Alternatives to my treatment were discussed. The note was completed using EMR. Every effort was made to ensure accuracy; however, inadvertent computerized transcription errors may be present.

## 2021-08-06 NOTE — PROGRESS NOTES
Remdesivir Day # 4    Recent Labs     08/04/21  0525 08/05/21  0432 08/06/21  0212   WBC 5.2 5.5 6.2   BUN 13 18 17   CREATININE <0.5* <0.5* 0.6*   ALT 24 29 39   AST 32 42* 50*       Estimated Creatinine Clearance: 236 mL/min (A) (based on SCr of 0.6 mg/dL (L)). Will continue with dose of 100 mg today and for the next 1 days thereafter. Pharmacy will continue to monitor.      Verito Pleitez, PharmD, [facility], 8/6/2021 9:00 AM

## 2021-08-07 LAB
ALBUMIN SERPL-MCNC: 2.4 G/DL (ref 3.4–5)
ALP BLD-CCNC: 60 U/L (ref 40–129)
ALT SERPL-CCNC: 57 U/L (ref 10–40)
ANION GAP SERPL CALCULATED.3IONS-SCNC: 9 MMOL/L (ref 3–16)
AST SERPL-CCNC: 70 U/L (ref 15–37)
BASOPHILS ABSOLUTE: 0 K/UL (ref 0–0.2)
BASOPHILS RELATIVE PERCENT: 0.5 %
BILIRUB SERPL-MCNC: 0.8 MG/DL (ref 0–1)
BILIRUBIN DIRECT: 0.3 MG/DL (ref 0–0.3)
BILIRUBIN, INDIRECT: 0.5 MG/DL (ref 0–1)
BUN BLDV-MCNC: 19 MG/DL (ref 7–20)
CALCIUM SERPL-MCNC: 7.7 MG/DL (ref 8.3–10.6)
CHLORIDE BLD-SCNC: 105 MMOL/L (ref 99–110)
CO2: 25 MMOL/L (ref 21–32)
CREAT SERPL-MCNC: 0.7 MG/DL (ref 0.9–1.3)
EOSINOPHILS ABSOLUTE: 0 K/UL (ref 0–0.6)
EOSINOPHILS RELATIVE PERCENT: 0.1 %
GFR AFRICAN AMERICAN: >60
GFR NON-AFRICAN AMERICAN: >60
GLUCOSE BLD-MCNC: 102 MG/DL (ref 70–99)
GLUCOSE BLD-MCNC: 109 MG/DL (ref 70–99)
GLUCOSE BLD-MCNC: 116 MG/DL (ref 70–99)
GLUCOSE BLD-MCNC: 147 MG/DL (ref 70–99)
GLUCOSE BLD-MCNC: 155 MG/DL (ref 70–99)
GLUCOSE BLD-MCNC: 160 MG/DL (ref 70–99)
GLUCOSE BLD-MCNC: 95 MG/DL (ref 70–99)
HCT VFR BLD CALC: 41.1 % (ref 40.5–52.5)
HEMOGLOBIN: 14 G/DL (ref 13.5–17.5)
INR BLD: 1.5 (ref 0.88–1.12)
LYMPHOCYTES ABSOLUTE: 0.3 K/UL (ref 1–5.1)
LYMPHOCYTES RELATIVE PERCENT: 4.8 %
MCH RBC QN AUTO: 29.7 PG (ref 26–34)
MCHC RBC AUTO-ENTMCNC: 34 G/DL (ref 31–36)
MCV RBC AUTO: 87.4 FL (ref 80–100)
MONOCYTES ABSOLUTE: 0.2 K/UL (ref 0–1.3)
MONOCYTES RELATIVE PERCENT: 2.9 %
NEUTROPHILS ABSOLUTE: 5.1 K/UL (ref 1.7–7.7)
NEUTROPHILS RELATIVE PERCENT: 91.7 %
PDW BLD-RTO: 15.8 % (ref 12.4–15.4)
PERFORMED ON: ABNORMAL
PERFORMED ON: NORMAL
PLATELET # BLD: 55 K/UL (ref 135–450)
PMV BLD AUTO: 8.1 FL (ref 5–10.5)
POTASSIUM REFLEX MAGNESIUM: 4.2 MMOL/L (ref 3.5–5.1)
PROTHROMBIN TIME: 17.2 SEC (ref 9.9–12.7)
RBC # BLD: 4.7 M/UL (ref 4.2–5.9)
SODIUM BLD-SCNC: 139 MMOL/L (ref 136–145)
TOTAL PROTEIN: 4.7 G/DL (ref 6.4–8.2)
TROPONIN: <0.01 NG/ML
WBC # BLD: 5.5 K/UL (ref 4–11)

## 2021-08-07 PROCEDURE — 6360000002 HC RX W HCPCS: Performed by: INTERNAL MEDICINE

## 2021-08-07 PROCEDURE — 99233 SBSQ HOSP IP/OBS HIGH 50: CPT | Performed by: INTERNAL MEDICINE

## 2021-08-07 PROCEDURE — 84484 ASSAY OF TROPONIN QUANT: CPT

## 2021-08-07 PROCEDURE — 80048 BASIC METABOLIC PNL TOTAL CA: CPT

## 2021-08-07 PROCEDURE — 80076 HEPATIC FUNCTION PANEL: CPT

## 2021-08-07 PROCEDURE — 6370000000 HC RX 637 (ALT 250 FOR IP): Performed by: INTERNAL MEDICINE

## 2021-08-07 PROCEDURE — 85610 PROTHROMBIN TIME: CPT

## 2021-08-07 PROCEDURE — 85025 COMPLETE CBC W/AUTO DIFF WBC: CPT

## 2021-08-07 PROCEDURE — 2700000000 HC OXYGEN THERAPY PER DAY

## 2021-08-07 PROCEDURE — 2060000000 HC ICU INTERMEDIATE R&B

## 2021-08-07 PROCEDURE — 94761 N-INVAS EAR/PLS OXIMETRY MLT: CPT

## 2021-08-07 PROCEDURE — 99232 SBSQ HOSP IP/OBS MODERATE 35: CPT | Performed by: INTERNAL MEDICINE

## 2021-08-07 PROCEDURE — 36415 COLL VENOUS BLD VENIPUNCTURE: CPT

## 2021-08-07 PROCEDURE — 94640 AIRWAY INHALATION TREATMENT: CPT

## 2021-08-07 PROCEDURE — 2500000003 HC RX 250 WO HCPCS: Performed by: INTERNAL MEDICINE

## 2021-08-07 PROCEDURE — 2580000003 HC RX 258: Performed by: INTERNAL MEDICINE

## 2021-08-07 RX ORDER — FUROSEMIDE 10 MG/ML
40 INJECTION INTRAMUSCULAR; INTRAVENOUS ONCE
Status: COMPLETED | OUTPATIENT
Start: 2021-08-07 | End: 2021-08-07

## 2021-08-07 RX ADMIN — METOPROLOL TARTRATE 25 MG: 25 TABLET, FILM COATED ORAL at 10:01

## 2021-08-07 RX ADMIN — ENOXAPARIN SODIUM 40 MG: 40 INJECTION SUBCUTANEOUS at 10:02

## 2021-08-07 RX ADMIN — METHYLPREDNISOLONE SODIUM SUCCINATE 40 MG: 40 INJECTION, POWDER, FOR SOLUTION INTRAMUSCULAR; INTRAVENOUS at 01:23

## 2021-08-07 RX ADMIN — METOPROLOL TARTRATE 25 MG: 25 TABLET, FILM COATED ORAL at 22:05

## 2021-08-07 RX ADMIN — FUROSEMIDE 40 MG: 10 INJECTION, SOLUTION INTRAMUSCULAR; INTRAVENOUS at 14:37

## 2021-08-07 RX ADMIN — REMDESIVIR 100 MG: 100 INJECTION, POWDER, LYOPHILIZED, FOR SOLUTION INTRAVENOUS at 03:15

## 2021-08-07 RX ADMIN — LEVOFLOXACIN 500 MG: 500 INJECTION, SOLUTION INTRAVENOUS at 14:54

## 2021-08-07 RX ADMIN — Medication 2000 UNITS: at 10:01

## 2021-08-07 RX ADMIN — SODIUM CHLORIDE, PRESERVATIVE FREE 10 ML: 5 INJECTION INTRAVENOUS at 22:06

## 2021-08-07 RX ADMIN — LISINOPRIL 40 MG: 20 TABLET ORAL at 10:01

## 2021-08-07 RX ADMIN — SODIUM CHLORIDE, PRESERVATIVE FREE 10 ML: 5 INJECTION INTRAVENOUS at 10:03

## 2021-08-07 RX ADMIN — SODIUM CHLORIDE 25 ML: 9 INJECTION, SOLUTION INTRAVENOUS at 14:52

## 2021-08-07 RX ADMIN — METHYLPREDNISOLONE SODIUM SUCCINATE 40 MG: 40 INJECTION, POWDER, FOR SOLUTION INTRAMUSCULAR; INTRAVENOUS at 13:03

## 2021-08-07 NOTE — FLOWSHEET NOTE
08/06/21 1952   Vital Signs   Temp 97.7 °F (36.5 °C)   Temp Source Oral   Pulse 60   Heart Rate Source Monitor   Resp 16   /73   BP Location Left upper arm   Patient Position Semi fowlers   Level of Consciousness Alert (0)   MEWS Score 1   Oxygen Therapy   SpO2 96 %   O2 Device Nasal cannula   O2 Flow Rate (L/min) 4 L/min   Pt assessment complete. Pt lying in bed quietly. Lung sounds dimished with expiratory wheezes. Pt on 02 4 liters via nasal canula. Nightly medications given. Emptied 800ml clear yellow urine from urinals. Denies needs at this time. Call light within reach.

## 2021-08-07 NOTE — PROGRESS NOTES
Pt is lying in bed with their eyes closed. Respirations are easy and even. Call light within reach bed in lowest position with the wheels locked. Will continue to monitor.  Charo Tobar RN

## 2021-08-07 NOTE — PROGRESS NOTES
Patient 90% on 3L/m sitting on edge of bed. He ambulated to the BR on 3L/m; POX dip to mid 80's. O2 increased to 4L/m: POX increased to 92% while patient at edge of bed.       He verbalizes that his breathing is improved; \"much clearer\", and that he feels less short of breath overall.     RT notified of increase in O2 back to 4L/m.

## 2021-08-07 NOTE — FLOWSHEET NOTE
08/07/21 1440   Vitals   Temp 97 °F (36.1 °C)   Temp Source Oral   Pulse 66   Heart Rate Source Monitor   Resp 17   /84   BP Location Left upper arm   Level of Consciousness Alert (0)   MEWS Score 1   Oxygen Therapy   SpO2 96 %   O2 Device High flow nasal cannula   O2 Flow Rate (L/min) 3 L/min

## 2021-08-07 NOTE — PROGRESS NOTES
Patient spo2 maintaining at rest 94-98% on 5L/min NC. Oxygen decreased to 4L/hfnc. Will further monitor.

## 2021-08-07 NOTE — FLOWSHEET NOTE
08/07/21 1830   Oxygen Therapy   SpO2 97 %   O2 Device High flow nasal cannula   O2 Flow Rate (L/min) 3 L/min   Patient Observation   Observations decreased to 2L/m

## 2021-08-07 NOTE — PROGRESS NOTES
IM Progress Note    Admit Date:  8/2/2021    Patient with COVID-19 infection,  Hypoxia  He has not had the vaccine    Was transferred to [facility] for an episode of wide complex tachycardia last night    Subjective:  Mr. Sameera Kaur feels much improved today . Now on 3 L oxygen  No dizziness with ambulation     Objective:   Patient Vitals for the past 4 hrs:   BP Temp Temp src Pulse Resp SpO2 Weight   08/07/21 0756 (!) 139/91 97.5 °F (36.4 °C) Oral 60 -- 96 % --   08/07/21 0442 129/80 97.4 °F (36.3 °C) Oral 54 16 95 % 291 lb 12 oz (132.3 kg)            Intake/Output Summary (Last 24 hours) at 8/7/2021 0805  Last data filed at 8/7/2021 0756  Gross per 24 hour   Intake 1340 ml   Output 3200 ml   Net -1860 ml       Physical Exam:  Patient is on droplet plus precautions  Gen: young obese male No distress. Alert. Awake and well-oriented . Eyes: PERRL. No sclera icterus. No conjunctival injection. ENT: No discharge. Pharynx clear. Neck: No JVD. Trachea midline. Resp: No accessory muscle use. Improving porsha air entry with minimal crackles in bases   CV: Regular rate. Regular rhythm. No murmur. No rub. No edema. GI: Non-tender. Non-distended. Normal bowel sounds. Skin: Warm and dry. No nodule on exposed extremities. No rash on exposed extremities. M/S: No cyanosis. No joint deformity. No clubbing. Neuro: Awake. Grossly nonfocal    Psych: Oriented x 3. No anxiety or agitation.        Scheduled Meds:   albuterol-ipratropium  1 puff Inhalation BID    metoprolol tartrate  25 mg Oral BID    enoxaparin  40 mg Subcutaneous Daily    lisinopril  40 mg Oral Daily    insulin glargine  60 Units Subcutaneous Nightly    sodium chloride flush  5-40 mL Intravenous 2 times per day    methylPREDNISolone  40 mg Intravenous Q12H    Vitamin D  2,000 Units Oral Daily    levofloxacin  500 mg Intravenous Q24H    insulin lispro  25 Units Subcutaneous TID WC    insulin lispro  0-6 Units Subcutaneous TID WC    insulin lispro  0-3 Units Subcutaneous Nightly       Continuous Infusions:   sodium chloride      dextrose      sodium chloride 25 mL (08/06/21 1509)       PRN Meds:  sodium chloride, guaiFENesin-dextromethorphan, glucose, dextrose, glucagon (rDNA), dextrose, sodium chloride flush, sodium chloride, polyethylene glycol, acetaminophen **OR** acetaminophen, prochlorperazine, albuterol-ipratropium      Data:  CBC:   Recent Labs     08/05/21 0432 08/06/21 0212 08/07/21  0533   WBC 5.5 6.2 5.5   HGB 13.7 13.5 14.0   HCT 40.2* 40.1* 41.1   MCV 87.2 88.0 87.4   PLT 64* 62* 55*     BMP:   Recent Labs     08/05/21 0432 08/06/21 0212 08/07/21  0533    137 139   K 4.2 4.0 4.2    105 105   CO2 24 24 25   BUN 18 17 19   CREATININE <0.5* 0.6* 0.7*     LIVER PROFILE:   Recent Labs     08/05/21 0432 08/06/21 0212 08/07/21  0533   AST 42* 50* 70*   ALT 29 39 57*   BILIDIR 0.3 0.3 0.3   BILITOT 0.7 0.6 0.8   ALKPHOS 55 58 60     PT/INR:   Recent Labs     08/05/21 0432 08/06/21 0212 08/07/21  0533   PROTIME 18.4* 18.4* 17.2*   INR 1.60* 1.60* 1.50*       CULTURES  Blood: NGTD  Sputum: ordered  COVID 19 PCR: detected       RADIOLOGY  CT CHEST PULMONARY EMBOLISM W CONTRAST   Final Result   1. Multifocal airspace disease. Differential includes pneumonia, including   COVID pneumonia, and alveolar proteinosis. Follow-up to resolution   recommended. 2. Suboptimal opacification of the pulmonary arteries. Artifact degraded   evaluation of the pulmonary arteries. No discrete pulmonary embolism to the   level of theproximal segmental arteries given limitation. 3. Other findings as described. XR CHEST PORTABLE   Final Result   Diffuse airspace opacity throughout both lungs, likely a combination of   pulmonary edema and multifocal pneumonia.                Assessment/Plan:    #COVID 19 Pneumonia  #Possible superimposed bacterial infection  #Acute hypoxic respiratory failure  - droplet plus isolation  -Chest x-ray showed multifocal airspace disease  - was on 5 L , now improving to 3 L  . Continue supplemental oxygen. Wean O2 as tolerated  -Completed  remdesivir, monitor BMP and LFTs  - solumedrol BID   - tocilizumab given on 8/3/21  -Continue Levaquin   -Seen by pulmonology      #DM2 with hyperglycemia  - Cont home lantus and collin preprandial humalog, added low dose SSI    #Lactic acidosis  - hold metformin, cont IV NS  Lactic acidosis resolved    #Sepsis  - present on admission  -Secondary to COVID-19 pneumonia and possibly superimposed bacterial infection. With elevated procalcitonin,Lactic acidosis, tachypnea and tachycardia  -Sepsis resolved now    #History of OWEN Cirrhosis and chronic ITP  - close monitoring of LFTs and platelets     # wide complex tachycardia - likely with acute covid infection or viral myocarditis  - resolved quickly. Started on BB Cards following    #HTN  - cont lisinopril    DVT Prophylaxis: SCD (2/2 TCP with plt 65k)  Diet: ADULT DIET;  Regular; 4 carb choices (60 gm/meal)  Code Status: Full Code     Petra Washington MD  8/7/2021 8:05 AM

## 2021-08-07 NOTE — PROGRESS NOTES
Pulmonary Progress Note    CC: Respiratory failure    Subjective:   Down to 3 L O2  Appears comfortable  Responding well to Lasix      Intake/Output Summary (Last 24 hours) at 8/7/2021 0810  Last data filed at 8/7/2021 0756  Gross per 24 hour   Intake 1340 ml   Output 3200 ml   Net -1860 ml       Exam:   BP (!) 139/91   Pulse 60   Temp 97.5 °F (36.4 °C) (Oral)   Resp 16   Ht 6' 4\" (1.93 m)   Wt 291 lb 12 oz (132.3 kg)   SpO2 96%   BMI 35.51 kg/m²  on 3 L  Gen:  No distress. Ill-appearing  Eyes: PERRL. No sclera icterus. No conjunctival injection. ENT: No discharge. Pharynx clear. Neck: Trachea midline. No obvious mass. Resp:  No accessory muscle use. Few crackles. No wheezes. No rhonchi. No dullness on percussion. CV: Regular rate. Regular rhythm. No murmur or rub. No LE edema. GI: Non-tender. Non-distended. No hernia. Skin: Warm and dry. No nodule on exposed extremities. Lymph: No cervical LAD. No supraclavicular LAD. M/S: No cyanosis. No joint deformity. No clubbing. Neuro: Awake. Alert. Moves all four extremities. Psych: Oriented x 3. No anxiety.      Scheduled Meds:   albuterol-ipratropium  1 puff Inhalation BID    metoprolol tartrate  25 mg Oral BID    enoxaparin  40 mg Subcutaneous Daily    lisinopril  40 mg Oral Daily    insulin glargine  60 Units Subcutaneous Nightly    sodium chloride flush  5-40 mL Intravenous 2 times per day    methylPREDNISolone  40 mg Intravenous Q12H    Vitamin D  2,000 Units Oral Daily    levofloxacin  500 mg Intravenous Q24H    insulin lispro  25 Units Subcutaneous TID WC    insulin lispro  0-6 Units Subcutaneous TID WC    insulin lispro  0-3 Units Subcutaneous Nightly     Continuous Infusions:   sodium chloride      dextrose      sodium chloride 25 mL (08/06/21 1509)     PRN Meds:  sodium chloride, guaiFENesin-dextromethorphan, glucose, dextrose, glucagon (rDNA), dextrose, sodium chloride flush, sodium chloride, polyethylene glycol, acetaminophen **OR** acetaminophen, prochlorperazine, albuterol-ipratropium    Labs:  CBC:   Recent Labs     08/05/21 0432 08/06/21 0212 08/07/21  0533   WBC 5.5 6.2 5.5   HGB 13.7 13.5 14.0   HCT 40.2* 40.1* 41.1   MCV 87.2 88.0 87.4   PLT 64* 62* 55*     BMP:   Recent Labs     08/05/21 0432 08/06/21 0212 08/07/21  0533    137 139   K 4.2 4.0 4.2    105 105   CO2 24 24 25   BUN 18 17 19   CREATININE <0.5* 0.6* 0.7*     LIVER PROFILE:   Recent Labs     08/05/21 0432 08/06/21 0212 08/07/21  0533   AST 42* 50* 70*   ALT 29 39 57*   BILIDIR 0.3 0.3 0.3   BILITOT 0.7 0.6 0.8   ALKPHOS 55 58 60     PT/INR:   Recent Labs     08/05/21 0432 08/06/21 0212 08/07/21  0533   PROTIME 18.4* 18.4* 17.2*   INR 1.60* 1.60* 1.50*     APTT: No results for input(s): APTT in the last 72 hours. UA:No results for input(s): NITRITE, COLORU, PHUR, LABCAST, WBCUA, RBCUA, MUCUS, TRICHOMONAS, YEAST, BACTERIA, CLARITYU, SPECGRAV, LEUKOCYTESUR, UROBILINOGEN, BILIRUBINUR, BLOODU, GLUCOSEU, AMORPHOUS in the last 72 hours. Invalid input(s): KETONESU  No results for input(s): PHART, [id number], PO2ART in the last 72 hours. Cultures:   8/2 BC NGTD  8/2 COVID-19 and influenza detected    Films:  CTPA 8/2 imaging was reviewed by me and showed   1. Multifocal airspace disease.    2. Suboptimal opacification of the pulmonary arteries. Artifact degraded   evaluation of the pulmonary arteries.  No discrete pulmonary embolism to the   level of theproximal segmental arteries given limitation.    3. Other findings as described        ASSESSMENT:  · Acute hypoxemic respiratory failure  · COVID-19 viral pneumonia  · Elevated procalcitonin  · Abnormal chest x-ray-COVID-19 and cannot exclude superimposed bacterial infection  · History of ITP  · History of OWEN      PLAN:  · Supplemental oxygen to maintain SaO2 >92%; wean as tolerated  · Continuous pulse oximetry  · Droplet plus isolation (surgical mask, eye protection, gown, glove)  · Levaquin D#5/7  · Avoid NEBs as able-albuterol MDI strongly preferred   · Steroid D#6- Solumedrol 40 mg IV   · Remdesivir D#5/5. Monitor LFTs, renal and CBC daily while on medication.   · Post tocilizumab 8/3/2021  · Repeat Lasix 40 mg IV x 1   · Lovenox daily and monitor platelet closely

## 2021-08-07 NOTE — PLAN OF CARE
Problem: Airway Clearance - Ineffective  Goal: Achieve or maintain patent airway  Note: Patient w/ patent airway     Problem: Gas Exchange - Impaired  Goal: Absence of hypoxia  Note: Spo2 94% at rest on 5L/min NC. Desaturated to 78% on 5L while ambulating to bathroom for BM. Recovered after 5 min rest after returning to the bed resting. Problem:  Body Temperature -  Risk of, Imbalanced  Goal: Will regain or maintain usual level of consciousness  Note: Alert and oriented     Problem: Nutrition Deficits  Goal: Optimize nutritional status  Note: Tolerating po intake without nausea/vomiting     Problem: Loneliness or Risk for Loneliness  Goal: Demonstrate positive use of time alone when socialization is not possible  Note: Denies loneliness - talking /facetiming family per phone

## 2021-08-07 NOTE — PROGRESS NOTES
[address] Daily Progress Note      Admit Date:  8/2/2021    Subjective:  Mr. Abilio Nash is seen for cardiology follow up of wide complex tachycardia on 8.5.21  No further arrhythmia     Reason for Consultation/Chief Complaint: \"I have been having a one run of wide complex tachycardia last night. .\"     History of Present Illness:  Evens Ba is a 35 y.o. patient who presented to the hospital with complaints of  Cough and shortness of breath. Admitted to the hospital on 8.2.21. He is in resp isolation. Patient is tested positive for COVID 19 related pulmonary infection,  Last night at about 11 PM patient noted to have 1 min run of wide complex tachycardia. Rate of about 170/min  No associated symptoms reported by RN. It resolved on its own. No arrhythmia before or after that. Patient has HBP,  DM,  ITP, OWEN but no heart disease listed. I have been asked to provide consultation regarding further management and testing.     ROS:  12 point ROS negative in all areas as listed below except as in [place]  Constitutional, EENT,  GI, , Musculoskeletal, skin, neurological, hematological, endocrine, Psychiatric    Past Medical History:   Diagnosis Date    Arthritis     left knee    Chronic lumbar pain     COVID-19 08/02/2021    DDD (degenerative disc disease), lumbar     MRI 2012    Depression     Glucose intolerance (impaired glucose tolerance)     History of ITP     Hypertension     Idiopathic thrombocytopenic purpura ([address]     OWEN (nonalcoholic steatohepatitis) 06/05/2021    Type 2 diabetes mellitus without complication, without long-term current use of insulin ([address] 08/30/2016     History reviewed. No pertinent surgical history.     Objective:   BP (!) 139/91   Pulse 60   Temp 97.5 °F (36.4 °C) (Oral)   Resp 16   Ht 6' 4\" (1.93 m)   Wt 291 lb 12 oz (132.3 kg)   SpO2 98%   BMI 35.51 kg/m²       Intake/Output Summary (Last 24 hours) at 8/7/2021 1009  Last data filed at 8/7/2021 BILITOT 0.7 0.6 0.8   ALKPHOS 55 58 60     PT/INR:   Recent Labs     21  0432 21  0212 21  0533   PROTIME 18.4* 18.4* 17.2*   INR 1.60* 1.60* 1.50*     APTT: No results for input(s): APTT in the last 72 hours. BNP:  No results for input(s): BNP in the last 72 hours. [id number]   Diagnosis  Normal sinus rhythmIncomplete left bundle branch blockNonspecific T wave abnormalityProlonged QTAbnormal ECGWhen compared with ECG of 02-AUG-2021 18:03,Vent. rate has decreased BY  38 BPMIncomplete left bundle branch block is now PresentConfirmed by Lucretia Ann MD, [address] () on 2021 5:03:57 PM         Assessment  One asymptomatic run of VT in setting of Acute infection related to [place] likely due to subclinical myocarditis  Hypertension   DM  OWEN    Plan:  1. Continue beta blockers   2. Asa  3. Troponin   4. Will sign off.   Follow in office in 6 weeks after discharge please give him my number at discharge to schedule hospital follow up  [phone]    ·     Silvano Shaw MD, MD 2021 10:09 AM

## 2021-08-07 NOTE — PROGRESS NOTES
Remdesivir Day # 5    Recent Labs     08/05/21  0432 08/06/21  0212 08/07/21  0533   WBC 5.5 6.2 5.5   BUN 18 17 19   CREATININE <0.5* 0.6* 0.7*   ALT 29 39 57*   AST 42* 50* 70*       Estimated Creatinine Clearance: 202 mL/min (A) (based on SCr of 0.7 mg/dL (L)). Will continue with dose of 100 mg today which will complete treatment. Pharmacy will continue to monitor.      Clif Kaplan, PharmD, [facility], 8/7/2021 6:37 AM

## 2021-08-08 VITALS
TEMPERATURE: 96.5 F | SYSTOLIC BLOOD PRESSURE: 123 MMHG | OXYGEN SATURATION: 90 % | DIASTOLIC BLOOD PRESSURE: 75 MMHG | RESPIRATION RATE: 17 BRPM | HEIGHT: 76 IN | WEIGHT: 288.13 LBS | HEART RATE: 77 BPM | BODY MASS INDEX: 35.09 KG/M2

## 2021-08-08 LAB
ALBUMIN SERPL-MCNC: 2.5 G/DL (ref 3.4–5)
ALP BLD-CCNC: 57 U/L (ref 40–129)
ALT SERPL-CCNC: 60 U/L (ref 10–40)
ANION GAP SERPL CALCULATED.3IONS-SCNC: 8 MMOL/L (ref 3–16)
AST SERPL-CCNC: 62 U/L (ref 15–37)
BASOPHILS ABSOLUTE: 0 K/UL (ref 0–0.2)
BASOPHILS RELATIVE PERCENT: 0.6 %
BILIRUB SERPL-MCNC: 0.9 MG/DL (ref 0–1)
BILIRUBIN DIRECT: 0.3 MG/DL (ref 0–0.3)
BILIRUBIN, INDIRECT: 0.6 MG/DL (ref 0–1)
BUN BLDV-MCNC: 24 MG/DL (ref 7–20)
CALCIUM SERPL-MCNC: 8.1 MG/DL (ref 8.3–10.6)
CHLORIDE BLD-SCNC: 100 MMOL/L (ref 99–110)
CO2: 29 MMOL/L (ref 21–32)
CREAT SERPL-MCNC: 0.8 MG/DL (ref 0.9–1.3)
EOSINOPHILS ABSOLUTE: 0 K/UL (ref 0–0.6)
EOSINOPHILS RELATIVE PERCENT: 0 %
GFR AFRICAN AMERICAN: >60
GFR NON-AFRICAN AMERICAN: >60
GLUCOSE BLD-MCNC: 142 MG/DL (ref 70–99)
GLUCOSE BLD-MCNC: 174 MG/DL (ref 70–99)
GLUCOSE BLD-MCNC: 174 MG/DL (ref 70–99)
GLUCOSE BLD-MCNC: 178 MG/DL (ref 70–99)
GLUCOSE BLD-MCNC: 225 MG/DL (ref 70–99)
HCT VFR BLD CALC: 42.6 % (ref 40.5–52.5)
HEMOGLOBIN: 14.2 G/DL (ref 13.5–17.5)
LYMPHOCYTES ABSOLUTE: 0.3 K/UL (ref 1–5.1)
LYMPHOCYTES RELATIVE PERCENT: 5.5 %
MCH RBC QN AUTO: 29.2 PG (ref 26–34)
MCHC RBC AUTO-ENTMCNC: 33.3 G/DL (ref 31–36)
MCV RBC AUTO: 87.6 FL (ref 80–100)
MONOCYTES ABSOLUTE: 0.2 K/UL (ref 0–1.3)
MONOCYTES RELATIVE PERCENT: 4.5 %
NEUTROPHILS ABSOLUTE: 4.2 K/UL (ref 1.7–7.7)
NEUTROPHILS RELATIVE PERCENT: 89.4 %
PDW BLD-RTO: 16.1 % (ref 12.4–15.4)
PERFORMED ON: ABNORMAL
PLATELET # BLD: 48 K/UL (ref 135–450)
PMV BLD AUTO: 7.8 FL (ref 5–10.5)
POTASSIUM REFLEX MAGNESIUM: 4.4 MMOL/L (ref 3.5–5.1)
RBC # BLD: 4.86 M/UL (ref 4.2–5.9)
SODIUM BLD-SCNC: 137 MMOL/L (ref 136–145)
TOTAL PROTEIN: 4.6 G/DL (ref 6.4–8.2)
WBC # BLD: 4.6 K/UL (ref 4–11)

## 2021-08-08 PROCEDURE — 6360000002 HC RX W HCPCS: Performed by: INTERNAL MEDICINE

## 2021-08-08 PROCEDURE — 94640 AIRWAY INHALATION TREATMENT: CPT

## 2021-08-08 PROCEDURE — 2580000003 HC RX 258: Performed by: INTERNAL MEDICINE

## 2021-08-08 PROCEDURE — 6370000000 HC RX 637 (ALT 250 FOR IP): Performed by: INTERNAL MEDICINE

## 2021-08-08 PROCEDURE — 99232 SBSQ HOSP IP/OBS MODERATE 35: CPT | Performed by: INTERNAL MEDICINE

## 2021-08-08 PROCEDURE — 85025 COMPLETE CBC W/AUTO DIFF WBC: CPT

## 2021-08-08 PROCEDURE — 80076 HEPATIC FUNCTION PANEL: CPT

## 2021-08-08 PROCEDURE — 36415 COLL VENOUS BLD VENIPUNCTURE: CPT

## 2021-08-08 PROCEDURE — 80048 BASIC METABOLIC PNL TOTAL CA: CPT

## 2021-08-08 RX ORDER — PREDNISONE 10 MG/1
TABLET ORAL
Qty: 18 TABLET | Refills: 0 | Status: SHIPPED | OUTPATIENT
Start: 2021-08-08 | End: 2021-09-17

## 2021-08-08 RX ORDER — LEVOFLOXACIN 500 MG/1
500 TABLET, FILM COATED ORAL DAILY
Qty: 5 TABLET | Refills: 0 | Status: SHIPPED | OUTPATIENT
Start: 2021-08-08 | End: 2021-08-13

## 2021-08-08 RX ADMIN — LISINOPRIL 40 MG: 20 TABLET ORAL at 09:06

## 2021-08-08 RX ADMIN — METOPROLOL TARTRATE 25 MG: 25 TABLET, FILM COATED ORAL at 09:05

## 2021-08-08 RX ADMIN — SODIUM CHLORIDE 25 ML: 9 INJECTION, SOLUTION INTRAVENOUS at 13:12

## 2021-08-08 RX ADMIN — Medication 2000 UNITS: at 09:06

## 2021-08-08 RX ADMIN — METHYLPREDNISOLONE SODIUM SUCCINATE 40 MG: 40 INJECTION, POWDER, FOR SOLUTION INTRAMUSCULAR; INTRAVENOUS at 13:16

## 2021-08-08 RX ADMIN — METHYLPREDNISOLONE SODIUM SUCCINATE 40 MG: 40 INJECTION, POWDER, FOR SOLUTION INTRAMUSCULAR; INTRAVENOUS at 01:09

## 2021-08-08 RX ADMIN — LEVOFLOXACIN 500 MG: 500 INJECTION, SOLUTION INTRAVENOUS at 13:13

## 2021-08-08 NOTE — PROGRESS NOTES
Pulmonary Progress Note    CC: Respiratory failure    Subjective:   Room air  Wants to go home  Appears comfortable      Intake/Output Summary (Last 24 hours) at 8/8/2021 0809  Last data filed at 8/8/2021 0152  Gross per 24 hour   Intake 460 ml   Output 2650 ml   Net -2190 ml       Exam:   /83   Pulse 50   Temp 96.9 °F (36.1 °C) (Oral)   Resp 16   Ht 6' 4\" (1.93 m)   Wt 288 lb 2 oz (130.7 kg)   SpO2 93%   BMI 35.07 kg/m²  on room air  Constitutional:  No acute distress. Jasbir Confer HEENT: no scleral icterus  Neck: No tracheal deviation present. Cardiovascular: Normal heart sounds. Pulmonary/Chest: No wheezes. No rhonchi. Few rales. No decreased breath sounds. No accessory muscle usage or stridor. Abdominal: Soft. Musculoskeletal: No cyanosis. No clubbing. Skin: Skin is warm and dry.        Scheduled Meds:   albuterol-ipratropium  1 puff Inhalation BID    metoprolol tartrate  25 mg Oral BID    enoxaparin  40 mg Subcutaneous Daily    lisinopril  40 mg Oral Daily    insulin glargine  60 Units Subcutaneous Nightly    sodium chloride flush  5-40 mL Intravenous 2 times per day    methylPREDNISolone  40 mg Intravenous Q12H    Vitamin D  2,000 Units Oral Daily    levofloxacin  500 mg Intravenous Q24H    insulin lispro  25 Units Subcutaneous TID WC    insulin lispro  0-6 Units Subcutaneous TID WC    insulin lispro  0-3 Units Subcutaneous Nightly     Continuous Infusions:   sodium chloride      dextrose      sodium chloride 25 mL (08/07/21 1452)     PRN Meds:  sodium chloride, guaiFENesin-dextromethorphan, glucose, dextrose, glucagon (rDNA), dextrose, sodium chloride flush, sodium chloride, polyethylene glycol, acetaminophen **OR** acetaminophen, prochlorperazine, albuterol-ipratropium    Labs:  CBC:   Recent Labs     08/06/21  0212 08/07/21  0533 08/08/21  0522   WBC 6.2 5.5 4.6   HGB 13.5 14.0 14.2   HCT 40.1* 41.1 42.6   MCV 88.0 87.4 87.6   PLT 62* 55* 48*     BMP:   Recent Labs 08/06/21 0212 08/07/21  0533 08/08/21  0522    139 137   K 4.0 4.2 4.4    105 100   CO2 24 25 29   BUN 17 19 24*   CREATININE 0.6* 0.7* 0.8*     LIVER PROFILE:   Recent Labs     08/06/21 0212 08/07/21  0533 08/08/21  0522   AST 50* 70* 62*   ALT 39 57* 60*   BILIDIR 0.3 0.3 0.3   BILITOT 0.6 0.8 0.9   ALKPHOS 58 60 57     PT/INR:   Recent Labs     08/06/21 0212 08/07/21  0533   PROTIME 18.4* 17.2*   INR 1.60* 1.50*     APTT: No results for input(s): APTT in the last 72 hours. UA:No results for input(s): NITRITE, COLORU, PHUR, LABCAST, WBCUA, RBCUA, MUCUS, TRICHOMONAS, YEAST, BACTERIA, CLARITYU, SPECGRAV, LEUKOCYTESUR, UROBILINOGEN, BILIRUBINUR, BLOODU, GLUCOSEU, AMORPHOUS in the last 72 hours. Invalid input(s): KETONESU  No results for input(s): PHART, [id number], PO2ART in the last 72 hours. Cultures:   8/2 BC NGTD  8/2 COVID-19 and influenza detected    Films:  CTPA 8/2 imaging was reviewed by me and showed   1. Multifocal airspace disease.    2. Suboptimal opacification of the pulmonary arteries. Artifact degraded   evaluation of the pulmonary arteries.  No discrete pulmonary embolism to the   level of theproximal segmental arteries given limitation. 3. Other findings as described        ASSESSMENT:  · Acute hypoxemic respiratory failure  · COVID-19 viral pneumonia  · Elevated procalcitonin  · Abnormal chest x-ray-COVID-19 and cannot exclude superimposed bacterial infection  · History of ITP  · History of OWEN      PLAN:  · Supplemental oxygen to maintain SaO2 >92%; wean as tolerated  · Continuous pulse oximetry  · Droplet plus isolation (surgical mask, eye protection, gown, glove)  · Levaquin D#6/7  · Steroid D#7- Solumedrol 40 mg IV. Prednisone taper at discharge  · Completed remdesivir D#5/5 medication.   · Post tocilizumab 8/3/2021  · Hold Lovenox for platelet < 50   · SCDs and ambulation  · DC planning is okay with pulmonary

## 2021-08-08 NOTE — PROGRESS NOTES
O2 Sat at rest on room air is 90-92%  O2 Sat with activity on room air is 85-86%    O2 Sat at rest with oxygen @  2 lpm is 96%. O2 Sat with activity with oxygen @2 lpm is 90%.

## 2021-08-08 NOTE — PROGRESS NOTES
IM Progress Note    Admit Date:  8/2/2021    Patient with COVID-19 infection,  Hypoxia  He has not had the vaccine    Was transferred to [facility] for an episode of wide complex tachycardia last night    Subjective:  Mr. Aung Nicholas feels much improved today . Wishes to go home  Dropped spo2 with ambulation this am      Objective:   Patient Vitals for the past 4 hrs:   BP Temp Temp src Pulse Resp SpO2   08/08/21 0856 123/75 96.5 °F (35.8 °C) Oral 77 17 90 %   08/08/21 0752 -- -- -- -- 16 93 %            Intake/Output Summary (Last 24 hours) at 8/8/2021 1106  Last data filed at 8/8/2021 0901  Gross per 24 hour   Intake 640 ml   Output 2650 ml   Net -2010 ml       Physical Exam:  Patient is on droplet plus precautions  Gen: young obese male No distress. Alert. Awake and well-oriented . Eyes: PERRL. No sclera icterus. No conjunctival injection. ENT: No discharge. Pharynx clear. Neck: No JVD. Trachea midline. Resp: No accessory muscle use. Improving porsha air entry with minimal crackles in bases   CV: Regular rate. Regular rhythm. No murmur. No rub. No edema. GI: Non-tender. Non-distended. Normal bowel sounds. Skin: Warm and dry. No nodule on exposed extremities. No rash on exposed extremities. M/S: No cyanosis. No joint deformity. No clubbing. Neuro: Awake. Grossly nonfocal    Psych: Oriented x 3. No anxiety or agitation.        Scheduled Meds:   albuterol-ipratropium  1 puff Inhalation BID    metoprolol tartrate  25 mg Oral BID    enoxaparin  40 mg Subcutaneous Daily    lisinopril  40 mg Oral Daily    insulin glargine  60 Units Subcutaneous Nightly    sodium chloride flush  5-40 mL Intravenous 2 times per day    methylPREDNISolone  40 mg Intravenous Q12H    Vitamin D  2,000 Units Oral Daily    levofloxacin  500 mg Intravenous Q24H    insulin lispro  25 Units Subcutaneous TID WC    insulin lispro  0-6 Units Subcutaneous TID WC    insulin lispro  0-3 Units Subcutaneous Nightly       Continuous Infusions:   sodium chloride      dextrose      sodium chloride 25 mL (08/07/21 1452)       PRN Meds:  sodium chloride, guaiFENesin-dextromethorphan, glucose, dextrose, glucagon (rDNA), dextrose, sodium chloride flush, sodium chloride, polyethylene glycol, acetaminophen **OR** acetaminophen, prochlorperazine, albuterol-ipratropium      Data:  CBC:   Recent Labs     08/06/21 0212 08/07/21 0533 08/08/21 0522   WBC 6.2 5.5 4.6   HGB 13.5 14.0 14.2   HCT 40.1* 41.1 42.6   MCV 88.0 87.4 87.6   PLT 62* 55* 48*     BMP:   Recent Labs     08/06/21 0212 08/07/21 0533 08/08/21 0522    139 137   K 4.0 4.2 4.4    105 100   CO2 24 25 29   BUN 17 19 24*   CREATININE 0.6* 0.7* 0.8*     LIVER PROFILE:   Recent Labs     08/06/21 0212 08/07/21 0533 08/08/21  0522   AST 50* 70* 62*   ALT 39 57* 60*   BILIDIR 0.3 0.3 0.3   BILITOT 0.6 0.8 0.9   ALKPHOS 58 60 57     PT/INR:   Recent Labs     08/06/21 0212 08/07/21 0533   PROTIME 18.4* 17.2*   INR 1.60* 1.50*       CULTURES  Blood: NGTD  Sputum: ordered  COVID 19 PCR: detected       RADIOLOGY  CT CHEST PULMONARY EMBOLISM W CONTRAST   Final Result   1. Multifocal airspace disease. Differential includes pneumonia, including   COVID pneumonia, and alveolar proteinosis. Follow-up to resolution   recommended. 2. Suboptimal opacification of the pulmonary arteries. Artifact degraded   evaluation of the pulmonary arteries. No discrete pulmonary embolism to the   level of theproximal segmental arteries given limitation. 3. Other findings as described. XR CHEST PORTABLE   Final Result   Diffuse airspace opacity throughout both lungs, likely a combination of   pulmonary edema and multifocal pneumonia. Assessment/Plan:    #COVID 19 Pneumonia  #Possible superimposed bacterial infection  #Acute hypoxic respiratory failure  - droplet plus isolation  -Chest x-ray showed multifocal airspace disease  - was on 5 L , now improving to 2 L .   Continue supplemental oxygen. Wean O2 as tolerated  -Completed  remdesivir, monitor BMP and LFTs  - solumedrol BID   - tocilizumab given on 8/3/21  -Continue Levaquin   -Seen by pulmonology      #DM2 with hyperglycemia  - Cont home lantus and collin preprandial humalog, added low dose SSI    #Lactic acidosis  - hold metformin, cont IV NS  Lactic acidosis resolved    #Sepsis  - present on admission  -Secondary to COVID-19 pneumonia and possibly superimposed bacterial infection. With elevated procalcitonin,Lactic acidosis, tachypnea and tachycardia  -Sepsis resolved now    #History of OWEN Cirrhosis and chronic ITP  - close monitoring of LFTs and platelets     # wide complex tachycardia - likely with acute covid infection or viral myocarditis  - resolved quickly. Started on BB Cards following    #HTN  - cont lisinopril    DVT Prophylaxis: SCD (2/2 TCP with plt 65k)  Diet: ADULT DIET;  Regular; 4 carb choices (60 gm/meal)  Code Status: Full Code     Kiesha Verde MD  8/8/2021 11:06 AM      Dc planning once off oxygen  Still hypoxic with ambulation, likely dc in am with prednisone taper

## 2021-08-08 NOTE — PROGRESS NOTES
Pt is lying in bed with their eyes closed. Respirations are easy and even. Call light within reach bed in lowest position with the wheels locked. Will continue to monitor.  Jose Wasserman RN

## 2021-08-08 NOTE — CARE COORDINATION
DISCHARGE ORDER  Date/Time 2021 4:10 PM  Completed by: Callie Kingston RN, Case Management    Patient Name: Taya Pizarro      : 1980  Admitting Diagnosis: Acute hypoxemic respiratory failure due to COVID-19 ([facility] [address] [U07.1, J96.01]      Admit order Date and Status:2021  (verify MD's last order for status of admission)      Noted discharge order. If applicable PT/OT recommendation at Discharge: NA  DME recommendation by PT/OT:NA  Confirmed discharge plan: Yes  with whom_pt______________  If pt confirmed DC plan does family need to be contacted by CM No if yes who______  Discharge Plan: Chart reviewed. Pt is returning home and qualifies for home O2. Pt has no preference in agency. TC to [facility] and spoke with Og Rosario she states they can accept the pt. Pt supplied with portable O2 tank and is aware to call [facility] at [phone] once he leaves the hospital so they can deliver his home O2 equipment. No further dc needs voiced. Reviewed chart. Role of discharge planner explained and patient verbalized understanding. Discharge order is noted. Has Home O2 in place on admit:  No    Pt is being d/c'd to home today. Pt's O2 sats are 96% on 2L. Discharge timeout done with Kaylah Evans. All discharge needs and concerns addressed.

## 2021-08-08 NOTE — ED PROVIDER NOTES
I independently examined and evaluated Moni Roque. In brief, Moni Roque is a 35 y.o. male with a past medical history of diabetes, hypertension, ITP, and MARCO ANTONIO, who presents to the ED complaining of Covid and shortness of breath. Patient has had symptoms for 2 to 3 days. Persistent since onset. Patient reports productive cough and shortness of breath. No palliative or provocative factors. He denies chest pain, fever, diarrhea, nausea vomiting, abdominal pain, dysuria. He has not taken anything for symptom relief at home. Patient tested positive for coronavirus 2 days ago. Patient was not vaccinated. REVIEW OF SYSTEMS  All systems reviewed, pertinent positives per HPI otherwise noted to be negative. Focused exam revealed   PHYSICAL EXAM  /80  Pulse 118   Temp 97.8 °F (Oral)   Resp 20   Ht 6' 4\" (1.93 m)   Wt 291 lb 12 oz (132.3 kg)   SpO2 81%   BMI 35.51 kg/m²    GENERAL APPEARANCE: Awake and alert. Cooperative. Ill-appearing. HENT: Normocephalic. Atraumatic. Mucous membranes are moist  NECK: Supple. Full range of motion of the neck without stiffness or pain. EYES: PERRL. EOM's grossly intact. HEART/CHEST: RRR. No murmurs. Chest wall is not tender to palpation. LUNGS: Respirations unlabored. Decreased breath sounds. Patient requiring 5 L nasal cannula oxygen  ABDOMEN: No tenderness. Soft. Non-distended. No masses. No organomegaly. No guarding or rebound. MUSCULOSKELETAL: No extremity edema. Compartments soft. No deformity. No tenderness in the extremities. All extremities neurovascularly intact. SKIN: Warm and dry. No acute rashes. NEUROLOGICAL: Alert and oriented. No gross facial drooping. Strength 5/5, sensation intact. PSYCHIATRIC: Normal mood and affect. ED course / MDM:   Overall ill appearing patient, in no acute distress, presenting for shortness of breath in the setting of coronavirus infection.   Physical exam remarkable for hypoxia and ill hyponatremia 132 and hypochloremia 96. Hyperglycemia 245. No other significant electrolyte abnormalities or evidence of kidney dysfunction. D-dimer elevated at 567. Flu swab negative. Covid infection confirmed. Based on results of work-up, I am concerned for Covid infection with hypoxia, patient also has evidence of superimposed bacterial pneumonia and meet sepsis criteria. At this time, do feel the patient requires admission for further work-up and management. Discussed the patient with hospital team.    CLINICAL IMPRESSION  1. Acute hypoxemic respiratory failure due to COVID-19 ([address]    2. Pneumonia due to infectious organism, unspecified laterality, unspecified part of lung    3. Septicemia ([address]        Blood pressure 119/72, pulse 56, temperature 97 °F (36.1 °C), temperature source Oral, resp. rate 18, height 6' 4\" (1.93 m), weight 291 lb 12 oz (132.3 kg), SpO2 93 %. Kacie Nice was admitted in stable condition. All diagnostic, treatment, and disposition decisions were made by myself in conjunction with the advanced practice provider. For all further details of the patient's emergency department visit, please see the advanced practice provider's documentation. Comment: Please note this report has been produced using speech recognition software and may contain errors related to that system including errors in grammar, punctuation, and spelling, as well as words and phrases that may be inappropriate. If there are any questions or concerns please feel free to contact the dictating provider for clarification.         Claire Ferraro MD  08/07/21 9877

## 2021-08-08 NOTE — PROGRESS NOTES
Results for Zahra Thomas (MRN [id number]) as of 8/8/2021 07:40   Ref. Range 8/8/2021 05:22   Platelet Count Latest Ref Range: 135 - 450 K/uL 48 (L)       Lovenox held.

## 2021-08-08 NOTE — FLOWSHEET NOTE
08/07/21 1957   Vital Signs   Temp 97 °F (36.1 °C)   Temp Source Oral   Pulse 56   Resp 18   /72   BP Location Left upper arm   Level of Consciousness Alert (0)   MEWS Score 1   Oxygen Therapy   SpO2 93 %   O2 Device Nasal cannula   O2 Flow Rate (L/min) 3 L/min   Pt assessment complete. Pt lying in bed quietly. Lung sounds clear/diminished. Pt's 02 turned down to 2 liters. SP02 93%. Pt refuses any insulin coverage at this time. Nightly medications given. Denies needs at this time. Call light within reach.

## 2021-08-08 NOTE — PROGRESS NOTES
Patient was read and given his discharge instructions. He denied having any questions related to post-hospitalization care or follow up. He was given a full tank of O2 with nasal cannula on; he was taken to the vehicle in a wheelchair. He had his personal possessions with him.

## 2021-08-09 ENCOUNTER — CARE COORDINATION (OUTPATIENT)
Dept: CASE MANAGEMENT | Age: 41
End: 2021-08-09

## 2021-08-09 NOTE — CARE COORDINATION
[address] Transitions Initial Follow Up Call    Call within 2 business days of discharge: Yes    Patient: Nancy Bentley Patient : 1980   MRN: [id number]  Reason for Admission: COVID-19 PNA, possible superimposed bacterial infection, acute hypoxic resp failure, DM2 with hyperglycemia, lactic acidosis, sepsis, hx OWEN cirrhosis and chronic ITP, wide complex tachycardia, HTN, Obesity  Discharge Date: 21 RARS: Readmission Risk Score: 23      Last Discharge [facility]       Complaint Diagnosis Description Type Department Provider    21 Cough; Shortness of Breath Acute hypoxemic respiratory failure due to COVID-19 ([facility] [address] . .. ED to Hosp-Admission (Discharged) (ADMITTED)  [facility] Jennifer Huang MD; Ardyth Morning,... Was this an external facility discharge? No Discharge Facility: NA    COVID-19 Detected on 2021.    1st attempt - Unable to reach patient by phone at this time. Message left requesting return call.        Follow Up  Future Appointments   Date Time Provider Lidia Fernandes   2021  3:40 PM 1200 Ximena Renee RN

## 2021-08-10 ENCOUNTER — CARE COORDINATION (OUTPATIENT)
Dept: CASE MANAGEMENT | Age: 41
End: 2021-08-10

## 2021-08-10 NOTE — DISCHARGE SUMMARY
Name:  Gina Rowe  Room:  /3779-01  MRN:    [id number]    Discharge Summary      This discharge summary is in conjunction with a complete physical exam done on the day of discharge. Discharging Physician: Dr. Olu Arroyo: 8/2/2021  Discharge:  8/8/2021    HPI:  The patient is a 35 y.o. male with PMH below, presents with cough, SOB/HUTCHISON, fatigue, myalgias. Pt reports his Sx have been worsening over the last 2-3 days. Associated productive cough. He has not had any N/V/D, abd pain. COVID + in ED today. He is requiring O2 in the ER and is not normally on O2 at home. He has hx of ITP. Diagnoses this Admission and Hospital Course   #COVID 19 Pneumonia  #Possible superimposed bacterial infection  #Acute hypoxic respiratory failure  - droplet plus isolation  -Chest x-ray showed multifocal airspace disease  - was on 5 L , now improving to 2 L . Continued supplemental oxygen. Weaned O2 as tolerated  -Completed  remdesivir, monitor BMP and LFTs  - solumedrol BID   - tocilizumab given on 8/3/21  -Continued Levaquin   -Seen by pulmonology  D/c home on Oxygen. The need for this was discussed face to face with the patient  Given Rx for Levaquin, prednisone taper.        #DM2 with hyperglycemia  - Cont'd home lantus and collin preprandial humalog, added low dose SSI     #Lactic acidosis  - held metformin, cont IV NS  Lactic acidosis resolved     #Sepsis  - present on admission  -Secondary to COVID-19 pneumonia and possibly superimposed bacterial infection. With elevated procalcitonin,Lactic acidosis, tachypnea and tachycardia  -Sepsis resolved now     #History of OWEN Cirrhosis and chronic ITP  - close monitoring of LFTs and platelets      # wide complex tachycardia - likely with acute covid infection or viral myocarditis  - resolved quickly.  Started on BB Cards following  D/c on Lopressor.     #HTN  - cont'd lisinopril     DVT Prophylaxis: SCD (2/2 TCP with plt 65k)    Procedures (Please Review Full these medications    Basaglar KwikPen 100 UNIT/ML injection pen  Generic drug: insulin glargine  Inject 60 Units into the skin nightly     benazepril 40 MG tablet  Commonly known as: LOTENSIN  TAKE ONE TABLET BY MOUTH DAILY     insulin lispro (1 Unit Dial) 100 UNIT/ML Sopn  Commonly known as: HumaLOG KwikPen  Inject 25 Units into the skin 3 times daily (before meals)     metFORMIN 1000 MG tablet  Commonly known as: GLUCOPHAGE  TAKE ONE TABLET BY MOUTH TWICE A DAY WITH MEALS        STOP taking these medications    benzonatate 200 MG capsule  Commonly known as: TESSALON           Where to Get Your Medications      These medications were sent to [address] [place], [place] [address] [address] - P [phone] - F [phone]  [address] [facility] [address]    Phone: [phone]   · metoprolol tartrate 25 MG tablet  · predniSONE 10 MG tablet     You can get these medications from any pharmacy    Bring a paper prescription for each of these medications  · levoFLOXacin 500 MG tablet           Discharged in stable condition to home. Follow Up: Follow up with PCP.     Umesh Jeong MD, 8/26/2021 5:56 AM

## 2021-08-10 NOTE — CARE COORDINATION
[address] Transitions Initial Follow Up Call    Call within 2 business days of discharge: Yes    Patient: Dorsie Paget Patient : 1980   MRN: [id number]  Reason for Admission: COVID-19 PNA, possible superimposed bacterial infection, acute hypoxic resp failure, DM2 with hyperglycemia, lactic acidosis, sepsis, hx OWEN cirrhosis and chronic ITP, wide complex tachycardia, HTN, Obesity  Discharge Date: 21 RARS: Readmission Risk Score: 23      Last Discharge [facility]       Complaint Diagnosis Description Type Department Provider    21 Cough; Shortness of Breath Acute hypoxemic respiratory failure due to COVID-19 ([facility] [address] . .. ED to Hosp-Admission (Discharged) (ADMITTED) [address] [facility] Tanja Velásquez MD; Ko King,... Was this an external facility discharge? No Discharge Facility: NA    COVID-19 Detected on 2021. 2nd attempt - Unable to reach patient by phone at this time. Message left requesting return call. No further CTN outreach scheduled at this time. CTN provided contact information for future needs.       Follow Up  Future Appointments   Date Time Provider Lidia Fernandes   2021  3:40 PM 1200 Ximena Richardson RN

## 2021-08-12 ENCOUNTER — TELEPHONE (OUTPATIENT)
Dept: FAMILY MEDICINE CLINIC | Age: 41
End: 2021-08-12

## 2021-08-12 NOTE — TELEPHONE ENCOUNTER
Patient told to schedule hospital f/u. [facility] is overbooked tomorrow and is out on vacation all next week. Patient would be a VV due to reason for hospital f/u. If [facility] does not approve to [employer] for patient is patient able to wait until [facility] returns on 8/23/2021? Please advise?       8/2/21- Admitted due to-     *Acute hypoxemic respiratory failure due to COVID-19      *Pneumonia due to infectious organism unspecified laterality, unspecified part of lung     *Septicemia     *Pneumonia due to COVID-19 virus       Discharged- 8/8/2021

## 2021-08-12 NOTE — TELEPHONE ENCOUNTER
Left message on machine for patient to call and schedule appointment with the next available provider.

## 2021-08-12 NOTE — TELEPHONE ENCOUNTER
If Zamzam Dela Cruz is unable to see him tomorrow, pls offer him appt with anyone avail tomorrow or next for hosp f/u.

## 2021-08-12 NOTE — TELEPHONE ENCOUNTER
----- Message from Annis Closs sent at 8/12/2021  9:54 AM EDT -----  Subject: Appointment Request    Reason for Call: Routine Hospital Follow Up    QUESTIONS  Type of Appointment? Established Patient  Reason for appointment request? Available appointments did not meet   patient need  Additional Information for Provider? Pt called to schedule a one week   follow up with their pcp due to recently being discharged from the   hospital due to [place]. Please follow up with pt.  ---------------------------------------------------------------------------  --------------  CALL BACK INFO  What is the best way for the office to contact you? OK to leave message on   voicemail  Preferred Call Back Phone Number? [phone]  ---------------------------------------------------------------------------  --------------  SCRIPT ANSWERS  Relationship to Patient? Self  (Patient requests to see provider urgently. )? No  (Has the patient been discharged from the hospital within 2 business days   AND does not have a Telephone Encounter  Follow Up From [address] [place] [address]   documented in [address]?)? No  Do you have any questions for your primary care provider that need to be   answered prior to your appointment? (Use RN Triage if question pertains to   anything on the red flag list)? No  (Patient needs follow up visit after hospital discharge) Book first   available appointment within 7 days OF DISCHARGE, if no appt, proceed to   book the next available time slot within 14 days OF DISCHARGE AND Send   Message to Provider. [facility] Follow Up appointment cannot be booked   beyond 14 Days and should result in a Message to Provider. ? Yes   Have you been diagnosed with, awaiting test results for, or told that you   are suspected of having COVID-19 (Coronavirus)? (If patient has tested   negative or was tested as a requirement for work, school, or travel and   not based on symptoms, answer no)?  Yes  Did your symptoms begin within the past 14 days or was your

## 2021-08-13 NOTE — TELEPHONE ENCOUNTER
Patient called back and was added to schedule to see Avelina for his hospital follow up virtually on 8/18/21. Patient was told to be seen today or mon at the latest. Can you overbook or advise?

## 2021-08-16 ENCOUNTER — TELEPHONE (OUTPATIENT)
Dept: FAMILY MEDICINE CLINIC | Age: 41
End: 2021-08-16

## 2021-08-16 NOTE — TELEPHONE ENCOUNTER
Message was incorrect. Patient needs a note for work excusing him prior to his follow up on 8/18/21 since he had to wait to be seen. Patient is still SOB. Yesterday was not as bad, today has been much worse. Patient complaining of gasping for air. Patients O2 levels running 92-93. No other symptoms per patient. Can hear patient having difficulty breathing over the phone.

## 2021-08-16 NOTE — TELEPHONE ENCOUNTER
Received call from patient's nurse  from [facility] with contact information in case she is needed.      Yifan Mirosy   [phone]  Available: M-F 8 AM- 4:30 PM

## 2021-08-16 NOTE — LETTER
[place] [address] [place]  Phone: [phone]  Fax: [phone]    COLIN Rodríguez CNP          August 16, 2021     Patient: Luz Maria Ro   YOB: 1980   Date of Visit: 8/16/2021       To Whom It May Concern: It is my medical opinion that Breanna Collado should remain out of work until 8-. If you have any questions or concerns, please don't hesitate to call.     Sincerely,        COLIN Rodríguez CNP

## 2021-08-16 NOTE — TELEPHONE ENCOUNTER
Received call from patient stating that he was recently released from the hospital and is requesting a doctor's release letter for work before appointment on 8/18/21. Patient also states that he is still having difficulty breathing.        Please advise

## 2021-08-18 ENCOUNTER — VIRTUAL VISIT (OUTPATIENT)
Dept: FAMILY MEDICINE CLINIC | Age: 41
End: 2021-08-18
Payer: COMMERCIAL

## 2021-08-18 DIAGNOSIS — Z09 HOSPITAL DISCHARGE FOLLOW-UP: Primary | ICD-10-CM

## 2021-08-18 DIAGNOSIS — U07.1 COVID-19: ICD-10-CM

## 2021-08-18 PROCEDURE — 99495 TRANSJ CARE MGMT MOD F2F 14D: CPT | Performed by: NURSE PRACTITIONER

## 2021-08-18 PROCEDURE — 1111F DSCHRG MED/CURRENT MED MERGE: CPT | Performed by: NURSE PRACTITIONER

## 2021-08-18 ASSESSMENT — ENCOUNTER SYMPTOMS
GASTROINTESTINAL NEGATIVE: 1
WHEEZING: 0
SHORTNESS OF BREATH: 1
COUGH: 1

## 2021-08-18 NOTE — PROGRESS NOTES
Post-Discharge Transitional Care Management Services or Hospital Follow Up      Barbara Reeder   YOB: 1980    Date of Office Visit:  8/18/2021  Date of Hospital Admission: 8/2/21  Date of Hospital Discharge: 8/8/21  Readmission Risk Score(high >=14%.  Medium >=10%):Readmission Risk Score: 23      Care management risk score Rising risk (score 2-5) and Complex Care (Scores >=6): 9     Non face to face  following discharge, date last encounter closed (first attempt may have been earlier): 8/10/2021 11:54 AM 8/10/2021 11:54 AM    Call initiated 2 business days of discharge: Yes     Patient Active Problem List   Diagnosis    Hypertension    Depression    Chronic lumbar pain    Acute ITP ([facility] [address]    Type 2 diabetes mellitus with hyperglycemia, with long-term current use of insulin (HCC)    Morbid obesity ([facility] [address]    History of ITP    Low HDL (under 40)    MARCO ANTONIO (obstructive sleep apnea)    Snoring    Thrombocytopenia (HCC)    Acute idiopathic thrombocytopenic purpura ([facility] [address]    OWEN (nonalcoholic steatohepatitis)    Cirrhosis of liver ([facility] [address]    Acute hypoxemic respiratory failure due to COVID-19 ([facility] [address]    Acute hypoxemic respiratory failure (HCC)    Pneumonia due to COVID-19 virus    Elevated procalcitonin    Abnormal chest x-ray    Lactic acidosis    Ventricular tachycardia (HCC)       Allergies   Allergen Reactions    Decadron [Dexamethasone] Other (See Comments)     Weak, lethargic    Trilyte [Peg 3350-Kcl-Na Bicarb-Nacl]     Trulicity [Dulaglutide]     Pcn [Penicillins] Hives and Rash       Medications listed as ordered at the time of discharge from [facility]   [facility]   Home Medication Instructions CHAY:    Printed on:08/18/21 3811   Medication Information                      benazepril (LOTENSIN) 40 MG tablet  TAKE ONE TABLET BY MOUTH DAILY             insulin glargine (BASAGLAR KWIKPEN) 100 UNIT/ML injection pen  Inject 60 Units into the skin nightly             insulin lispro, 1 Unit Dial, (HUMALOG KWIKPEN) 100 UNIT/ML SOPN  Inject 25 Units into the skin 3 times daily (before meals)             metFORMIN (GLUCOPHAGE) 1000 MG tablet  TAKE ONE TABLET BY MOUTH TWICE A DAY WITH MEALS             metoprolol tartrate (LOPRESSOR) 25 MG tablet  Take 1 tablet by mouth 2 times daily             predniSONE (DELTASONE) 10 MG tablet  30 mg x 3 days, 20 mg x 3 days, 10 mg x 3 days then stop                   Medications marked \"taking\" at this time  Outpatient Medications Marked as Taking for the 8/18/21 encounter (Virtual Visit) with COLIN Hawkins - CNP   Medication Sig Dispense Refill    metoprolol tartrate (LOPRESSOR) 25 MG tablet Take 1 tablet by mouth 2 times daily 60 tablet 3    predniSONE (DELTASONE) 10 MG tablet 30 mg x 3 days, 20 mg x 3 days, 10 mg x 3 days then stop 18 tablet 0    insulin glargine (BASAGLAR KWIKPEN) 100 UNIT/ML injection pen Inject 60 Units into the skin nightly 5 pen 5    insulin lispro, 1 Unit Dial, (HUMALOG KWIKPEN) 100 UNIT/ML SOPN Inject 25 Units into the skin 3 times daily (before meals) 22.5 mL 3    metFORMIN (GLUCOPHAGE) 1000 MG tablet TAKE ONE TABLET BY MOUTH TWICE A DAY WITH MEALS 180 tablet 1    benazepril (LOTENSIN) 40 MG tablet TAKE ONE TABLET BY MOUTH DAILY 30 tablet 3        Medications patient taking as of now reconciled against medications ordered at time of hospital discharge: Yes    Chief Complaint   Patient presents with    Follow-Up from Hospital     covid        HPI   Patient was hospitalized with COVID-19. He was admitted on August 2 and discharged on August 8. Patient states overall he is feeling better but he is still having some shortness of breath and fatigue. He was sent home with 2 L of oxygen to wear when he is walking around the house. If he is at rest he does not need the oxygen. He has been checking his O2 at rest without oxygen is 92%. With exertion his O2 drops to 85% without oxygen.   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 Since discharge he has been taking a daily multivitamin. Patient wants to know how he can exercise his lungs to help promote recovery. Inpatient course: Discharge summary reviewed- see chart. Interval history/Current status: stable      Review of Systems   Constitutional: Positive for fatigue. HENT: Negative. Respiratory: Positive for cough and shortness of breath. Negative for wheezing. Cardiovascular: Negative. Gastrointestinal: Negative. Musculoskeletal: Negative. Skin: Negative. Neurological: Negative. Psychiatric/Behavioral: Negative. There were no vitals filed for this visit. There is no height or weight on file to calculate BMI. Wt Readings from Last 3 Encounters:   08/08/21 288 lb 2 oz (130.7 kg)   06/11/21 294 lb 9.6 oz (133.6 kg)   06/04/21 299 lb 1.6 oz (135.7 kg)     BP Readings from Last 3 Encounters:   08/08/21 123/75   06/11/21 126/74   06/06/21 137/83       Physical Exam  Physical exam was not done due to this being a virtual visit. Patient appeared well no signs of shortness of breath with talking. Normal range of motion of neck. Assessment/Plan:  1. Hospital discharge follow-up  - IA DISCHARGE MEDS RECONCILED W/ CURRENT OUTPATIENT MED LIST    2. COVID-19  Continue with oxygen as needed for exertion. Patient was advised to get plenty of rest take his multivitamins. Patient was advised he could try an incentive spirometer to help open up his lungs. But that he really just needs time to heal from the Covid. We will give 1 more week off of work will reevaluate next week on how he is feeling.         Medical Decision Making: moderate complexity

## 2021-08-24 ENCOUNTER — NURSE TRIAGE (OUTPATIENT)
Dept: OTHER | Facility: CLINIC | Age: 41
End: 2021-08-24

## 2021-08-24 ENCOUNTER — TELEPHONE (OUTPATIENT)
Dept: FAMILY MEDICINE CLINIC | Age: 41
End: 2021-08-24

## 2021-08-24 NOTE — TELEPHONE ENCOUNTER
Call from patient stating he was released from the hospital on 8/8 for covid and has been on oxygen since release. Patient stated his feet have been swollen for the last 3-4 days. Please advise what patient should do.

## 2021-08-24 NOTE — TELEPHONE ENCOUNTER
Received call from [address] at [facility] with Red Flag Complaint. Brief description of triage: Difficulty breathing, post hospitalization for Covid 1 week ago. Triage indicates for patient to go to ER now. Care advice provided, patient verbalizes understanding; denies any other questions or concerns; instructed to call back for any new or worsening symptoms. Attention Provider: Thank you for allowing me to participate in the care of your patient. The patient was connected to triage in response to information provided to the [facility]. Please do not respond through this encounter as the response is not directed to a shared pool. Reason for Disposition   Illness requiring prolonged bedrest in past month (e.g., immobilization, long hospital stay)    Answer Assessment - Initial Assessment Questions  1. RESPIRATORY STATUS: \"Describe your breathing? \" (e.g., wheezing, shortness of breath, unable to speak, severe coughing)       Post hospitalization for Covid on th e 8/8/21. Caller has been SOB with low O2 sats. when ambulating. Stated that his PCP told him to rest as much as possible. When walking his O2 drops into th 80's, causing air hunger    2. ONSET: \"When did this breathing problem begin? \"       4 days    3. PATTERN \"Does the difficult breathing come and go, or has it been constant since it started? \"       Constant    4. SEVERITY: \"How bad is your breathing? \" (e.g., mild, moderate, severe)     - MILD: No SOB at rest, mild SOB with walking, speaks normally in sentences, can lay down, no retractions, pulse < 100.     - MODERATE: SOB at rest, SOB with minimal exertion and prefers to sit, cannot lie down flat, speaks in phrases, mild retractions, audible wheezing, pulse 100-120.     - SEVERE: Very SOB at rest, speaks in single words, struggling to breathe, sitting hunched forward, retractions, pulse > 120       Only needs the O2 when walking around.   O2 levels are in the 90's at rest, but drops when walking. 5. RECURRENT SYMPTOM: \"Have you had difficulty breathing before? \" If so, ask: \"When was the last time? \" and \"What happened that time? \"       Denies SOB prior to Covid    6. CARDIAC HISTORY: \"Do you have any history of heart disease? \" (e.g., heart attack, angina, bypass surgery, angioplasty)       Hypertension    7. LUNG HISTORY: \"Do you have any history of lung disease? \"  (e.g., pulmonary embolus, asthma, emphysema)      Denies    8. CAUSE: \"What do you think is causing the breathing problem? \"       Unsure    9. OTHER SYMPTOMS: \"Do you have any other symptoms? (e.g., dizziness, runny nose, cough, chest pain, fever)      Coughs, dry at time, but mucus is clear    10. PREGNANCY: \"Is there any chance you are pregnant? \" \"When was your last menstrual period? \"        N/a    11. TRAVEL: \"Have you traveled out of the country in the last month? \" (e.g., travel history, exposures)        Denies    Protocols used: BREATHING DIFFICULTY-ADULT-OH

## 2021-08-26 ENCOUNTER — TELEPHONE (OUTPATIENT)
Dept: FAMILY MEDICINE CLINIC | Age: 41
End: 2021-08-26

## 2021-08-26 ENCOUNTER — VIRTUAL VISIT (OUTPATIENT)
Dept: FAMILY MEDICINE CLINIC | Age: 41
End: 2021-08-26
Payer: COMMERCIAL

## 2021-08-26 DIAGNOSIS — J96.01 ACUTE HYPOXEMIC RESPIRATORY FAILURE DUE TO COVID-19 (HCC): Primary | ICD-10-CM

## 2021-08-26 DIAGNOSIS — U07.1 ACUTE HYPOXEMIC RESPIRATORY FAILURE DUE TO COVID-19 (HCC): Primary | ICD-10-CM

## 2021-08-26 DIAGNOSIS — R60.0 LOWER LEG EDEMA: Primary | ICD-10-CM

## 2021-08-26 PROCEDURE — 1111F DSCHRG MED/CURRENT MED MERGE: CPT | Performed by: NURSE PRACTITIONER

## 2021-08-26 PROCEDURE — 99213 OFFICE O/P EST LOW 20 MIN: CPT | Performed by: NURSE PRACTITIONER

## 2021-08-26 PROCEDURE — G8427 DOCREV CUR MEDS BY ELIG CLIN: HCPCS | Performed by: NURSE PRACTITIONER

## 2021-08-26 PROCEDURE — 1036F TOBACCO NON-USER: CPT | Performed by: NURSE PRACTITIONER

## 2021-08-26 PROCEDURE — G8417 CALC BMI ABV UP PARAM F/U: HCPCS | Performed by: NURSE PRACTITIONER

## 2021-08-26 RX ORDER — FUROSEMIDE 20 MG/1
TABLET ORAL
Qty: 30 TABLET | Refills: 0 | Status: SHIPPED | OUTPATIENT
Start: 2021-08-26 | End: 2021-09-14 | Stop reason: SDUPTHER

## 2021-08-26 NOTE — LETTER
[address]  Phone: [phone]  Fax: [phone]    COLIN Chan - CNP        August 26, 2021     Patient: Carleen Ogden   YOB: 1980   Date of Visit: 8/26/2021       To Whom it May Concern:    Guerline Merlos was seen in my clinic on 8/26/2021. He may return to work on September 14, 2021. If you have any questions or concerns, please don't hesitate to call.     Sincerely,         COLIN Rojo - CNP
No

## 2021-08-26 NOTE — PROGRESS NOTES
Patient: Lonnie Crouch is a 35 y.o. male who presents today with the following Chief Complaint(s):  Chief Complaint   Patient presents with    Edema     swelling of the feet    Positive For Covid-19     prolonged covid symptoms, SOB     Consented to a virtual visit    HPI-this is a 17-year-old male patient following up after being hospitalized on the second of this month with Covid/pneumonia/hypoxic respiratory failure due to the Covid. He does have a history ITP in which he sees a specialist for. After being discharged from the hospital he is experiencing lower leg edema to his feet. He states when he presses there is an indentation there. He denies no weight gain or weight loss. This started 6 days ago. He states he has been taking his hydrochlorothiazide but is not helping. He was prescribed this in the past and he was told not to take this. He states that his blood pressures are stable that they are running 120/80 to 130/85. His blood sugars have been pretty stable his fastings run between 110-120 and at night they run between 150-160. He does have home O2 and he does see a pulmonologist.  He was encouraged to call them because his shortness of breath is not improving. I am putting a referral in for cardiology due to his chronic illnesses and chronic state.   Current Outpatient Medications   Medication Sig Dispense Refill    furosemide (LASIX) 20 MG tablet Take 1/2 tab daily by mouth 30 tablet 0    insulin glargine (BASAGLAR KWIKPEN) 100 UNIT/ML injection pen Inject 60 Units into the skin nightly 5 pen 5    insulin lispro, 1 Unit Dial, (HUMALOG KWIKPEN) 100 UNIT/ML SOPN Inject 25 Units into the skin 3 times daily (before meals) 22.5 mL 3    metFORMIN (GLUCOPHAGE) 1000 MG tablet TAKE ONE TABLET BY MOUTH TWICE A DAY WITH MEALS 180 tablet 1    benazepril (LOTENSIN) 40 MG tablet TAKE ONE TABLET BY MOUTH DAILY 30 tablet 3    metoprolol tartrate (LOPRESSOR) 25 MG tablet Take 1 tablet by mouth 2 times daily (Patient not taking: Reported on 8/26/2021) 60 tablet 3    predniSONE (DELTASONE) 10 MG tablet 30 mg x 3 days, 20 mg x 3 days, 10 mg x 3 days then stop (Patient not taking: Reported on 8/26/2021) 18 tablet 0     No current facility-administered medications for this visit. Patient's past medical history, surgical history, family history, medications,  and allergies  were all reviewed and updated as appropriate today. Review of Systems   Cardiovascular: Positive for leg swelling (Bilateral feet edema). All other systems reviewed and are negative. Physical Exam  Nursing note reviewed. HENT:      Head: Normocephalic. Nose: Nose normal.      Mouth/Throat:      Mouth: Mucous membranes are moist.   Eyes:      Conjunctiva/sclera: Conjunctivae normal.   Pulmonary:      Effort: Pulmonary effort is normal.      Comments: O2 present per nasal cannula noted  Does not seem short of breath when talking with the patient  Musculoskeletal:         General: Normal range of motion. Cervical back: Normal range of motion. Skin:     General: Skin is dry. Neurological:      Mental Status: He is alert and oriented to person, place, and time. Psychiatric:         Mood and Affect: Mood normal.         Behavior: Behavior normal.         Thought Content: Thought content normal.         Judgment: Judgment normal.       There were no vitals filed for this visit. Assessment:  Encounter Diagnosis   Name Primary?  Lower leg edema Yes       Controlled SubstancesMonitoring:  NA    Plan:  1. Lower leg edema  Problem  - Emy Bocanegra MD, Cardiology, [facility]  - furosemide (LASIX) 20 MG tablet; Take 1/2 tab daily by mouth  Dispense: 30 tablet;  Refill: 0-he was instructed to take this until he sees cardiology and be evaluated by MD  He was instructed to elevate his feet above his heart  Purchased some compression stockings over-the-counter to help with the swelling    He was instructed that I will follow-up with him in 3 months with his diabetes  He was instructed to follow-up with all of his specialist for his other conditions    Rosalinda Arroyo, was evaluated through a synchronous (real-time) audio-video encounter. The patient (or guardian if applicable) is aware that this is a billable service. Verbal consent to proceed has been obtained within the past 12 months. The visit was conducted pursuant to the emergency declaration under the [facility], [address] [place] [address] and the [employer] Act. Patient identification was verified, and a caregiver was present when appropriate. The patient was located in a state where the provider was credentialed to provide care. Total time spent for this encounter: 20 min    --COLIN Stewart CNP on 8/26/2021 at 10:01 AM    An electronic signature was used to authenticate this note. COLIN Stewart CNP, BETTY    Reviewed treatment plan with patient. Patient verbalized understanding to treatment plan and questions were answered. [facility] Tianna Lopez.  Linda, [address]

## 2021-08-30 ENCOUNTER — TELEPHONE (OUTPATIENT)
Dept: FAMILY MEDICINE CLINIC | Age: 41
End: 2021-08-30

## 2021-08-30 NOTE — TELEPHONE ENCOUNTER
----- Message from Asaf Smith sent at 8/30/2021  9:21 AM EDT -----  Subject: Referral Request    QUESTIONS   Reason for referral request? Pt needs a referral to Dr. Amanda Rodríguez at [facility] pulmonologist. fax [phone]. Has the physician seen you for this condition before? No   Preferred Specialist (if applicable)? Do you already have an appointment scheduled? Additional Information for Provider?   ---------------------------------------------------------------------------  --------------  CALL BACK INFO  What is the best way for the office to contact you? OK to leave message on   voicemail  Preferred Call Back Phone Number?  [phone]

## 2021-09-07 ENCOUNTER — NURSE TRIAGE (OUTPATIENT)
Dept: OTHER | Facility: CLINIC | Age: 41
End: 2021-09-07

## 2021-09-07 NOTE — TELEPHONE ENCOUNTER
Reason for Disposition   SEVERE swelling (e.g., swelling extends above knee, entire leg is swollen, weeping fluid)    Answer Assessment - Initial Assessment Questions  1. ONSET: \"When did the swelling start? \" (e.g., minutes, hours, days)      About 2 weeks ago in the ankles and feet. Now the swelling is all the way up the legs and into abdomen. 2. LOCATION: \"What part of the leg is swollen? \"  \"Are both legs swollen or just one leg? \"      Bilateral whole legs    3. SEVERITY: \"How bad is the swelling? \" (e.g., localized; mild, moderate, severe)   - Localized - small area of swelling localized to one leg   - MILD pedal edema - swelling limited to foot and ankle, pitting edema < 1/4 inch (6 mm) deep, rest and elevation eliminate most or all swelling   - MODERATE edema - swelling of lower leg to knee, pitting edema > 1/4 inch (6 mm) deep, rest and elevation only partially reduce swelling   - SEVERE edema - swelling extends above knee, facial or hand swelling present       Severe- whole leg, abd. And face    4. REDNESS: \"Does the swelling look red or infected? \"      No    5. PAIN: \"Is the swelling painful to touch? \" If so, ask: \"How painful is it? \"   (Scale 1-10; mild, moderate or severe)      No    6. FEVER: \"Do you have a fever? \" If so, ask: \"What is it, how was it measured, and when did it start? \"       No    7. CAUSE: \"What do you think is causing the leg swelling? \"      I don't know    8. MEDICAL HISTORY: \"Do you have a history of heart failure, kidney disease, liver failure, or cancer? \"      HTN,  A-Fib, cirrhosis of the liver, DM    9. RECURRENT SYMPTOM: \"Have you had leg swelling before? \" If so, ask: \"When was the last time? \" \"What happened that time? \"      Not this bad    10. OTHER SYMPTOMS: \"Do you have any other symptoms? \" (e.g., chest pain, difficulty breathing)        30 lbs weight gain in 1-1 1/2 months (since release from hospital), bloating    11. PREGNANCY: \"Is there any chance you are pregnant? \" \"When was your last menstrual period? \"        No    Protocols used: LEG SWELLING AND EDEMA-ADULT-[place]  [facility] [address] [facility]. Brief description of triage: bilateral leg swelling unto and including abdomen, also facial swelling,  30 lb weight gain in the last 4-6 weeks. Using O2 24/7 right now. Triage indicates for patient to ED/UCC/PCP office with approval    Velia with Kaylin. Kaylin spoke with Gabriela Giron CNP. Pt to go to ED.    [facility] back due to patient refusal of ED. Spoke with Adrian. Kaylin came back on the line and she spoke with Isai Lamb CNP and recommended same disposition. Kaylin spoke with Nelson Mosquera wife. Care advice provided, patient verbalizes understanding; denies any other questions or concerns; instructed to call back for any new or worsening symptoms. Attention Provider: Thank you for allowing me to participate in the care of your patient. The patient was connected to triage in response to information provided to the [facility]. Please do not respond through this encounter as the response is not directed to a shared pool.

## 2021-09-07 NOTE — TELEPHONE ENCOUNTER
Nurse Triaged called back today stating Pt is refusing to go to Ed. Per Hermilo Parisi CNP's, recommended Pt to go to ED to received the proper medical treatment of IV Lasix. Pt's wife understands & will try to convince Pt to go to ED.

## 2021-09-08 ENCOUNTER — APPOINTMENT (OUTPATIENT)
Dept: GENERAL RADIOLOGY | Age: 41
End: 2021-09-08
Payer: COMMERCIAL

## 2021-09-08 ENCOUNTER — HOSPITAL ENCOUNTER (EMERGENCY)
Age: 41
Discharge: HOME | End: 2021-09-08
Attending: STUDENT IN AN ORGANIZED HEALTH CARE EDUCATION/TRAINING PROGRAM
Payer: COMMERCIAL

## 2021-09-08 ENCOUNTER — APPOINTMENT (OUTPATIENT)
Dept: CT IMAGING | Age: 41
End: 2021-09-08
Payer: COMMERCIAL

## 2021-09-08 VITALS
TEMPERATURE: 97.2 F | DIASTOLIC BLOOD PRESSURE: 98 MMHG | BODY MASS INDEX: 38.36 KG/M2 | SYSTOLIC BLOOD PRESSURE: 146 MMHG | OXYGEN SATURATION: 96 % | RESPIRATION RATE: 16 BRPM | WEIGHT: 315 LBS | HEIGHT: 76 IN | HEART RATE: 95 BPM

## 2021-09-08 DIAGNOSIS — Z86.16 PERSONAL HISTORY OF COVID-19: ICD-10-CM

## 2021-09-08 DIAGNOSIS — Z99.81 ON HOME OXYGEN THERAPY: ICD-10-CM

## 2021-09-08 DIAGNOSIS — K74.69 LIVER, CIRRHOSIS, PORTAL (HCC): ICD-10-CM

## 2021-09-08 DIAGNOSIS — R06.02 SHORTNESS OF BREATH: Primary | ICD-10-CM

## 2021-09-08 DIAGNOSIS — R60.0 BILATERAL LEG EDEMA: ICD-10-CM

## 2021-09-08 LAB
A/G RATIO: 2.1 (ref 1.1–2.2)
ALBUMIN SERPL-MCNC: 3.9 G/DL (ref 3.4–5)
ALP BLD-CCNC: 62 U/L (ref 40–129)
ALT SERPL-CCNC: 34 U/L (ref 10–40)
ANION GAP SERPL CALCULATED.3IONS-SCNC: 10 MMOL/L (ref 3–16)
AST SERPL-CCNC: 34 U/L (ref 15–37)
BASOPHILS ABSOLUTE: 0.1 K/UL (ref 0–0.2)
BASOPHILS RELATIVE PERCENT: 1.2 %
BILIRUB SERPL-MCNC: 2.3 MG/DL (ref 0–1)
BUN BLDV-MCNC: 6 MG/DL (ref 7–20)
CALCIUM SERPL-MCNC: 9 MG/DL (ref 8.3–10.6)
CHLORIDE BLD-SCNC: 106 MMOL/L (ref 99–110)
CO2: 24 MMOL/L (ref 21–32)
CREAT SERPL-MCNC: <0.5 MG/DL (ref 0.9–1.3)
D DIMER: 278 NG/ML DDU (ref 0–229)
EKG ATRIAL RATE: 101 BPM
EKG DIAGNOSIS: NORMAL
EKG P AXIS: 49 DEGREES
EKG P-R INTERVAL: 124 MS
EKG Q-T INTERVAL: 354 MS
EKG QRS DURATION: 90 MS
EKG QTC CALCULATION (BAZETT): 459 MS
EKG R AXIS: 5 DEGREES
EKG T AXIS: 16 DEGREES
EKG VENTRICULAR RATE: 101 BPM
EOSINOPHILS ABSOLUTE: 0.2 K/UL (ref 0–0.6)
EOSINOPHILS RELATIVE PERCENT: 2.2 %
GFR AFRICAN AMERICAN: >60
GFR NON-AFRICAN AMERICAN: >60
GLOBULIN: 1.9 G/DL
GLUCOSE BLD-MCNC: 185 MG/DL (ref 70–99)
HCT VFR BLD CALC: 46.7 % (ref 40.5–52.5)
HEMOGLOBIN: 15.7 G/DL (ref 13.5–17.5)
LYMPHOCYTES ABSOLUTE: 1.9 K/UL (ref 1–5.1)
LYMPHOCYTES RELATIVE PERCENT: 26.5 %
MCH RBC QN AUTO: 30.4 PG (ref 26–34)
MCHC RBC AUTO-ENTMCNC: 33.7 G/DL (ref 31–36)
MCV RBC AUTO: 90.4 FL (ref 80–100)
MONOCYTES ABSOLUTE: 0.8 K/UL (ref 0–1.3)
MONOCYTES RELATIVE PERCENT: 11.4 %
NEUTROPHILS ABSOLUTE: 4.2 K/UL (ref 1.7–7.7)
NEUTROPHILS RELATIVE PERCENT: 58.7 %
PDW BLD-RTO: 18 % (ref 12.4–15.4)
PLATELET # BLD: 59 K/UL (ref 135–450)
PMV BLD AUTO: 7.8 FL (ref 5–10.5)
POTASSIUM REFLEX MAGNESIUM: 4.1 MMOL/L (ref 3.5–5.1)
PRO-BNP: 83 PG/ML (ref 0–124)
RBC # BLD: 5.17 M/UL (ref 4.2–5.9)
SODIUM BLD-SCNC: 140 MMOL/L (ref 136–145)
TOTAL PROTEIN: 5.8 G/DL (ref 6.4–8.2)
TROPONIN: <0.01 NG/ML
WBC # BLD: 7.1 K/UL (ref 4–11)

## 2021-09-08 PROCEDURE — 83880 ASSAY OF NATRIURETIC PEPTIDE: CPT

## 2021-09-08 PROCEDURE — 6360000004 HC RX CONTRAST MEDICATION: Performed by: STUDENT IN AN ORGANIZED HEALTH CARE EDUCATION/TRAINING PROGRAM

## 2021-09-08 PROCEDURE — 71260 CT THORAX DX C+: CPT

## 2021-09-08 PROCEDURE — 85379 FIBRIN DEGRADATION QUANT: CPT

## 2021-09-08 PROCEDURE — 93005 ELECTROCARDIOGRAM TRACING: CPT | Performed by: PHYSICIAN ASSISTANT

## 2021-09-08 PROCEDURE — 84484 ASSAY OF TROPONIN QUANT: CPT

## 2021-09-08 PROCEDURE — 93010 ELECTROCARDIOGRAM REPORT: CPT | Performed by: INTERNAL MEDICINE

## 2021-09-08 PROCEDURE — 99284 EMERGENCY DEPT VISIT MOD MDM: CPT

## 2021-09-08 PROCEDURE — 85025 COMPLETE CBC W/AUTO DIFF WBC: CPT

## 2021-09-08 PROCEDURE — 80053 COMPREHEN METABOLIC PANEL: CPT

## 2021-09-08 PROCEDURE — 71045 X-RAY EXAM CHEST 1 VIEW: CPT

## 2021-09-08 RX ADMIN — IOPAMIDOL 100 ML: 755 INJECTION, SOLUTION INTRAVENOUS at 13:03

## 2021-09-08 ASSESSMENT — ENCOUNTER SYMPTOMS
VOMITING: 0
COUGH: 1
SHORTNESS OF BREATH: 1
SPUTUM PRODUCTION: 0
ABDOMINAL PAIN: 0
CHEST TIGHTNESS: 1

## 2021-09-08 NOTE — ED PROVIDER NOTES
I independently examined and evaluated Alicia Whalen. In brief, is a 69-year-old male, with diagnosis of Covid several weeks ago, presenting with 1 month history of dry cough, as well as leg swelling for the past 2 weeks. Patient was reportedly started on a low-dose diuretic which does not seem to be helping the swelling. Denies any chest pain. Denies other complaints or concerns. Focused exam revealed patient resting comfortably, no acute distress. Heart regular rate and rhythm. Lungs clear to auscultation bilaterally. Abdomen soft, nondistended, nontender. 2+ pitting edema bilateral lower extremities.     Labs Reviewed   CBC WITH AUTO DIFFERENTIAL - Abnormal; Notable for the following components:       Result Value    RDW 18.0 (*)     Platelets 59 (*)     All other components within normal limits    Narrative:     Performed at:  [facility],  [employer], [facility]   Phone [phone]   COMPREHENSIVE METABOLIC PANEL W/ REFLEX TO MG FOR LOW K - Abnormal; Notable for the following components:    Glucose 185 (*)     BUN 6 (*)     CREATININE <0.5 (*)     Total Protein 5.8 (*)     Total Bilirubin 2.3 (*)     All other components within normal limits    Narrative:     Performed at:  [place] [address],  [employer], [facility]   Phone [phone]   D-DIMER, QUANTITATIVE - Abnormal; Notable for the following components:    D-Dimer, Quant 278 (*)     All other components within normal limits    Narrative:     Performed at:  [facility],  [place], [facility]   Phone [phone]   TROPONIN    Narrative:     Performed at:  [place] [address],  [place], [facility]   Phone [phone]   BRAIN NATRIURETIC PEPTIDE    Narrative:     Performed at:  [facility]) [facility]    Ifeanyi Lizarraga, punctuation, and spelling, as well as words and phrases that may be inappropriate. If there are any questions or concerns please feel free to contact the dictating provider for clarification.        Otis Patel MD  09/09/21 0800

## 2021-09-08 NOTE — ED PROVIDER NOTES
[address] ED  EMERGENCY DEPARTMENT ENCOUNTER        Pt Name: Ana Arnett  MRN: [id number]  Armstrong[place]rena 1980  Date of evaluation: 9/8/2021  Provider: Rosalinda Arzate PA-C  PCP: COLIN Elias CNP    Shared Visit or Autonomous Visit:  I have seen and evaluated this patient with my supervising physician Dr Michael Wiley       Chief Complaint   Patient presents with    Shortness of Breath     month; dry cough; leg swelling past 2 weeks       HISTORY OF PRESENT ILLNESS   (Location/Symptom, Timing/Onset, Context/Setting, Quality, Duration, Modifying Factors, Severity)  Note limiting factors. Ana Arnett is a 35 y.o. male presenting to the emergency department for evaluation of bilateral leg swelling and shortness of breath symptoms for about the past 2 weeks. He recently had COVID-19 a month ago was discharged on oxygen 2 L as needed at home O2 was 88% on room air so he started wearing oxygen all the time now. Shortness of breath worse with activity he walks across the room gets out of breath. Dry cough. Some chest tightness states not really any pain. No fever. No abdominal pain. States his wife told him looks like his abdomen is also swollen. No history of DVT or PE. No known heart problems. No history of CHF. His doctor placed him on Lasix 10 mg daily for the past 7 days is not helping. Has history of diabetes, hypertension, ITP. The history is provided by the patient. Shortness of Breath  Onset quality:  Gradual  Duration:  2 weeks  Timing:  Constant  Progression:  Worsening  Chronicity:  New  Associated symptoms: cough    Associated symptoms: no abdominal pain, no fever, no sputum production and no vomiting    Risk factors: obesity    Risk factors: no hx of PE/DVT          Nursing Notes were reviewed    REVIEW OF SYSTEMS    (2-9 systems for level 4, 10 or more for level 5)     Review of Systems   Constitutional: Negative for fever. Respiratory: Positive for cough, chest tightness and shortness of breath. Negative for sputum production. Cardiovascular: Positive for leg swelling. Gastrointestinal: Negative for abdominal pain and vomiting. All other systems reviewed and are negative. Positives and Pertinent negatives as per HPI. 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 No pertinent surgical history.       [address]       Discharge Medication List as of 9/8/2021  1:47 PM      CONTINUE these medications which have NOT CHANGED    Details   furosemide (LASIX) 20 MG tablet Take 1/2 tab daily by mouth, Disp-30 tablet, R-0Normal      metoprolol tartrate (LOPRESSOR) 25 MG tablet Take 1 tablet by mouth 2 times daily, Disp-60 tablet, R-3Normal      predniSONE (DELTASONE) 10 MG tablet 30 mg x 3 days, 20 mg x 3 days, 10 mg x 3 days then stop, Disp-18 tablet, R-0Normal      insulin glargine (BASAGLAR KWIKPEN) 100 UNIT/ML injection pen Inject 60 Units into the skin nightly, Disp-5 pen, R-5Normal      insulin lispro, 1 Unit Dial, (HUMALOG KWIKPEN) 100 UNIT/ML SOPN Inject 25 Units into the skin 3 times daily (before meals), Disp-22.5 mL, R-3Normal      metFORMIN (GLUCOPHAGE) 1000 MG tablet TAKE ONE TABLET BY MOUTH TWICE A DAY WITH MEALS, Disp-180 tablet, R-1Normal      benazepril (LOTENSIN) 40 MG tablet TAKE ONE TABLET BY MOUTH DAILY, Disp-30 tablet, R-3Normal               ALLERGIES     Decadron [dexamethasone], Methocarbamol, Trilyte [peg 3350-kcl-[id number] bicarb-nacl], Trulicity [dulaglutide], and Pcn [penicillins]    FAMILYHISTORY       Family History   Problem Relation Age of Onset    Diabetes Mother     High Blood Pressure Mother     Depression Mother     Depression Father     High Blood Pressure Father     Other Father         sleep apnea          SOCIAL HISTORY       Social History     Socioeconomic History    Marital status:      Spouse name: None    Number of children: None    Years of education: None    Highest education level: None   Occupational History    None   Tobacco Use    Smoking status: Never Smoker    Smokeless tobacco: Never Used   Vaping Use    Vaping Use: Never used   Substance and Sexual Activity    Alcohol use: No    Drug use: No    Sexual activity: Yes     Partners: Female   Other Topics Concern    None   Social History Narrative    None     Social Determinants of Health     Financial Resource Strain:     Difficulty of Paying Living Expenses:    Food Insecurity:     Worried About Running Out of Food in the Last Year:     Ran Out of Food in the Last Year:    Transportation Needs:     Lack of Transportation (Medical):      Lack of Transportation (Non-Medical):    Physical Activity:     Days of Exercise per Week:     Minutes of Exercise per Session:    Stress:     Feeling of Stress :    Social Connections:     Frequency of Communication with Friends and Family:     Frequency of Social Gatherings with Friends and Family:     Attends Christianity Services:     Active Member of Clubs or Organizations:     Attends Club or Organization Meetings:     Marital Status:    Intimate Partner Violence:     Fear of Current or Ex-Partner:     Emotionally Abused:     Physically Abused:     Sexually Abused:        SCREENINGS    [place] Coma Scale  Eye Opening: Spontaneous  Best Verbal Response: Oriented  Best Motor Response: Obeys commands  Roverto Coma Scale Score: 15        PHYSICAL EXAM    (up to 7 for level 4, 8 or more for level 5)     ED Triage Vitals [09/08/21 1009]   BP Temp Temp src Pulse Resp SpO2 Height Weight   (!) 133/94 97.2 °F (36.2 °C) -- 103 16 98 % 6' 4\" (1.93 m) (!) 316 lb (143.3 kg)       Physical Exam  Vitals and nursing note reviewed. Constitutional:       Appearance: He is well-developed. He is obese. He is not toxic-appearing. Interventions: Nasal cannula in place. HENT:      Head: Normocephalic and atraumatic. Mouth/Throat:      Mouth: Mucous membranes are moist.      Pharynx: Oropharynx is clear. No pharyngeal swelling or posterior oropharyngeal erythema. Eyes:      Conjunctiva/sclera: Conjunctivae normal.      Pupils: Pupils are equal, round, and reactive to light. Cardiovascular:      Rate and Rhythm: Regular rhythm. Tachycardia present. Pulses:           Dorsalis pedis pulses are 2+ on the right side and 2+ on the left side. Heart sounds: Normal heart sounds. Pulmonary:      Effort: Pulmonary effort is normal.      Breath sounds: No stridor. No wheezing, rhonchi or rales. Abdominal:      General: Bowel sounds are normal.      Palpations: Abdomen is soft. Abdomen is not rigid. Tenderness: There is no abdominal tenderness. There is no guarding or rebound. Comments: Mild edema lower abdomen, no erythema, nontender   Musculoskeletal:         General: Normal range of motion. Cervical back: Normal range of motion and neck supple. Right lower leg: Edema (3+) present. Left lower leg: Edema (3+) present. Comments: No calf tenderness   Skin:     General: Skin is warm and dry. Neurological:      Mental Status: He is alert and oriented to person, place, and time. GCS: GCS eye subscore is 4. GCS verbal subscore is 5. GCS motor subscore is 6. Cranial Nerves: No cranial nerve deficit. Sensory: No sensory deficit. Motor: No abnormal muscle tone.       Coordination: Coordination normal.   Psychiatric:         Speech: Speech normal.         Behavior: Behavior normal.         Thought Content:  Thought content normal.         DIAGNOSTIC RESULTS   LABS:    Labs Reviewed   CBC WITH AUTO DIFFERENTIAL - Abnormal; Notable for the following components:       Result Value    RDW 18.0 (*)     Platelets 59 (*)     All other components within normal limits    Narrative:     Performed at:  [facility] [address],  [employer]   Phone [phone]   COMPREHENSIVE METABOLIC PANEL W/ REFLEX TO MG FOR LOW K - Abnormal; Notable for the following components:    Glucose 185 (*)     BUN 6 (*)     CREATININE <0.5 (*)     Total Protein 5.8 (*)     Total Bilirubin 2.3 (*)     All other components within normal limits    Narrative:     Performed at:  [facility] [address],  [facility]   Phone [phone]   D-DIMER, QUANTITATIVE - Abnormal; Notable for the following components:    D-Dimer, Quant 278 (*)     All other components within normal limits    Narrative:     Performed at:  [place] [address],  [employer]   Phone [phone]   TROPONIN    Narrative:     Performed at:  [place] [address],  [employer]   Phone [phone]   BRAIN NATRIURETIC PEPTIDE    Narrative:     Performed at:  [facility] [address],  [employer]   Phone [phone]     Results for orders placed or performed during the hospital encounter of 09/08/21   CBC Auto Differential   Result Value Ref Range    WBC 7.1 4.0 - 11.0 K/uL    RBC 5.17 4.20 - 5.90 M/uL    Hemoglobin 15.7 13.5 - 17.5 g/dL    Hematocrit 46.7 40.5 - 52.5 %    MCV 90.4 80.0 - 100.0 fL    MCH 30.4 26.0 - 34.0 pg    MCHC 33.7 31.0 - 36.0 g/dL    RDW 18.0 (H) 12.4 - 15.4 %    Platelets 59 (L) 591 - 450 K/uL    MPV 7.8 5.0 - 10.5 fL    Neutrophils % 58.7 % Lymphocytes % 26.5 %    Monocytes % 11.4 %    Eosinophils % 2.2 %    Basophils % 1.2 %    Neutrophils Absolute 4.2 1.7 - 7.7 K/uL    Lymphocytes Absolute 1.9 1.0 - 5.1 K/uL    Monocytes Absolute 0.8 0.0 - 1.3 K/uL    Eosinophils Absolute 0.2 0.0 - 0.6 K/uL    Basophils Absolute 0.1 0.0 - 0.2 K/uL   Comprehensive Metabolic Panel w/ Reflex to MG   Result Value Ref Range    Sodium 140 136 - 145 mmol/L    Potassium reflex Magnesium 4.1 3.5 - 5.1 mmol/L    Chloride 106 99 - 110 mmol/L    CO2 24 21 - 32 mmol/L    Anion Gap 10 3 - 16    Glucose 185 (H) 70 - 99 mg/dL    BUN 6 (L) 7 - 20 mg/dL    CREATININE <0.5 (L) 0.9 - 1.3 mg/dL    GFR Non-African American >60 >60    GFR African American >60 >60    Calcium 9.0 8.3 - 10.6 mg/dL    Total Protein 5.8 (L) 6.4 - 8.2 g/dL    Albumin 3.9 3.4 - 5.0 g/dL    Albumin/Globulin Ratio 2.1 1.1 - 2.2    Total Bilirubin 2.3 (H) 0.0 - 1.0 mg/dL    Alkaline Phosphatase 62 40 - 129 U/L    ALT 34 10 - 40 U/L    AST 34 15 - 37 U/L    Globulin 1.9 g/dL   Troponin   Result Value Ref Range    Troponin <0.01 <0.01 ng/mL   Brain Natriuretic Peptide   Result Value Ref Range    Pro-BNP 83 0 - 124 pg/mL   D-dimer, quantitative   Result Value Ref Range    D-Dimer, Quant 278 (H) 0 - 229 ng/mL DDU   EKG 12 Lead   Result Value Ref Range    Ventricular Rate 101 BPM    Atrial Rate 101 BPM    P-R Interval 124 ms    QRS Duration 90 ms    Q-T Interval 354 ms    QTc Calculation (Bazett) 459 ms    P Axis 49 degrees    R Axis 5 degrees    T Axis 16 degrees    Diagnosis       Sinus tachycardiaNonspecific ST abnormalityWhen compared with ECG of 06-AUG-2021 09:58,Nonspecific T wave abnormality has replaced inverted T waves in Inferior leadsConfirmed by GIRMA Hernandez MD ([id number]) on 9/8/2021 2:43:52 PM       All other labs were within normal range or not returned as of this dictation. EKG:  All EKG's are interpreted by the Emergency Department Physician in the absence of a cardiologist.  Please see their note for interpretation of EKG. RADIOLOGY:   Non-plain film images such as CT, Ultrasound and MRI are read by the radiologist. Plain radiographic images are visualized andpreliminarily interpreted by the  ED Provider with the below findings:        Interpretation perthe Radiologist below, if available at the time of this note:    CT CHEST PULMONARY EMBOLISM W CONTRAST   Final Result   1. Improved with residual bilateral interstitial thickening likely due to   resolving atypical interstitial pneumonia. 2. Cirrhosis with stigmata of portal venous hypertension including new small   amount of ascites. XR CHEST PORTABLE   Final Result   Multifocal airspace disease, improved from the prior exam.  Continued   follow-up to resolution is recommended. XR CHEST PORTABLE    Result Date: 9/8/2021  EXAMINATION: ONE XRAY VIEW OF THE CHEST 9/8/2021 10:39 am COMPARISON: Chest x-ray dated 08/02/2021. HISTORY: ORDERING SYSTEM PROVIDED HISTORY: sob TECHNOLOGIST PROVIDED HISTORY: Reason for exam:->sob Reason for Exam: sob Acuity: Acute Type of Exam: Initial FINDINGS: HEART/MEDIASTINUM: The cardiomediastinal silhouette is within normal limits. PLEURA/LUNGS: There are low lung volumes. There is vascular crowding. There is improvement of the multifocal airspace disease, there are still persistent subtle opacities. There are no pleural effusions. There is no appreciable pneumothorax. BONES/SOFT TISSUE: No acute abnormality. Multifocal airspace disease, improved from the prior exam.  Continued follow-up to resolution is recommended. CT CHEST PULMONARY EMBOLISM W CONTRAST    Result Date: 9/8/2021  EXAMINATION: CTA OF THE CHEST 9/8/2021 12:45 pm TECHNIQUE: CTA of the chest was performed after the administration of intravenous contrast.  Multiplanar reformatted images are provided for review. MIP images are provided for review.  Dose modulation, iterative reconstruction, and/or weight based adjustment of the mA/kV was utilized to reduce the radiation dose to as low as reasonably achievable. COMPARISON: 09/08/2021, 08/29/2021 and 03/21/2019 HISTORY: ORDERING SYSTEM PROVIDED HISTORY: elevated d dimer, recent COVID, hypoxia TECHNOLOGIST PROVIDED HISTORY: Reason for exam:->elevated d dimer, recent COVID, hypoxia Decision Support Exception - unselect if not a suspected or confirmed emergency medical condition->Emergency Medical Condition (MA) Reason for Exam: History of Covid, released from hospital beginning of August. Acuity: Chronic Type of Exam: Subsequent/Follow-up FINDINGS: Pulmonary Arteries: Motion artifact degrades image quality. There is no acute pulmonary thromboembolus. Mediastinum: Coronary artery calcifications are a marker of atherosclerosis. There are no enlarged thoracic lymph nodes. Lungs/pleura: The airway is patent. There is no pneumothorax or pleural effusion. There is improved aeration of the bilateral lungs with residual interstitial thickening likely due to resolving atypical interstitial pneumonia. No change in the 9 mm solid right upper lobe pulmonary nodule, no follow-up imaging is recommended. Upper Abdomen: The liver is nodular in contour consistent with cirrhosis. The spleen is enlarged measuring 19 cm in length. There is a new small amount of ascites within the upper abdomen. Soft Tissues/Bones: Degenerative changes involve the thoracic spine. There is bilateral gynecomastia, right greater than left. 1. Improved with residual bilateral interstitial thickening likely due to resolving atypical interstitial pneumonia. 2. Cirrhosis with stigmata of portal venous hypertension including new small amount of ascites. PROCEDURES   Unless otherwise noted below, none     Procedures    CRITICAL CARE TIME   Critical care provided for this patient of which 0 min were spend on critical care and decision making. 0 min spent on procedures .  There was imminent failure of an organ system which required critical intervention to prevent clinically significant progression of life threatening deterioration of the patient's condition to the point of disability or death. CONSULTS:  None      EMERGENCY DEPARTMENT COURSE and DIFFERENTIAL DIAGNOSIS/MDM:   Vitals:    Vitals:    09/08/21 1009 09/08/21 1408   BP: (!) 133/94 (!) 146/98   Pulse: 103 95   Resp: 16 16   Temp: 97.2 °F (36.2 °C)    SpO2: 98% 96%   Weight: (!) 316 lb (143.3 kg)    Height: 6' 4\" (1.93 m)        Patient was given thefollowing medications:  Medications   iopamidol (ISOVUE-370) 76 % injection 75 mL (100 mLs IntraVENous Given 9/8/21 1303)         ED course  Patient presented to the ER for evaluation of bilateral leg swelling and shortness of breath concern for congestive heart failure. No history of congestive heart failure. He is currently on nasal cannula oxygen since discharged home after recent Covid 19 infection wearing 2 L. On initial exam he had mild tachycardia rate 104. No wheezes rales or rhonchi. Denies any chest pain. Bilateral leg edema is symmetric. No calf tenderness. Work-up was obtained. Troponin is normal.  BNP is 83. Chest x-ray showing a focal airspace disease improved from previous. D-dimer was mildly elevated at 278 and CTPA was obtained negative for pulmonary embolism. CT showing improved bilateral interstitial thickening from recent infection. Also noted incidental finding liver cirrhosis small amount of ascites. No abdominal pain or tenderness. Bilirubin 2.3 otherwise LFTs are unremarkable. Patient is stable. Appropriate for discharge home. Tachycardia resolved while here. Oxygen saturation 96%. Normal respirations. Not in CHF. He will be discharged to follow-up with his doctor and return to the ER for any worsening symptoms.         I estimate there is LOW risk for PULMONARY EMBOLISM, DEEP VENOUS THROMBOSIS, ACUTE CONGESTIVE HEART FAILURE,  PNEUMOTHORAX, STATUS ASTHMATICUS, OR ACUTE CORONARY SYNDROME, thus I consider the discharge disposition reasonable. FINAL IMPRESSION      1. Shortness of breath    2. Personal history of COVID-19    3. On home oxygen therapy    4. Bilateral leg edema    5.  Liver, cirrhosis, portal ([address]          DISPOSITION/PLAN   DISPOSITION     PATIENT REFERREDTO:  ZeldadorotamatildaCOLIN - CNP  [address] [facility] [address]  [phone]    Call in 1 day  Call to arrange follow-up appointment from ER visit    [place] [facility]  [address]  [phone]    If symptoms worsen      DISCHARGE MEDICATIONS:  Discharge Medication List as of 9/8/2021  1:47 PM          DISCONTINUED MEDICATIONS:  Discharge Medication List as of 9/8/2021  1:47 PM                 (Please note that portions ofthis note were completed with a voice recognition program.  Efforts were made to edit the dictations but occasionally words are mis-transcribed.)    Deja Aguilera PA-C (electronically signed)            Deshawn Reid PA-C  09/09/21 0866

## 2021-09-14 ENCOUNTER — TELEPHONE (OUTPATIENT)
Dept: CARDIOLOGY CLINIC | Age: 41
End: 2021-09-14

## 2021-09-14 ENCOUNTER — OFFICE VISIT (OUTPATIENT)
Dept: CARDIOLOGY CLINIC | Age: 41
End: 2021-09-14
Payer: COMMERCIAL

## 2021-09-14 VITALS
SYSTOLIC BLOOD PRESSURE: 136 MMHG | HEIGHT: 76 IN | BODY MASS INDEX: 37.52 KG/M2 | DIASTOLIC BLOOD PRESSURE: 88 MMHG | OXYGEN SATURATION: 95 % | WEIGHT: 308.12 LBS | HEART RATE: 108 BPM | TEMPERATURE: 98.6 F

## 2021-09-14 DIAGNOSIS — R00.0 WIDE-COMPLEX TACHYCARDIA: Primary | ICD-10-CM

## 2021-09-14 DIAGNOSIS — R60.0 LOWER LEG EDEMA: ICD-10-CM

## 2021-09-14 DIAGNOSIS — R06.02 SOB (SHORTNESS OF BREATH): ICD-10-CM

## 2021-09-14 PROCEDURE — 1036F TOBACCO NON-USER: CPT | Performed by: INTERNAL MEDICINE

## 2021-09-14 PROCEDURE — 93270 REMOTE 30 DAY ECG REV/REPORT: CPT | Performed by: INTERNAL MEDICINE

## 2021-09-14 PROCEDURE — 99214 OFFICE O/P EST MOD 30 MIN: CPT | Performed by: INTERNAL MEDICINE

## 2021-09-14 PROCEDURE — G8417 CALC BMI ABV UP PARAM F/U: HCPCS | Performed by: INTERNAL MEDICINE

## 2021-09-14 PROCEDURE — G8427 DOCREV CUR MEDS BY ELIG CLIN: HCPCS | Performed by: INTERNAL MEDICINE

## 2021-09-14 RX ORDER — ASPIRIN 81 MG/1
81 TABLET ORAL DAILY
Qty: 30 TABLET | Refills: 5 | COMMUNITY
Start: 2021-09-14

## 2021-09-14 RX ORDER — ATORVASTATIN CALCIUM 40 MG/1
40 TABLET, FILM COATED ORAL DAILY
Qty: 30 TABLET | Refills: 11 | Status: SHIPPED | OUTPATIENT
Start: 2021-09-14 | End: 2022-10-06

## 2021-09-14 RX ORDER — FUROSEMIDE 20 MG/1
TABLET ORAL
Qty: 30 TABLET | Refills: 3 | Status: SHIPPED | OUTPATIENT
Start: 2021-09-14 | End: 2021-11-24

## 2021-09-14 NOTE — TELEPHONE ENCOUNTER
Monitor placed by Pritesh [facility] [employer]  Length of monitor 30 day  Monitor ordered by [facility]  Serial number [id number]  Kit ID   Activation successful prior to pt leaving office?  Yes

## 2021-09-14 NOTE — PATIENT INSTRUCTIONS
Plan:  Increase Lasix to 20 mg daily- full pill   Echocardiogram   Stress test- [facility]   30 day Cardiac event monitor   Cardiac medications reviewed including indications and pertinent side effects    Check blood pressure and heart rate at home a few times per week- keep a log with dates and times and bring to office visit   Regular exercise and following a healthy diet encouraged   Follow up with me in 8 weeks   Follow up with PCP as scheduled

## 2021-09-14 NOTE — PROGRESS NOTES
[address]   Cardiac Followup    Referring Provider: Andrea Ayala, APRN - CNP     Chief Complaint   Patient presents with    New Patient    Edema     abdomen, ankles and legs    Other     still short of breath due to COVID recovery      Rosalinda Arroyo   1980      History of Present Illness:    Rosalinda Arroyo is a 35 y.o. male who is here today for hospital follow up for shortness of breath and VT. He has a past medical history of coronary artery disease- noted on CT chest, hypertension, OWEN, and  idiopathic thrombocytopenia. He was admitted to the hospital in 8/2021 due to cough with shortness of breath. He was found to be COVID- 19 positive with a pulmonary infection. Patient was noted to have one minute of wide complex tachycardia- rate 170 bpm.    Today he states he continues to have SOB that is slowly getting better. He states he is scheduled with [place] [id number] pulmonology with the end of October- soonest he could be seen. No chest pain. He is slowly trying to increase his activity but continues to have some fatigue. He has been having leg and abdominal edema and was started on Lasix 20 mg, half a tablet daily, by PCP. He is tolerating his medications and taking them as prescribed. Patient currently denies any weight gain, edema, palpitations, chest pain, dizziness, and syncope. Past Medical History:   has a past medical history of Arthritis, Chronic lumbar pain, COVID-19, DDD (degenerative disc disease), lumbar, Depression, Glucose intolerance (impaired glucose tolerance), History of ITP, Hypertension, Idiopathic thrombocytopenic purpura ([address] OWEN (nonalcoholic steatohepatitis), and Type 2 diabetes mellitus without complication, without long-term current use of insulin ([facility] [address] Surgical History:   has no past surgical history on file. Social History:   reports that he has never smoked.  He has never used smokeless tobacco. He reports that he does not drink alcohol and does not use drugs. Family History:  family history includes Depression in his father and mother; Diabetes in his mother; High Blood Pressure in his father and mother; Other in his father. Home Medications:  Prior to Admission medications    Medication Sig Start Date End Date Taking? Authorizing Provider   furosemide (LASIX) 20 MG tablet Take 1/2 tab daily by mouth 8/26/21  Yes COLIN Bella CNP   insulin glargine [facility]) 100 UNIT/ML injection pen Inject 60 Units into the skin nightly 7/6/21  Yes COLIN Bella CNP   insulin lispro, 1 Unit Dial, (HUMALOG KWIKPEN) 100 UNIT/ML SOPN Inject 25 Units into the skin 3 times daily (before meals) 7/6/21 11/3/21 Yes COLIN Bella CNP   benazepril (LOTENSIN) 40 MG tablet TAKE ONE TABLET BY MOUTH DAILY 5/10/21  Yes COLIN Bella CNP   metoprolol tartrate (LOPRESSOR) 25 MG tablet Take 1 tablet by mouth 2 times daily  Patient not taking: Reported on 8/26/2021 8/8/21   Orlando Rushing MD   predniSONE (DELTASONE) 10 MG tablet 30 mg x 3 days, 20 mg x 3 days, 10 mg x 3 days then stop  Patient not taking: Reported on 8/26/2021 8/8/21   Orlando Rushing MD   metFORMIN (GLUCOPHAGE) 1000 MG tablet TAKE ONE TABLET BY MOUTH TWICE A DAY WITH MEALS  Patient not taking: Reported on 9/14/2021 6/15/21   COLIN Bella CNP        Allergies:  Decadron [dexamethasone], Methocarbamol, Trilyte [peg 3350-kcl-na bicarb-nacl], Trulicity [dulaglutide], and Pcn [penicillins]     Review of Systems:   · Constitutional: there has been no unanticipated weight loss. There's been no change in energy level, sleep pattern, or activity level. · Eyes: No visual changes or diplopia. No scleral icterus. · ENT: No Headaches, hearing loss or vertigo. No mouth sores or sore throat. · Cardiovascular: Reviewed in HPI  · Respiratory: No cough or wheezing, no sputum production. No hematemesis.     · Gastrointestinal: No abdominal pain, appetite loss, blood in stools. No change in bowel or bladder habits. · Genitourinary: No dysuria, trouble voiding, or hematuria. · Musculoskeletal:  No gait disturbance, weakness or joint complaints. · Integumentary: No rash or pruritis. · Neurological: No headache, diplopia, change in muscle strength, numbness or tingling. No change in gait, balance, coordination, mood, affect, memory, mentation, behavior. · Psychiatric: No anxiety, no depression. · Endocrine: No malaise, fatigue or temperature intolerance. No excessive thirst, fluid intake, or urination. No tremor. · Hematologic/Lymphatic: No abnormal bruising or bleeding, blood clots or swollen lymph nodes. · Allergic/Immunologic: No nasal congestion or hives. Physical Examination:    Vitals:    09/14/21 1206   BP: 136/88   Pulse: 108   Temp: 98.6 °F (37 °C)   SpO2: 95%        Constitutional and General Appearance: NAD   Respiratory:  · Normal excursion and expansion without use of accessory muscles  · Resp Auscultation: Normal breath sounds without dullness  Cardiovascular:  · The apical impulses not displaced  · Heart tones are crisp and normal  · Cervical veins are not engorged  · The carotid upstroke is normal in amplitude and contour without delay or bruit  · Normal S1S2, No S3, No Murmur  · Peripheral pulses are symmetrical and full  · There is no clubbing, cyanosis of the extremities. · No edema  · Femoral Arteries: 2+ and equal  · Pedal Pulses: 2+ and equal   Abdomen:  · No masses or tenderness  · Liver/Spleen: No Abnormalities Noted  Neurological/Psychiatric:  · Alert and oriented in all spheres  · Moves all extremities well  · Exhibits normal gait balance and coordination  · No abnormalities of mood, affect, memory, mentation, or behavior are noted    CT chest 9/8/2021    Mediastinum: Coronary artery calcifications are a marker of atherosclerosis. There are no enlarged thoracic lymph nodes   .  Improved with residual bilateral interstitial thickening likely due to resolving atypical interstitial pneumonia. 2. Cirrhosis with stigmata of portal venous hypertension including new small amount of ascites. EKG 9/8/2021  Sinus tachycardia  Nonspecific ST abnormality  When compared with ECG of 06-AUG-2021 09:58,  Nonspecific T wave abnormality has replaced inverted T waves in Inferior leads    Assessment:   SOB - suspect residual from covid dx 8/2/21  Coronary artery disease- noted on CT chest 9/8/2021. New. Could be contributing to symptoms   Wide complex tachycardia in hospital when ill w/ covid  Abnormal EKG - reviewed w/ pt   COVID-19 infection  8/2/2021  Hypertension - controlled   Diabetes mellitus   History of idiopathic thrombocytopenia    History of OWEN   Family History of heart diease in mother   Edema - BNP normal. No other sign CHF      Plan:  Asprin 81 mg daily   Lipitor 40 mg daily  Increase Lasix to 20 mg daily- full pill   Echocardiogram   Stress test- [facility]   30 day Cardiac event monitor   Cardiac medications reviewed including indications and pertinent side effects    Check blood pressure and heart rate at home a few times per week- keep a log with dates and times and bring to office visit   Regular exercise and following a healthy diet encouraged   Follow up with me in 8 weeks   Follow up with PCP as scheduled     Scribe's attestation: This note was scribed in the presence of Dr. Carla Callahan M.D. By Duncan Nguyen RN    The scribes documentation has been prepared under my direction and personally reviewed by me in its entirety. I confirm that the note above accurately reflects all work, treatment, procedures, and medical decision making performed by me. Dr. Carla Callahan MD        Thank you for allowing me to participate in the care of this individual.      Najma Darling. Kody Najera M.D., [facility]    Addendum 9/27/21  I spoke with patient. He is unable to walk on treadmill long enough to achieve target heart rate. Will pursue peer to peer for approval of stress test.

## 2021-09-14 NOTE — LETTER
Nancy Bentley    1980    [address]   Cardiac Followup    Referring Provider: COLIN Zhang - DEEJAY     Chief Complaint   Patient presents with    New Patient    Edema     abdomen, ankles and legs    Other     still short of breath due to COVID recovery      Nancy Bentley   1980      History of Present Illness:    Nancy Bentley is a 35 y.o. male who is here today for hospital follow up for shortness of breath and VT. He has a past medical history of coronary artery disease- noted on CT chest, hypertension, OWEN, and  idiopathic thrombocytopenia. He was admitted to the hospital in 8/2021 due to cough with shortness of breath. He was found to be COVID- 19 positive with a pulmonary infection. Patient was noted to have one minute of wide complex tachycardia- rate 170 bpm.    Today he states he continues to have SOB that is slowly getting better. He states he is scheduled with [facility] pulmonology with the end of October- soonest he could be seen. No chest pain. He is slowly trying to increase his activity but continues to have some fatigue. He has been having leg and abdominal edema and was started on Lasix 20 mg, half a tablet daily, by PCP. He is tolerating his medications and taking them as prescribed. Patient currently denies any weight gain, edema, palpitations, chest pain, dizziness, and syncope. Past Medical History:   has a past medical history of Arthritis, Chronic lumbar pain, COVID-19, DDD (degenerative disc disease), lumbar, Depression, Glucose intolerance (impaired glucose tolerance), History of ITP, Hypertension, Idiopathic thrombocytopenic purpura ([facility] [address] OWEN (nonalcoholic steatohepatitis), and Type 2 diabetes mellitus without complication, without long-term current use of insulin ([facility] [address] Surgical History:   has no past surgical history on file. Social History:   reports that he has never smoked.  He has never used smokeless tobacco. He reports that he does not drink alcohol and does not use drugs. Family History:  family history includes Depression in his father and mother; Diabetes in his mother; High Blood Pressure in his father and mother; Other in his father. Home Medications:  Prior to Admission medications    Medication Sig Start Date End Date Taking? Authorizing Provider   furosemide (LASIX) 20 MG tablet Take 1/2 tab daily by mouth 8/26/21  Yes COLIN Damian CNP   insulin glargine [facility]) 100 UNIT/ML injection pen Inject 60 Units into the skin nightly 7/6/21  Yes COLIN Damian CNP   insulin lispro, 1 Unit Dial, (HUMALOG KWIKPEN) 100 UNIT/ML SOPN Inject 25 Units into the skin 3 times daily (before meals) 7/6/21 11/3/21 Yes COLIN Damian CNP   benazepril (LOTENSIN) 40 MG tablet TAKE ONE TABLET BY MOUTH DAILY 5/10/21  Yes COLIN Damian CNP   metoprolol tartrate (LOPRESSOR) 25 MG tablet Take 1 tablet by mouth 2 times daily  Patient not taking: Reported on 8/26/2021 8/8/21   Saad Erwin MD   predniSONE (DELTASONE) 10 MG tablet 30 mg x 3 days, 20 mg x 3 days, 10 mg x 3 days then stop  Patient not taking: Reported on 8/26/2021 8/8/21   Saad Erwin MD   metFORMIN (GLUCOPHAGE) 1000 MG tablet TAKE ONE TABLET BY MOUTH TWICE A DAY WITH MEALS  Patient not taking: Reported on 9/14/2021 6/15/21   COLIN Damian CNP        Allergies:  Decadron [dexamethasone], Methocarbamol, Trilyte [peg 3350-kcl-na bicarb-nacl], Trulicity [dulaglutide], and Pcn [penicillins]     Review of Systems:   · Constitutional: there has been no unanticipated weight loss. There's been no change in energy level, sleep pattern, or activity level. · Eyes: No visual changes or diplopia. No scleral icterus. · ENT: No Headaches, hearing loss or vertigo. No mouth sores or sore throat. · Cardiovascular: Reviewed in HPI  · Respiratory: No cough or wheezing, no sputum production. No hematemesis. · Gastrointestinal: No abdominal pain, appetite loss, blood in stools. No change in bowel or bladder habits. · Genitourinary: No dysuria, trouble voiding, or hematuria. · Musculoskeletal:  No gait disturbance, weakness or joint complaints. · Integumentary: No rash or pruritis. · Neurological: No headache, diplopia, change in muscle strength, numbness or tingling. No change in gait, balance, coordination, mood, affect, memory, mentation, behavior. · Psychiatric: No anxiety, no depression. · Endocrine: No malaise, fatigue or temperature intolerance. No excessive thirst, fluid intake, or urination. No tremor. · Hematologic/Lymphatic: No abnormal bruising or bleeding, blood clots or swollen lymph nodes. · Allergic/Immunologic: No nasal congestion or hives. Physical Examination:    Vitals:    09/14/21 1206   BP: 136/88   Pulse: 108   Temp: 98.6 °F (37 °C)   SpO2: 95%        Constitutional and General Appearance: NAD   Respiratory:  · Normal excursion and expansion without use of accessory muscles  · Resp Auscultation: Normal breath sounds without dullness  Cardiovascular:  · The apical impulses not displaced  · Heart tones are crisp and normal  · Cervical veins are not engorged  · The carotid upstroke is normal in amplitude and contour without delay or bruit  · Normal S1S2, No S3, No Murmur  · Peripheral pulses are symmetrical and full  · There is no clubbing, cyanosis of the extremities. · No edema  · Femoral Arteries: 2+ and equal  · Pedal Pulses: 2+ and equal   Abdomen:  · No masses or tenderness  · Liver/Spleen: No Abnormalities Noted  Neurological/Psychiatric:  · Alert and oriented in all spheres  · Moves all extremities well  · Exhibits normal gait balance and coordination  · No abnormalities of mood, affect, memory, mentation, or behavior are noted    CT chest 9/8/2021    Mediastinum: Coronary artery calcifications are a marker of atherosclerosis. There are no enlarged thoracic lymph nodes   . Improved with residual bilateral interstitial thickening likely due to resolving atypical interstitial pneumonia. 2. Cirrhosis with stigmata of portal venous hypertension including new small amount of ascites. EKG 9/8/2021  Sinus tachycardia  Nonspecific ST abnormality  When compared with ECG of 06-AUG-2021 09:58,  Nonspecific T wave abnormality has replaced inverted T waves in Inferior leads    Assessment:   SOB - suspect residual from covid dx 8/2/21  Coronary artery disease- noted on CT chest 9/8/2021. New. Could be contributing to symptoms   Wide complex tachycardia in hospital when ill w/ covid  Abnormal EKG - reviewed w/ pt   COVID-19 infection  8/2/2021  Hypertension - controlled   Diabetes mellitus   History of idiopathic thrombocytopenia    History of OWEN   Family History of heart diease in mother   Edema - BNP normal. No other sign CHF      Plan:  Asprin 81 mg daily   Lipitor 40 mg daily  Increase Lasix to 20 mg daily- full pill   Echocardiogram   Stress test- [employer]   30 day Cardiac event monitor   Cardiac medications reviewed including indications and pertinent side effects    Check blood pressure and heart rate at home a few times per week- keep a log with dates and times and bring to office visit   Regular exercise and following a healthy diet encouraged   Follow up with me in 8 weeks   Follow up with PCP as scheduled     Scribe's attestation: This note was scribed in the presence of Dr. Lenore Eng M.D. By Hector Cervantes RN    The scribes documentation has been prepared under my direction and personally reviewed by me in its entirety. I confirm that the note above accurately reflects all work, treatment, procedures, and medical decision making performed by me. Dr. Lenore Eng MD        Thank you for allowing me to participate in the care of this individual.      Jose Martin.  Patty Ramos M.D., [facility]

## 2021-09-17 ENCOUNTER — OFFICE VISIT (OUTPATIENT)
Dept: FAMILY MEDICINE CLINIC | Age: 41
End: 2021-09-17
Payer: COMMERCIAL

## 2021-09-17 VITALS
HEIGHT: 76 IN | SYSTOLIC BLOOD PRESSURE: 136 MMHG | OXYGEN SATURATION: 95 % | BODY MASS INDEX: 37.48 KG/M2 | HEART RATE: 105 BPM | WEIGHT: 307.8 LBS | DIASTOLIC BLOOD PRESSURE: 84 MMHG

## 2021-09-17 DIAGNOSIS — Z79.4 TYPE 2 DIABETES MELLITUS WITH HYPERGLYCEMIA, WITH LONG-TERM CURRENT USE OF INSULIN (HCC): Primary | ICD-10-CM

## 2021-09-17 DIAGNOSIS — E11.65 TYPE 2 DIABETES MELLITUS WITH HYPERGLYCEMIA, WITH LONG-TERM CURRENT USE OF INSULIN (HCC): Primary | ICD-10-CM

## 2021-09-17 DIAGNOSIS — B37.2: ICD-10-CM

## 2021-09-17 DIAGNOSIS — I10 ESSENTIAL HYPERTENSION: ICD-10-CM

## 2021-09-17 LAB — HBA1C MFR BLD: 6.7 %

## 2021-09-17 PROCEDURE — 3044F HG A1C LEVEL LT 7.0%: CPT | Performed by: NURSE PRACTITIONER

## 2021-09-17 PROCEDURE — G8427 DOCREV CUR MEDS BY ELIG CLIN: HCPCS | Performed by: NURSE PRACTITIONER

## 2021-09-17 PROCEDURE — 2022F DILAT RTA XM EVC RTNOPTHY: CPT | Performed by: NURSE PRACTITIONER

## 2021-09-17 PROCEDURE — 1036F TOBACCO NON-USER: CPT | Performed by: NURSE PRACTITIONER

## 2021-09-17 PROCEDURE — 99214 OFFICE O/P EST MOD 30 MIN: CPT | Performed by: NURSE PRACTITIONER

## 2021-09-17 PROCEDURE — 83036 HEMOGLOBIN GLYCOSYLATED A1C: CPT | Performed by: NURSE PRACTITIONER

## 2021-09-17 PROCEDURE — G8417 CALC BMI ABV UP PARAM F/U: HCPCS | Performed by: NURSE PRACTITIONER

## 2021-09-17 RX ORDER — BENAZEPRIL HYDROCHLORIDE 40 MG/1
TABLET, FILM COATED ORAL
Qty: 30 TABLET | Refills: 3 | Status: SHIPPED | OUTPATIENT
Start: 2021-09-17 | End: 2022-03-01

## 2021-09-17 RX ORDER — INSULIN GLARGINE 100 [IU]/ML
60 INJECTION, SOLUTION SUBCUTANEOUS NIGHTLY
Qty: 5 PEN | Refills: 5 | Status: SHIPPED
Start: 2021-09-17 | End: 2021-12-23

## 2021-09-17 NOTE — LETTER
[address]  Phone: [phone]  Fax: [phone]    COLIN Marcelo CNP        September 17, 2021     Patient: Ethelene Duane   YOB: 1980   Date of Visit: 9/17/2021       To Whom it May Concern:    Romi Andrews was seen in my clinic on 9/17/2021. He may return to work on October 1, 2021. If you have any questions or concerns, please don't hesitate to call.     Sincerely,         COLIN Arias - CNP

## 2021-09-17 NOTE — PROGRESS NOTES
Patient: Ricarda Brunner is a 35 y.o. male who presents today with the following Chief Complaint(s):  Chief Complaint   Patient presents with    Diabetes     3 month follow up         HPI-this is a 39-year-old male patient following up with his diabetes  He was recently hiked specialized with Covid-19 and he still has the side effects of a cough. He currently is under the care of cardiology and they are monitoring his heart with a heart monitor and I am afraid to give him any kind of a inhaler for the cough because I do not want to interfere with any of his results. I did ask him to call his cardiologist to question him to see if this would be feasible or not. He does have a history of diabetes and we did a POCT A1c today and pleasantly it was 6.7. I was very happy to see this and encouraged him to continue dieting and exercising and taking his medications  He also has a history of hypertension and he currently is taking Benzapril 40 mg tablet daily. His blood pressure today is controlled at 136/84. He denies no side effects from the medication and is doing well and is stable. I will be refilling his medication today  He also is complaining of a fungal infection around the webs of his finger where his ring is and I am going to give an antifungal cream for him. He is not really done anything to help himself with this issue  Current Outpatient Medications   Medication Sig Dispense Refill    insulin glargine (BASAGLAR KWIKPEN) 100 UNIT/ML injection pen Inject 60 Units into the skin nightly 5 pen 5    benazepril (LOTENSIN) 40 MG tablet TAKE ONE TABLET BY MOUTH DAILY 30 tablet 3    miconazole (MICOTIN) 2 % cream Apply topically 2 times daily.  For 2 weeks 113 g 1    atorvastatin (LIPITOR) 40 MG tablet Take 1 tablet by mouth daily 30 tablet 11    furosemide (LASIX) 20 MG tablet Take 1/2 tab daily by mouth 30 tablet 3    aspirin EC 81 MG EC tablet Take 1 tablet by mouth daily 30 tablet 5    insulin lispro, 1 Unit Dial, (HUMALOG KWIKPEN) 100 UNIT/ML SOPN Inject 25 Units into the skin 3 times daily (before meals) 22.5 mL 3    metFORMIN (GLUCOPHAGE) 1000 MG tablet TAKE ONE TABLET BY MOUTH TWICE A DAY WITH MEALS 180 tablet 1     No current facility-administered medications for this visit. Patient's past medical history, surgical history, family history, medications,  and allergies  were all reviewed and updated as appropriate today. Review of Systems   All other systems reviewed and are negative. Physical Exam  Vitals and nursing note reviewed. Constitutional:       Appearance: Normal appearance. He is well-developed. HENT:      Head: Normocephalic. Right Ear: Tympanic membrane, ear canal and external ear normal.      Left Ear: Tympanic membrane, ear canal and external ear normal.      Nose: Nose normal.      Mouth/Throat:      Mouth: Mucous membranes are moist.      Pharynx: No oropharyngeal exudate or posterior oropharyngeal erythema. Eyes:      Conjunctiva/sclera: Conjunctivae normal.   Cardiovascular:      Rate and Rhythm: Normal rate and regular rhythm. Heart sounds: Normal heart sounds. No murmur heard. Pulmonary:      Effort: Pulmonary effort is normal.      Breath sounds: Normal breath sounds. No wheezing. Abdominal:      General: Bowel sounds are normal.      Palpations: Abdomen is soft. There is no mass. Musculoskeletal:         General: Normal range of motion. Cervical back: Normal range of motion and neck supple. Feet:      Right foot:      Protective Sensation: 10 sites tested. 9 sites sensed. Left foot:      Protective Sensation: 10 sites tested. 9 sites sensed. Lymphadenopathy:      Cervical: No cervical adenopathy. Skin:     General: Skin is warm and dry. Neurological:      General: No focal deficit present. Mental Status: He is alert and oriented to person, place, and time.    Psychiatric:         Mood and Affect: Mood normal. Behavior: Behavior normal.         Thought Content: Thought content normal.         Judgment: Judgment normal.       Vitals:    09/17/21 1541   BP: 136/84   Pulse: 105   SpO2: 95%       Assessment:  Encounter Diagnoses   Name Primary?  Type 2 diabetes mellitus with hyperglycemia, with long-term current use of insulin ([facility]) Yes    Essential hypertension     Candidiasis of finger web        Controlled SubstancesMonitoring:  NA    Plan:  1. Type 2 diabetes mellitus with hyperglycemia, with long-term current use of insulin ([facility])  Stable  - POCT glycosylated hemoglobin (Hb A1C)-6.7  - insulin glargine (BASAGLAR KWIKPEN) 100 UNIT/ML injection pen; Inject 60 Units into the skin nightly  Dispense: 5 pen; Refill: 5  - BENITO - Óscar Tao DPM, Podiatry, [place]  - Microalbumin / Creatinine Urine Ratio  Diabetic foot exam completed today  Note given for work to return October 1 he is hoping by then to feel better. He still having shortness of breath and this cough. 2. Essential hypertension  Stable  - benazepril (LOTENSIN) 40 MG tablet; TAKE ONE TABLET BY MOUTH DAILY  Dispense: 30 tablet; Refill: 3    3. Candidiasis of finger web  Problem  - miconazole (MICOTIN) 2 % cream; Apply topically 2 times daily. For 2 weeks  Dispense: 113 g; Refill: 1      COLIN Alexander - CNP, FNP    Reviewed treatment plan with patient. Patient verbalized understanding to treatment plan and questions were answered. [address] [facility] [address] [place].  [place], [address]

## 2021-09-18 LAB
CREATININE URINE: 376.2 MG/DL (ref 39–259)
MICROALBUMIN UR-MCNC: 2 MG/DL
MICROALBUMIN/CREAT UR-RTO: 5.3 MG/G (ref 0–30)

## 2021-09-23 ENCOUNTER — TELEPHONE (OUTPATIENT)
Dept: CARDIOLOGY CLINIC | Age: 41
End: 2021-09-23

## 2021-09-23 NOTE — TELEPHONE ENCOUNTER
Patient is currently wearing cardiac event monitor. Wide complex tachycardia in hospital when ill w/ covid. He still has a cough and PCP wants to prescribe inhaler. This will increase his HR. Is it ok for pt to take or will this mess things up.

## 2021-09-24 NOTE — TELEPHONE ENCOUNTER
Is okay for him to do so just as long as we know about.   If he is using an albuterol inhaler make sure he uses a spacer

## 2021-09-27 ENCOUNTER — TELEPHONE (OUTPATIENT)
Dept: ADMINISTRATIVE | Age: 41
End: 2021-09-27

## 2021-09-27 NOTE — TELEPHONE ENCOUNTER
The NM stress test ordered for this patient is being denied by insurance based upon the following:   - Based on [employer] Cardiac Imaging Guidelines Section(s): CD 1.4 Stress Testing with Imaging - Indications, we cannot approve this request. Your records show that you may have a problem with the blood vessels in your heart. The request cannot be approved because: You must have one of the following. -Findings on your current electrocardiogram or ECG (a tracing of your heart's electrical activity) that could make it hard to determine whether or not your heart is getting enough blood supply with exercise. -An inability to walk on a treadmill as much as needed to reach the target heart rate during an exercise treadmill test or ETT (an ECG and blood pressure check done before, during, and after walking on a treadmill). -A history of an ETT with results that were abnormal but did not appear to be due to a disease to the blood vessels that provide blood to your heart muscle (coronary artery disease). All available information in the patient's chart has been provided to [employer]. A peer to peer is available by calling [employer] to schedule at [phone] and referencing tracking # [id number] . Please let me know if you've any questions - thank you,.     [address] [facility] Authorization  Phone: [phone]

## 2021-09-28 ENCOUNTER — HOSPITAL ENCOUNTER (OUTPATIENT)
Dept: NON INVASIVE DIAGNOSTICS | Age: 41
Discharge: HOME | End: 2021-09-28
Payer: COMMERCIAL

## 2021-09-28 ENCOUNTER — HOSPITAL ENCOUNTER (OUTPATIENT)
Dept: NUCLEAR MEDICINE | Age: 41
Discharge: HOME | End: 2021-09-28
Payer: COMMERCIAL

## 2021-09-28 DIAGNOSIS — R00.0 WIDE-COMPLEX TACHYCARDIA: ICD-10-CM

## 2021-09-28 DIAGNOSIS — R06.02 SOB (SHORTNESS OF BREATH): ICD-10-CM

## 2021-09-28 LAB
LV EF: 52 %
LV EF: 58 %
LVEF MODALITY: NORMAL
LVEF MODALITY: NORMAL

## 2021-09-28 PROCEDURE — A9502 TC99M TETROFOSMIN: HCPCS | Performed by: INTERNAL MEDICINE

## 2021-09-28 PROCEDURE — 6360000002 HC RX W HCPCS: Performed by: INTERNAL MEDICINE

## 2021-09-28 PROCEDURE — 93306 TTE W/DOPPLER COMPLETE: CPT

## 2021-09-28 PROCEDURE — 78452 HT MUSCLE IMAGE SPECT MULT: CPT

## 2021-09-28 PROCEDURE — 93017 CV STRESS TEST TRACING ONLY: CPT

## 2021-09-28 PROCEDURE — 3430000000 HC RX DIAGNOSTIC RADIOPHARMACEUTICAL: Performed by: INTERNAL MEDICINE

## 2021-09-28 RX ADMIN — REGADENOSON 0.4 MG: 0.08 INJECTION, SOLUTION INTRAVENOUS at 11:32

## 2021-09-28 RX ADMIN — TETROFOSMIN 10.2 MILLICURIE: 1.38 INJECTION, POWDER, LYOPHILIZED, FOR SOLUTION INTRAVENOUS at 10:00

## 2021-09-28 RX ADMIN — TETROFOSMIN 33.7 MILLICURIE: 1.38 INJECTION, POWDER, LYOPHILIZED, FOR SOLUTION INTRAVENOUS at 11:31

## 2021-10-01 ENCOUNTER — TELEPHONE (OUTPATIENT)
Dept: FAMILY MEDICINE CLINIC | Age: 41
End: 2021-10-01

## 2021-10-01 NOTE — TELEPHONE ENCOUNTER
Received call from [facility] from Chyna Reynolds stating that she will be closing the file for case management for the Pt due to not being able to reach them.      Ema Renae from [facility]:   [phone]   Ex. [phone]

## 2021-10-06 ENCOUNTER — TELEPHONE (OUTPATIENT)
Dept: FAMILY MEDICINE CLINIC | Age: 41
End: 2021-10-06

## 2021-10-06 NOTE — TELEPHONE ENCOUNTER
Call from patients employer stating they received a letter to return to work from patient as of 10/1. Caller is inquiring if there are any restrictions for patients job. Patient has been wearing an \"oxygen meter\" per caller and he also wears a back pack for his job duties. He is a . Please advise if patient is able to do his duties.     Please call Myke Evans @ [phone]

## 2021-10-06 NOTE — LETTER
[address]  Phone: [phone]  Fax: [phone]    COLIN Weaver CNP        October 7, 2021     Patient: Bret Lewis   YOB: 1980   Date of Visit: 10/6/2021       To Whom It May Concern: It is my medical opinion that Richy Peace should remain out of work until he is reassessed again in the office on 10/13/2021. If you have any questions or concerns, please don't hesitate to call.     Sincerely,        COLIN Weaver CNP

## 2021-10-13 ENCOUNTER — VIRTUAL VISIT (OUTPATIENT)
Dept: FAMILY MEDICINE CLINIC | Age: 41
End: 2021-10-13
Payer: COMMERCIAL

## 2021-10-13 DIAGNOSIS — R06.2 WHEEZING: Primary | ICD-10-CM

## 2021-10-13 DIAGNOSIS — U09.9 POST COVID-19 CONDITION, UNSPECIFIED: ICD-10-CM

## 2021-10-13 DIAGNOSIS — R06.02 SHORTNESS OF BREATH: ICD-10-CM

## 2021-10-13 PROCEDURE — G8417 CALC BMI ABV UP PARAM F/U: HCPCS | Performed by: NURSE PRACTITIONER

## 2021-10-13 PROCEDURE — G8427 DOCREV CUR MEDS BY ELIG CLIN: HCPCS | Performed by: NURSE PRACTITIONER

## 2021-10-13 PROCEDURE — 1036F TOBACCO NON-USER: CPT | Performed by: NURSE PRACTITIONER

## 2021-10-13 PROCEDURE — G8484 FLU IMMUNIZE NO ADMIN: HCPCS | Performed by: NURSE PRACTITIONER

## 2021-10-13 PROCEDURE — 99213 OFFICE O/P EST LOW 20 MIN: CPT | Performed by: NURSE PRACTITIONER

## 2021-10-13 RX ORDER — ALBUTEROL SULFATE 90 UG/1
2 AEROSOL, METERED RESPIRATORY (INHALATION) EVERY 6 HOURS PRN
Qty: 18 G | Refills: 2 | Status: SHIPPED | OUTPATIENT
Start: 2021-10-13

## 2021-10-13 ASSESSMENT — ENCOUNTER SYMPTOMS
SHORTNESS OF BREATH: 1
COUGH: 1

## 2021-10-13 NOTE — PROGRESS NOTES
Patient: Natividad Santamaria is a 35 y.o. male who presents today with the following Chief Complaint(s):  Chief Complaint   Patient presents with    Post-COVID Symptoms     Breathing Problems   Consented to a virtual visit today      HPI--this is a 38-year-old male patient with post Covid symptoms  Still having shortness of breath along with a cough which is nonproductive. He sees pulmonology on Friday he was encouraged to follow through with this but I am ordering a inhaler for him today. 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 Light duty note was given and he is to return on October 18 after seeing the pulmonologist.  Current Outpatient Medications   Medication Sig Dispense Refill    metoprolol tartrate (LOPRESSOR) 25 MG tablet       albuterol sulfate HFA (VENTOLIN HFA) 108 (90 Base) MCG/ACT inhaler Inhale 2 puffs into the lungs every 6 hours as needed for Wheezing 18 g 2    insulin glargine (BASAGLAR KWIKPEN) 100 UNIT/ML injection pen Inject 60 Units into the skin nightly 5 pen 5    benazepril (LOTENSIN) 40 MG tablet TAKE ONE TABLET BY MOUTH DAILY 30 tablet 3    miconazole (MICOTIN) 2 % cream Apply topically 2 times daily. For 2 weeks 113 g 1    atorvastatin (LIPITOR) 40 MG tablet Take 1 tablet by mouth daily 30 tablet 11    furosemide (LASIX) 20 MG tablet Take 1/2 tab daily by mouth 30 tablet 3    aspirin EC 81 MG EC tablet Take 1 tablet by mouth daily 30 tablet 5    insulin lispro, 1 Unit Dial, (HUMALOG KWIKPEN) 100 UNIT/ML SOPN Inject 25 Units into the skin 3 times daily (before meals) 22.5 mL 3    metFORMIN (GLUCOPHAGE) 1000 MG tablet TAKE ONE TABLET BY MOUTH TWICE A DAY WITH MEALS 180 tablet 1     No current facility-administered medications for this visit.        Patient's past medical history, surgical history, family history, medications,  and allergies  were all reviewed and updated as appropriate today. Review of Systems   Respiratory: Positive for cough and shortness of breath. All other systems reviewed and are negative. Physical Exam  Nursing note reviewed. HENT:      Head: Normocephalic. Nose: Nose normal.      Mouth/Throat:      Mouth: Mucous membranes are moist.   Eyes:      Conjunctiva/sclera: Conjunctivae normal.   Pulmonary:      Effort: Pulmonary effort is normal.   Musculoskeletal:         General: Normal range of motion. Cervical back: Normal range of motion. Skin:     General: Skin is dry. Neurological:      Mental Status: He is alert and oriented to person, place, and time. Psychiatric:         Mood and Affect: Mood normal.         Behavior: Behavior normal.         Thought Content: Thought content normal.         Judgment: Judgment normal.       There were no vitals filed for this visit. Assessment:  Encounter Diagnoses   Name Primary?  Wheezing Yes    Shortness of breath     Post covid-19 condition, unspecified        Controlled SubstancesMonitoring:  NA    Plan:  1. Wheezing  Problem  - albuterol sulfate HFA (VENTOLIN HFA) 108 (90 Base) MCG/ACT inhaler; Inhale 2 puffs into the lungs every 6 hours as needed for Wheezing  Dispense: 18 g; Refill: 2    2. Shortness of breath  Problem  - albuterol sulfate HFA (VENTOLIN HFA) 108 (90 Base) MCG/ACT inhaler; Inhale 2 puffs into the lungs every 6 hours as needed for Wheezing  Dispense: 18 g; Refill: 2    3. Post covid-19 condition, unspecified  Problem  - albuterol sulfate HFA (VENTOLIN HFA) 108 (90 Base) MCG/ACT inhaler; Inhale 2 puffs into the lungs every 6 hours as needed for Wheezing  Dispense: 18 g; Refill: 2  Follow through with pulmonology on Friday  note given for light duty  Can go back to work on October 18    Cecilia Cheng, APRN - CNP, FNP    Reviewed treatment plan with patient.   Patient verbalized understanding to treatment plan and questions were answered. [address] [facility] [address]  [place], [address]

## 2021-10-19 PROCEDURE — 93272 ECG/REVIEW INTERPRET ONLY: CPT | Performed by: INTERNAL MEDICINE

## 2021-10-19 NOTE — TELEPHONE ENCOUNTER
Patient employer had questions for clinical staff regarding light duty and return to work.     Please advise       Liza Young [phone]

## 2021-10-20 ENCOUNTER — TELEPHONE (OUTPATIENT)
Dept: CARDIOLOGY CLINIC | Age: 41
End: 2021-10-20

## 2021-10-20 DIAGNOSIS — R00.0 WIDE-COMPLEX TACHYCARDIA: ICD-10-CM

## 2021-10-20 NOTE — TELEPHONE ENCOUNTER
Spoke with Alycia. Confirmed patient is on light duty until cleared to have restrictions lifted. Alycia stated he had an appointment coming up hoping he would have restrictions lifted. Informed Alycia we do not have definite date for him to return to normal duty. Alycia stated they would continue to be flexible with patient.

## 2021-10-21 ENCOUNTER — TELEPHONE (OUTPATIENT)
Dept: FAMILY MEDICINE CLINIC | Age: 41
End: 2021-10-21

## 2021-10-21 NOTE — TELEPHONE ENCOUNTER
----- Message from Kellie Barajas sent at 10/21/2021 10:54 AM EDT -----  Subject: Message to Provider    QUESTIONS  Information for Provider? Patient is calling to request that work   restriction be fully lifted so that he may return to work. Patient is   requesting the the letter be e-mailed to [email or web address].   ---------------------------------------------------------------------------  --------------  [address]  What is the best way for the office to contact you? OK to leave message on   voicemail  Preferred Call Back Phone Number? [phone]  ---------------------------------------------------------------------------  --------------  SCRIPT ANSWERS  Relationship to Patient?  Self

## 2021-10-21 NOTE — LETTER
[address]  Phone: [phone]  Fax: [phone]    COLIN Manzano CNP        October 21, 2021     Patient: Lucrecia Newman   YOB: 1980   Date of Visit: 10/21/2021       To Whom It May Concern: It is my medical opinion that Vernell White may return to full duty immediately with no restrictions. If you have any questions or concerns, please don't hesitate to call.     Sincerely,        [facility] COLIN Cheng CNP

## 2021-10-22 ENCOUNTER — TELEPHONE (OUTPATIENT)
Dept: CARDIOLOGY CLINIC | Age: 41
End: 2021-10-22

## 2021-10-27 ENCOUNTER — HOSPITAL ENCOUNTER (EMERGENCY)
Age: 41
Discharge: HOME | End: 2021-10-27
Payer: COMMERCIAL

## 2021-10-27 ENCOUNTER — APPOINTMENT (OUTPATIENT)
Dept: GENERAL RADIOLOGY | Age: 41
End: 2021-10-27
Payer: COMMERCIAL

## 2021-10-27 VITALS
OXYGEN SATURATION: 99 % | BODY MASS INDEX: 35.8 KG/M2 | DIASTOLIC BLOOD PRESSURE: 89 MMHG | HEIGHT: 76 IN | WEIGHT: 294 LBS | HEART RATE: 88 BPM | RESPIRATION RATE: 20 BRPM | SYSTOLIC BLOOD PRESSURE: 105 MMHG | TEMPERATURE: 97 F

## 2021-10-27 DIAGNOSIS — S82.892A CLOSED LEFT ANKLE FRACTURE, INITIAL ENCOUNTER: Primary | ICD-10-CM

## 2021-10-27 PROCEDURE — 29515 APPLICATION SHORT LEG SPLINT: CPT

## 2021-10-27 PROCEDURE — 73610 X-RAY EXAM OF ANKLE: CPT

## 2021-10-27 PROCEDURE — 6370000000 HC RX 637 (ALT 250 FOR IP): Performed by: PHYSICIAN ASSISTANT

## 2021-10-27 PROCEDURE — 99283 EMERGENCY DEPT VISIT LOW MDM: CPT

## 2021-10-27 RX ORDER — OXYCODONE HYDROCHLORIDE 5 MG/1
5 TABLET ORAL ONCE
Status: COMPLETED | OUTPATIENT
Start: 2021-10-27 | End: 2021-10-27

## 2021-10-27 RX ORDER — OXYCODONE HYDROCHLORIDE 5 MG/1
5 TABLET ORAL EVERY 6 HOURS PRN
Qty: 12 TABLET | Refills: 0 | Status: SHIPPED | OUTPATIENT
Start: 2021-10-27 | End: 2021-10-30

## 2021-10-27 RX ADMIN — OXYCODONE 5 MG: 5 TABLET ORAL at 16:23

## 2021-10-27 ASSESSMENT — PAIN DESCRIPTION - ORIENTATION: ORIENTATION: LEFT

## 2021-10-27 ASSESSMENT — PAIN SCALES - GENERAL: PAINLEVEL_OUTOF10: 9

## 2021-10-27 ASSESSMENT — ENCOUNTER SYMPTOMS: COLOR CHANGE: 0

## 2021-10-27 ASSESSMENT — PAIN DESCRIPTION - LOCATION: LOCATION: ANKLE

## 2021-10-27 ASSESSMENT — PAIN DESCRIPTION - DESCRIPTORS: DESCRIPTORS: SHARP

## 2021-10-27 ASSESSMENT — PAIN DESCRIPTION - PAIN TYPE: TYPE: ACUTE PAIN

## 2021-10-27 NOTE — Clinical Note
Christina Hardy was seen and treated in our emergency department on 10/27/2021. He may return to work on 10/28/2021. Restrictions of sitting job only until cleared by orthopedics     If you have any questions or concerns, please don't hesitate to call.       Clarisa Myers PA-C

## 2021-10-27 NOTE — ED PROVIDER NOTES
[address] ED  EMERGENCY DEPARTMENT ENCOUNTER        Pt Name: Maddison Elkins  MRN: [id number]  [place] 1980  Date of evaluation: 10/27/2021  Provider: Ady Ridley PA-C  PCP: COLIN Falcon CNP    Shared Visit or Autonomous Visit: TAE. I have evaluated this patient. My supervising physician was available for consultation. CHIEF COMPLAINT       Chief Complaint   Patient presents with    Ankle Injury     left ankle, slipped on wet grass, states heard ankle pop       HISTORY OF PRESENT ILLNESS   (Location/Symptom, Timing/Onset, Context/Setting, Quality, Duration, Modifying Factors, Severity)  Note limiting factors. Maddison Elkins is a 35 y.o. male presenting to the emergency department for evaluation of left ankle pain and injury. While working today he was walking slipped on wet grass twisted his ankle felt a pop has pain and swelling to the left lateral ankle. The history is provided by the patient. Ankle Problem  Location:  Ankle  Injury: yes    Ankle location:  L ankle  Pain details:     Onset quality:  Sudden  Chronicity:  New  Worsened by:  Bearing weight and activity  Associated symptoms: swelling    Associated symptoms: no fever and no numbness          Nursing Notes were reviewed    REVIEW OF SYSTEMS    (2-9 systems for level 4, 10 or more for level 5)     Review of Systems   Constitutional: Negative for fever. Musculoskeletal:        Left ankle pain   Skin: Negative for color change and wound. Positives and Pertinent negatives as per HPI.        PAST MEDICAL HISTORY     Past Medical History:   Diagnosis Date    Arthritis     left knee    Chronic lumbar pain     COVID-19 08/02/2021    DDD (degenerative disc disease), lumbar     MRI 2012    Depression     Glucose intolerance (impaired glucose tolerance)     History of ITP     Hypertension     [facility]ocytopen[facility] purpura ([facility]     OWEN (nonalcoholic steatohepatitis) 06/05/2021    Type 2 diabetes mellitus without complication, without long-term current use of insulin ([facility] [address] 08/30/2016         SURGICAL HISTORY   History reviewed. No pertinent surgical history. CURRENTMEDICATIONS       Previous Medications    ALBUTEROL SULFATE HFA (VENTOLIN HFA) 108 (90 BASE) MCG/ACT INHALER    Inhale 2 puffs into the lungs every 6 hours as needed for Wheezing    ASPIRIN EC 81 MG EC TABLET    Take 1 tablet by mouth daily    ATORVASTATIN (LIPITOR) 40 MG TABLET    Take 1 tablet by mouth daily    BENAZEPRIL (LOTENSIN) 40 MG TABLET    TAKE ONE TABLET BY MOUTH DAILY    FUROSEMIDE (LASIX) 20 MG TABLET    Take 1/2 tab daily by mouth    INSULIN GLARGINE (BASAGLAR KWIKPEN) 100 UNIT/ML INJECTION PEN    Inject 60 Units into the skin nightly    INSULIN LISPRO, 1 UNIT DIAL, (HUMALOG KWIKPEN) 100 UNIT/ML SOPN    Inject 25 Units into the skin 3 times daily (before meals)    METFORMIN (GLUCOPHAGE) 1000 MG TABLET    TAKE ONE TABLET BY MOUTH TWICE A DAY WITH MEALS    METOPROLOL TARTRATE (LOPRESSOR) 25 MG TABLET        MICONAZOLE (MICOTIN) 2 % CREAM    Apply topically 2 times daily.  For 2 weeks         ALLERGIES     Decadron [dexamethasone], Methocarbamol, Trilyte [peg [id number] bicarb-nacl], Trulicity [dulaglutide], and Pcn [penicillins]    FAMILYHISTORY       Family History   Problem Relation Age of Onset    Diabetes Mother     High Blood Pressure Mother     Depression Mother     Depression Father     High Blood Pressure Father     Other Father         sleep apnea          SOCIAL HISTORY       Social History     Socioeconomic History    Marital status:      Spouse name: None    Number of children: None    Years of education: None    Highest education level: None   Occupational History    None   Tobacco Use    Smoking status: Never Smoker    Smokeless tobacco: Never Used   Vaping Use    Vaping Use: Never used   Substance and Sexual Activity    Alcohol use: No    Drug use: No    Sexual activity: Yes     Partners: Female   Other Topics Concern    None   Social History Narrative    None     Social Determinants of Health     Financial Resource Strain:     Difficulty of Paying Living Expenses:    Food Insecurity:     Worried About Running Out of Food in the Last Year:     [address] in the Last Year:    Transportation Needs:     Lack of Transportation (Medical):  Lack of Transportation (Non-Medical):    Physical Activity:     Days of Exercise per Week:     Minutes of Exercise per Session:    Stress:     Feeling of Stress :    Social Connections:     Frequency of Communication with Friends and Family:     Frequency of Social Gatherings with Friends and Family:     Attends Faith Services:     Active Member of Clubs or Organizations:     Attends Club or Organization Meetings:     Marital Status:    Intimate Partner Violence:     Fear of Current or Ex-Partner:     Emotionally Abused:     Physically Abused:     Sexually Abused:        SCREENINGS             PHYSICAL EXAM    (up to 7 for level 4, 8 or more for level 5)     ED Triage Vitals [10/27/21 1423]   BP Temp Temp Source Pulse Resp SpO2 Height Weight   103/64 97 °F (36.1 °C) Temporal 85 16 98 % 6' 4\" (1.93 m) 294 lb (133.4 kg)       Physical Exam  Vitals and nursing note reviewed. Constitutional:       Appearance: He is well-developed. He is not ill-appearing or toxic-appearing. HENT:      Head: Normocephalic and atraumatic. Cardiovascular:      Pulses: Normal pulses. Dorsalis pedis pulses are 2+ on the left side. Posterior tibial pulses are 2+ on the left side. Pulmonary:      Effort: Pulmonary effort is normal.   Musculoskeletal:      Left ankle: Swelling present. No deformity or lacerations. Tenderness present. No base of 5th metatarsal or proximal fibula tenderness. Normal pulse. Legs:       Comments: No tenderness at the knee   Skin:     General: Skin is warm and dry.       Capillary Refill: Capillary refill takes less than 2 seconds. Neurological:      Mental Status: He is alert and oriented to person, place, and time. Sensory: Sensation is intact. Motor: Motor function is intact. No abnormal muscle tone. Psychiatric:         Behavior: Behavior normal.         DIAGNOSTIC RESULTS   LABS:    Labs Reviewed - No data to display    All other labs were within normal range or not returned as of this dictation. EKG: All EKG's are interpreted by the Emergency Department Physician in the absence of a cardiologist.  Please see their note for interpretation of EKG. RADIOLOGY:   Non-plain film images such as CT, Ultrasound and MRI are read by the radiologist. Plain radiographic images are visualized andpreliminarily interpreted by the  ED Provider with the below findings:        Interpretation perthe Radiologist below, if available at the time of this note:    XR ANKLE LEFT (MIN 3 VIEWS)   Final Result   1. Acute, minimally displaced, obliquely oriented distal fibular fracture. 2. Prominent calcaneal enthesopathy at the Achilles tendon insertion. XR ANKLE LEFT (MIN 3 VIEWS)    Result Date: 10/27/2021  EXAMINATION: THREE XRAY VIEWS OF THE LEFT ANKLE 10/27/2021 2:56 pm COMPARISON: None. HISTORY: ORDERING SYSTEM PROVIDED HISTORY: ankle injury TECHNOLOGIST PROVIDED HISTORY: Reason for exam:->ankle injury Reason for Exam: fell today now having ankle pain Acuity: Acute Type of Exam: Initial Initial evaluation, acute traumatic pain, left ankle FINDINGS: There is an acute, minimally displaced, obliquely oriented fracture of the distal fibula at the level of the syndesmosis. The ankle mortise is normal in alignment. There is no evidence of osteochondral lesion. Joint alignment is maintained. Joint spaces are maintained. No erosions are present. There is prominent calcaneal enthesopathy at the Achilles tendon insertion.      1. Acute, minimally displaced, obliquely oriented distal fibular fracture. 2. Prominent calcaneal enthesopathy at the Achilles tendon insertion. PROCEDURES   Unless otherwise noted below, none     Procedures    CRITICAL CARE TIME   N/A    CONSULTS:  None      EMERGENCY DEPARTMENT COURSE and DIFFERENTIAL DIAGNOSIS/MDM:   Vitals:    Vitals:    10/27/21 1423   BP: 103/64   Pulse: 85   Resp: 16   Temp: 97 °F (36.1 °C)   TempSrc: Temporal   SpO2: 98%   Weight: 294 lb (133.4 kg)   Height: 6' 4\" (1.93 m)       Patient was given thefollowing medications:  Medications   oxyCODONE (ROXICODONE) immediate release tablet 5 mg (5 mg Oral Given 10/27/21 1623)       ED course  Patient presented to the ER for evaluation of left ankle pain and injury after fall today. X-ray obtained showing minimally displaced oblique distal fibula fracture. Closed. Neurovascular intact. Splint applied by nurse. Patient re-evaluated afterwards and splint is in good position, intact sensation cap refill less than 2 seconds. Referred to orthopedics they will contact arrange close follow-up appointment. Advise nonweightbearing. Rest ice elevate oxycodone prescribed as needed for pain may also take Tylenol and ibuprofen. I estimate there is LOW risk for COMPARTMENT SYNDROME,  SEPTIC ARTHRITIS, TENDON OR NEUROVASCULAR INJURY, thus I consider the discharge disposition reasonable. FINAL IMPRESSION      1.  Closed left ankle fracture, initial encounter          DISPOSITION/PLAN   DISPOSITION Discharge - Pending Orders Complete    PATIENT REFERREDTO:  Jacob Chen MD  [place] [address] [address]  [phone]    Schedule an appointment as soon as possible for a visit   [facility] orthopedics call for follow-up appointment    [facility]  [address] [facility] [address] [place] [address]  [phone]    Schedule an appointment as soon as possible for a visit   For work-related injuries    [facility] ED  [address] [facility]

## 2021-10-27 NOTE — ED TRIAGE NOTES
Chief Complaint   Patient presents with    Ankle Injury     left ankle, slipped on wet grass, states heard ankle pop

## 2021-10-29 ENCOUNTER — OFFICE VISIT (OUTPATIENT)
Dept: ORTHOPEDIC SURGERY | Age: 41
End: 2021-10-29
Payer: COMMERCIAL

## 2021-10-29 VITALS — BODY MASS INDEX: 35.8 KG/M2 | HEIGHT: 76 IN | WEIGHT: 294 LBS

## 2021-10-29 DIAGNOSIS — R52 PAIN: Primary | ICD-10-CM

## 2021-10-29 DIAGNOSIS — S82.832A CLOSED FRACTURE OF DISTAL END OF LEFT FIBULA, UNSPECIFIED FRACTURE MORPHOLOGY, INITIAL ENCOUNTER: ICD-10-CM

## 2021-10-29 PROCEDURE — 99203 OFFICE O/P NEW LOW 30 MIN: CPT | Performed by: ORTHOPAEDIC SURGERY

## 2021-10-29 RX ORDER — TRAMADOL HYDROCHLORIDE 50 MG/1
50 TABLET ORAL EVERY 6 HOURS PRN
Qty: 20 TABLET | Refills: 0 | Status: SHIPPED | OUTPATIENT
Start: 2021-10-29 | End: 2021-11-03

## 2021-10-29 NOTE — PROGRESS NOTES
ORTHOPAEDIC SURGERY INITIAL EVALUATION NOTE  Chief Complaint   Patient presents with    New Patient     10/27 fell while working       HISTORY OF PRESENT ILLNESS:  78-year-old male presents for evaluation of a left ankle injury. He sustained the injury while at work. He slipped down a hill. He placed all of his weight on his left ankle and his ankle gave out on him. He experienced immediate pain and swelling to the ankle. He presented the emergency department where x-rays demonstrated a mildly displaced distal fibula fracture. He was placed into a well-padded splint. He was given orthopedic follow-up. Since that time, he has had moderate pain rated 7 out of 10 in severity. His pain is localizing to the lateral aspect of the ankle and does not radiate. He denies medial ankle pain. He has been compliant with nonweightbearing restrictions. He has been using Tylenol for pain, but feels this has not quite been managing it. He denies dysesthesias. He has been comfortable in the splint. Of note, the patient is diabetic with most recent hemoglobin A1c of 6.7. He also has some numbness at baseline to his lower extremities related to sciatica issues. Past Medical History:   Diagnosis Date    Arthritis     left knee    Chronic lumbar pain     COVID-19 08/02/2021    DDD (degenerative disc disease), lumbar     MRI 2012    Depression     Glucose intolerance (impaired glucose tolerance)     History of ITP     Hypertension     Idiopathic thrombocytopenic purpura ([facility] [address]     OWEN (nonalcoholic steatohepatitis) 06/05/2021    Type 2 diabetes mellitus without complication, without long-term current use of insulin ([facility]) 08/30/2016       Current Outpatient Medications   Medication Sig Dispense Refill    traMADol (ULTRAM) 50 MG tablet Take 1 tablet by mouth every 6 hours as needed for Pain for up to 5 days. Intended supply: 5 days.  Take lowest dose possible to manage pain 20 tablet 0    oxyCODONE (ROXICODONE) 5 MG immediate release tablet Take 1 tablet by mouth every 6 hours as needed for Pain for up to 3 days. Intended supply: 3 days. Take lowest dose possible to manage pain 12 tablet 0    metoprolol tartrate (LOPRESSOR) 25 MG tablet       albuterol sulfate HFA (VENTOLIN HFA) 108 (90 Base) MCG/ACT inhaler Inhale 2 puffs into the lungs every 6 hours as needed for Wheezing 18 g 2    insulin glargine (BASAGLAR KWIKPEN) 100 UNIT/ML injection pen Inject 60 Units into the skin nightly 5 pen 5    benazepril (LOTENSIN) 40 MG tablet TAKE ONE TABLET BY MOUTH DAILY 30 tablet 3    miconazole (MICOTIN) 2 % cream Apply topically 2 times daily. For 2 weeks 113 g 1    atorvastatin (LIPITOR) 40 MG tablet Take 1 tablet by mouth daily 30 tablet 11    furosemide (LASIX) 20 MG tablet Take 1/2 tab daily by mouth 30 tablet 3    aspirin EC 81 MG EC tablet Take 1 tablet by mouth daily 30 tablet 5    insulin lispro, 1 Unit Dial, (HUMALOG KWIKPEN) 100 UNIT/ML SOPN Inject 25 Units into the skin 3 times daily (before meals) 22.5 mL 3    metFORMIN (GLUCOPHAGE) 1000 MG tablet TAKE ONE TABLET BY MOUTH TWICE A DAY WITH MEALS 180 tablet 1     No current facility-administered medications for this visit. History reviewed. No pertinent surgical history.     Allergies   Allergen Reactions    Decadron [Dexamethasone] Other (See Comments)     Weak, lethargic    Methocarbamol Diarrhea    Trilyte [Peg 3350-Kcl-Na Bicarb-Nacl]     Trulicity [Dulaglutide]     Pcn [Penicillins] Hives and Rash       Family History   Problem Relation Age of Onset    Diabetes Mother     High Blood Pressure Mother     Depression Mother     Depression Father     High Blood Pressure Father     Other Father         sleep apnea       Social History     Socioeconomic History    Marital status:      Spouse name: Not on file    Number of children: Not on file    Years of education: Not on file    Highest education level: Not on file Occupational History    Not on file   Tobacco Use    Smoking status: Never Smoker    Smokeless tobacco: Never Used   Vaping Use    Vaping Use: Never used   Substance and Sexual Activity    Alcohol use: No    Drug use: No    Sexual activity: Yes     Partners: Female   Other Topics Concern    Not on file   Social History Narrative    Not on file     Social Determinants of Health     Financial Resource Strain:     Difficulty of Paying Living Expenses:    Food Insecurity:     Worried About Running Out of Food in the Last Year:     [address] in the Last Year:    Transportation Needs:     Lack of Transportation (Medical):  Lack of Transportation (Non-Medical):    Physical Activity:     Days of Exercise per Week:     Minutes of Exercise per Session:    Stress:     Feeling of Stress :    Social Connections:     Frequency of Communication with Friends and Family:     Frequency of Social Gatherings with Friends and Family:     Attends Orthodox Services:     Active Member of Clubs or Organizations:     Attends Club or Organization Meetings:     Marital Status:    Intimate Partner Violence:     Fear of Current or Ex-Partner:     Emotionally Abused:     Physically Abused:     Sexually Abused:      Review of Systems   Please see intake form dated 10/29/2021 for complete review of systems. PHYSICAL EXAM  Gen: awake, alert, oriented  HEENT: atraumatic, normocephalic  Neck: supple  Resp: equal chest rise bilaterally, no audible wheezes, no accessory muscle use. CV: Lower extremities warm and well perfused. Abd: soft, nontender   Focused examination of the left lower extremity:  The patient presents in a well-padded short leg splint. This was removed in order to examine the skin. There are no cuts, open wounds, or abrasions. There is diffuse moderate to severe swelling involving both malleoli. There is diffuse tenderness to palpation of this area.   Tenderness is most significant about the lateral ankle. There is minor medial ankle tenderness. No significant pain with syndesmosis squeeze test.  Sensation is intact to light touch in deep peroneal, superficial peroneal, tibial, sural, and saphenous nerve distributions. Motor function is intact to EHL, FHL, tibialis anterior, and gastroc. There is brisk capillary refill to the toes and a strong palpable dorsalis pedis pulse. Compartments are soft and compressible. There is no calf tenderness and a negative Homans' sign. LABS  Lab Results   Component Value Date/Time    LABA1C 6.7 09/17/2021 04:09 PM    LABALBU 3.9 09/08/2021 11:15 AM    INR 1.50 (H) 08/07/2021 05:33 AM      RADIOGRAPHIC EXAM:  3 views of the left ankle dated October 27, 2021 including AP, mortise, and lateral x-rays demonstrate oblique Lynn B distal fibula fracture in near-anatomic alignment. There is minor lateral displacement. The ankle mortise is congruent. There is no medial clear space widening. No evidence of syndesmosis disruption. There is significant calcaneal enthesopathy at the insertion of the Achilles. No talar dome lesions. There is minor degenerative changes. Repeat x-rays 3 views of the left ankle obtained today in clinic October 29, 2021 were reviewed. These demonstrated no change in alignment of previously noted Lynn B distal fibula fracture. No evidence of medial malleoli fracture. No medial clear space widening. Overall alignment is near-anatomic. There is less than 3 mm of step-off at the distal fibula. ASSESSEMENT AND PLAN    35 y.o. male with the following orthopaedic surgery problems:    1. Minimally displaced left Lynn B distal fibula fracture    I discussed the treatment options including operative and nonoperative. Given his minimal displaced nature, he will opt initially for nonoperative treatment. We will watch this closely. I recommended that he remain nonweightbearing in his splint.   He was returned to the splint and given his significant swelling. He will use ice and elevation to limit swelling. I did give him a short-term prescription of tramadol for pain control to use mostly at night with difficulty sleeping. He will return to work on sedentary basis at this time. He will return to clinic in 2 weeks for repeat evaluation with new x-rays at that time. Anticipated 6 to 8 weeks until full weightbearing. I did indicate to him that if further displacement occurs, operative intervention would be recommended.     Ban Richards MD

## 2021-11-08 NOTE — PROGRESS NOTES
[address]   Cardiac Followup    Referring Provider: Samara Duverney, APRN - CNP     Chief Complaint   Patient presents with    3 Month Follow-Up     Wide complex tachycardia    Other     No new complaints      Karine Chowdhury   1980      History of Present Illness:    Karine Chowdhury is a 35 y.o. male who is here today for follow up for shortness of breath and VT. He has a past medical history of coronary artery disease- noted on CT chest, hypertension, OWEN, and  idiopathic thrombocytopenia. He was admitted to the hospital in 8/2021 due to cough with shortness of breath. He was found to be COVID- 19 positive with a pulmonary infection. Patient was noted to have one minute of wide complex tachycardia- rate 170 bpm.    Today, he denies chest pain. No h/o syncope. He notes that he has almost passed out in the past but this has been when he has been sick. While wearing his monitor had episode felt heart racing and lightheaded. Checked his pulse and was > 200. Resolved spontaneously. No assocuated chest pain. Has not recurred. Lasted few mins. Past Medical History:   has a past medical history of Arthritis, Chronic lumbar pain, COVID-19, DDD (degenerative disc disease), lumbar, Depression, Glucose intolerance (impaired glucose tolerance), History of ITP, Hypertension, Idiopathic thrombocytopenic purpura ([address] OWEN (nonalcoholic steatohepatitis), and Type 2 diabetes mellitus without complication, without long-term current use of insulin ([address] Surgical History:   has no past surgical history on file. Social History:   reports that he has never smoked. He has never used smokeless tobacco. He reports that he does not drink alcohol and does not use drugs. Family History:  family history includes Depression in his father and mother; Diabetes in his mother; High Blood Pressure in his father and mother; Other in his father.    No sudden death or early death    Home Medications:  Prior to Admission medications    Medication Sig Start Date End Date Taking? Authorizing Provider   insulin lispro, 1 Unit Dial, 100 UNIT/ML SOPN INJECT 25 INTO THE SKIN 3 TIMES A DAY BEFORE MEALS 11/1/21  Yes Alma Delia Ruelas MD   albuterol sulfate HFA (VENTOLIN HFA) 108 (90 Base) MCG/ACT inhaler Inhale 2 puffs into the lungs every 6 hours as needed for Wheezing 10/13/21  Yes COLIN Wei CNP   insulin glargine [facility] KWIKPEN) 100 UNIT/ML injection pen Inject 60 Units into the skin nightly 9/17/21  Yes COLIN Wei CNP   benazepril (LOTENSIN) 40 MG tablet TAKE ONE TABLET BY MOUTH DAILY 9/17/21  Yes COLIN Wei CNP   miconazole (MICOTIN) 2 % cream Apply topically 2 times daily. For 2 weeks 9/17/21  Yes COLIN Wei CNP   atorvastatin (LIPITOR) 40 MG tablet Take 1 tablet by mouth daily 9/14/21  Yes Soo Turcios MD   aspirin EC 81 MG EC tablet Take 1 tablet by mouth daily 9/14/21  Yes Soo Turcios MD   metFORMIN (GLUCOPHAGE) 1000 MG tablet TAKE ONE TABLET BY MOUTH TWICE A DAY WITH MEALS 6/15/21  Yes COLIN Wei CNP   furosemide (LASIX) 20 MG tablet Take 1/2 tab daily by mouth  Patient not taking: Reported on 11/9/2021 9/14/21   Soo Turcios MD        Allergies:  Decadron [dexamethasone], Methocarbamol, Trilyte [peg 3350-kcl-na bicarb-nacl], Trulicity [dulaglutide], and Pcn [penicillins]     Review of Systems:   · Constitutional: there has been no unanticipated weight loss. There's been no change in energy level, sleep pattern, or activity level. · Eyes: No visual changes or diplopia. No scleral icterus. · ENT: No Headaches, hearing loss or vertigo. No mouth sores or sore throat. · Cardiovascular: Reviewed in HPI  · Respiratory: No cough or wheezing, no sputum production. No hematemesis. · Gastrointestinal: No abdominal pain, appetite loss, blood in stools. No change in bowel or bladder habits.   · Genitourinary: No dysuria, trouble voiding, or hematuria. · Musculoskeletal:  No gait disturbance, weakness or joint complaints. · Integumentary: No rash or pruritis. · Neurological: No headache, diplopia, change in muscle strength, numbness or tingling. No change in gait, balance, coordination, mood, affect, memory, mentation, behavior. · Psychiatric: No anxiety, no depression. · Endocrine: No malaise, fatigue or temperature intolerance. No excessive thirst, fluid intake, or urination. No tremor. · Hematologic/Lymphatic: No abnormal bruising or bleeding, blood clots or swollen lymph nodes. · Allergic/Immunologic: No nasal congestion or hives. Physical Examination:    Vitals:    11/09/21 1149   BP: 134/80   Pulse: 102   Temp: 98.3 °F (36.8 °C)   SpO2: 94%        Constitutional and General Appearance: NAD   Respiratory:  · Normal excursion and expansion without use of accessory muscles  · Resp Auscultation: Normal breath sounds without dullness  Cardiovascular:  · The apical impulses not displaced  · Heart tones are crisp and normal  · Cervical veins are not engorged  · The carotid upstroke is normal in amplitude and contour without delay or bruit  · Normal S1S2, No S3, No Murmur  · Peripheral pulses are symmetrical and full  · There is no clubbing, cyanosis of the extremities. · No edema  · Femoral Arteries: 2+ and equal  · Pedal Pulses: 2+ and equal   Abdomen:  · No masses or tenderness  · Liver/Spleen: No Abnormalities Noted  Neurological/Psychiatric:  · Alert and oriented in all spheres  · Moves all extremities well  · Exhibits normal gait balance and coordination  · No abnormalities of mood, affect, memory, mentation, or behavior are noted    CT chest 9/8/2021  Mediastinum: Coronary artery calcifications are a marker of atherosclerosis. There are no enlarged thoracic lymph nodes. Improved with residual bilateral interstitial thickening likely due to resolving atypical interstitial pneumonia.  2. Cirrhosis with stigmata of portal venous hypertension including new small amount of ascites. EKG 9/8/2021  Sinus tachycardia  Nonspecific ST abnormality  When compared with ECG of 06-AUG-2021 09:58,  Nonspecific T wave abnormality has replaced inverted T waves in Inferior leads    ECHO 9/28/21  Summary  Normal left ventricle size, wall thickness, and systolic function with an  estimated ejection fraction of 55-60%. There is hypokinesis of the basal  inferior wall. No additional regional wall motion abnormalities are seen. There is mild concentric left ventricular hypertrophy. Normal left ventricular diastolic filling pressure. Mild posterior mitral annular calcification is present. Aortic valve sclerosis without aortic stenosis. LEXISCAN 9/28/21  Summary  There is an inferolateral fixed defect consistent with diaphragmatic attenuation. No clear inducible ischemia or scar. Normal myocardial  thickening, normal LV function with post-stress LVEF 52%. 30 Day Monitor 10/20/21  Predominantly NSR, AT, NSVT    Assessment:   SOB - suspect residual from covid dx 8/2/21. Now resolved. Coronary artery disease- noted on CT chest 9/8/2021. Stable w/o angina  Wide complex tachycardia  - symptomatic  Abnormal EKG - reviewed w/ pt   COVID-19 infection  8/2/2021  Hypertension - controlled   Diabetes mellitus   History of idiopathic thrombocytopenia    History of OWEN   Family History of heart diease in mother   H/O edema - no sig today. BNP normal. No other sign CHF    Plan:  Change Metoprolol to longer acting and increase dose: Toprol XL 50 mg daily  Referral to EP  Cardiac medications reviewed including indications and pertinent side effects    Check blood pressure and heart rate at home a few times per week- keep a log with dates and times and bring to office visit   Regular exercise and following a healthy diet encouraged   Follow up in 3 months    Scribe's attestation:   This note was scribed in the presence of Tyler Botello MD by Milton Myers RN. The scribes documentation has been prepared under my direction and personally reviewed by me in its entirety. I confirm that the note above accurately reflects all work, treatment, procedures, and medical decision making performed by me. Dr. Leonor Felty, MD        Thank you for allowing me to participate in the care of this individual.      Sudhir Marte.  Maddison Dahl M.D., [facility]

## 2021-11-09 ENCOUNTER — TELEPHONE (OUTPATIENT)
Dept: CARDIOLOGY CLINIC | Age: 41
End: 2021-11-09

## 2021-11-09 ENCOUNTER — OFFICE VISIT (OUTPATIENT)
Dept: CARDIOLOGY CLINIC | Age: 41
End: 2021-11-09
Payer: COMMERCIAL

## 2021-11-09 VITALS
HEIGHT: 76 IN | HEART RATE: 102 BPM | WEIGHT: 294 LBS | OXYGEN SATURATION: 94 % | DIASTOLIC BLOOD PRESSURE: 80 MMHG | BODY MASS INDEX: 35.8 KG/M2 | SYSTOLIC BLOOD PRESSURE: 134 MMHG | TEMPERATURE: 98.3 F

## 2021-11-09 DIAGNOSIS — I10 PRIMARY HYPERTENSION: Primary | ICD-10-CM

## 2021-11-09 DIAGNOSIS — I47.20 VENTRICULAR TACHYCARDIA: ICD-10-CM

## 2021-11-09 PROCEDURE — G8427 DOCREV CUR MEDS BY ELIG CLIN: HCPCS | Performed by: INTERNAL MEDICINE

## 2021-11-09 PROCEDURE — G8417 CALC BMI ABV UP PARAM F/U: HCPCS | Performed by: INTERNAL MEDICINE

## 2021-11-09 PROCEDURE — 1036F TOBACCO NON-USER: CPT | Performed by: INTERNAL MEDICINE

## 2021-11-09 PROCEDURE — 99214 OFFICE O/P EST MOD 30 MIN: CPT | Performed by: INTERNAL MEDICINE

## 2021-11-09 PROCEDURE — G8484 FLU IMMUNIZE NO ADMIN: HCPCS | Performed by: INTERNAL MEDICINE

## 2021-11-09 RX ORDER — METOPROLOL SUCCINATE 50 MG/1
50 TABLET, EXTENDED RELEASE ORAL DAILY
Qty: 90 TABLET | Refills: 3 | Status: SHIPPED
Start: 2021-11-09 | End: 2021-12-07 | Stop reason: SINTOL

## 2021-11-09 NOTE — PATIENT INSTRUCTIONS
Plan:  Change Metoprolol to longer acting: Toprol XL 5mg daily  Follow up with EP team for ventricular tachycardia asap  Encourage weight loss   Follow up in 3 months

## 2021-11-11 DIAGNOSIS — I47.20 VENTRICULAR TACHYCARDIA (HCC): Primary | ICD-10-CM

## 2021-11-12 ENCOUNTER — OFFICE VISIT (OUTPATIENT)
Dept: ORTHOPEDIC SURGERY | Age: 41
End: 2021-11-12
Payer: COMMERCIAL

## 2021-11-12 VITALS — BODY MASS INDEX: 35.8 KG/M2 | WEIGHT: 294 LBS | HEIGHT: 76 IN

## 2021-11-12 DIAGNOSIS — S82.832A CLOSED FRACTURE OF DISTAL END OF LEFT FIBULA, UNSPECIFIED FRACTURE MORPHOLOGY, INITIAL ENCOUNTER: Primary | ICD-10-CM

## 2021-11-12 PROCEDURE — L4360 PNEUMAT WALKING BOOT PRE CST: HCPCS | Performed by: ORTHOPAEDIC SURGERY

## 2021-11-12 PROCEDURE — 99213 OFFICE O/P EST LOW 20 MIN: CPT | Performed by: ORTHOPAEDIC SURGERY

## 2021-11-16 ENCOUNTER — OFFICE VISIT (OUTPATIENT)
Dept: CARDIOLOGY CLINIC | Age: 41
End: 2021-11-16
Payer: COMMERCIAL

## 2021-11-16 ENCOUNTER — TELEPHONE (OUTPATIENT)
Dept: CARDIOLOGY CLINIC | Age: 41
End: 2021-11-16

## 2021-11-16 VITALS
OXYGEN SATURATION: 98 % | DIASTOLIC BLOOD PRESSURE: 70 MMHG | HEART RATE: 96 BPM | SYSTOLIC BLOOD PRESSURE: 110 MMHG | WEIGHT: 294 LBS | HEIGHT: 76 IN | BODY MASS INDEX: 35.8 KG/M2

## 2021-11-16 DIAGNOSIS — G47.33 OSA (OBSTRUCTIVE SLEEP APNEA): ICD-10-CM

## 2021-11-16 DIAGNOSIS — I47.20 VENTRICULAR TACHYCARDIA: ICD-10-CM

## 2021-11-16 DIAGNOSIS — I47.20 VENTRICULAR TACHYCARDIA (HCC): Primary | ICD-10-CM

## 2021-11-16 DIAGNOSIS — R00.0 WIDE-COMPLEX TACHYCARDIA: Primary | ICD-10-CM

## 2021-11-16 PROCEDURE — 99204 OFFICE O/P NEW MOD 45 MIN: CPT | Performed by: INTERNAL MEDICINE

## 2021-11-16 PROCEDURE — G8417 CALC BMI ABV UP PARAM F/U: HCPCS | Performed by: INTERNAL MEDICINE

## 2021-11-16 PROCEDURE — G8484 FLU IMMUNIZE NO ADMIN: HCPCS | Performed by: INTERNAL MEDICINE

## 2021-11-16 PROCEDURE — G8427 DOCREV CUR MEDS BY ELIG CLIN: HCPCS | Performed by: INTERNAL MEDICINE

## 2021-11-16 NOTE — TELEPHONE ENCOUNTER
Monitor placed by [address] [place] [address]  Length of monitor 1 week  Monitor ordered by [id number] [address]  Kit ID [id number]  Activation successful prior to pt leaving office?  Yes

## 2021-11-16 NOTE — PROGRESS NOTES
[address]   Electrophysiology Consult Note            Date: 11/16/21  Patient Name: Krystian Locke  YOB: 1980    Primary Care Physician: COLIN Johnson - DEEJAY    CHIEF COMPLAINT:   Chief Complaint   Patient presents with    New Patient    Loss of Consciousness     1 week ago    Other     VT     HISTORY OF PRESENT ILLNESS: Krystian Locke is a 35 y.o. male with a PMH significant for CAD, wide complex tachycardia, COVID-19 (8/2021), idiopathic thrombocytopenia. Patient was admitted to hospital in 8/2021 with cough and SOB and was found to be COVID+. While in the hospital he had a run or wide complex tachycardia at 170 bpm. Patient wore a cardiac event monitor from 9/14/2021 to 10/11/2021 which demonstrated predominately SR with an average HR of 102 () with NSVT. Today, 11/16/2021, ECG demonstrates SR (98). He has a broken leg and recently had a cast removed and wife reports a syncopal episode with a seizure during cast removal. He reports that he is taking his medications as prescribed. Patient denies current edema, chest pain, sob, palpitations, dizziness. Past Medical History:   has a past medical history of Arthritis, Chronic lumbar pain, COVID-19, DDD (degenerative disc disease), lumbar, Depression, Glucose intolerance (impaired glucose tolerance), History of ITP, Hypertension, Idiopathic thrombocytopenic purpura ([address] OWEN (nonalcoholic steatohepatitis), and Type 2 diabetes mellitus without complication, without long-term current use of insulin ([address] Past Surgical History:   has no past surgical history on file. Allergies:  Decadron [dexamethasone], Methocarbamol, Trilyte [peg 3350-kcl-na bicarb-nacl], Trulicity [dulaglutide], and Pcn [penicillins]    Social History:   reports that he has never smoked. He has never used smokeless tobacco. He reports that he does not drink alcohol and does not use drugs.      Family History: family history includes Depression in his father and mother; Diabetes in his mother; High Blood Pressure in his father and mother; Other in his father. Home Medications:    Prior to Admission medications    Medication Sig Start Date End Date Taking? Authorizing Provider   insulin lispro, 1 Unit Dial, 100 UNIT/ML SOPN INJECT 25 INTO THE SKIN 3 TIMES A DAY BEFORE MEALS 11/1/21  Yes Parth River MD   albuterol sulfate HFA (VENTOLIN HFA) 108 (90 Base) MCG/ACT inhaler Inhale 2 puffs into the lungs every 6 hours as needed for Wheezing 10/13/21  Yes COLIN Michael CNP   insulin glargine [facility] AUTHORITY KWIKPEN) 100 UNIT/ML injection pen Inject 60 Units into the skin nightly 9/17/21  Yes COLIN Michael CNP   benazepril (LOTENSIN) 40 MG tablet TAKE ONE TABLET BY MOUTH DAILY 9/17/21  Yes COLIN Michael CNP   miconazole (MICOTIN) 2 % cream Apply topically 2 times daily. For 2 weeks 9/17/21  Yes COLIN Michael CNP   atorvastatin (LIPITOR) 40 MG tablet Take 1 tablet by mouth daily 9/14/21  Yes Judson Canavan, MD   aspirin EC 81 MG EC tablet Take 1 tablet by mouth daily 9/14/21  Yes Judson Canavan, MD   metoprolol succinate (TOPROL XL) 50 MG extended release tablet Take 1 tablet by mouth daily 11/9/21   Judson Canavan, MD   furosemide (LASIX) 20 MG tablet Take 1/2 tab daily by mouth  Patient not taking: Reported on 11/9/2021 9/14/21   Judson Canavan, MD   metFORMIN (GLUCOPHAGE) 1000 MG tablet TAKE ONE TABLET BY MOUTH TWICE A DAY WITH MEALS  Patient not taking: Reported on 11/16/2021 6/15/21   COLIN Michael CNP       REVIEW OF SYSTEMS:    All 14-point review of systems are completed and  pertinent positives are mentioned in the history of present illness. Other  systems are reviewed and are negative.     Physical Examination:    /70   Pulse 96   Ht 6' 4\" (1.93 m)   Wt 294 lb (133.4 kg)   SpO2 98%   BMI 35.79 kg/m²      Constitutional and General Appearance:    alert, cooperative, no distress and appears stated age  [de-identified]:    PERRLA, no cervical lymphadenopathy. No masses palpable. Normal oral  mucosa  Respiratory:  · Normal excursion and expansion without use of accessory muscles  · Resp Auscultation: Normal breath sounds without dullness or wheezing  Cardiovascular:  · The apical impulse is not displaced  · Mildly tachycardic. RRR S1S2 w/o M/G/R  Abdomen:  · No masses or tenderness  · Bowel sounds present  Extremities:  ·  No Cyanosis or Clubbing  ·  Lower extremity edema: No  · Skin: Warm and dry  Neurological:  · Alert and oriented. · Moves all extremities well  · No abnormalities of mood, affect, memory, mentation, or behavior are noted    DATA:    ECG 11/16/21: Personally reviewed. Stress test 9/28/2021  Summary There is an inferolateral fixed defect consistent with diaphragmatic  attenuation. No clear inducible ischemia or scar. Normal myocardial  thickening, normal LV function with post-stress LVEF 52%. Echo 9/28/2021   Summary   Normal left ventricle size, wall thickness, and systolic function with an   estimated ejection fraction of 55-60%. There is hypokinesis of the basal   inferior wall. No additional regional wall motion abnormalities are seen. There is mild concentric left ventricular hypertrophy. Normal left ventricular diastolic filling pressure. Mild posterior mitral annular calcification is present. Aortic valve sclerosis without aortic stenosis. IMPRESSION:    1. Inappropriate sinus tachycardia. 11/16/2021  Patient is a pleasant 35year old male with a medical history significant for hypertension, diabetes mellitus type II, history of COVID-19 long hauler and vasovagal syncope with pain who presents from home to [facility] care. Patient wore a cardiac monitor which showed his average heart rate was ~100. His hemoglobin was within normal limits. His thyroid was within normal limits.   He is asymptomatic as far as I can tell and is tolerating his metoprolol. We discussed viridiana agents, ablation, and antiarrhythmics. We will ask that he continue his metoprolol.  - Continue metoprolol. 2. Non-sustained ventricular tachycardia. 11/16/2021  Patient wore a cardiac monitor which showed NSVT (fast and slow). Unclear if he was symptomatic as he didn't know to hit button if symptoms such as presyncope or syncope. We will ask him to wear another cardiac monitor. We also ask him to consider ILR for longer term monitoring. Given his normal LVEF and normal stress test beta blocker propanolol > metoprolol would be preferred start from medical therapy standpoint.  - Continue metoprolol. - 1 week monitor and consider ILR.  - Follow up with me in 3 months. RECOMMENDATIONS:  1. Discussed medications to slow HR down. 2. Discussed medications for ventricular tachycardia. --amiodarone, sotalol, flecainide, dronedarone. 3. Discussed wearing another cardiac event monitor versus loop monitor to monitor for VT. Patient would like to try one week monitor first. We will call you with results. 4. Continue current medications as prescribed. 5. Follow up with me in 3 months. QUALITY MEASURES  1. Tobacco Cessation Counseling: NA  2. Retake of BP if >140/90:   NA  3. Documentation to PCP/referring for new patient:  Sent to PCP at close of office visit  4. CAD patient on anti-platelet: NA  5. CAD patient on STATIN therapy:  Yes  6. Patient with CHF and aFib on anticoagulation:  NA     All questions and concerns were addressed to the patient/family. Alternatives to my treatment were discussed. Dr. Ada Yang MD  Electrophysiology  [facility]. [facility] [facility]. [address]. [place], [address]  Phone: [phone]  Fax: [phone]     NOTE: This report was transcribed using voice recognition software. Every effort was made to ensure accuracy, however, inadvertent computerized transcription errors may be present.      Vinny Ledesma Mikala RN, am scribing for and in the presence of Dr. Nathalie Salmon. 11/16/21 12:05 PM   Fiona Martinez RN    The scribe's documentation has been prepared under my direction and personally reviewed by me in its entirety. I confirm that the note above accurately reflects all work, physical examination, the discussion of treatments and procedures, and medical decision making performed by me. Sergio Acosta MD personally performed the services described in this documentation as scribed by nurse in my presence, and is both accurate and complete.     Electronically signed by Guilherme Davis MD on 11/16/2021 at 3:14 PM

## 2021-11-16 NOTE — PATIENT INSTRUCTIONS
RECOMMENDATIONS:  1. Discussed medications to slow HR down. 2. Discussed medications for ventricular tachycardia. --amiodarone, sotalol, flecainide, dronedarone. 3. Discussed wearing another cardiac event monitor versus loop monitor to monitor for VT. Patient would like to try one week monitor first. We will call you with results. 4. Continue current medications as prescribed. 5. Follow up with me in 3 months.

## 2021-11-17 ENCOUNTER — HOSPITAL ENCOUNTER (EMERGENCY)
Age: 41
Discharge: HOME | End: 2021-11-17
Attending: EMERGENCY MEDICINE
Payer: COMMERCIAL

## 2021-11-17 VITALS
OXYGEN SATURATION: 97 % | HEART RATE: 97 BPM | HEIGHT: 76 IN | WEIGHT: 294 LBS | RESPIRATION RATE: 11 BRPM | TEMPERATURE: 98.3 F | SYSTOLIC BLOOD PRESSURE: 115 MMHG | BODY MASS INDEX: 35.8 KG/M2 | DIASTOLIC BLOOD PRESSURE: 79 MMHG

## 2021-11-17 DIAGNOSIS — R00.0 SINUS TACHYCARDIA: ICD-10-CM

## 2021-11-17 DIAGNOSIS — E87.6 HYPOKALEMIA: ICD-10-CM

## 2021-11-17 DIAGNOSIS — R00.2 PALPITATIONS: Primary | ICD-10-CM

## 2021-11-17 DIAGNOSIS — R73.9 HYPERGLYCEMIA: ICD-10-CM

## 2021-11-17 LAB
A/G RATIO: 1.7 (ref 1.1–2.2)
ALBUMIN SERPL-MCNC: 3.8 G/DL (ref 3.4–5)
ALP BLD-CCNC: 78 U/L (ref 40–129)
ALT SERPL-CCNC: 47 U/L (ref 10–40)
ANION GAP SERPL CALCULATED.3IONS-SCNC: 17 MMOL/L (ref 3–16)
AST SERPL-CCNC: 46 U/L (ref 15–37)
BASOPHILS ABSOLUTE: 0 K/UL (ref 0–0.2)
BASOPHILS RELATIVE PERCENT: 0.5 %
BILIRUB SERPL-MCNC: 1.5 MG/DL (ref 0–1)
BUN BLDV-MCNC: 10 MG/DL (ref 7–20)
CALCIUM SERPL-MCNC: 8.9 MG/DL (ref 8.3–10.6)
CHLORIDE BLD-SCNC: 98 MMOL/L (ref 99–110)
CO2: 22 MMOL/L (ref 21–32)
CREAT SERPL-MCNC: 0.8 MG/DL (ref 0.9–1.3)
EKG ATRIAL RATE: 124 BPM
EKG DIAGNOSIS: NORMAL
EKG P AXIS: 35 DEGREES
EKG P-R INTERVAL: 132 MS
EKG Q-T INTERVAL: 342 MS
EKG QRS DURATION: 96 MS
EKG QTC CALCULATION (BAZETT): 491 MS
EKG R AXIS: 28 DEGREES
EKG T AXIS: 2 DEGREES
EKG VENTRICULAR RATE: 124 BPM
EOSINOPHILS ABSOLUTE: 0.3 K/UL (ref 0–0.6)
EOSINOPHILS RELATIVE PERCENT: 2.9 %
GFR AFRICAN AMERICAN: >60
GFR NON-AFRICAN AMERICAN: >60
GLUCOSE BLD-MCNC: 328 MG/DL (ref 70–99)
HCT VFR BLD CALC: 47.3 % (ref 40.5–52.5)
HEMOGLOBIN: 15.6 G/DL (ref 13.5–17.5)
LYMPHOCYTES ABSOLUTE: 4.4 K/UL (ref 1–5.1)
LYMPHOCYTES RELATIVE PERCENT: 45.3 %
MAGNESIUM: 1.9 MG/DL (ref 1.8–2.4)
MCH RBC QN AUTO: 28.3 PG (ref 26–34)
MCHC RBC AUTO-ENTMCNC: 32.9 G/DL (ref 31–36)
MCV RBC AUTO: 85.9 FL (ref 80–100)
MONOCYTES ABSOLUTE: 0.8 K/UL (ref 0–1.3)
MONOCYTES RELATIVE PERCENT: 8 %
NEUTROPHILS ABSOLUTE: 4.2 K/UL (ref 1.7–7.7)
NEUTROPHILS RELATIVE PERCENT: 43.3 %
PDW BLD-RTO: 14 % (ref 12.4–15.4)
PLATELET # BLD: 108 K/UL (ref 135–450)
PMV BLD AUTO: 8.5 FL (ref 5–10.5)
POTASSIUM REFLEX MAGNESIUM: 3.3 MMOL/L (ref 3.5–5.1)
RBC # BLD: 5.51 M/UL (ref 4.2–5.9)
SODIUM BLD-SCNC: 137 MMOL/L (ref 136–145)
TOTAL PROTEIN: 6.1 G/DL (ref 6.4–8.2)
TROPONIN: <0.01 NG/ML
WBC # BLD: 9.6 K/UL (ref 4–11)

## 2021-11-17 PROCEDURE — 93005 ELECTROCARDIOGRAM TRACING: CPT | Performed by: EMERGENCY MEDICINE

## 2021-11-17 PROCEDURE — 2580000003 HC RX 258: Performed by: EMERGENCY MEDICINE

## 2021-11-17 PROCEDURE — 99283 EMERGENCY DEPT VISIT LOW MDM: CPT

## 2021-11-17 PROCEDURE — 6370000000 HC RX 637 (ALT 250 FOR IP): Performed by: EMERGENCY MEDICINE

## 2021-11-17 PROCEDURE — 80053 COMPREHEN METABOLIC PANEL: CPT

## 2021-11-17 PROCEDURE — 84484 ASSAY OF TROPONIN QUANT: CPT

## 2021-11-17 PROCEDURE — 83735 ASSAY OF MAGNESIUM: CPT

## 2021-11-17 PROCEDURE — 85025 COMPLETE CBC W/AUTO DIFF WBC: CPT

## 2021-11-17 PROCEDURE — 93228 REMOTE 30 DAY ECG REV/REPORT: CPT | Performed by: INTERNAL MEDICINE

## 2021-11-17 PROCEDURE — 93010 ELECTROCARDIOGRAM REPORT: CPT | Performed by: INTERNAL MEDICINE

## 2021-11-17 RX ORDER — 0.9 % SODIUM CHLORIDE 0.9 %
1000 INTRAVENOUS SOLUTION INTRAVENOUS ONCE
Status: COMPLETED | OUTPATIENT
Start: 2021-11-17 | End: 2021-11-17

## 2021-11-17 RX ORDER — POTASSIUM CHLORIDE 20 MEQ/1
40 TABLET, EXTENDED RELEASE ORAL ONCE
Status: COMPLETED | OUTPATIENT
Start: 2021-11-17 | End: 2021-11-17

## 2021-11-17 RX ADMIN — POTASSIUM CHLORIDE 40 MEQ: 1500 TABLET, EXTENDED RELEASE ORAL at 14:26

## 2021-11-17 RX ADMIN — SODIUM CHLORIDE 1000 ML: 9 INJECTION, SOLUTION INTRAVENOUS at 14:25

## 2021-11-17 NOTE — TELEPHONE ENCOUNTER
Patient in the ER at [facility] today. Cumulative report obtained due to emergent needs from [facility]. Patient renrolled in [facility] to a Pocket ECG Telemetry for access to continuous monitoring.

## 2021-11-17 NOTE — ED NOTES
D/C instructions reviewed with no questions or concerns. Off unit in stable condition.       Baltazar Boudreaux RN  11/17/21 1800

## 2021-11-18 NOTE — ED PROVIDER NOTES
[address] ED      CHIEF COMPLAINT  Tachycardia (Patient states he felt like his heart was racing and he felt dizzy. patient wearing a cardiac monitor currently and says he has had this issue since July, after having COVID)       HISTORY OF PRESENT ILLNESS  Karine Chowdhury is a 35 y.o. male with a past medical history of ITP and diabetes who presents to the ED complaining of tachycardia. The patient states that he had an ankle fracture, has been taking tramadol for pain but it is not really helping. His wife gave him what she thought was an \"edible\" to help with his pain, however after taking it he found that there was actually [facility] in the substance. This made him very anxious, nauseated, and he started to have tachycardia on his Holter monitor. He is currently wearing a Holter monitor, he has been seen by EP Dr. Suma Phillips at [facility]. Since he has had Covid, he has had issues with palpitations. He is wearing a Holter monitor as the results. He states that he tried to transmit his tachycardia through the device, but it was not working for him. He did not feel well and had the palpitations and some dizziness, so he called EMS. Heart rate was 140s for EMS. On arrival here, he states he is feeling somewhat improved. He denies any chest pain at present, or when this event happened earlier. He had some shortness of breath earlier, but denies any shortness of breath at this time. He also has been having issues with his blood sugar, since having Covid as well. He takes insulin. He otherwise denies any recent illness, vomiting, diarrhea or abdominal pain. Denies fever. No other complaints, modifying factors or associated symptoms. I have reviewed the following from the nursing documentation.     Past Medical History:   Diagnosis Date    Arthritis     left knee    Chronic lumbar pain     COVID-19 08/02/2021    DDD (degenerative disc disease), lumbar     MRI 2012    Depression     Glucose intolerance (impaired glucose tolerance)     History of ITP     Hypertension     [facility]ocyt[facility] purpura [facility]     OWEN (nonalcoholic steatohepatitis) 06/05/2021    Type 2 diabetes mellitus without complication, without long-term current use of insulin [facility] 08/30/2016     History reviewed. No pertinent surgical history. Family History   Problem Relation Age of Onset    Diabetes Mother     High Blood Pressure Mother     Depression Mother     Depression Father     High Blood Pressure Father     Other Father         sleep apnea     Social History     Socioeconomic History    Marital status:      Spouse name: Not on file    Number of children: Not on file    Years of education: Not on file    Highest education level: Not on file   Occupational History    Not on file   Tobacco Use    Smoking status: Never Smoker    Smokeless tobacco: Never Used   Vaping Use    Vaping Use: Never used   Substance and Sexual Activity    Alcohol use: No    Drug use: No    Sexual activity: Yes     Partners: Female   Other Topics Concern    Not on file   Social History Narrative    Not on file     Social Determinants of Health     Financial Resource Strain:     Difficulty of Paying Living Expenses: Not on file   Food Insecurity:     Worried About Running Out of Food in the Last Year: Not on file    Soren of Food in the Last Year: Not on file   Transportation Needs:     Lack of Transportation (Medical): Not on file    Lack of Transportation (Non-Medical):  Not on file   Physical Activity:     Days of Exercise per Week: Not on file    Minutes of Exercise per Session: Not on file   Stress:     Feeling of Stress : Not on file   Social Connections:     Frequency of Communication with Friends and Family: Not on file    Frequency of Social Gatherings with Friends and Family: Not on file    Attends Mandaeism Services: Not on file    Active Member of Clubs or Organizations: Not on file  Attends Club or Organization Meetings: Not on file    Marital Status: Not on file   Intimate Partner Violence:     Fear of Current or Ex-Partner: Not on file    Emotionally Abused: Not on file    Physically Abused: Not on file    Sexually Abused: Not on file   Housing Stability:     Unable to Pay for Housing in the Last Year: Not on file    Number of Jeffy in the Last Year: Not on file    Unstable Housing in the Last Year: Not on file     No current facility-administered medications for this encounter. Current Outpatient Medications   Medication Sig Dispense Refill    metoprolol succinate (TOPROL XL) 50 MG extended release tablet Take 1 tablet by mouth daily 90 tablet 3    insulin lispro, 1 Unit Dial, 100 UNIT/ML SOPN INJECT 25 INTO THE SKIN 3 TIMES A DAY BEFORE MEALS 21 mL 1    albuterol sulfate HFA (VENTOLIN HFA) 108 (90 Base) MCG/ACT inhaler Inhale 2 puffs into the lungs every 6 hours as needed for Wheezing 18 g 2    insulin glargine (BASAGLAR KWIKPEN) 100 UNIT/ML injection pen Inject 60 Units into the skin nightly 5 pen 5    benazepril (LOTENSIN) 40 MG tablet TAKE ONE TABLET BY MOUTH DAILY 30 tablet 3    miconazole (MICOTIN) 2 % cream Apply topically 2 times daily.  For 2 weeks 113 g 1    atorvastatin (LIPITOR) 40 MG tablet Take 1 tablet by mouth daily 30 tablet 11    furosemide (LASIX) 20 MG tablet Take 1/2 tab daily by mouth (Patient not taking: Reported on 11/9/2021) 30 tablet 3    aspirin EC 81 MG EC tablet Take 1 tablet by mouth daily 30 tablet 5    metFORMIN (GLUCOPHAGE) 1000 MG tablet TAKE ONE TABLET BY MOUTH TWICE A DAY WITH MEALS (Patient not taking: Reported on 11/16/2021) 180 tablet 1     Allergies   Allergen Reactions    Decadron [Dexamethasone] Other (See Comments)     Weak, lethargic    Methocarbamol Diarrhea    Trilyte [Peg 3350-Kcl-Na Bicarb-Nacl]     Trulicity [Dulaglutide]     Pcn [Penicillins] Hives and Rash       REVIEW OF SYSTEMS  10 systems reviewed, pertinent positives per HPI otherwise noted to be negative. PHYSICAL EXAM  /79   Pulse 97   Temp 98.3 °F (36.8 °C) (Oral)   Resp 11   Ht 6' 4\" (1.93 m)   Wt 294 lb (133.4 kg)   SpO2 97%   BMI 35.79 kg/m²    Physical exam:  General appearance: awake and cooperative. no distress. Non toxic appearing. Skin: Warm and dry. No rashes or lesions. HENT: Normocephalic. Atraumatic. Mucus membranes are dry. Neck: supple   Eyes: JORGE. EOM intact. Conjunctival injection bilaterally   Heart: tachycardic rate. Regular rhythm. No murmurs. Lungs: Respirations unlabored. CTAB. No wheezes, rales, or rhonchi. Good air exchange  Abdomen: No tenderness. Soft. Non distended. No peritoneal signs. Musculoskeletal: No extremity edema. Compartments soft. No deformity. No tenderness in the extremities. All extremities neurovascularly intact. Radial, Dp, and PT pulses +2/4 bilaterally  Neurological: Alert and oriented. No focal deficits. No aphasia or dysarthria. Psychiatric: Normal mood and affect. LABS  I have reviewed all labs for this visit.    Results for orders placed or performed during the hospital encounter of 11/17/21   CBC Auto Differential   Result Value Ref Range    WBC 9.6 4.0 - 11.0 K/uL    RBC 5.51 4.20 - 5.90 M/uL    Hemoglobin 15.6 13.5 - 17.5 g/dL    Hematocrit 47.3 40.5 - 52.5 %    MCV 85.9 80.0 - 100.0 fL    MCH 28.3 26.0 - 34.0 pg    MCHC 32.9 31.0 - 36.0 g/dL    RDW 14.0 12.4 - 15.4 %    Platelets 198 (L) 442 - 450 K/uL    MPV 8.5 5.0 - 10.5 fL    Neutrophils % 43.3 %    Lymphocytes % 45.3 %    Monocytes % 8.0 %    Eosinophils % 2.9 %    Basophils % 0.5 %    Neutrophils Absolute 4.2 1.7 - 7.7 K/uL    Lymphocytes Absolute 4.4 1.0 - 5.1 K/uL    Monocytes Absolute 0.8 0.0 - 1.3 K/uL    Eosinophils Absolute 0.3 0.0 - 0.6 K/uL    Basophils Absolute 0.0 0.0 - 0.2 K/uL   Comprehensive Metabolic Panel w/ Reflex to MG   Result Value Ref Range    Sodium 137 136 - 145 mmol/L    Potassium reflex Magnesium 3.3 (L) 3.5 - 5.1 mmol/L    Chloride 98 (L) 99 - 110 mmol/L    CO2 22 21 - 32 mmol/L    Anion Gap 17 (H) 3 - 16    Glucose 328 (H) 70 - 99 mg/dL    BUN 10 7 - 20 mg/dL    CREATININE 0.8 (L) 0.9 - 1.3 mg/dL    GFR Non-African American >60 >60    GFR African American >60 >60    Calcium 8.9 8.3 - 10.6 mg/dL    Total Protein 6.1 (L) 6.4 - 8.2 g/dL    Albumin 3.8 3.4 - 5.0 g/dL    Albumin/Globulin Ratio 1.7 1.1 - 2.2    Total Bilirubin 1.5 (H) 0.0 - 1.0 mg/dL    Alkaline Phosphatase 78 40 - 129 U/L    ALT 47 (H) 10 - 40 U/L    AST 46 (H) 15 - 37 U/L   Troponin   Result Value Ref Range    Troponin <0.01 <0.01 ng/mL   Magnesium   Result Value Ref Range    Magnesium 1.90 1.80 - 2.40 mg/dL   EKG 12 Lead   Result Value Ref Range    Ventricular Rate 124 BPM    Atrial Rate 124 BPM    P-R Interval 132 ms    QRS Duration 96 ms    Q-T Interval 342 ms    QTc Calculation (Bazett) 491 ms    P Axis 35 degrees    R Axis 28 degrees    T Axis 2 degrees    Diagnosis       Sinus tachycardiaST & T wave abnormality, consider inferolateral ischemiaAbnormal ECGWhen compared with ECG of 08-SEP-2021 11:19,Inverted T waves have replaced nonspecific T wave abnormality in Inferior leadsConfirmed by ARCADIO Guzman MDHEN ([id number]) on 11/17/2021 6:01:53 PM       ECG  The Ekg interpreted by me shows  sinus tachycardia, ognz=[id number]   Axis is   Normal  QTc is  within an acceptable range  Intervals and Durations are unremarkable. ST Segments: nonspecific ST abnormality no acute ischemia  No significant change from prior EKG dated 6/6/21      RADIOLOGY      ED COURSE/MDM  Patient seen and evaluated. Old records reviewed. Labs and imaging reviewed and results discussed with patient. The patient is a 73-year-old male presenting with tachycardia. He is 126 on arrival, 100% on room air. He is afebrile. He is overall nontoxic-appearing. He does appear to have THC toxidrome with conjunctival injection, dry mouth, and fatigue.   He states he did not realize that THC was contained in the substance. I do suspect that this was the etiology of his palpitations. It appears that he is in a sinus tachycardia here. He appears dry, was given fluids and potassium replacement. He was hyperglycemic, no DKA. He states he does have insulin at home that he needs to take. He initially does not feel well, but states he feels significantly improved and better on reevaluation. He is unable to transmit his Holter monitor results, I did call Dr. Arben Jose with electrophysiology. He was able to have the device transmitted at the time the patient had symptoms, and he was found to have sinus tachycardia. There were no wide-complex rhythms, no sign of VT. Given this and the fact that the patient feels much improved and his heart rate has improved to 98, I do feel he is appropriate for discharge home. The heart center came down and looked at the patient's device, and fixed it to where he was able to transmit again if he was having symptoms. The patient is comfortable with discharge home. He will follow-up with electrophysiology. He is given strict return precautions back to the ED and voices understanding. During the patient's ED course, the patient was given:  Medications   0.9 % sodium chloride bolus (0 mLs IntraVENous Stopped 11/17/21 1801)   potassium chloride (KLOR-CON M) extended release tablet 40 mEq (40 mEq Oral Given 11/17/21 1426)        CLINICAL IMPRESSION  1. Palpitations    2. Sinus tachycardia    3. Hyperglycemia    4. Hypokalemia        Blood pressure 115/79, pulse 97, temperature 98.3 °F (36.8 °C), temperature source Oral, resp. rate 11, height 6' 4\" (1.93 m), weight 294 lb (133.4 kg), SpO2 97 %. Patient was given scripts for the following medications. I counseled patient how to take these medications.    Discharge Medication List as of 11/17/2021  5:54 PM          Follow-up with:  Yue Flynn MD  [address]  [phone]    Call in 1 day      COLIN [facility] [address]  [phone]    Call in 1 day        DISCLAIMER: This chart was created using Dragon dictation software. Efforts were made by me to ensure accuracy, however some errors may be present due to limitations of this technology and occasionally words are not transcribed correctly.        Pat HumphriesTroy Regional Medical Centeryoung  11/18/21 1037

## 2021-11-18 NOTE — TELEPHONE ENCOUNTER
11.17.21 New Monitor had to be reg and given to pt. Due to existing monitor would not connect. Monitor placed by   Alylssa Monteiro (pt was in the ER @ time of placement)  Monitor company   [employer]  Length of monitor   7-days  Monitor ordered by   Dr. Cherylene Hoit   [id number]  Activation successful prior to pt leaving office?    Yes

## 2021-11-24 ENCOUNTER — OFFICE VISIT (OUTPATIENT)
Dept: FAMILY MEDICINE CLINIC | Age: 41
End: 2021-11-24
Payer: COMMERCIAL

## 2021-11-24 VITALS
SYSTOLIC BLOOD PRESSURE: 138 MMHG | HEIGHT: 76 IN | DIASTOLIC BLOOD PRESSURE: 98 MMHG | WEIGHT: 291.4 LBS | OXYGEN SATURATION: 97 % | BODY MASS INDEX: 35.49 KG/M2 | HEART RATE: 96 BPM

## 2021-11-24 DIAGNOSIS — Z79.4 TYPE 2 DIABETES MELLITUS WITH HYPERGLYCEMIA, WITH LONG-TERM CURRENT USE OF INSULIN (HCC): Primary | ICD-10-CM

## 2021-11-24 DIAGNOSIS — E11.65 TYPE 2 DIABETES MELLITUS WITH HYPERGLYCEMIA, WITH LONG-TERM CURRENT USE OF INSULIN (HCC): Primary | ICD-10-CM

## 2021-11-24 DIAGNOSIS — S82.822G CLOSED TORUS FRACTURE OF DISTAL END OF LEFT FIBULA WITH DELAYED HEALING, SUBSEQUENT ENCOUNTER: ICD-10-CM

## 2021-11-24 DIAGNOSIS — I47.20 VENTRICULAR TACHYCARDIA: ICD-10-CM

## 2021-11-24 PROCEDURE — 99213 OFFICE O/P EST LOW 20 MIN: CPT | Performed by: NURSE PRACTITIONER

## 2021-11-24 PROCEDURE — G8427 DOCREV CUR MEDS BY ELIG CLIN: HCPCS | Performed by: NURSE PRACTITIONER

## 2021-11-24 PROCEDURE — G8417 CALC BMI ABV UP PARAM F/U: HCPCS | Performed by: NURSE PRACTITIONER

## 2021-11-24 PROCEDURE — 3044F HG A1C LEVEL LT 7.0%: CPT | Performed by: NURSE PRACTITIONER

## 2021-11-24 PROCEDURE — 2022F DILAT RTA XM EVC RTNOPTHY: CPT | Performed by: NURSE PRACTITIONER

## 2021-11-24 PROCEDURE — 1036F TOBACCO NON-USER: CPT | Performed by: NURSE PRACTITIONER

## 2021-11-24 PROCEDURE — G8484 FLU IMMUNIZE NO ADMIN: HCPCS | Performed by: NURSE PRACTITIONER

## 2021-11-24 NOTE — PROGRESS NOTES
Patient: Alka Kemp is a 35 y.o. male who presents today with the following Chief Complaint(s):  Chief Complaint   Patient presents with    Diabetes     2 month follow up         HPI-this is a 78-year-old male patient following up with his diabetes today  We do not have the POCT hemoglobin A1c is for me to check and he is not fasting today  He states that if we did check him it would be high because is not gotten his Basaglar from his pharmacy. I asked why he could not tell me. I told him he needs his insulin for control of his blood sugars he verbalized understanding of this. He also is on the insulin lispro 25 units 3 times daily with meals and this is the only thing he has been taking he states. He does see cardiology for his ventricular tachycardia he is on a beta-blocker. He was instructed to follow closely with cardiology. He states he just had a Holter monitor and was taken off yesterday  He also just recently fractured his left foot he is currently in a boot and he sees Ortho regarding this. Current Outpatient Medications   Medication Sig Dispense Refill    metoprolol succinate (TOPROL XL) 50 MG extended release tablet Take 1 tablet by mouth daily 90 tablet 3    insulin lispro, 1 Unit Dial, 100 UNIT/ML SOPN INJECT 25 INTO THE SKIN 3 TIMES A DAY BEFORE MEALS 21 mL 1    albuterol sulfate HFA (VENTOLIN HFA) 108 (90 Base) MCG/ACT inhaler Inhale 2 puffs into the lungs every 6 hours as needed for Wheezing 18 g 2    insulin glargine (BASAGLAR KWIKPEN) 100 UNIT/ML injection pen Inject 60 Units into the skin nightly 5 pen 5    benazepril (LOTENSIN) 40 MG tablet TAKE ONE TABLET BY MOUTH DAILY 30 tablet 3    miconazole (MICOTIN) 2 % cream Apply topically 2 times daily.  For 2 weeks 113 g 1    atorvastatin (LIPITOR) 40 MG tablet Take 1 tablet by mouth daily 30 tablet 11    aspirin EC 81 MG EC tablet Take 1 tablet by mouth daily 30 tablet 5    fluticasone-salmeterol (ADVAIR HFA) 230-21 MCG/ACT inhaler Inhale 2 puffs into the lungs every 12 hours (Patient not taking: Reported on 11/24/2021)       No current facility-administered medications for this visit. Patient's past medical history, surgical history, family history, medications,  and allergies  were all reviewed and updated as appropriate today. Review of Systems   All other systems reviewed and are negative. Physical Exam  Vitals and nursing note reviewed. Constitutional:       Appearance: He is well-developed. HENT:      Head: Normocephalic. Right Ear: External ear normal.      Left Ear: External ear normal.      Nose: Nose normal.   Eyes:      Conjunctiva/sclera: Conjunctivae normal.   Cardiovascular:      Rate and Rhythm: Normal rate and regular rhythm. Pulses: Normal pulses. Heart sounds: Normal heart sounds. No murmur heard. Pulmonary:      Effort: Pulmonary effort is normal.      Breath sounds: Normal breath sounds. No wheezing. Abdominal:      General: Bowel sounds are normal.      Palpations: Abdomen is soft. Musculoskeletal:         General: Normal range of motion. Cervical back: Normal range of motion and neck supple. Comments: Boot noted to left foot   Skin:     General: Skin is warm and dry. Neurological:      General: No focal deficit present. Mental Status: He is alert and oriented to person, place, and time. Psychiatric:         Mood and Affect: Mood normal.         Behavior: Behavior normal.         Thought Content: Thought content normal.         Judgment: Judgment normal.       Vitals:    11/24/21 1114   BP: (!) 138/98   Pulse: 96   SpO2: 97%       Assessment:  Encounter Diagnoses   Name Primary?     Type 2 diabetes mellitus with hyperglycemia, with long-term current use of insulin (HCC) Yes    Ventricular tachycardia (HCC)     Closed torus fracture of distal end of left fibula with delayed healing, subsequent encounter        Controlled SubstancesMonitoring: NA    Plan:  1. Type 2 diabetes mellitus with hyperglycemia, with long-term current use of insulin (HCC)  Uncontrolled  Instructed to get the Basaglar and take as prescribed  Continue insulin lispro 25 units 3 times daily    2. Ventricular tachycardia (HCC)  Stable  Continue beta-blocker as previously prescribed by cardiology  Follow with cardiology    3. Closed torus fracture of distal end of left fibula with delayed healing, subsequent encounter  Stable  Follow with Ortho      [place] Cheng, COLIN - CNP, FNP    Reviewed treatment plan with patient. Patient verbalized understanding to treatment plan and questions were answered. [address] [facility]  [place], [address]

## 2021-12-10 ENCOUNTER — OFFICE VISIT (OUTPATIENT)
Dept: ORTHOPEDIC SURGERY | Age: 41
End: 2021-12-10
Payer: COMMERCIAL

## 2021-12-10 VITALS — WEIGHT: 291 LBS | BODY MASS INDEX: 35.44 KG/M2 | HEIGHT: 76 IN

## 2021-12-10 DIAGNOSIS — S82.832D CLOSED FRACTURE OF DISTAL END OF LEFT FIBULA WITH ROUTINE HEALING, UNSPECIFIED FRACTURE MORPHOLOGY, SUBSEQUENT ENCOUNTER: Primary | ICD-10-CM

## 2021-12-10 PROCEDURE — 99213 OFFICE O/P EST LOW 20 MIN: CPT | Performed by: ORTHOPAEDIC SURGERY

## 2021-12-10 NOTE — PROGRESS NOTES
ORTHOPAEDIC SURGERY FOLLOWUP VISIT     CHIEF COMPLAINT:  Left ankle injury follow-up     DATE OF INJURY: 10/27/2021  DIAGNOSIS: Left distal fibula fracture  DATE OF SURGERY: N/A, nonoperative management     HISTORY OF PRESENT ILLNESS:  68-year-old male presents 6 weeks out from a mildly displaced distal fibula fracture of his left ankle. It was reasonably aligned at initial x-rays and at follow-up x-rays. There is no evidence of widening of the ankle mortise. Overall, his pain has improved since previous evaluation. He rates his pain a 0 out of 10. His pain is primarily about the lateral ankle. He has the occasional medial ankle pain. His pain does not radiate. He denies dysesthesias. He has been ambulating with a Cam walking boot. He denies a significant increase in pain with ambulation. He admits to ambulating to the bathroom at night without the use of the cam boot. He uses a crutch to do so. He feels his mobility is significantly improving. PHYSICAL EXAM:  General: Well-appearing male of stated age. No acute distress. Left lower extremity: Patient presents in a cam boot. This was removed in order to examine the skin. There are no cuts, open wounds, or abrasions. There is mild edema about the malleoli. There is tenderness to palpation about the distal fibula. There is minor tenderness about the medial malleolus in the area of the deltoid ligament. There is no obvious deformity. Sensation is intact to light touch in deep peroneal, superficial peroneal, tibial, sural, and saphenous nerve distributions.  Motor function is intact to EHL, FHL, tibialis anterior, and gastroc. There is a negative anterior drawer test of the ankle in neutral and and plantar flexion. Dorsiflexion is just to neutral.  Plantar flexion is 40 degrees.   There is slight reduction in subtalar motion.  There is brisk capillary refill to the toes and a strong palpable dorsalis pedis pulse.  Compartments are soft and compressible. There is no calf tenderness and a negative Homans' sign.     RADIOGRAPHIC EXAM:  3 views of the left ankle including AP, mortise, and lateral projections demonstrate no significant change in alignment. The ankle mortise is congruent. Distal fibula fracture is in unchanged alignment. There has been progressive periosteal callus formation particularly about the lateral and posterior aspects of the distal fibula. Fracture line is persistent at the syndesmosis. There is no evidence of syndesmosis widening.     ASSESSMENT AND PLAN:  Left distal fibula fracture with mild displacement.     Continues to improve. He has no pain with weightbearing and at rest.  He does still have tenderness to palpation at the fracture site. I informed him that his fracture is not completely healed at this point. I would advise him to continue to use the boot for weightbearing for an additional 3 to 4 weeks. He will transition to regular footwear at that time. He will use ice and elevation to limit swelling. He was advised to aggressively work on range of motion to prevent stiffness. He was counseled on appropriate exercises for his ankle fracture recovery. I would see him back in 4 weeks for repeat x-rays and likely transition to regular footwear at that time.   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     Rupa Casas MD

## 2021-12-13 ENCOUNTER — TELEPHONE (OUTPATIENT)
Dept: CARDIOLOGY CLINIC | Age: 41
End: 2021-12-13

## 2021-12-21 DIAGNOSIS — I47.20 VENTRICULAR TACHYCARDIA: ICD-10-CM

## 2021-12-21 DIAGNOSIS — R00.0 WIDE-COMPLEX TACHYCARDIA: ICD-10-CM

## 2021-12-23 ENCOUNTER — TELEPHONE (OUTPATIENT)
Dept: FAMILY MEDICINE CLINIC | Age: 41
End: 2021-12-23

## 2021-12-23 NOTE — TELEPHONE ENCOUNTER
Received a denial for the patient's Basaglar. They said it is not on the formulary. Should we initiate a pa or send an alternative? They stated the covered alternative is Levemir or Lantus. I scanned denial letter into media.     New pt appt 2/22/2022-  With TL

## 2021-12-23 NOTE — TELEPHONE ENCOUNTER
I sent a prescription for Lantus. Please let the patient know that Lantus and Basaglar are both the same type of insulin-insulin glargine.

## 2022-01-14 ENCOUNTER — OFFICE VISIT (OUTPATIENT)
Dept: ORTHOPEDIC SURGERY | Age: 42
End: 2022-01-14
Payer: COMMERCIAL

## 2022-01-14 VITALS — HEIGHT: 76 IN | WEIGHT: 291 LBS | BODY MASS INDEX: 35.44 KG/M2

## 2022-01-14 DIAGNOSIS — S82.832D CLOSED FRACTURE OF DISTAL END OF LEFT FIBULA WITH ROUTINE HEALING, UNSPECIFIED FRACTURE MORPHOLOGY, SUBSEQUENT ENCOUNTER: Primary | ICD-10-CM

## 2022-01-14 PROCEDURE — 99213 OFFICE O/P EST LOW 20 MIN: CPT | Performed by: ORTHOPAEDIC SURGERY

## 2022-01-18 NOTE — PROGRESS NOTES
dorsalis pedis pulse.  Compartments are soft and compressible.  There is no calf tenderness and a negative Homans' sign.     RADIOGRAPHIC EXAM:  3 views of the left ankle including AP, mortise, and lateral projections demonstrate no significant change in alignment.  The ankle mortise is congruent. Distal fibula fracture is in unchanged alignment. There has been progressive periosteal callus formation particularly about the lateral and posterior aspects of the distal fibula. Fracture line is persistent at the syndesmosis. There is minimal evidence of syndesmosis widening.     ASSESSMENT AND PLAN:  Left distal fibula fracture with mild displacement.     He continues to have some pain mostly with prolonged activities. This is experienced about the level of the fracture site. There is the occasional popping sensation which may be related to incongruity at the ankle syndesmosis. I would allow him to continue weightbearing as tolerated without assist devices. At this point, he will return to work on sedentary basis for the next 2 weeks and then advance to light duty. He will return to clinic in 4 weeks for repeat assessment.   It is my hope that he will return to full duty at that time.  Kiana Mendez MD

## 2022-01-27 ENCOUNTER — TELEPHONE (OUTPATIENT)
Dept: FAMILY MEDICINE CLINIC | Age: 42
End: 2022-01-27

## 2022-01-27 NOTE — TELEPHONE ENCOUNTER
It looks like he has a future appointment scheduled with Compa Ramos in February. If he needs refills please let us know what those are and we can send those in.

## 2022-01-27 NOTE — TELEPHONE ENCOUNTER
----- Message from Carson Bagley, [place] sent at 1/27/2022  1:22 PM EST -----  Subject: Appointment Request    Reason for Call: Routine Existing Condition Follow Up    QUESTIONS  Type of Appointment? Established Patient  Reason for appointment request? Other - [facility] cannot book for tis provider  Additional Information for Provider? Pt called and stated that he needs to   make an appointment to get depression medication. No availabilities due to   [facility] can not book for this provider. Please call the pt back to schedule.   ---------------------------------------------------------------------------  --------------  CALL BACK INFO  What is the best way for the office to contact you? OK to leave message on   voicemail  Preferred Call Back Phone Number? [phone]  ---------------------------------------------------------------------------  --------------  SCRIPT ANSWERS  Relationship to Patient? Self  Is this follow up request related to routine Diabetes Management? No  Have you been diagnosed with, awaiting test results for, or told that you   are suspected of having COVID-19 (Coronavirus)? (If patient has tested   negative or was tested as a requirement for work, school, or travel and   not based on symptoms, answer no)? No  Within the past two weeks have you developed any of the following symptoms   (answer no if symptoms have been present longer than 2 weeks or began   more than 2 weeks ago)? Fever or Chills, Cough, Shortness of breath or   difficulty breathing, Loss of taste or smell, Sore throat, Nasal   congestion, Sneezing or runny nose, Fatigue or generalized body aches   (answer no if pain is specific to a body part e.g. back pain), Diarrhea,   Headache? No  Have you had close contact with someone with COVID-19 in the last 14 days? No  (Service Expert  click yes below to proceed with [employer] As Usual   Scheduling)?  Yes

## 2022-01-27 NOTE — TELEPHONE ENCOUNTER
Pt wanted to be seen sooner to discuss medications that were started by [facility].  Pt scheduled with NB 2/1 and will do the est care visit with TL 2/22

## 2022-02-01 ENCOUNTER — VIRTUAL VISIT (OUTPATIENT)
Dept: FAMILY MEDICINE CLINIC | Age: 42
End: 2022-02-01
Payer: COMMERCIAL

## 2022-02-01 DIAGNOSIS — Z79.4 TYPE 2 DIABETES MELLITUS WITH HYPERGLYCEMIA, WITH LONG-TERM CURRENT USE OF INSULIN (HCC): ICD-10-CM

## 2022-02-01 DIAGNOSIS — F32.A DEPRESSION, UNSPECIFIED DEPRESSION TYPE: Primary | ICD-10-CM

## 2022-02-01 DIAGNOSIS — E11.65 TYPE 2 DIABETES MELLITUS WITH HYPERGLYCEMIA, WITH LONG-TERM CURRENT USE OF INSULIN (HCC): ICD-10-CM

## 2022-02-01 PROCEDURE — G8427 DOCREV CUR MEDS BY ELIG CLIN: HCPCS | Performed by: NURSE PRACTITIONER

## 2022-02-01 PROCEDURE — 99213 OFFICE O/P EST LOW 20 MIN: CPT | Performed by: NURSE PRACTITIONER

## 2022-02-01 PROCEDURE — 1036F TOBACCO NON-USER: CPT | Performed by: NURSE PRACTITIONER

## 2022-02-01 PROCEDURE — G8484 FLU IMMUNIZE NO ADMIN: HCPCS | Performed by: NURSE PRACTITIONER

## 2022-02-01 PROCEDURE — G8417 CALC BMI ABV UP PARAM F/U: HCPCS | Performed by: NURSE PRACTITIONER

## 2022-02-01 RX ORDER — FLUOXETINE HYDROCHLORIDE 20 MG/1
20 CAPSULE ORAL DAILY
Qty: 30 CAPSULE | Refills: 3 | Status: SHIPPED | OUTPATIENT
Start: 2022-02-01 | End: 2022-06-06

## 2022-02-01 ASSESSMENT — PATIENT HEALTH QUESTIONNAIRE - PHQ9
2. FEELING DOWN, DEPRESSED OR HOPELESS: 1
5. POOR APPETITE OR OVEREATING: 0
10. IF YOU CHECKED OFF ANY PROBLEMS, HOW DIFFICULT HAVE THESE PROBLEMS MADE IT FOR YOU TO DO YOUR WORK, TAKE CARE OF THINGS AT HOME, OR GET ALONG WITH OTHER PEOPLE: 2
8. MOVING OR SPEAKING SO SLOWLY THAT OTHER PEOPLE COULD HAVE NOTICED. OR THE OPPOSITE, BEING SO FIGETY OR RESTLESS THAT YOU HAVE BEEN MOVING AROUND A LOT MORE THAN USUAL: 0
9. THOUGHTS THAT YOU WOULD BE BETTER OFF DEAD, OR OF HURTING YOURSELF: 0
SUM OF ALL RESPONSES TO PHQ9 QUESTIONS 1 & 2: 4
7. TROUBLE CONCENTRATING ON THINGS, SUCH AS READING THE NEWSPAPER OR WATCHING TELEVISION: 0
SUM OF ALL RESPONSES TO PHQ QUESTIONS 1-9: 9
6. FEELING BAD ABOUT YOURSELF - OR THAT YOU ARE A FAILURE OR HAVE LET YOURSELF OR YOUR FAMILY DOWN: 3
SUM OF ALL RESPONSES TO PHQ QUESTIONS 1-9: 9
3. TROUBLE FALLING OR STAYING ASLEEP: 1
SUM OF ALL RESPONSES TO PHQ QUESTIONS 1-9: 9
SUM OF ALL RESPONSES TO PHQ QUESTIONS 1-9: 9
1. LITTLE INTEREST OR PLEASURE IN DOING THINGS: 3
4. FEELING TIRED OR HAVING LITTLE ENERGY: 1

## 2022-02-01 ASSESSMENT — ENCOUNTER SYMPTOMS
SHORTNESS OF BREATH: 0
WHEEZING: 0

## 2022-02-01 NOTE — PROGRESS NOTES
2022    TELEHEALTH EVALUATION -- Audio/Visual (During BFQWU-81 public health emergency)    HPI:    Kat Vivas (:  1980) has requested an audio/video evaluation for the following concern(s):    Depression: used to be on effexor-did not like how he felt if he missed a dose , tried wellbutrin- was allergic to that , cymbalta made him a zombie, zoloft helped at first but then felt like over time it quit working- was on 100 mg. Has had a lot of medical problems the end of last year and has been out of work- this has made him feel more down. Wants to start on meds for depression. Thinks his mom takes meds but not sure what. Review of Systems   Respiratory: Negative for shortness of breath and wheezing. Cardiovascular: Negative for chest pain and palpitations. Psychiatric/Behavioral: Positive for dysphoric mood. Negative for suicidal ideas. Prior to Visit Medications    Medication Sig Taking? Authorizing Provider   FLUoxetine (PROZAC) 20 MG capsule Take 1 capsule by mouth daily Yes COLIN Tadeo CNP   insulin glargine (LANTUS;BASAGLAR) 100 UNIT/ML injection pen Inject 60 Units into the skin nightly Yes COLIN Kamara CNP   metoprolol tartrate (LOPRESSOR) 25 MG tablet Take 1 tablet by mouth 2 times daily Yes Diamond Soria MD   insulin lispro, 1 Unit Dial, 100 UNIT/ML SOPN INJECT 25 INTO THE SKIN 3 TIMES A DAY BEFORE MEALS Yes Yashira Moncada MD   albuterol sulfate HFA (VENTOLIN HFA) 108 (90 Base) MCG/ACT inhaler Inhale 2 puffs into the lungs every 6 hours as needed for Wheezing Yes COLIN Leggett CNP   benazepril (LOTENSIN) 40 MG tablet TAKE ONE TABLET BY MOUTH DAILY Yes COLIN Leggett CNP   miconazole (MICOTIN) 2 % cream Apply topically 2 times daily.  For 2 weeks Yes COLIN Leggett CNP   atorvastatin (LIPITOR) 40 MG tablet Take 1 tablet by mouth daily Yes Diamond Soria MD   aspirin EC 81 MG EC tablet Take 1 tablet by mouth daily Yes Marlo Aguirre MD   metFORMIN (GLUCOPHAGE) 1000 MG tablet TAKE ONE TABLET BY MOUTH TWICE A DAY WITH MEALS  Patient not taking: Reported on 2/1/2022  COLIN [place] - DEEJAY   fluticasone-salmeterol (ADVAIR HFA) 230-21 MCG/ACT inhaler Inhale 2 puffs into the lungs every 12 hours  Patient not taking: Reported on 11/24/2021  Historical Provider, MD       Social History     Tobacco Use    Smoking status: Never Smoker    Smokeless tobacco: Never Used   Vaping Use    Vaping Use: Never used   Substance Use Topics    Alcohol use: No    Drug use: No        Allergies   Allergen Reactions    Decadron [Dexamethasone] Other (See Comments)     Weak, lethargic    Methocarbamol Diarrhea    Trilyte [Peg 3350-Kcl-Na Bicarb-Nacl]     Trulicity [Dulaglutide]     Pcn [Penicillins] Hives and Rash   ,   Past Medical History:   Diagnosis Date    Arthritis     left knee    Chronic lumbar pain     Closed fracture of left ankle with delayed healing 10/2021    COVID-19 08/02/2021    DDD (degenerative disc disease), lumbar     MRI 2012    Depression     Glucose intolerance (impaired glucose tolerance)     History of ITP     Hypertension     Idiopathic thrombocytopenic purpura ([address]     OWEN (nonalcoholic steatohepatitis) 06/05/2021    Type 2 diabetes mellitus without complication, without long-term current use of insulin ([address] 08/30/2016   , History reviewed. No pertinent surgical history. ,   Family History   Problem Relation Age of Onset    Diabetes Mother     High Blood Pressure Mother     Depression Mother     Depression Father     High Blood Pressure Father     Other Father         sleep apnea       PHYSICAL EXAMINATION:  [ INSTRUCTIONS:  \"[x]\" Indicates a positive item  \"[]\" Indicates a negative item  -- DELETE ALL ITEMS NOT EXAMINED]  Vital Signs: (As obtained by patient/caregiver or practitioner observation)    Blood pressure-  Heart rate-    Respiratory rate-    Temperature-  Pulse oximetry-     Constitutional: [x] Appears well-developed and well-nourished [x] No apparent distress      [] Abnormal-   Mental status  [x] Alert and awake  [x] Oriented to person/place/time [x]Able to follow commands      Eyes:  EOM    []  Normal  [] Abnormal-  Sclera  [x]  Normal  [] Abnormal -         Discharge []  None visible  [] Abnormal -    HENT:   [x] Normocephalic, atraumatic. [] Abnormal   [] Mouth/Throat: Mucous membranes are moist.     External Ears [] Normal  [] Abnormal-     Neck: [] No visualized mass     Pulmonary/Chest: [x] Respiratory effort normal.  [x] No visualized signs of difficulty breathing or respiratory distress        [] Abnormal-      Musculoskeletal:   [] Normal gait with no signs of ataxia         [x] Normal range of motion of neck        [] Abnormal-       Neurological:        [x] No Facial Asymmetry (Cranial nerve 7 motor function) (limited exam to video visit)          [x] No gaze palsy        [] Abnormal-         Skin:        [x] No significant exanthematous lesions or discoloration noted on facial skin         [] Abnormal-            Psychiatric:       [x] Normal Affect [x] No Hallucinations        [] Abnormal-     Other pertinent observable physical exam findings-     ASSESSMENT/PLAN:  1. Depression, unspecified depression type    - FLUoxetine (PROZAC) 20 MG capsule; Take 1 capsule by mouth daily  Dispense: 30 capsule; Refill: 3  Discussed to watch for any SI and to stop med if this happens and get help - he states understanding    Has appt to establish with Katiuska Simmsing on 2/22- can follow up on med at that visit     No follow-ups on file. Bonilla Atkinson, was evaluated through a synchronous (real-time) audio-video encounter. The patient (or guardian if applicable) is aware that this is a billable service, which includes applicable co-pays. This Virtual Visit was conducted with patient's (and/or legal guardian's) consent.  The visit was conducted pursuant to the emergency declaration under the [facility], [address] [place] [address] waiver authority and the [place] [employer] and [employer] General Act. Patient identification was verified, and a caregiver was present when appropriate. The patient was located at home in a state where the provider was licensed to provide care. Total time spent on this encounter: Not billed by time    --Marnie Lennox, APRN - CNP on 2/1/2022 at 8:39 AM    An electronic signature was used to authenticate this note.

## 2022-02-02 ENCOUNTER — TELEPHONE (OUTPATIENT)
Dept: ORTHOPEDIC SURGERY | Age: 42
End: 2022-02-02

## 2022-02-09 ENCOUNTER — OFFICE VISIT (OUTPATIENT)
Dept: ORTHOPEDIC SURGERY | Age: 42
End: 2022-02-09
Payer: COMMERCIAL

## 2022-02-09 VITALS — BODY MASS INDEX: 35.44 KG/M2 | HEIGHT: 76 IN | WEIGHT: 291 LBS

## 2022-02-09 DIAGNOSIS — S82.832D CLOSED FRACTURE OF DISTAL END OF LEFT FIBULA WITH ROUTINE HEALING, UNSPECIFIED FRACTURE MORPHOLOGY, SUBSEQUENT ENCOUNTER: Primary | ICD-10-CM

## 2022-02-09 PROCEDURE — 99213 OFFICE O/P EST LOW 20 MIN: CPT | Performed by: ORTHOPAEDIC SURGERY

## 2022-02-14 NOTE — PROGRESS NOTES
ORTHOPAEDIC SURGERY FOLLOWUP VISIT     CHIEF COMPLAINT:  Left ankle injury follow-up     DATE OF INJURY: 10/27/2021  DIAGNOSIS: Left distal fibula fracture  DATE OF SURGERY: N/A, nonoperative management     HISTORY OF PRESENT ILLNESS:  24-year-old male presents 3.5 months out from a mildly displaced distal fibula fracture of his left ankle.  It was reasonably aligned at initial x-rays and at follow-up x-rays.  There is no evidence of widening of the ankle mortise.  Overall, his pain has improved since previous evaluation.  He rates his pain a 0 or a 1 out of 10.  His pain is primarily about the lateral ankle.  He has the occasional medial ankle pain.  His pain does not radiate.  He denies dysesthesias.  He has been ambulating in regular footwear. His pain is worse with prolonged periods of standing and walking. His pain is localized to the lateral ankle. He will occasionally get a popping sensation experienced at the lateral ankle. This has become persistent. He attempted to return to work, however he is having difficulty with ambulating on uneven ground. His pain is severely limiting his tolerance to ambulating. He states that he is in moderate to severe pain at the end of the day after work. He presents for repeat evaluation.     PHYSICAL EXAM:  General: Well-appearing male of stated age.  No acute distress. Left lower extremity: Patient presents in regular footwear.  There are no cuts, open wounds, or abrasions.  There is no tenderness to palpation about the medial malleolus.  There is  point tenderness tenderness to palpation about the fracture site of the distal fibula. No significant tenderness to palpation about the anterior syndesmosis. No tenderness to palpation about peroneal tendons. No reproducible tendon subluxation with ankle circumduction. Here is no obvious deformity.  Sensation is intact to light touch in deep peroneal, superficial peroneal, tibial, sural, and saphenous nerve distributions.  Motor function is intact to EHL, FHL, tibialis anterior, and gastroc.  There is a negative anterior drawer test of the ankle in neutral and and plantar flexion.  Dorsiflexion is just to neutral.  Plantar flexion is 40 degrees.  There is slight reduction in subtalar motion.  There is brisk capillary refill to the toes and a strong palpable dorsalis pedis pulse.  Compartments are soft and compressible.  There is no calf tenderness and a negative Homans' sign.     RADIOGRAPHIC EXAM:  No new x-rays obtained today.     ASSESSMENT AND PLAN:  Left distal fibula fracture with mild displacement.     He continues to have some pain mostly with prolonged activities. This is experienced about the level of the fracture site. There is the occasional popping sensation which may be related to incongruity at the ankle syndesmosis. He does have start up pain and pain with walking on uneven surfaces. He will continue to weight-bear as tolerated without assist devices. Given his persistent symptoms, I recommended a CT scan of the ankle to evaluate for fibrous union, nonunion. CT scan was ordered. During this time, will adjust his work schedule. A new work note was provided. I would see him back after the CT scan is obtained for discussion of the results. I indicated to him that if he still has a persistent fracture line that is not healing, sometimes a work-up for nutritional/hormonal abnormalities can reveal the reason for a nonunion. I indicated that in some cases refractory bone healing can necessitate need for surgery for bone grafting/compression.       Holly Rodas MD

## 2022-02-17 ENCOUNTER — OFFICE VISIT (OUTPATIENT)
Dept: CARDIOLOGY CLINIC | Age: 42
End: 2022-02-17
Payer: COMMERCIAL

## 2022-02-17 ENCOUNTER — TELEPHONE (OUTPATIENT)
Dept: ORTHOPEDIC SURGERY | Age: 42
End: 2022-02-17

## 2022-02-17 VITALS
OXYGEN SATURATION: 97 % | BODY MASS INDEX: 36.65 KG/M2 | SYSTOLIC BLOOD PRESSURE: 126 MMHG | DIASTOLIC BLOOD PRESSURE: 72 MMHG | HEIGHT: 76 IN | WEIGHT: 301 LBS | HEART RATE: 71 BPM

## 2022-02-17 DIAGNOSIS — I47.20 VENTRICULAR TACHYCARDIA: Primary | ICD-10-CM

## 2022-02-17 DIAGNOSIS — R00.0 WIDE-COMPLEX TACHYCARDIA: ICD-10-CM

## 2022-02-17 PROCEDURE — 93000 ELECTROCARDIOGRAM COMPLETE: CPT | Performed by: INTERNAL MEDICINE

## 2022-02-17 PROCEDURE — G8427 DOCREV CUR MEDS BY ELIG CLIN: HCPCS | Performed by: INTERNAL MEDICINE

## 2022-02-17 PROCEDURE — G8417 CALC BMI ABV UP PARAM F/U: HCPCS | Performed by: INTERNAL MEDICINE

## 2022-02-17 PROCEDURE — G8484 FLU IMMUNIZE NO ADMIN: HCPCS | Performed by: INTERNAL MEDICINE

## 2022-02-17 PROCEDURE — 1036F TOBACCO NON-USER: CPT | Performed by: INTERNAL MEDICINE

## 2022-02-17 PROCEDURE — 99214 OFFICE O/P EST MOD 30 MIN: CPT | Performed by: INTERNAL MEDICINE

## 2022-02-17 RX ORDER — PROPRANOLOL HYDROCHLORIDE 80 MG/1
80 CAPSULE, EXTENDED RELEASE ORAL DAILY
Qty: 30 CAPSULE | Refills: 5 | Status: SHIPPED | OUTPATIENT
Start: 2022-02-17 | End: 2022-09-02

## 2022-02-17 NOTE — PROGRESS NOTES
[facility]   Electrophysiology Consult Note            Date: 2/17/22  Patient Name: Mayo Paris  YOB: 1980    Primary Care Physician: COLIN Moreno CNP    CHIEF COMPLAINT:   Chief Complaint   Patient presents with    3 Month Follow-Up    Palpitations    Shortness of Breath     when having palpitations      HISTORY OF PRESENT ILLNESS: Mayo Paris is a 38 y.o. male with a PMH significant for CAD, wide complex tachycardia, COVID-19 (8/2021), idiopathic thrombocytopenia. Patient was admitted to hospital in 8/2021 with cough and SOB and was found to be COVID+. While in the hospital he had a run or wide complex tachycardia at 170 bpm. Patient wore a cardiac event monitor from 9/14/2021 to 10/11/2021 which demonstrated predominately SR with an average HR of 102 () with NSVT. On 11/16/2021, ECG demonstrates SR (98). He has a broken leg and recently had a cast removed and wife reports a syncopal episode with a seizure during cast removal. Patient wore a cardiac event monitor from 11/17/2021 to 11/23/2021 which demonstrated predominately SR with an average HR of 91 (). PAC burden 0.96%, PVC burden 0.86%. Interval History since last office visit: Today, 2/17/2022, ECG demonstrates SR (66). He is doing ok. He did not tolerate metoprolol succinate due to abd pain. He is taking all other medications as prescribed. Patient denies current edema, chest pain, sob, dizziness or syncope. Past Medical History:   has a past medical history of Arthritis, Chronic lumbar pain, Closed fracture of left ankle with delayed healing, COVID-19, DDD (degenerative disc disease), lumbar, Depression, Glucose intolerance (impaired glucose tolerance), History of ITP, Hypertension, Idiopathic thrombocytopenic purpura ([address] OWEN (nonalcoholic steatohepatitis), and Type 2 diabetes mellitus without complication, without long-term current use of insulin ([address]     Past Surgical History:   has no past surgical history on file. Allergies:  Decadron [dexamethasone], Methocarbamol, Trilyte [peg 3350-kcl-na bicarb-nacl], Trulicity [dulaglutide], and Pcn [penicillins]    Social History:   reports that he has never smoked. He has never used smokeless tobacco. He reports that he does not drink alcohol and does not use drugs. Family History: family history includes Depression in his father and mother; Diabetes in his mother; High Blood Pressure in his father and mother; Other in his father. Home Medications:    Prior to Admission medications    Medication Sig Start Date End Date Taking? Authorizing Provider   FLUoxetine (PROZAC) 20 MG capsule Take 1 capsule by mouth daily 2/1/22  Yes COLIN Carreon CNP   insulin glargine (LANTUS;BASAGLAR) 100 UNIT/ML injection pen Inject 60 Units into the skin nightly 12/23/21  Yes COLIN Wilhelm CNP   metoprolol tartrate (LOPRESSOR) 25 MG tablet Take 1 tablet by mouth 2 times daily 12/7/21  Yes Halle Ovalles MD   insulin lispro, 1 Unit Dial, 100 UNIT/ML SOPN INJECT 25 INTO THE SKIN 3 TIMES A DAY BEFORE MEALS 11/1/21  Yes Kevin Cisse MD   benazepril (LOTENSIN) 40 MG tablet TAKE ONE TABLET BY MOUTH DAILY 9/17/21  Yes COLIN Perera CNP   miconazole (MICOTIN) 2 % cream Apply topically 2 times daily.  For 2 weeks 9/17/21  Yes COLIN Perera CNP   aspirin EC 81 MG EC tablet Take 1 tablet by mouth daily 9/14/21  Yes Halle Ovalles MD   metFORMIN (GLUCOPHAGE) 1000 MG tablet TAKE ONE TABLET BY MOUTH TWICE A DAY WITH MEALS  Patient not taking: Reported on 2/1/2022 2/1/22   COLIN Carreon CNP   fluticasone-salmeterol (ADVAIR HFA) 230-21 MCG/ACT inhaler Inhale 2 puffs into the lungs every 12 hours  Patient not taking: Reported on 11/24/2021 10/14/21   Historical Provider, MD   albuterol sulfate HFA (VENTOLIN HFA) 108 (90 Base) MCG/ACT inhaler Inhale 2 puffs into the lungs every 6 hours as needed for Wheezing  Patient not taking: Reported on 2/17/2022 10/13/21   Hilary Ormond Nesius, APRN - CNP   atorvastatin (LIPITOR) 40 MG tablet Take 1 tablet by mouth daily  Patient not taking: Reported on 2/17/2022 9/14/21   Rajni Ayers MD       REVIEW OF SYSTEMS:    All 14-point review of systems are completed and  pertinent positives are mentioned in the history of present illness. Other  systems are reviewed and are negative. Physical Examination:    /72   Pulse 71   Ht 6' 4\" (1.93 m)   Wt (!) 301 lb (136.5 kg)   SpO2 97%   BMI 36.64 kg/m²      Constitutional and General Appearance:    alert, cooperative, no distress and appears stated age  [de-identified]:    PERRLA, no cervical lymphadenopathy. No masses palpable. Normal oral  mucosa  Respiratory:  · Normal excursion and expansion without use of accessory muscles  · Resp Auscultation: Normal breath sounds without dullness or wheezing  Cardiovascular:  · The apical impulse is not displaced  · Mildly tachycardic. RRR S1S2 w/o M/G/R  Abdomen:  · No masses or tenderness  · Bowel sounds present  Extremities:  ·  No Cyanosis or Clubbing  ·  Lower extremity edema: No  · Skin: Warm and dry  Neurological:  · Alert and oriented. · Moves all extremities well  · No abnormalities of mood, affect, memory, mentation, or behavior are noted    DATA:    ECG 2/17/22: Personally reviewed. Stress test 9/28/2021  Summary There is an inferolateral fixed defect consistent with diaphragmatic  attenuation. No clear inducible ischemia or scar. Normal myocardial  thickening, normal LV function with post-stress LVEF 52%. Echo 9/28/2021   Summary   Normal left ventricle size, wall thickness, and systolic function with an   estimated ejection fraction of 55-60%. There is hypokinesis of the basal   inferior wall. No additional regional wall motion abnormalities are seen. There is mild concentric left ventricular hypertrophy.    Normal left ventricular diastolic filling pressure. Mild posterior mitral annular calcification is present. Aortic valve sclerosis without aortic stenosis. IMPRESSION:    1. Inappropriate sinus tachycardia. 11/16/2021  Patient is a pleasant 35year old male with a medical history significant for hypertension, diabetes mellitus type II, history of COVID-19 long hauler and vasovagal syncope with pain who presents from home to [facility]. Patient wore a cardiac monitor which showed his average heart rate was ~100. His hemoglobin was within normal limits. His thyroid was within normal limits. He is asymptomatic as far as I can tell and is tolerating his metoprolol. We discussed viridiana agents, ablation, and antiarrhythmics. We will ask that he continue his metoprolol.  - Continue metoprolol. 02/17/2022  Patient resents for follow-up. He is been doing well. He had one episode of palpitations he thought was related to possible ingestion of CBD and anxiety. We discussed alternative beta-blocker such as propranolol. We will start him on propranolol over metoprolol. Follow-up in 3 months. - Start propanolol.  - Stop metoprolol.  - Follow up in 3 months then we will extend. 2. Non-sustained ventricular tachycardia. 11/16/2021  Patient wore a cardiac monitor which showed NSVT (fast and slow). Unclear if he was symptomatic as he didn't know to hit button if symptoms such as presyncope or syncope. We will ask him to wear another cardiac monitor. We also ask him to consider ILR for longer term monitoring. Given his normal LVEF and normal stress test beta blocker propanolol > metoprolol would be preferred start from medical therapy standpoint.  - Continue metoprolol. - 1 week monitor and consider ILR.  - Follow up with me in 3 months. 2/17/2022  - Plan as per above. RECOMMENDATIONS:  1. Discussed loop monitor for long term monitoring, will defer for now. 2. Stop metoprolol. Start propranolol 80 mg daily.  Let us know how this works for you. 3. Follow up in 3 months, or sooner if needed. QUALITY MEASURES  1. Tobacco Cessation Counseling: NA  2. Retake of BP if >140/90:   NA  3. Documentation to PCP/referring for new patient:  Sent to PCP at close of office visit  4. CAD patient on anti-platelet: NA  5. CAD patient on STATIN therapy:  Yes  6. Patient with CHF and aFib on anticoagulation:  NA     All questions and concerns were addressed to the patient/family. Alternatives to my treatment were discussed. Dr. Deanna Basilio MD  [facility]. [address] [place] [address]. [address]. [address]  Phone: [phone]  Fax: [phone]     NOTE: This report was transcribed using voice recognition software. Every effort was made to ensure accuracy, however, inadvertent computerized transcription errors may be present. Christian Rao RN, am scribing for and in the presence of Dr. Kristan Bang. 02/17/22 10:30 AM   Shay Velazco RN    The scribe's documentation has been prepared under my direction and personally reviewed by me in its entirety. I confirm that the note above accurately reflects all work, physical examination, the discussion of treatments and procedures, and medical decision making performed by me. Leigh Shook MD personally performed the services described in this documentation as scribed by nurse in my presence, and is both accurate and complete.     Electronically signed by Suzie Gordillo MD on 2/17/2022 at 10:55 AM

## 2022-02-17 NOTE — TELEPHONE ENCOUNTER
General Question     Subject: PATIENT STATES NO ONE HAS CONTACTED HIM TO SCHEDULE HIS CT SCAN. WONDERING IF HE SHOULD  STILL COME IN TOMORROW. PLEASE ADVISE.   Patient Lynnette Perez  Contact Number: [phone]

## 2022-02-17 NOTE — PATIENT INSTRUCTIONS
RECOMMENDATIONS:  1. Discussed loop monitor for long term monitoring, will defer for now. 2. Stop metoprolol. Start propranolol 80 mg daily. Let us know how this works for you. 3. Follow up in 3 months, or sooner if needed.

## 2022-02-24 ENCOUNTER — TELEPHONE (OUTPATIENT)
Dept: ORTHOPEDIC SURGERY | Age: 42
End: 2022-02-24

## 2022-03-01 DIAGNOSIS — I10 ESSENTIAL HYPERTENSION: ICD-10-CM

## 2022-03-01 RX ORDER — BENAZEPRIL HYDROCHLORIDE 40 MG/1
TABLET, FILM COATED ORAL
Qty: 30 TABLET | Refills: 0 | Status: SHIPPED | OUTPATIENT
Start: 2022-03-01 | End: 2022-04-06

## 2022-03-01 NOTE — TELEPHONE ENCOUNTER
Last Office Visit  -  2/1/22   Next Office Visit  -  n/a    Last Filled  -    Last UDS -    Contract -

## 2022-03-02 ENCOUNTER — HOSPITAL ENCOUNTER (OUTPATIENT)
Dept: CT IMAGING | Age: 42
Discharge: HOME | End: 2022-03-02
Payer: COMMERCIAL

## 2022-03-02 DIAGNOSIS — S82.832D CLOSED FRACTURE OF DISTAL END OF LEFT FIBULA WITH ROUTINE HEALING, UNSPECIFIED FRACTURE MORPHOLOGY, SUBSEQUENT ENCOUNTER: ICD-10-CM

## 2022-03-02 PROCEDURE — 73700 CT LOWER EXTREMITY W/O DYE: CPT

## 2022-03-03 ENCOUNTER — OFFICE VISIT (OUTPATIENT)
Dept: FAMILY MEDICINE CLINIC | Age: 42
End: 2022-03-03
Payer: COMMERCIAL

## 2022-03-03 VITALS
SYSTOLIC BLOOD PRESSURE: 122 MMHG | HEART RATE: 91 BPM | WEIGHT: 301 LBS | OXYGEN SATURATION: 97 % | DIASTOLIC BLOOD PRESSURE: 88 MMHG | BODY MASS INDEX: 36.65 KG/M2 | HEIGHT: 76 IN

## 2022-03-03 DIAGNOSIS — I10 PRIMARY HYPERTENSION: ICD-10-CM

## 2022-03-03 DIAGNOSIS — Z76.89 ENCOUNTER TO ESTABLISH CARE: Primary | ICD-10-CM

## 2022-03-03 DIAGNOSIS — F32.A DEPRESSION, UNSPECIFIED DEPRESSION TYPE: ICD-10-CM

## 2022-03-03 DIAGNOSIS — U07.1 COVID-19: ICD-10-CM

## 2022-03-03 DIAGNOSIS — E11.65 TYPE 2 DIABETES MELLITUS WITH HYPERGLYCEMIA, WITH LONG-TERM CURRENT USE OF INSULIN (HCC): ICD-10-CM

## 2022-03-03 DIAGNOSIS — E78.5 HYPERLIPIDEMIA, UNSPECIFIED HYPERLIPIDEMIA TYPE: ICD-10-CM

## 2022-03-03 DIAGNOSIS — J02.9 SORE THROAT: ICD-10-CM

## 2022-03-03 DIAGNOSIS — Z79.4 TYPE 2 DIABETES MELLITUS WITH HYPERGLYCEMIA, WITH LONG-TERM CURRENT USE OF INSULIN (HCC): ICD-10-CM

## 2022-03-03 PROBLEM — Z86.2 HISTORY OF ITP: Status: RESOLVED | Noted: 2018-05-05 | Resolved: 2022-03-03

## 2022-03-03 PROBLEM — E78.6 LOW HDL (UNDER 40): Status: RESOLVED | Noted: 2018-05-05 | Resolved: 2022-03-03

## 2022-03-03 PROBLEM — J96.01 ACUTE HYPOXEMIC RESPIRATORY FAILURE DUE TO COVID-19 (HCC): Status: RESOLVED | Noted: 2021-08-02 | Resolved: 2022-03-03

## 2022-03-03 PROBLEM — R06.02 SOB (SHORTNESS OF BREATH): Status: RESOLVED | Noted: 2021-09-14 | Resolved: 2022-03-03

## 2022-03-03 LAB
A/G RATIO: 1.9 (ref 1.1–2.2)
ALBUMIN SERPL-MCNC: 4.2 G/DL (ref 3.4–5)
ALP BLD-CCNC: 100 U/L (ref 40–129)
ALT SERPL-CCNC: 31 U/L (ref 10–40)
ANION GAP SERPL CALCULATED.3IONS-SCNC: 15 MMOL/L (ref 3–16)
AST SERPL-CCNC: 26 U/L (ref 15–37)
BILIRUB SERPL-MCNC: 1.4 MG/DL (ref 0–1)
BUN BLDV-MCNC: 12 MG/DL (ref 7–20)
CALCIUM SERPL-MCNC: 9.1 MG/DL (ref 8.3–10.6)
CHLORIDE BLD-SCNC: 97 MMOL/L (ref 99–110)
CHOLESTEROL, TOTAL: 165 MG/DL (ref 0–199)
CO2: 23 MMOL/L (ref 21–32)
CREAT SERPL-MCNC: 0.6 MG/DL (ref 0.9–1.3)
GFR AFRICAN AMERICAN: >60
GFR NON-AFRICAN AMERICAN: >60
GLUCOSE BLD-MCNC: 345 MG/DL (ref 70–99)
HDLC SERPL-MCNC: 46 MG/DL (ref 40–60)
LDL CHOLESTEROL CALCULATED: 93 MG/DL
POTASSIUM SERPL-SCNC: 4.4 MMOL/L (ref 3.5–5.1)
S PYO AG THROAT QL: NORMAL
SODIUM BLD-SCNC: 135 MMOL/L (ref 136–145)
TOTAL PROTEIN: 6.4 G/DL (ref 6.4–8.2)
TRIGL SERPL-MCNC: 128 MG/DL (ref 0–150)
VLDLC SERPL CALC-MCNC: 26 MG/DL

## 2022-03-03 PROCEDURE — 36415 COLL VENOUS BLD VENIPUNCTURE: CPT | Performed by: REGISTERED NURSE

## 2022-03-03 PROCEDURE — 2022F DILAT RTA XM EVC RTNOPTHY: CPT | Performed by: REGISTERED NURSE

## 2022-03-03 PROCEDURE — 1036F TOBACCO NON-USER: CPT | Performed by: REGISTERED NURSE

## 2022-03-03 PROCEDURE — G8484 FLU IMMUNIZE NO ADMIN: HCPCS | Performed by: REGISTERED NURSE

## 2022-03-03 PROCEDURE — 87880 STREP A ASSAY W/OPTIC: CPT | Performed by: REGISTERED NURSE

## 2022-03-03 PROCEDURE — G8427 DOCREV CUR MEDS BY ELIG CLIN: HCPCS | Performed by: REGISTERED NURSE

## 2022-03-03 PROCEDURE — 99214 OFFICE O/P EST MOD 30 MIN: CPT | Performed by: REGISTERED NURSE

## 2022-03-03 PROCEDURE — G8417 CALC BMI ABV UP PARAM F/U: HCPCS | Performed by: REGISTERED NURSE

## 2022-03-03 PROCEDURE — 3046F HEMOGLOBIN A1C LEVEL >9.0%: CPT | Performed by: REGISTERED NURSE

## 2022-03-03 RX ORDER — FLASH GLUCOSE SENSOR
1 KIT MISCELLANEOUS DAILY
Qty: 1 EACH | Refills: 3 | Status: SHIPPED | OUTPATIENT
Start: 2022-03-03

## 2022-03-03 ASSESSMENT — ENCOUNTER SYMPTOMS
DIARRHEA: 0
COUGH: 0
NAUSEA: 0
SHORTNESS OF BREATH: 0
CONSTIPATION: 0
SINUS PAIN: 0
RHINORRHEA: 1
SORE THROAT: 1
SINUS PRESSURE: 0
ABDOMINAL PAIN: 0

## 2022-03-03 NOTE — PROGRESS NOTES
Patient: Davy Curry is a 38 y.o. male who presents today with the following Chief Complaint(s):  Chief Complaint   Patient presents with    Established New Doctor     Est. Care-Prev. DN Pt       Assessment/Plan:  1. Encounter to establish care  Patient's past medical history, surgical history, family history, medications,  and allergies  were all reviewed and updated as appropriate today. 2. Primary hypertension  Stable. Continue current medications. - Comprehensive Metabolic Panel    3. Type 2 diabetes mellitus with hyperglycemia, with long-term current use of insulin ([address]  Blood work drawn today. He has not been checking his blood sugar. He is interested in the continuous glucose monitoring system. Provided him with a starter system for the freestyle marty. Prescription given. Strongly encouraged him to monitor his blood sugar at least 3 times a day. - Hemoglobin A1C  - Continuous Blood Gluc Sensor ([address] [place] [address]) MISC; 1 each by Does not apply route daily Please dispense complete kit for glucose monitoring. Dispense: 1 each; Refill: 3    4. Depression, unspecified depression type  Stable. Continue current medication. 5. COVID-19  He is recovering well after COVID. He says he still has not regained his sense of taste and smell. He denies any shortness of breath or difficulty breathing. 6. Hyperlipidemia, unspecified hyperlipidemia type    - Lipid Panel      Return in about 6 months (around 9/3/2022), or if symptoms worsen or fail to improve. HPI:     He is here to establish care. He also has a sore throat that started a week ago. He denies fever or productive cough. He was recently around his granddaughter who was positive for strep throat. Diabetes- he has not been checking his blood sugars. He takes lispro 3 times per day before meals and lantus at night. He does not drink soda or eat out. He does not eat a lot of sweets. He has lost 90lbs.  He is active in his job.     ITP- sees Dr. Raciel Mann at [facility]. He sees cardiology yearly and an electrophysiologist every 6 months. He had a severe case of COVID and was hospitalized. He is supposed to follow up with Pulmonology. Depression- Prozac is working well for him. Current Outpatient Medications   Medication Sig Dispense Refill    Continuous Blood Gluc Sensor (FREESTYLE JAI SENSOR SYSTEM) MISC 1 each by Does not apply route daily Please dispense complete kit for glucose monitoring. 1 each 3    benazepril (LOTENSIN) 40 MG tablet TAKE 1 TABLET BY MOUTH EVERY DAY 30 tablet 0    propranolol (INDERAL LA) 80 MG extended release capsule Take 1 capsule by mouth daily 30 capsule 5    FLUoxetine (PROZAC) 20 MG capsule Take 1 capsule by mouth daily 30 capsule 3    insulin glargine (LANTUS;BASAGLAR) 100 UNIT/ML injection pen Inject 60 Units into the skin nightly 6 pen 5    insulin lispro, 1 Unit Dial, 100 UNIT/ML SOPN INJECT 25 INTO THE SKIN 3 TIMES A DAY BEFORE MEALS 21 mL 1    albuterol sulfate HFA (VENTOLIN HFA) 108 (90 Base) MCG/ACT inhaler Inhale 2 puffs into the lungs every 6 hours as needed for Wheezing 18 g 2    miconazole (MICOTIN) 2 % cream Apply topically 2 times daily. For 2 weeks 113 g 1    atorvastatin (LIPITOR) 40 MG tablet Take 1 tablet by mouth daily 30 tablet 11    aspirin EC 81 MG EC tablet Take 1 tablet by mouth daily 30 tablet 5     No current facility-administered medications for this visit. Review of Systems   Constitutional: Negative for chills, diaphoresis and fatigue. HENT: Positive for rhinorrhea and sore throat. Negative for congestion, ear pain, postnasal drip, sinus pressure and sinus pain. Eyes: Negative for visual disturbance. Respiratory: Negative for cough and shortness of breath. Cardiovascular: Negative for chest pain, palpitations and leg swelling. Gastrointestinal: Negative for abdominal pain, constipation, diarrhea and nausea.    Endocrine: Negative for cold intolerance, heat intolerance, polydipsia, polyphagia and polyuria. Genitourinary: Negative for difficulty urinating. Musculoskeletal: Negative. Skin: Negative. Neurological: Negative for dizziness, light-headedness and headaches. Hematological: Bruises/bleeds easily ( due to ITP). Psychiatric/Behavioral: Negative for dysphoric mood, self-injury, sleep disturbance and suicidal ideas. The patient is not nervous/anxious. Vitals:    03/03/22 0956 03/03/22 1022   BP: (!) 130/92 122/88   Pulse: 91    SpO2: 97%    Weight: (!) 301 lb (136.5 kg)    Height: 6' 4\" (1.93 m)      Physical Exam  Vitals reviewed. Constitutional:       General: He is not in acute distress. Appearance: Normal appearance. He is not ill-appearing, toxic-appearing or diaphoretic. HENT:      Head: Normocephalic and atraumatic. Right Ear: Tympanic membrane, ear canal and external ear normal. There is no impacted cerumen. Left Ear: Tympanic membrane, ear canal and external ear normal. There is no impacted cerumen. Nose: Nose normal. No congestion or rhinorrhea. Mouth/Throat:      Mouth: Mucous membranes are moist.      Pharynx: Oropharynx is clear. No oropharyngeal exudate or posterior oropharyngeal erythema. Eyes:      General:         Right eye: No discharge. Left eye: No discharge. Extraocular Movements: Extraocular movements intact. Conjunctiva/sclera: Conjunctivae normal.      Pupils: Pupils are equal, round, and reactive to light. Cardiovascular:      Rate and Rhythm: Normal rate and regular rhythm. Pulses: Normal pulses. Heart sounds: Normal heart sounds. No murmur heard. No friction rub. No gallop. Pulmonary:      Effort: Pulmonary effort is normal.      Breath sounds: Normal breath sounds. No stridor. No wheezing, rhonchi or rales. Abdominal:      General: Bowel sounds are normal. There is no distension. Palpations: Abdomen is soft. There is no mass. Tenderness: There is no abdominal tenderness. There is no guarding or rebound. Hernia: No hernia is present. Musculoskeletal:         General: Normal range of motion. Cervical back: Normal range of motion and neck supple. No tenderness. Right lower leg: No edema. Left lower leg: No edema. Lymphadenopathy:      Cervical: No cervical adenopathy. Skin:     General: Skin is warm and dry. Coloration: Skin is not jaundiced or pale. Findings: No erythema. Neurological:      General: No focal deficit present. Mental Status: He is alert and oriented to person, place, and time. Psychiatric:         Mood and Affect: Mood normal.         Behavior: Behavior normal.         Thought Content: Thought content normal.         Judgment: Judgment normal.         Patient Active Problem List   Diagnosis    Hypertension    Depression    Chronic lumbar pain    Acute ITP (HCC)    Type 2 diabetes mellitus with hyperglycemia, with long-term current use of insulin (HCC)    Morbid obesity (HCC)    MARCO ANTONIO (obstructive sleep apnea)    Snoring    Thrombocytopenia (HCC)    Acute idiopathic thrombocytopenic purpura (HCC)    OWEN (nonalcoholic steatohepatitis)    Cirrhosis of liver ([address]    Pneumonia due to COVID-19 virus    Closed fracture of left distal fibula    Ventricular tachycardia ([address]     Past Medical History:   Diagnosis Date    Arthritis     left knee    Chronic lumbar pain     Closed fracture of left ankle with delayed healing 10/2021    COVID-19 08/02/2021    DDD (degenerative disc disease), lumbar     MRI 2012    Depression     Glucose intolerance (impaired glucose tolerance)     History of ITP     Hypertension     Idiopathic thrombocytopenic purpura ([address]     OWEN (nonalcoholic steatohepatitis) 06/05/2021    Type 2 diabetes mellitus without complication, without long-term current use of insulin ([address] 08/30/2016      History reviewed.  No pertinent surgical history. Family History   Problem Relation Age of Onset    Diabetes Mother     High Blood Pressure Mother     Depression Mother     Depression Father     High Blood Pressure Father     Other Father         sleep apnea      Allergies   Allergen Reactions    Chloraseptic Sore Throat [Acetaminophen] Hives     Lozenges     Decadron [Dexamethasone] Other (See Comments)     Weak, lethargic    Methocarbamol Diarrhea    Trilyte [Peg 3350-Kcl-Na Bicarb-Nacl]     Trulicity [Dulaglutide]     Pcn [Penicillins] Hives and Rash       This chart was generated using the [address] [place] [address] [address] [employer] system. I created this record but it may contain dictation errors due to the limitation of the software.

## 2022-03-04 LAB
ESTIMATED AVERAGE GLUCOSE: 205.9 MG/DL
HBA1C MFR BLD: 8.8 %

## 2022-03-05 LAB — THROAT CULTURE: NORMAL

## 2022-03-07 ENCOUNTER — TELEPHONE (OUTPATIENT)
Dept: CARDIOLOGY CLINIC | Age: 42
End: 2022-03-07

## 2022-03-07 NOTE — TELEPHONE ENCOUNTER
Pt sts AGK said he would order medication for his low libido. No mention of medication in office note. Please advise.

## 2022-03-11 ENCOUNTER — TELEPHONE (OUTPATIENT)
Dept: FAMILY MEDICINE CLINIC | Age: 42
End: 2022-03-11

## 2022-03-15 ENCOUNTER — OFFICE VISIT (OUTPATIENT)
Dept: ORTHOPEDIC SURGERY | Age: 42
End: 2022-03-15
Payer: COMMERCIAL

## 2022-03-15 DIAGNOSIS — S82.832D CLOSED FRACTURE OF DISTAL END OF LEFT FIBULA WITH ROUTINE HEALING, UNSPECIFIED FRACTURE MORPHOLOGY, SUBSEQUENT ENCOUNTER: Primary | ICD-10-CM

## 2022-03-15 PROCEDURE — 99213 OFFICE O/P EST LOW 20 MIN: CPT | Performed by: ORTHOPAEDIC SURGERY

## 2022-03-15 NOTE — PROGRESS NOTES
ORTHOPAEDIC SURGERY FOLLOWUP VISIT     CHIEF COMPLAINT:  Left ankle injury follow-up     DATE OF INJURY: 10/27/2021  DIAGNOSIS: Left distal fibula fracture  DATE OF SURGERY: N/A, nonoperative management     HISTORY OF PRESENT ILLNESS:  78-year-old male presents 4.5 months out from a mildly displaced distal fibula fracture of his left ankle.  It was reasonably aligned at initial x-rays and at follow-up x-rays.  There is no evidence of widening of the ankle mortise.  Overall, his pain has improved since previous evaluation.  He rates his pain a 0 or a 1 out of 10.  His pain is primarily about the lateral ankle.  His pain does not radiate.  He denies dysesthesias.  He has been ambulating in regular footwear.  His pain is worse with prolonged periods of standing and walking.  His pain is localized to the lateral ankle.  He will occasionally get a popping sensation experienced at the lateral ankle. This has become persistent. He attempted to return to work, however he is having difficulty with ambulating on uneven ground. His pain is severely limiting his tolerance to ambulating. He has been off work since last evaluation. I recommended a CT scan to evaluate for the presence of nonunion/delayed healing. He presents for these results today.     PHYSICAL EXAM:  General: Well-appearing male of stated age.  No acute distress. Left lower extremity: Patient presents in regular footwear.  There are no cuts, open wounds, or abrasions.  There is no tenderness to palpation about the medial malleolus.  There is point tenderness tenderness to palpation about the fracture site of the distal fibula.  No significant tenderness to palpation about the anterior syndesmosis.  No tenderness to palpation about peroneal tendons.  No reproducible tendon subluxation with ankle circumduction.  Here is no obvious deformity.  Sensation is intact to light touch in deep peroneal, superficial peroneal, tibial, sural, and saphenous nerve distributions.  Motor function is intact to EHL, FHL, tibialis anterior, and gastroc.  There is a negative anterior drawer test of the ankle in neutral and and plantar flexion.  Dorsiflexion is just to neutral.  Plantar flexion is 40 degrees.  There is slight reduction in subtalar motion.  There is brisk capillary refill to the toes and a strong palpable dorsalis pedis pulse.  Compartments are soft and compressible.  There is no calf tenderness and a negative Homans' sign.     RADIOGRAPHIC EXAM:  EXAMINATION:   CT OF THE LEFT ANKLE WITHOUT CONTRAST 3/2/2022 11:20 am       TECHNIQUE:   CT of the left ankle was performed without the administration of intravenous   contrast.  Multiplanar reformatted images are provided for review. Dose   modulation, iterative reconstruction, and/or weight based adjustment of the   mA/kV was utilized to reduce the radiation dose to as low as reasonably   achievable.       COMPARISON:   Left ankle radiograph January 14, 2022; left ankle radiograph October 27, 2021       HISTORY   ORDERING SYSTEM PROVIDED HISTORY: Closed fracture of distal end of left   fibula with routine healing, unspecified fracture morphology, subsequent   encounter   TECHNOLOGIST PROVIDED HISTORY:   Reason for exam:->evaluate fracture healing   Reason for Exam: FALL AT WORK IN OCTOBER 2021, LEFT ANKLE FRACTURE,   DIFFICULTY WITH HEALING, PT IS DIABETIC, PT HAS ITP   SYSTOC ORDER RB:->[id number]       FINDINGS:   Bones: Redemonstration of obliquely oriented fracture of the lateral   malleolus with a small amount of bridging callus formation along the lateral   posterior aspect of the fracture site (image 41 series 601; image 33 series   602).  90% of the fracture line remains visible.  Alignment is unchanged.    Well corticated ossicles along the anterior tip of the medial malleolus and   medial talus consistent with sequela of remote trauma.  Small well corticated   ossicle at the anterior aspect of the anterolateral tibial plafond and likely   related to remote syndesmotic injury.  No acute fracture identified. Prominent posterior calcaneal enthesophyte.       Soft Tissue: Subcutaneous edema with no organized collection.  Small amount   of fluid tracks along the common peroneal tendon sheath consistent with   tenosynovitis.  Thickening of the Achilles tendon compatible with   mild-to-moderate tendinosis.       Joint: Mild-to-moderate hindfoot and midfoot osteoarthritis.  Anatomic   alignment of the joints.  Small tibiotalar effusion.           Impression   1. Redemonstration of lateral malleolar fracture with only minimal osseous   bridging present.  Proximally 90% of the fracture remains visible. 2. Small well corticated ossicles about the hindfoot consistent with sequela   of remote trauma. 3. No acute fracture identified. 4. Tenosynovitis of the peroneal tendons. 5. Mild Achilles tendinosis. 6. Mild-to-moderate hindfoot and midfoot osteoarthritis.      ASSESSMENT AND PLAN:  Left distal fibula fracture with mild displacement, with incomplete union.     I reviewed his CT scan with him in detail today. He does have some element of healing, however it is approximately 10% healing mostly involving the posterior cortex. I discussed the options with him today. These include watchful waiting, bone stimulator, or surgery. I discussed the reasons for possible nonunion. He does have an autoimmune condition which may be playing a factor. He also has poor control of his diabetes. I indicated that at times vitamin D deficiency as a cause for nonunion. I recommended vitamin D and calcium supplementation. At this time, the patient is interested in proceeding with a bone stimulator as possible way to avoid surgical intervention given his comorbidities. I agree with this decision at this time.   We will order a bone stimulator given that the fracture gap is less than 1 cm and there is evidence that biology is present to continue complete union. I briefly discussed the surgical option of open reduction internal fixation. He would prefer to avoid this given his autoimmune condition and poor control of his diabetes to avoid surgical complications.   We will call him once the bone stimulator is approved.      Baltazar Fleming MD

## 2022-03-17 ENCOUNTER — TELEMEDICINE (OUTPATIENT)
Dept: FAMILY MEDICINE CLINIC | Age: 42
End: 2022-03-17
Payer: COMMERCIAL

## 2022-03-17 DIAGNOSIS — E11.65 TYPE 2 DIABETES MELLITUS WITH HYPERGLYCEMIA, WITH LONG-TERM CURRENT USE OF INSULIN (HCC): ICD-10-CM

## 2022-03-17 DIAGNOSIS — N52.9 ERECTILE DYSFUNCTION, UNSPECIFIED ERECTILE DYSFUNCTION TYPE: Primary | ICD-10-CM

## 2022-03-17 DIAGNOSIS — Z79.4 TYPE 2 DIABETES MELLITUS WITH HYPERGLYCEMIA, WITH LONG-TERM CURRENT USE OF INSULIN (HCC): ICD-10-CM

## 2022-03-17 PROCEDURE — G8417 CALC BMI ABV UP PARAM F/U: HCPCS | Performed by: REGISTERED NURSE

## 2022-03-17 PROCEDURE — G8484 FLU IMMUNIZE NO ADMIN: HCPCS | Performed by: REGISTERED NURSE

## 2022-03-17 PROCEDURE — 99213 OFFICE O/P EST LOW 20 MIN: CPT | Performed by: REGISTERED NURSE

## 2022-03-17 PROCEDURE — G8427 DOCREV CUR MEDS BY ELIG CLIN: HCPCS | Performed by: REGISTERED NURSE

## 2022-03-17 PROCEDURE — 2022F DILAT RTA XM EVC RTNOPTHY: CPT | Performed by: REGISTERED NURSE

## 2022-03-17 PROCEDURE — 1036F TOBACCO NON-USER: CPT | Performed by: REGISTERED NURSE

## 2022-03-17 PROCEDURE — 3052F HG A1C>EQUAL 8.0%<EQUAL 9.0%: CPT | Performed by: REGISTERED NURSE

## 2022-03-17 RX ORDER — TADALAFIL 5 MG/1
5 TABLET ORAL DAILY PRN
Qty: 30 TABLET | Refills: 3 | Status: SHIPPED | OUTPATIENT
Start: 2022-03-17

## 2022-03-17 ASSESSMENT — ENCOUNTER SYMPTOMS
SHORTNESS OF BREATH: 0
GASTROINTESTINAL NEGATIVE: 1

## 2022-03-17 NOTE — PROGRESS NOTES
3/17/2022    TELEHEALTH EVALUATION -- Audio/Visual (During CQVNI-47 public health emergency)    HPI:    Kat Vivas (:  1980) has requested an audio/video evaluation for the following concern(s):    He is here to follow up. He is using the [employer] and it is working well overall. He is interested in using Cialis. He had talked to his cardiologist and he recommended Cialis for him. His cardiologist wanted him to discuss this with his PCP. Decreased interests and sexual intercourse. He also complains of difficulty getting and maintaining an erection. Review of Systems   Constitutional: Negative for diaphoresis and fatigue. Respiratory: Negative for shortness of breath. Cardiovascular: Negative for chest pain and palpitations. Gastrointestinal: Negative. Endocrine: Negative for polydipsia, polyphagia and polyuria. Genitourinary: Negative for difficulty urinating, frequency, hematuria, penile discharge, penile pain, penile swelling, scrotal swelling, testicular pain and urgency. Prior to Visit Medications    Medication Sig Taking? Authorizing Provider   tadalafil (CIALIS) 5 MG tablet Take 1 tablet by mouth daily as needed for Erectile Dysfunction Yes COLIN Espinal CNP   Continuous Blood Gluc Sensor ([address] [place] [address]) MISC 1 each by Does not apply route daily Please dispense complete kit for glucose monitoring.  Yes COLIN Espinal CNP   benazepril (LOTENSIN) 40 MG tablet TAKE 1 TABLET BY MOUTH EVERY DAY Yes COLIN Tadeo CNP   propranolol (INDERAL LA) 80 MG extended release capsule Take 1 capsule by mouth daily Yes Katiuska Jones MD   FLUoxetine (PROZAC) 20 MG capsule Take 1 capsule by mouth daily Yes COLIN Tadeo CNP   insulin glargine (LANTUS;BASAGLAR) 100 UNIT/ML injection pen Inject 60 Units into the skin nightly Yes COLIN Espinal CNP   insulin lispro, 1 Unit Dial, 100 UNIT/ML SOPN INJECT 25 INTO THE SKIN 3 TIMES A DAY BEFORE MEALS Yes Yashira Moncada MD   albuterol sulfate HFA (VENTOLIN HFA) 108 (90 Base) MCG/ACT inhaler Inhale 2 puffs into the lungs every 6 hours as needed for Wheezing Yes COLIN Roe CNP   miconazole (MICOTIN) 2 % cream Apply topically 2 times daily.  For 2 weeks Yes COLIN Roe CNP   atorvastatin (LIPITOR) 40 MG tablet Take 1 tablet by mouth daily Yes Rod Phillips MD   aspirin EC 81 MG EC tablet Take 1 tablet by mouth daily Yes Rod Phillips MD       Social History     Tobacco Use    Smoking status: Never Smoker    Smokeless tobacco: Never Used   Vaping Use    Vaping Use: Never used   Substance Use Topics    Alcohol use: No    Drug use: No        Allergies   Allergen Reactions    Chloraseptic Sore Throat [Acetaminophen] Hives     Lozenges     Decadron [Dexamethasone] Other (See Comments)     Weak, lethargic    Methocarbamol Diarrhea    Trilyte [Peg 3350-Kcl-Na Bicarb-Nacl]     Trulicity [Dulaglutide]     Pcn [Penicillins] Hives and Rash   ,   Past Medical History:   Diagnosis Date    Arthritis     left knee    Chronic lumbar pain     Closed fracture of left ankle with delayed healing 10/2021    COVID-19 08/02/2021    DDD (degenerative disc disease), lumbar     MRI 2012    Depression     Glucose intolerance (impaired glucose tolerance)     History of ITP     Hypertension     Idi[facility] thrombocytopen[facility] purpura ([facility]     OWEN (nonalcoholic steatohepatitis) 06/05/2021    Type 2 diabetes mellitus without complication, without long-term current use of insulin ([facility] [address] [facility] 08/30/2016       PHYSICAL EXAMINATION:  [ INSTRUCTIONS:  \"[x]\" Indicates a positive item  \"[]\" Indicates a negative item  -- DELETE ALL ITEMS NOT EXAMINED]    Constitutional: [x] Appears well-developed and well-nourished [x] No apparent distress      [] Abnormal-   Mental status  [x] Alert and awake  [x] Oriented to person/place/time [x]Able to follow commands Eyes:  EOM    [x]  Normal  [] Abnormal-  Sclera  [x]  Normal  [] Abnormal -         Discharge [x]  None visible  [] Abnormal -    HENT:   [x] Normocephalic, atraumatic. [] Abnormal   [x] Mouth/Throat: Mucous membranes are moist.       Pulmonary/Chest: [x] Respiratory effort normal.  [x] No visualized signs of difficulty breathing or respiratory distress        [] Abnormal-      Musculoskeletal:   [x] Normal gait with no signs of ataxia         [x] Normal range of motion of neck        [] Abnormal-       Neurological:        [x] No Facial Asymmetry (Cranial nerve 7 motor function) (limited exam to video visit)          [x] No gaze palsy        [] Abnormal-         Skin:        [x] No significant exanthematous lesions or discoloration noted on facial skin         [] Abnormal-            Psychiatric:       [x] Normal Affect [x] No Hallucinations        [] Abnormal-     Other pertinent observable physical exam findings- none    ASSESSMENT/PLAN:  1. Erectile dysfunction, unspecified erectile dysfunction type  Discussed side effects and risks and benefits of medication. He verbalized understanding. He does have some decreased interest in sexual intercourse however, this may be due to issues with getting and maintaining an erection. Recommend he trial tadalafil and follow-up if he does not see an improvement after about a month-if no improvement plan is to check a testosterone level. - tadalafil (CIALIS) 5 MG tablet; Take 1 tablet by mouth daily as needed for Erectile Dysfunction  Dispense: 30 tablet; Refill: 3    2. Type 2 diabetes mellitus with hyperglycemia, with long-term current use of insulin (HCC)  Stable. Freestyle marty continuous glucose monitor is working well for him. He says he has noticed that occasionally the reading is off.   While this is working well then fingersticks and he said that this has alerted him to low blood sugar and has helped him to manage his blood sugar more efficiently. Return if symptoms worsen or fail to improve. Aisha Saini is a 38 y.o. male being evaluated by a Virtual Visit (video visit) encounter to address concerns as mentioned above. A caregiver was present when appropriate. Due to this being a TeleHealth encounter (During JEFXC-28 public health emergency), evaluation of the following organ systems was limited: Vitals/Constitutional/EENT/Resp/CV/GI//MS/Neuro/Skin/Heme-Lymph-Imm. Pursuant to the emergency declaration under the [address] [place] [address], [address] [place] [address] authority and the [employer] Act, this Virtual Visit was conducted with patient's (and/or legal guardian's) consent, to reduce the patient's risk of exposure to COVID-19 and provide necessary medical care. The patient (and/or legal guardian) has also been advised to contact this office for worsening conditions or problems, and seek emergency medical treatment and/or call 911 if deemed necessary. Services were provided through a video synchronous discussion virtually to substitute for in-person clinic visit. Patient and provider were located at their individual homes. --COLIN Patel - CNP on 3/17/2022 at 2:01 PM    An electronic signature was used to authenticate this note. This chart was generated using the [address] [place] [address] [address] dictation system. I created this record but it may contain dictation errors due to the limitation of the software.

## 2022-04-01 ENCOUNTER — TELEPHONE (OUTPATIENT)
Dept: ORTHOPEDIC SURGERY | Age: 42
End: 2022-04-01

## 2022-04-01 NOTE — TELEPHONE ENCOUNTER
The employer [employer]ie [employer]urgu[employer] is requesting a call, he is concerned about this patient returning to work on Monday due to the physical nature of his job and lifting 50 lbs and over. He may be reached at [phone].

## 2022-04-01 NOTE — TELEPHONE ENCOUNTER
[employer]-14 is needed TODAY Patient is trying to return to work Monday and his [address] expires today. Also, if we are requesting a Bone Stimulator that needs to be requested through [employer] not private insurance. Send msg to request to [address].

## 2022-04-06 DIAGNOSIS — I10 ESSENTIAL HYPERTENSION: ICD-10-CM

## 2022-04-06 RX ORDER — BENAZEPRIL HYDROCHLORIDE 40 MG/1
TABLET, FILM COATED ORAL
Qty: 30 TABLET | Refills: 5 | Status: SHIPPED | OUTPATIENT
Start: 2022-04-06 | End: 2022-10-04

## 2022-05-10 DIAGNOSIS — Z79.4 TYPE 2 DIABETES MELLITUS WITH HYPERGLYCEMIA, WITH LONG-TERM CURRENT USE OF INSULIN (HCC): Primary | ICD-10-CM

## 2022-05-10 DIAGNOSIS — E11.65 TYPE 2 DIABETES MELLITUS WITH HYPERGLYCEMIA, WITH LONG-TERM CURRENT USE OF INSULIN (HCC): Primary | ICD-10-CM

## 2022-05-10 RX ORDER — INSULIN GLARGINE 100 [IU]/ML
INJECTION, SOLUTION SUBCUTANEOUS
Qty: 15 ML | Refills: 2 | OUTPATIENT
Start: 2022-05-10

## 2022-05-10 NOTE — TELEPHONE ENCOUNTER
PA COVER MY MEDS  Medication:FreeStyle Isabella 14 Day Sensor  Key:[id number] - PA Case ID: [id number] - Rx #: [id number]  Status:PENDING

## 2022-05-10 NOTE — TELEPHONE ENCOUNTER
Requesting Refill  insulin glargine (LANTUS;BASAGLAR) 100 UNIT/ML injection pen     Last Office Visit  -  3/17/22  Next Office Visit  -  none

## 2022-06-06 DIAGNOSIS — F32.A DEPRESSION, UNSPECIFIED DEPRESSION TYPE: ICD-10-CM

## 2022-06-06 RX ORDER — FLUOXETINE HYDROCHLORIDE 20 MG/1
CAPSULE ORAL
Qty: 30 CAPSULE | Refills: 2 | Status: SHIPPED | OUTPATIENT
Start: 2022-06-06 | End: 2022-09-02

## 2022-07-06 DIAGNOSIS — R06.2 WHEEZING: ICD-10-CM

## 2022-07-06 DIAGNOSIS — U09.9 POST COVID-19 CONDITION, UNSPECIFIED: ICD-10-CM

## 2022-07-06 DIAGNOSIS — R06.02 SHORTNESS OF BREATH: ICD-10-CM

## 2022-09-02 DIAGNOSIS — F32.A DEPRESSION, UNSPECIFIED DEPRESSION TYPE: ICD-10-CM

## 2022-09-02 DIAGNOSIS — I47.20 VENTRICULAR TACHYCARDIA: ICD-10-CM

## 2022-09-02 RX ORDER — PROPRANOLOL HYDROCHLORIDE 80 MG/1
CAPSULE, EXTENDED RELEASE ORAL
Qty: 30 CAPSULE | Refills: 0 | Status: SHIPPED | OUTPATIENT
Start: 2022-09-02 | End: 2022-10-03

## 2022-09-02 RX ORDER — FLUOXETINE HYDROCHLORIDE 20 MG/1
CAPSULE ORAL
Qty: 30 CAPSULE | Refills: 5 | Status: SHIPPED | OUTPATIENT
Start: 2022-09-02

## 2022-10-03 DIAGNOSIS — R06.02 SHORTNESS OF BREATH: ICD-10-CM

## 2022-10-03 DIAGNOSIS — U09.9 POST COVID-19 CONDITION, UNSPECIFIED: ICD-10-CM

## 2022-10-03 DIAGNOSIS — R06.2 WHEEZING: ICD-10-CM

## 2022-10-03 DIAGNOSIS — I10 ESSENTIAL HYPERTENSION: ICD-10-CM

## 2022-10-03 DIAGNOSIS — E78.5 HYPERLIPIDEMIA, UNSPECIFIED HYPERLIPIDEMIA TYPE: Primary | ICD-10-CM

## 2022-10-03 DIAGNOSIS — I47.20 VENTRICULAR TACHYCARDIA: ICD-10-CM

## 2022-10-03 RX ORDER — PROPRANOLOL HYDROCHLORIDE 80 MG/1
CAPSULE, EXTENDED RELEASE ORAL
Qty: 30 CAPSULE | Refills: 0 | Status: SHIPPED | OUTPATIENT
Start: 2022-10-03

## 2022-10-03 NOTE — TELEPHONE ENCOUNTER
Last Office Visit  -  3/17/22  Next Office Visit  -  none    Last Filled  -  4/6/22  Last UDS -    Contract -

## 2022-10-04 RX ORDER — BENAZEPRIL HYDROCHLORIDE 40 MG/1
TABLET, FILM COATED ORAL
Qty: 30 TABLET | Refills: 5 | Status: SHIPPED | OUTPATIENT
Start: 2022-10-04

## 2022-10-06 RX ORDER — ATORVASTATIN CALCIUM 40 MG/1
TABLET, FILM COATED ORAL
Qty: 30 TABLET | Refills: 1 | Status: SHIPPED | OUTPATIENT
Start: 2022-10-06

## 2022-10-06 NOTE — TELEPHONE ENCOUNTER
10/6 called ([phone]) unable to make contact with pt. FORTUNATO for pt to call [facility] to schedule appt. Sent letter to pt.

## 2022-11-15 ENCOUNTER — OFFICE VISIT (OUTPATIENT)
Dept: CARDIOLOGY CLINIC | Age: 42
End: 2022-11-15

## 2022-11-15 VITALS
HEART RATE: 58 BPM | SYSTOLIC BLOOD PRESSURE: 126 MMHG | HEIGHT: 76 IN | WEIGHT: 311.5 LBS | BODY MASS INDEX: 37.93 KG/M2 | OXYGEN SATURATION: 97 % | DIASTOLIC BLOOD PRESSURE: 72 MMHG

## 2022-11-15 DIAGNOSIS — I47.20 VENTRICULAR TACHYCARDIA: ICD-10-CM

## 2022-11-15 DIAGNOSIS — I10 PRIMARY HYPERTENSION: Primary | ICD-10-CM

## 2022-11-15 PROCEDURE — 99214 OFFICE O/P EST MOD 30 MIN: CPT | Performed by: INTERNAL MEDICINE

## 2022-11-15 PROCEDURE — 3074F SYST BP LT 130 MM HG: CPT | Performed by: INTERNAL MEDICINE

## 2022-11-15 PROCEDURE — 3078F DIAST BP <80 MM HG: CPT | Performed by: INTERNAL MEDICINE

## 2022-11-15 RX ORDER — METOPROLOL SUCCINATE 50 MG/1
TABLET, EXTENDED RELEASE ORAL
Qty: 90 TABLET | Refills: 1 | OUTPATIENT
Start: 2022-11-15

## 2022-11-15 NOTE — PATIENT INSTRUCTIONS
Plan:  1. Continue taking cardiac medications as prescribed  2. Follow up with PCP regarding blood pressure monitoring   -would recommend obtaining blood pressure cuff for home monitoring  Cardiac medications reviewed including indications and pertinent side effects. Medication list updated at this visit.    Check blood pressure and heart rate at home a few times per week- keep a log with dates and times and bring to office visit   Regular exercise and following a healthy diet encouraged   Follow up with me 1 year or sooner if needed

## 2022-11-15 NOTE — PROGRESS NOTES
[address]   Cardiac Followup    Referring Provider:  Merlinda Rily, APRN - CNP     No chief complaint on file. Ravinder Lau   1980      History of Present Illness:    Ravinder Lau is a 38 y.o. male who is here today for follow up for shortness of breath and VT. He has a past medical history of coronary artery disease- noted on CT chest, hypertension, OWEN, and idiopathic thrombocytopenia. He was admitted to the hospital in 8/2021 due to cough with shortness of breath. He was found to be COVID- 19 positive with a pulmonary infection. Patient was noted to have one minute of wide complex tachycardia- rate 170 bpm.    At last OV 11/9/21, He noted that he has almost passed out in the past but this has been when he has been sick. While wearing his monitor had episode felt heart racing and lightheaded. Checked his pulse and was > 200. Resolved spontaneously. No assocuated chest pain. Has not recurred. Lasted few mins. Since last OV, patient has followed up with Dr Papo KAMARA who discussed loop monitor for long term monitoring but patient decided to defer loop for now. Metoprolol was stopped and patient startede on propranolol 80 mg daily   Today ***    Past Medical History:   has a past medical history of Arthritis, Chronic lumbar pain, Closed fracture of left ankle with delayed healing, COVID-19, DDD (degenerative disc disease), lumbar, Depression, Glucose intolerance (impaired glucose tolerance), History of ITP, Hypertension, Idiopathic thrombocytopenic purpura ([address] OWEN (nonalcoholic steatohepatitis), and Type 2 diabetes mellitus without complication, without long-term current use of insulin ([facility] [address] Surgical History:   has no past surgical history on file. Social History:   reports that he has never smoked. He has never used smokeless tobacco. He reports that he does not drink alcohol and does not use drugs.      Family History:  family history includes Depression in his father and mother; Diabetes in his mother; High Blood Pressure in his father and mother; Other in his father. No sudden death or early death    Home Medications:  Prior to Admission medications    Medication Sig Start Date End Date Taking? Authorizing Provider   atorvastatin (LIPITOR) 40 MG tablet TAKE 1 TABLET BY MOUTH EVERY DAY 10/6/22   Dangelo Cid MD   benazepril (LOTENSIN) 40 MG tablet TAKE 1 TABLET BY MOUTH EVERY DAY 10/4/22   COLIN Denney CNP   propranolol (INDERAL LA) 80 MG extended release capsule TAKE 1 CAPSULE BY MOUTH EVERY DAY 10/3/22   MARLENE Sheldon MD   FLUoxetine (PROZAC) 20 MG capsule TAKE 1 CAPSULE BY MOUTH EVERY DAY 9/2/22   OCLIN Denney CNP   insulin glargine (LANTUS;BASAGLAR) 100 UNIT/ML injection pen Inject 60 Units into the skin nightly 5/10/22   COLIN Denney CNP   tadalafil (CIALIS) 5 MG tablet Take 1 tablet by mouth daily as needed for Erectile Dysfunction 3/17/22   COLIN Denney CNP   Continuous Blood Gluc Sensor ([address] [place] [address]) MISC 1 each by Does not apply route daily Please dispense complete kit for glucose monitoring. 3/3/22   COLIN Denney CNP   insulin lispro, 1 Unit Dial, 100 UNIT/ML SOPN INJECT 25 INTO THE SKIN 3 TIMES A DAY BEFORE MEALS 11/1/21   Che Moon MD   albuterol sulfate HFA (VENTOLIN HFA) 108 (90 Base) MCG/ACT inhaler Inhale 2 puffs into the lungs every 6 hours as needed for Wheezing 10/13/21   COLIN Villarreal CNP   miconazole (MICOTIN) 2 % cream Apply topically 2 times daily.  For 2 weeks 9/17/21   COLIN Villarreal CNP   aspirin EC 81 MG EC tablet Take 1 tablet by mouth daily 9/14/21   Dangelo Cid MD        Allergies:  Chloraseptic sore throat [acetaminophen], Decadron [dexamethasone], Methocarbamol, Trilyte [peg 3350-kcl-na bicarb-nacl], Trulicity [dulaglutide], and Pcn [penicillins]     Review of Systems:   Constitutional: there has been no unanticipated weight loss. There's been no change in energy level, sleep pattern, or activity level. Eyes: No visual changes or diplopia. No scleral icterus. ENT: No Headaches, hearing loss or vertigo. No mouth sores or sore throat. Cardiovascular: Reviewed in HPI  Respiratory: No cough or wheezing, no sputum production. No hematemesis. Gastrointestinal: No abdominal pain, appetite loss, blood in stools. No change in bowel or bladder habits. Genitourinary: No dysuria, trouble voiding, or hematuria. Musculoskeletal:  No gait disturbance, weakness or joint complaints. Integumentary: No rash or pruritis. Neurological: No headache, diplopia, change in muscle strength, numbness or tingling. No change in gait, balance, coordination, mood, affect, memory, mentation, behavior. Psychiatric: No anxiety, no depression. Endocrine: No malaise, fatigue or temperature intolerance. No excessive thirst, fluid intake, or urination. No tremor. Hematologic/Lymphatic: No abnormal bruising or bleeding, blood clots or swollen lymph nodes. Allergic/Immunologic: No nasal congestion or hives. Physical Examination:    There were no vitals filed for this visit. Constitutional and General Appearance: NAD   Respiratory:  Normal excursion and expansion without use of accessory muscles  Resp Auscultation: Normal breath sounds without dullness  Cardiovascular: The apical impulses not displaced  Heart tones are crisp and normal  Cervical veins are not engorged  The carotid upstroke is normal in amplitude and contour without delay or bruit  Normal S1S2, No S3, No Murmur  Peripheral pulses are symmetrical and full  There is no clubbing, cyanosis of the extremities.   No edema  Femoral Arteries: 2+ and equal  Pedal Pulses: 2+ and equal   Abdomen:  No masses or tenderness  Liver/Spleen: No Abnormalities Noted  Neurological/Psychiatric:  Alert and oriented in all spheres  Moves all extremities well  Exhibits normal gait balance and coordination  No abnormalities of mood, affect, memory, mentation, or behavior are noted    Imaging:    [id number]  Sinus Rhythm   Intraventricular conduction delay    Event Monitor 21-21      CT chest 2021  Mediastinum: Coronary artery calcifications are a marker of atherosclerosis. There are no enlarged thoracic lymph nodes. Improved with residual bilateral interstitial thickening likely due to resolving atypical interstitial pneumonia. 2. Cirrhosis with stigmata of portal venous hypertension including new small amount of ascites. EKG 2021  Sinus tachycardia  Nonspecific ST abnormality  When compared with ECG of 06-AUG-2021 09:58,  Nonspecific T wave abnormality has replaced inverted T waves in Inferior leads    ECHO 21  Summary  Normal left ventricle size, wall thickness, and systolic function with an  estimated ejection fraction of 55-60%. There is hypokinesis of the basal  inferior wall. No additional regional wall motion abnormalities are seen. There is mild concentric left ventricular hypertrophy. Normal left ventricular diastolic filling pressure. Mild posterior mitral annular calcification is present. Aortic valve sclerosis without aortic stenosis. LEXISCAN 21  Summary  There is an inferolateral fixed defect consistent with diaphragmatic attenuation. No clear inducible ischemia or scar. Normal myocardial thickening, normal LV function with post-stress LVEF 52%. 30 Day Monitor 10/20/21  Predominantly NSR, AT, NSVT    Assessment:   SOB - suspect residual from covid dx 21. Now resolved. Coronary artery disease- noted on CT chest 2021. Stable w/o angina  Wide complex tachycardia  - symptomatic  Abnormal EKG - reviewed w/ pt   COVID-19 infection  2021  Hypertension - controlled   Diabetes mellitus   History of idiopathic thrombocytopenia    History of OWEN   Family History of heart diease in mother   H/O edema - no sig today.  BNP normal. No other sign CHF    Plan:  ***        ***      ***        Thank you for allowing me to participate in the care of this individual.      Dioni Leslie.  Paul Alvarez M.D., [address] [facility]

## 2022-11-15 NOTE — TELEPHONE ENCOUNTER
11/15/2022 [facility] [place]      Metoprolol is no longer onn active med list dc 2/17/22

## 2022-11-15 NOTE — PROGRESS NOTES
[facility]   Cardiac Followup    Referring Provider:  COLIN Christian - CNP     Chief Complaint   Patient presents with    Follow-up        Nilton Redding   1980      History of Present Illness:    Nilton Redding is a 38 y.o. male who is here today for follow up for shortness of breath and VT. He has a past medical history of coronary artery disease- noted on CT chest, hypertension, OWEN, and idiopathic thrombocytopenia. He was admitted to the hospital in 8/2021 due to cough with shortness of breath. He was found to be COVID- 19 positive with a pulmonary infection. Patient was noted to have one minute of wide complex tachycardia- rate 170 bpm.    At last OV 11/9/21, He noted that he has almost passed out in the past but this has been when he has been sick. While wearing his monitor had episode felt heart racing and lightheaded. Checked his pulse and was > 200. Resolved spontaneously. No assocuated chest pain. Has not recurred. Lasted few mins. Since last OV, patient has followed up with Dr Alley KAMARA who discussed loop monitor for long term monitoring but patient decided to defer loop for now. Metoprolol was stopped and patient started on propranolol 80 mg daily. Today patient reports feeling overall well. He has been tolerating propranolol well. Patient denies current edema, chest pain, sob, palpitations, dizziness or syncope. He does not check his blood pressure at home but states he can run anywhere from 156-559 systolic.      Past Medical History:   has a past medical history of Arthritis, Chronic lumbar pain, Closed fracture of left ankle with delayed healing, COVID-19, DDD (degenerative disc disease), lumbar, Depression, Glucose intolerance (impaired glucose tolerance), History of ITP, Hypertension, Idiopathic thrombocytopenic purpura ([facility] [address] OWEN (nonalcoholic steatohepatitis), and Type 2 diabetes mellitus without complication, without long-term current use of insulin [facility]).    Surgical History:   has no past surgical history on file. Social History:   reports that he has never smoked. He has never used smokeless tobacco. He reports that he does not drink alcohol and does not use drugs. Family History:  family history includes Depression in his father and mother; Diabetes in his mother; High Blood Pressure in his father and mother; Other in his father. No sudden death or early death    Home Medications:  Prior to Admission medications    Medication Sig Start Date End Date Taking? Authorizing Provider   atorvastatin (LIPITOR) 40 MG tablet TAKE 1 TABLET BY MOUTH EVERY DAY 10/6/22  Yes Devorah Murillo MD   benazepril (LOTENSIN) 40 MG tablet TAKE 1 TABLET BY MOUTH EVERY DAY 10/4/22  Yes COLIN Goldman CNP   propranolol (INDERAL LA) 80 MG extended release capsule TAKE 1 CAPSULE BY MOUTH EVERY DAY 10/3/22  Yes Paras Barreto MD   FLUoxetine (PROZAC) 20 MG capsule TAKE 1 CAPSULE BY MOUTH EVERY DAY 9/2/22  Yes COLIN Chow CNP   insulin glargine (LANTUS;BASAGLAR) 100 UNIT/ML injection pen Inject 60 Units into the skin nightly 5/10/22  Yes COLIN Goldman CNP   Continuous Blood Gluc Sensor ([address] [place] [address]) MISC 1 each by Does not apply route daily Please dispense complete kit for glucose monitoring.  3/3/22  Yes COLIN Chow CNP   insulin lispro, 1 Unit Dial, 100 UNIT/ML SOPN INJECT 25 INTO THE SKIN 3 TIMES A DAY BEFORE MEALS 11/1/21  Yes Jeannette Garcia MD   albuterol sulfate HFA (VENTOLIN HFA) 108 (90 Base) MCG/ACT inhaler Inhale 2 puffs into the lungs every 6 hours as needed for Wheezing 10/13/21  Yes COLIN Sandoval CNP   aspirin EC 81 MG EC tablet Take 1 tablet by mouth daily 9/14/21  Yes Devorah Murillo MD   tadalafil (CIALIS) 5 MG tablet Take 1 tablet by mouth daily as needed for Erectile Dysfunction  Patient not taking: Reported on 11/15/2022 3/17/22   COLIN Chow CNP miconazole (MICOTIN) 2 % cream Apply topically 2 times daily. For 2 weeks  Patient not taking: Reported on 11/15/2022 9/17/21   COLIN Olsen - CNP        Allergies:  Chloraseptic sore throat [acetaminophen], Decadron [dexamethasone], Methocarbamol, Trilyte [peg 3350-kcl-na bicarb-nacl], Trulicity [dulaglutide], and Pcn [penicillins]     Review of Systems:   Constitutional: there has been no unanticipated weight loss. There's been no change in energy level, sleep pattern, or activity level. Eyes: No visual changes or diplopia. No scleral icterus. ENT: No Headaches, hearing loss or vertigo. No mouth sores or sore throat. Cardiovascular: Reviewed in HPI  Respiratory: No cough or wheezing, no sputum production. No hematemesis. Gastrointestinal: No abdominal pain, appetite loss, blood in stools. No change in bowel or bladder habits. Genitourinary: No dysuria, trouble voiding, or hematuria. Musculoskeletal:  No gait disturbance, weakness or joint complaints. Integumentary: No rash or pruritis. Neurological: No headache, diplopia, change in muscle strength, numbness or tingling. No change in gait, balance, coordination, mood, affect, memory, mentation, behavior. Psychiatric: No anxiety, no depression. Endocrine: No malaise, fatigue or temperature intolerance. No excessive thirst, fluid intake, or urination. No tremor. Hematologic/Lymphatic: No abnormal bruising or bleeding, blood clots or swollen lymph nodes. Allergic/Immunologic: No nasal congestion or hives. Physical Examination:    Vitals:    11/15/22 1213   BP: 126/72   Pulse: 58   SpO2: 97%          Constitutional and General Appearance: NAD   Respiratory:  Normal excursion and expansion without use of accessory muscles  Resp Auscultation: Normal breath sounds without dullness  Cardiovascular:   The apical impulses not displaced  Heart tones are crisp and normal  Cervical veins are not engorged  The carotid upstroke is normal in amplitude and contour without delay or bruit  Normal S1S2, No S3, No Murmur  Peripheral pulses are symmetrical and full  There is no clubbing, cyanosis of the extremities. No edema  Femoral Arteries: 2+ and equal  Pedal Pulses: 2+ and equal   Abdomen:  No masses or tenderness  Liver/Spleen: No Abnormalities Noted  Neurological/Psychiatric:  Alert and oriented in all spheres  Moves all extremities well  Exhibits normal gait balance and coordination  No abnormalities of mood, affect, memory, mentation, or behavior are noted    Imaging:    [id number]  Sinus Rhythm   Intraventricular conduction delay    Event Monitor 21-21      CT chest 2021  Mediastinum: Coronary artery calcifications are a marker of atherosclerosis. There are no enlarged thoracic lymph nodes. Improved with residual bilateral interstitial thickening likely due to resolving atypical interstitial pneumonia. 2. Cirrhosis with stigmata of portal venous hypertension including new small amount of ascites. EKG 2021  Sinus tachycardia  Nonspecific ST abnormality  When compared with ECG of 06-AUG-2021 09:58,  Nonspecific T wave abnormality has replaced inverted T waves in Inferior leads    ECHO 21  Summary  Normal left ventricle size, wall thickness, and systolic function with an  estimated ejection fraction of 55-60%. There is hypokinesis of the basal  inferior wall. No additional regional wall motion abnormalities are seen. There is mild concentric left ventricular hypertrophy. Normal left ventricular diastolic filling pressure. Mild posterior mitral annular calcification is present. Aortic valve sclerosis without aortic stenosis. LEXISCAN 21  Summary  There is an inferolateral fixed defect consistent with diaphragmatic attenuation. No clear inducible ischemia or scar. Normal myocardial thickening, normal LV function with post-stress LVEF 52%.       30 Day Monitor 10/20/21  Predominantly NSR, AT, NSVT    Assessment:   SOB - suspect residual from covid dx 8/2/21. Now resolved. Coronary artery disease- noted on CT chest 9/8/2021. Stable w/o angina  Wide complex tachycardia  - symptoms resolved w/ BBlk  Abnormal EKG   COVID-19 infection  8/2/2021  Hypertension - controlled   Diabetes mellitus   History of idiopathic thrombocytopenia    History of OWEN   Family History of heart diease in mother     Plan:  1. Continue taking cardiac medications as prescribed - tolerating Propanolol better than metoprolol   2. Follow up with PCP regarding blood pressure monitoring   -would recommend obtaining blood pressure cuff for home monitoring  Cardiac medications reviewed including indications and pertinent side effects. Medication list updated at this visit. Check blood pressure and heart rate at home a few times per week- keep a log with dates and times and bring to office visit   Regular exercise and following a healthy diet encouraged   Follow up with me 1 year or sooner if needed     Scribe's attestation: This note was scribed in the presence of Dr. Malou Perdomo MD by Simon Gibbons RN    The scribes documentation has been prepared under my direction and personally reviewed by me in its entirety. I confirm that the note above accurately reflects all work, treatment, procedures, and medical decision making performed by me. Dr. Malou Perdomo MD    Thank you for allowing me to participate in the care of this individual.      Corinna Machado.  Lauren Hughes M.D., [facility] [place]

## 2022-11-17 RX ORDER — PROPRANOLOL HYDROCHLORIDE 80 MG/1
CAPSULE, EXTENDED RELEASE ORAL
Qty: 90 CAPSULE | Refills: 3 | Status: SHIPPED | OUTPATIENT
Start: 2022-11-17

## 2022-12-15 DIAGNOSIS — E78.5 HYPERLIPIDEMIA, UNSPECIFIED HYPERLIPIDEMIA TYPE: ICD-10-CM

## 2022-12-16 RX ORDER — ATORVASTATIN CALCIUM 40 MG/1
TABLET, FILM COATED ORAL
Qty: 30 TABLET | Refills: 3 | Status: SHIPPED | OUTPATIENT
Start: 2022-12-16

## 2022-12-19 NOTE — TELEPHONE ENCOUNTER
PET scheduling called stating patient PET scan is cancelled for tomorrow due to P2P not being completed yet. Patient is now scheduled for 4/9/19 @ [facility]. Patient lives closer to [place]. [facility] location. Patient was agreeable to schedule at this location. Patient is now scheduled on 4/4/19 @ 930 arrival time 1000 scan time. Patient is aware.  Will watch for correspondence with P2P Never smoker

## 2023-01-13 DIAGNOSIS — E11.65 TYPE 2 DIABETES MELLITUS WITH HYPERGLYCEMIA, WITH LONG-TERM CURRENT USE OF INSULIN (HCC): ICD-10-CM

## 2023-01-13 DIAGNOSIS — Z79.4 TYPE 2 DIABETES MELLITUS WITH HYPERGLYCEMIA, WITH LONG-TERM CURRENT USE OF INSULIN (HCC): ICD-10-CM

## 2023-01-13 RX ORDER — INSULIN GLARGINE 100 [IU]/ML
INJECTION, SOLUTION SUBCUTANEOUS
Qty: 18 ML | Refills: 0 | Status: SHIPPED | OUTPATIENT
Start: 2023-01-13 | End: 2023-02-13

## 2023-01-27 ENCOUNTER — OFFICE VISIT (OUTPATIENT)
Dept: FAMILY MEDICINE CLINIC | Age: 43
End: 2023-01-27
Payer: COMMERCIAL

## 2023-01-27 VITALS
WEIGHT: 312 LBS | HEIGHT: 76 IN | HEART RATE: 64 BPM | OXYGEN SATURATION: 97 % | SYSTOLIC BLOOD PRESSURE: 118 MMHG | BODY MASS INDEX: 37.99 KG/M2 | RESPIRATION RATE: 16 BRPM | DIASTOLIC BLOOD PRESSURE: 82 MMHG

## 2023-01-27 DIAGNOSIS — I10 ESSENTIAL HYPERTENSION: ICD-10-CM

## 2023-01-27 DIAGNOSIS — Z12.11 SCREENING FOR COLON CANCER: ICD-10-CM

## 2023-01-27 DIAGNOSIS — E11.65 TYPE 2 DIABETES MELLITUS WITH HYPERGLYCEMIA, WITH LONG-TERM CURRENT USE OF INSULIN (HCC): Primary | ICD-10-CM

## 2023-01-27 DIAGNOSIS — Z79.4 TYPE 2 DIABETES MELLITUS WITH HYPERGLYCEMIA, WITH LONG-TERM CURRENT USE OF INSULIN (HCC): Primary | ICD-10-CM

## 2023-01-27 LAB — HBA1C MFR BLD: 10.3 %

## 2023-01-27 PROCEDURE — 99214 OFFICE O/P EST MOD 30 MIN: CPT | Performed by: REGISTERED NURSE

## 2023-01-27 PROCEDURE — 3046F HEMOGLOBIN A1C LEVEL >9.0%: CPT | Performed by: REGISTERED NURSE

## 2023-01-27 PROCEDURE — 2022F DILAT RTA XM EVC RTNOPTHY: CPT | Performed by: REGISTERED NURSE

## 2023-01-27 PROCEDURE — G8417 CALC BMI ABV UP PARAM F/U: HCPCS | Performed by: REGISTERED NURSE

## 2023-01-27 PROCEDURE — 1036F TOBACCO NON-USER: CPT | Performed by: REGISTERED NURSE

## 2023-01-27 PROCEDURE — G8484 FLU IMMUNIZE NO ADMIN: HCPCS | Performed by: REGISTERED NURSE

## 2023-01-27 PROCEDURE — 3074F SYST BP LT 130 MM HG: CPT | Performed by: REGISTERED NURSE

## 2023-01-27 PROCEDURE — G8427 DOCREV CUR MEDS BY ELIG CLIN: HCPCS | Performed by: REGISTERED NURSE

## 2023-01-27 PROCEDURE — 3079F DIAST BP 80-89 MM HG: CPT | Performed by: REGISTERED NURSE

## 2023-01-27 PROCEDURE — 83036 HEMOGLOBIN GLYCOSYLATED A1C: CPT | Performed by: REGISTERED NURSE

## 2023-01-27 RX ORDER — BLOOD-GLUCOSE SENSOR
1 EACH MISCELLANEOUS
Qty: 3 EACH | Refills: 3 | Status: SHIPPED | OUTPATIENT
Start: 2023-01-27

## 2023-01-27 RX ORDER — BLOOD-GLUCOSE TRANSMITTER
EACH MISCELLANEOUS
Qty: 1 EACH | Refills: 3 | Status: SHIPPED | OUTPATIENT
Start: 2023-01-27

## 2023-01-27 ASSESSMENT — PATIENT HEALTH QUESTIONNAIRE - PHQ9
SUM OF ALL RESPONSES TO PHQ9 QUESTIONS 1 & 2: 0
10. IF YOU CHECKED OFF ANY PROBLEMS, HOW DIFFICULT HAVE THESE PROBLEMS MADE IT FOR YOU TO DO YOUR WORK, TAKE CARE OF THINGS AT HOME, OR GET ALONG WITH OTHER PEOPLE: 0
4. FEELING TIRED OR HAVING LITTLE ENERGY: 1
2. FEELING DOWN, DEPRESSED OR HOPELESS: 0
SUM OF ALL RESPONSES TO PHQ QUESTIONS 1-9: 1
SUM OF ALL RESPONSES TO PHQ QUESTIONS 1-9: 1
6. FEELING BAD ABOUT YOURSELF - OR THAT YOU ARE A FAILURE OR HAVE LET YOURSELF OR YOUR FAMILY DOWN: 0
9. THOUGHTS THAT YOU WOULD BE BETTER OFF DEAD, OR OF HURTING YOURSELF: 0
SUM OF ALL RESPONSES TO PHQ QUESTIONS 1-9: 1
1. LITTLE INTEREST OR PLEASURE IN DOING THINGS: 0
5. POOR APPETITE OR OVEREATING: 0
8. MOVING OR SPEAKING SO SLOWLY THAT OTHER PEOPLE COULD HAVE NOTICED. OR THE OPPOSITE, BEING SO FIGETY OR RESTLESS THAT YOU HAVE BEEN MOVING AROUND A LOT MORE THAN USUAL: 0
3. TROUBLE FALLING OR STAYING ASLEEP: 0
SUM OF ALL RESPONSES TO PHQ QUESTIONS 1-9: 1

## 2023-01-27 ASSESSMENT — ENCOUNTER SYMPTOMS
EYES NEGATIVE: 1
GASTROINTESTINAL NEGATIVE: 1
SHORTNESS OF BREATH: 0

## 2023-01-27 NOTE — PROGRESS NOTES
Patient: Krystian Locke is a 37 y.o. male who presents today with the following Chief Complaint(s):  Chief Complaint   Patient presents with    Diabetes     Last a1c was 8.8 on 3/3/22       Assessment/Plan:    1. Type 2 diabetes mellitus with hyperglycemia, with long-term current use of insulin [facility])    This is a 68-year-old male who is here to follow-up for diabetes management. His A1c is significantly elevated today at 10.3. He admits that he has not been taking his short acting insulin. He has been compliant with Lantus administration nightly. He says that on average he may take it 1 time a day. He is not following a diabetic diet and does not regularly exercise. Plan:   -Continue Lantus 60 units nightly.    -Set an alarm for mealtime/short acting insulin.  -Follow diabetic diet, referred to dietitian for assistance in meal planning and carb counting.  - Exercise 30 minutes 5 times weekly  -Dexcom continuous glucose monitor  -Return in 1 month to review blood sugar readings    - POCT glycosylated hemoglobin (Hb A1C)  - Continuous Blood Gluc Sensor (DEXCOM G6 SENSOR) MISC; 1 each by Does not apply route every 10 days  Dispense: 3 each; Refill: 3  - Continuous Blood Gluc Transmit (DEXCOM G6 TRANSMITTER) MISC; Check blood sugar at least 4 times daily  Dispense: 1 each; Refill: 3    2. Screening for colon cancer  Schedule colonoscopy. [facility]    3. Essential hypertension  Stable. Continue current medication. Approximately 36 minutes spent in total counseling patient, reviewing chart/HPI/ROS and charting. Return if symptoms worsen or fail to improve, for Diabetes follow up. HPI:     He is here for diabetes follow up. He has not been following a diabetic diet. He says he does not drink soda or eat a lot of sweets or candy. He does eat a lot of bread, noodles. He checks blood sugar occasionally but not regularly.  He has not been taking his mealtime insulin three times a day. He says that more often than not he is only taking mealtime insulin once a day. Diabetes  He presents for his follow-up diabetic visit. He has type 2 diabetes mellitus. There are no hypoglycemic associated symptoms. Pertinent negatives for hypoglycemia include no nervousness/anxiousness. Pertinent negatives for diabetes include no chest pain, no fatigue, no polydipsia, no polyphagia and no polyuria. There are no hypoglycemic complications. There are no diabetic complications. Risk factors for coronary artery disease include diabetes mellitus, male sex, stress, sedentary lifestyle and obesity. Current diabetic treatment includes intensive insulin program. He is compliant with treatment some of the time. His weight is increasing steadily. Diabetic current diet: Not following a diabetic diet. He rarely participates in exercise. His home blood glucose trend is increasing rapidly.      Current Outpatient Medications   Medication Sig Dispense Refill    Continuous Blood Gluc Sensor (DEXCOM G6 SENSOR) MISC 1 each by Does not apply route every 10 days 3 each 3    Continuous Blood Gluc Transmit (DEXCOM G6 TRANSMITTER) MISC Check blood sugar at least 4 times daily 1 each 3    LANTUS SOLOSTAR 100 UNIT/ML injection pen INJECT 60 UNITS INTO THE SKIN NIGHTLY 18 mL 0    atorvastatin (LIPITOR) 40 MG tablet TAKE 1 TABLET BY MOUTH EVERY DAY 30 tablet 3    propranolol (INDERAL LA) 80 MG extended release capsule TAKE 1 CAPSULE BY MOUTH EVERY DAY 90 capsule 3    benazepril (LOTENSIN) 40 MG tablet TAKE 1 TABLET BY MOUTH EVERY DAY 30 tablet 5    FLUoxetine (PROZAC) 20 MG capsule TAKE 1 CAPSULE BY MOUTH EVERY DAY 30 capsule 5    insulin lispro, 1 Unit Dial, 100 UNIT/ML SOPN INJECT 25 INTO THE SKIN 3 TIMES A DAY BEFORE MEALS 21 mL 1    albuterol sulfate HFA (VENTOLIN HFA) 108 (90 Base) MCG/ACT inhaler Inhale 2 puffs into the lungs every 6 hours as needed for Wheezing 18 g 2    miconazole (MICOTIN) 2 % cream Apply topically 2 times daily. For 2 weeks 113 g 1    aspirin EC 81 MG EC tablet Take 1 tablet by mouth daily 30 tablet 5    tadalafil (CIALIS) 5 MG tablet Take 1 tablet by mouth daily as needed for Erectile Dysfunction (Patient not taking: No sig reported) 30 tablet 3     No current facility-administered medications for this visit. Review of Systems   Constitutional:  Negative for appetite change, chills, diaphoresis and fatigue. Eyes: Negative. Respiratory:  Negative for shortness of breath. Cardiovascular:  Negative for chest pain and palpitations. Gastrointestinal: Negative. Endocrine: Negative for polydipsia, polyphagia and polyuria. Genitourinary: Negative. Musculoskeletal: Negative. Skin: Negative. Neurological: Negative. Psychiatric/Behavioral:  Negative for dysphoric mood and sleep disturbance. The patient is not nervous/anxious. Vitals:    01/27/23 1025   BP: 118/82   Site: Left Upper Arm   Position: Sitting   Cuff Size: Large Adult   Pulse: 64   Resp: 16   SpO2: 97%   Weight: (!) 312 lb (141.5 kg)   Height: 6' 4\" (1.93 m)     Physical Exam  Constitutional:       General: He is not in acute distress. Appearance: Normal appearance. He is not ill-appearing, toxic-appearing or diaphoretic. HENT:      Head: Normocephalic and atraumatic. Eyes:      Extraocular Movements: Extraocular movements intact. Conjunctiva/sclera: Conjunctivae normal.      Pupils: Pupils are equal, round, and reactive to light. Cardiovascular:      Rate and Rhythm: Normal rate and regular rhythm. Pulses: Normal pulses. Heart sounds: Normal heart sounds. No murmur heard. No friction rub. No gallop. Pulmonary:      Effort: Pulmonary effort is normal. No respiratory distress. Breath sounds: No stridor. No wheezing, rhonchi or rales. Musculoskeletal:         General: Normal range of motion. Cervical back: Normal range of motion. Right lower leg: No edema. Left lower leg: No edema. Skin:     General: Skin is warm and dry. Coloration: Skin is not jaundiced or pale. Findings: No erythema. Neurological:      General: No focal deficit present. Mental Status: He is alert and oriented to person, place, and time. Psychiatric:         Mood and Affect: Mood normal.         Behavior: Behavior normal.         Thought Content: Thought content normal.         Judgment: Judgment normal.       Patient's past medical history, surgical history, family history, medications,  and allergies  were all reviewed and updated as appropriate today. Patient Active Problem List   Diagnosis    Hypertension    Depression    Chronic lumbar pain    Acute ITP (HCC)    Type 2 diabetes mellitus with hyperglycemia, with long-term current use of insulin (HCC)    Morbid obesity (HCC)    MARCO ANTONIO (obstructive sleep apnea)    Snoring    Thrombocytopenia (HCC)    Acute idiopathic thrombocytopenic purpura (HCC)    OWEN (nonalcoholic steatohepatitis)    Cirrhosis of liver (HCC)    Pneumonia due to COVID-19 virus    Closed fracture of left distal fibula    Ventricular tachycardia     Past Medical History:   Diagnosis Date    Arthritis     left knee    Chronic lumbar pain     Closed fracture of left ankle with delayed healing 10/2021    COVID-19 08/02/2021    DDD (degenerative disc disease), lumbar     MRI 2012    Depression     Glucose intolerance (impaired glucose tolerance)     History of ITP     Hypertension     Idiopathic thrombocytopenic purpura (HCC)     OWEN (nonalcoholic steatohepatitis) 06/05/2021    Type [facility] without complication, [facility] 08/30/2016      History reviewed. No pertinent surgical history.     Family History   Problem Relation Age of Onset    Diabetes Mother     High Blood Pressure Mother     Depression Mother     Depression Father     High Blood Pressure Father     Other Father         sleep apnea      Allergies Allergen Reactions    Chloraseptic Sore Throat [Acetaminophen] Hives     Lozenges     Decadron [Dexamethasone] Other (See Comments)     Weak, lethargic    Methocarbamol Diarrhea    Trilyte [Peg 3350-Kcl-Na Bicarb-Nacl]     Trulicity [Dulaglutide]     Pcn [Penicillins] Hives and Rash       This chart was generated using the Dragon dictation system. I created this record but it may contain dictation errors due to the limitation of the software.

## 2023-01-27 NOTE — PATIENT INSTRUCTIONS
Continue Lantus 60 units nightly    Take Mealtime insulin 25 units (insulin lispro) three times a day with meals. Follow a diabetic diet. The dietician will reach out to you. Exercise 30 minutes 5 times weekly. Monitor blood sugar- follow up in one month. Drink at least 64 ounces of water daily.

## 2023-01-27 NOTE — Clinical Note
This is an uncontrolled diabetic- A1c 10.3. He has not been taking his short acting insulin and has not been exercising for following a diabetic diet. Would benefit greatly from our dietician.    With thanks,      COLIN Cavazos, CNP

## 2023-01-30 ENCOUNTER — CARE COORDINATION (OUTPATIENT)
Dept: CARE COORDINATION | Age: 43
End: 2023-01-30

## 2023-01-30 SDOH — ECONOMIC STABILITY: HOUSING INSECURITY
IN THE LAST 12 MONTHS, WAS THERE A TIME WHEN YOU DID NOT HAVE A STEADY PLACE TO SLEEP OR SLEPT IN A SHELTER (INCLUDING NOW)?: NO

## 2023-01-30 SDOH — ECONOMIC STABILITY: TRANSPORTATION INSECURITY
IN THE PAST 12 MONTHS, HAS LACK OF TRANSPORTATION KEPT YOU FROM MEETINGS, WORK, OR FROM GETTING THINGS NEEDED FOR DAILY LIVING?: NO

## 2023-01-30 SDOH — ECONOMIC STABILITY: INCOME INSECURITY: IN THE LAST 12 MONTHS, WAS THERE A TIME WHEN YOU WERE NOT ABLE TO PAY THE MORTGAGE OR RENT ON TIME?: NO

## 2023-01-30 SDOH — ECONOMIC STABILITY: HOUSING INSECURITY: IN THE LAST 12 MONTHS, HOW MANY PLACES HAVE YOU LIVED?: 1

## 2023-01-30 SDOH — HEALTH STABILITY: PHYSICAL HEALTH: ON AVERAGE, HOW MANY DAYS PER WEEK DO YOU ENGAGE IN MODERATE TO STRENUOUS EXERCISE (LIKE A BRISK WALK)?: 0 DAYS

## 2023-01-30 SDOH — ECONOMIC STABILITY: TRANSPORTATION INSECURITY
IN THE PAST 12 MONTHS, HAS THE LACK OF TRANSPORTATION KEPT YOU FROM MEDICAL APPOINTMENTS OR FROM GETTING MEDICATIONS?: NO

## 2023-01-30 SDOH — ECONOMIC STABILITY: FOOD INSECURITY: WITHIN THE PAST 12 MONTHS, THE FOOD YOU BOUGHT JUST DIDN'T LAST AND YOU DIDN'T HAVE MONEY TO GET MORE.: NEVER TRUE

## 2023-01-30 SDOH — ECONOMIC STABILITY: FOOD INSECURITY: WITHIN THE PAST 12 MONTHS, YOU WORRIED THAT YOUR FOOD WOULD RUN OUT BEFORE YOU GOT MONEY TO BUY MORE.: NEVER TRUE

## 2023-01-30 SDOH — HEALTH STABILITY: PHYSICAL HEALTH: ON AVERAGE, HOW MANY MINUTES DO YOU ENGAGE IN EXERCISE AT THIS LEVEL?: 0 MIN

## 2023-01-30 ASSESSMENT — SOCIAL DETERMINANTS OF HEALTH (SDOH): HOW HARD IS IT FOR YOU TO PAY FOR THE VERY BASICS LIKE FOOD, HOUSING, MEDICAL CARE, AND HEATING?: NOT VERY HARD

## 2023-01-30 NOTE — CARE COORDINATION
Ambulatory Care Coordination Note  1/30/2023    ACC: Renetta Rodgers, JOHNNY  ACM received return call from patient. Patient explained the reason for the call and introduced to CC and the role of the ACM. Patient in agreement to work with ACM and RD on goals for his DM.   Patient is a 43-year-old male who has a hx of diabetes and uncontrolled management.  His A1c is significantly 10.3.  He admits that he has not been taking his short acting insulin. He has been compliant with Lantus administration nightly. He says that on average he may take it 1 time a day.  He is not following a diabetic diet and does not regularly exercise.  Patient referred to M to work with medication mgmt and RD to lower his daily BS and A1C through diet and exercise in combination with medications.   Patient admits to day that he has not checked his BS, he states last reading was a few days ago and was 180.   ACM reviewed medications and how to take his lantus 60u at night and insulin lispro 25u three times a day before meals. Patient asked to keep track of readings in the am and before meals. ACM will provide a chart for the patient to use. Goal of BS <130.    Patient encouraged to walk when nice outside, and to use other forms of exercise or apps when inside. Patient in agreement to try this.   [facility] will send RD referral for patient.   Patient will  Dexcom tonight to help with daily monitoring. Patient is scheduled for a one month follow up with PCP.   Patient denied any other questions or concerns.        Plan:   POC to PCP  RD referral  Resources for monitoring BS-Chart   Follow up two weeks       Offered patient enrollment in the Remote Patient Monitoring (RPM) program for in-home monitoring: Patient is not eligible for RPM program.    Ambulatory Care Coordination Assessment    Care Coordination Protocol  Referral from Primary Care Provider: No  Week 1 - Initial Assessment     Do you have all of your prescriptions and are they  filled?: Yes  Barriers to medication adherence: None  Are you able to afford your medications?: Yes  How often do you have trouble taking your medications the way you have been told to take them?: I always take them as prescribed. Do you have Home O2 Therapy?: No      Ability to seek help/take action for Emergent Urgent situations i.e. fire, crime, inclement weather or health crisis. : Independent  Ability to ambulate to restroom: Independent  Ability handle personal hygeine needs (bathing/dressing/grooming): Independent  Ability to manage Medications: Independent  Ability to prepare Food Preparation: Independent  Ability to maintain home (clean home, laundry): Independent  Ability to drive and/or has transportation: Independent  Ability to do shopping: Independent  Ability to manage finances: Independent  Is patient able to live independently?: Yes     Current Housing: Private Residence        Per the Fall Risk Screening, did the patient have 2 or more falls or 1 fall with injury in the past year?: No     Frequent urination at night?: Yes  Do you use rails/bars?: No  Do you have a non-slip tub mat?: No     Are you experiencing loss of meaning?: No  Are you experiencing loss of hope and peace?: No     Thinking about your patient's physical health needs, are there any symptoms or problems (risk indicators) you are unsure about that require further investigation?: No identified areas of uncertainly or problems already being investigated   Are the patients physical health problems impacting on their mental well-being?: No identified areas of concern   Are there any problems with your patients lifestyle behaviors (alcohol, drugs, diet, exercise) that are impacting on physical or mental well-being?: No identified areas of concern   Do you have any other concerns about your patients mental well-being?  How would you rate their severity and impact on the patient?: No identified areas of concern   How would you rate their home environment in terms of safety and stability (including domestic violence, insecure housing, neighbor harassment)?: Consistently safe, supportive, stable, no identified problems   How do daily activities impact on the patient's well-being? (include current or anticipated unemployment, work, caregiving, access to transportation or other): No identified problems or perceived positive benefits   How would you rate their social network (family, work, friends)?: Good participation with social networks   How would you rate their financial resources (including ability to afford all required medical care)?: Financially secure, resources adequate, no identified problems   How wells does the patient now understand their health and well-being (symptoms, signs or risk factors) and what they need to do to manage their health?: Reasonable to good understanding and already engages in managing health or is willing to undertake better management   How well do you think your patient can engage in healthcare discussions? (Barriers include language, deafness, aphasia, alcohol or drug problems, learning difficulties, concentration): Clear and open communication, no identified barriers   Do other services need to be involved to help this patient?: Other care/services not required at this time   Are current services involved with this patient well-coordinated? (Include coordination with other services you are now recommendation): All required care/services in place and well-coordinated   Suggested Interventions and Community Resources  Diabetes Education: In Process    Registered Dietician: In Process   Zone Management Tools: In Process                  Prior to Admission medications    Medication Sig Start Date End Date Taking?  Authorizing Provider   Continuous Blood Gluc Sensor (DEXCOM G6 SENSOR) MISC 1 each by Does not apply route every 10 days 1/27/23   COLNI Cooper - CNP   Continuous Blood Gluc Transmit [facility] G6 TRANSMITTER) MISC Check blood sugar at least 4 times daily 1/27/23   Albert SaCOLIN - CNP   LANTUS SOLOSTAR 100 UNIT/ML injection pen INJECT 60 UNITS INTO THE SKIN NIGHTLY 1/13/23   Albert SaCOLIN CNP   atorvastatin (LIPITOR) 40 MG tablet TAKE 1 TABLET BY MOUTH EVERY DAY 12/16/22   Anna Deras MD   propranolol (INDERAL LA) 80 MG extended release capsule TAKE 1 CAPSULE BY MOUTH EVERY DAY 11/17/22   Chidi Heart MD   benazepril (LOTENSIN) 40 MG tablet TAKE 1 TABLET BY MOUTH EVERY DAY 10/4/22   Albert Sa, APRN - CNP   FLUoxetine (PROZAC) 20 MG capsule TAKE 1 CAPSULE BY MOUTH EVERY DAY 9/2/22   COLIN Carrillo CNP   tadalafil (CIALIS) 5 MG tablet Take 1 tablet by mouth daily as needed for Erectile Dysfunction  Patient not taking: No sig reported 3/17/22   Albert Sa, APRN - CNP   insulin lispro, 1 Unit Dial, 100 UNIT/ML SOPN INJECT 25 INTO THE SKIN 3 TIMES A DAY BEFORE MEALS 11/1/21   Mehreen Ascencio MD   albuterol sulfate HFA (VENTOLIN HFA) 108 (90 Base) MCG/ACT inhaler Inhale 2 puffs into the lungs every 6 hours as needed for Wheezing 10/13/21   COLIN Nolan CNP   miconazole (MICOTIN) 2 % cream Apply topically 2 times daily. For 2 weeks 9/17/21   COLIN Nolan CNP   aspirin EC 81 MG EC tablet Take 1 tablet by mouth daily 9/14/21   Chidi Heart MD       Future Appointments   Date Time Provider Lidia Browni   2/28/2023  1:00 PM COLIN Carrillo CNP [facility] - [facility]     ,   Diabetes Assessment    Medic Alert ID: No  Meal Planning: Avoidance of concentrated sweets   How often do you test your blood sugar?: Daily (Comment: Dexcom)   Do you have barriers with adherence to non-pharmacologic self-management interventions?  (Nutrition/Exercise/Self-Monitoring): No   Have you ever had to go to the ED for symptoms of low blood sugar?: No       No patient-reported symptoms        , and   General Assessment    Do you have any symptoms that are causing concern?: No

## 2023-02-03 ENCOUNTER — CARE COORDINATION (OUTPATIENT)
Dept: CARE COORDINATION | Age: 43
End: 2023-02-03

## 2023-02-03 NOTE — CARE COORDINATION
Earline Llanos  February 3, 2023    Initial Referral Reason: DM    Patient Care Team:  COLIN Carmona CNP as PCP - General (Family Medicine)  COLIN Carmona CNP as PCP - Empaneled Provider  Stefany Linares MD as Consulting Physician (Pulmonology)  So Bermudez RN as Ambulatory Care Manager  Blanquita Sethi RD as Dietitian (Dietitian Registered)    Past Medical History:    Current Outpatient Medications   Medication Sig Dispense Refill    Continuous Blood Gluc Sensor (DEXCOM G6 SENSOR) MISC 1 each by Does not apply route every 10 days 3 each 3    Continuous Blood Gluc Transmit (DEXCOM G6 TRANSMITTER) MISC Check blood sugar at least 4 times daily 1 each 3    LANTUS SOLOSTAR 100 UNIT/ML injection pen INJECT 60 UNITS INTO THE SKIN NIGHTLY 18 mL 0    atorvastatin (LIPITOR) 40 MG tablet TAKE 1 TABLET BY MOUTH EVERY DAY 30 tablet 3    propranolol (INDERAL LA) 80 MG extended release capsule TAKE 1 CAPSULE BY MOUTH EVERY DAY 90 capsule 3    benazepril (LOTENSIN) 40 MG tablet TAKE 1 TABLET BY MOUTH EVERY DAY 30 tablet 5    FLUoxetine (PROZAC) 20 MG capsule TAKE 1 CAPSULE BY MOUTH EVERY DAY 30 capsule 5    tadalafil (CIALIS) 5 MG tablet Take 1 tablet by mouth daily as needed for Erectile Dysfunction (Patient not taking: No sig reported) 30 tablet 3    insulin lispro, 1 Unit Dial, 100 UNIT/ML SOPN INJECT 25 INTO THE SKIN 3 TIMES A DAY BEFORE MEALS 21 mL 1    albuterol sulfate HFA (VENTOLIN HFA) 108 (90 Base) MCG/ACT inhaler Inhale 2 puffs into the lungs every 6 hours as needed for Wheezing 18 g 2    miconazole (MICOTIN) 2 % cream Apply topically 2 times daily. For 2 weeks 113 g 1    aspirin EC 81 MG EC tablet Take 1 tablet by mouth daily 30 tablet 5     No current facility-administered medications for this visit.        Biochemical Data, Medical Tests and Procedures:    Lab Results   Component Value Date    LABA1C 10.3 01/27/2023     Lab Results   Component Value Date    .9 03/03/2022       Lab Results   Component Value Date    CHOL 165 03/03/2022    CHOL 184 01/11/2021     Lab Results   Component Value Date    TRIG 128 03/03/2022    TRIG 170 (H) 01/11/2021     Lab Results   Component Value Date    HDL 46 03/03/2022    HDL 40 01/11/2021     Lab Results   Component Value Date    LDLCALC 93 03/03/2022    LDLCALC 110 (H) 01/11/2021     Lab Results   Component Value Date    LABVLDL 26 03/03/2022    LABVLDL 34 01/11/2021     No results found for: CHOLHDLRATIO    Lab Results   Component Value Date    WBC 9.6 11/17/2021    HGB 15.6 11/17/2021    HCT 47.3 11/17/2021    MCV 85.9 11/17/2021     (L) 11/17/2021       Lab Results   Component Value Date    CREATININE 0.6 (L) 03/03/2022    BUN 12 03/03/2022     (L) 03/03/2022    K 4.4 03/03/2022    CL 97 (L) 03/03/2022    CO2 23 03/03/2022         Anthropometric Measurements:    Height: 76\"  Weight: 312 lb (141.5 kg) (1/27/23)  BMI: 37.98 kg/m2 (Obese, class II)  IBW: 202 lb +-10%  %IBW: 154.5 %    Physical Exam Findings:  Deferred    Nutrition Interview: RD called pt, explained reason for call and role in care. Pt states his main nutrition concern is knowing what to eat to keep blood sugars stable. Note most recent HgbA1C was 10.3% on 1/27/23. Patient reports that he wears a CGM (Dexcom) and checks his blood sugars often throughout the day. Patient reports a fasting blood sugar of 250 mg/dL this morning, which is higher than usual, per patient. Patient reports that his fasting blood sugar is typically 150-180 mg/dL. Patient reports that he eats three meals per day, and seldom dines out. Patient drinks mostly water throughout the day, except for coffee in the morning. Patient's physical activity consists of pacing the floor or walking in the parking lot at work. RD explained the importance of having a consistent carbohydrate intake throughout the day to help keep blood sugars steady, avoiding spikes and dips.  Reviewed which foods contain carbohydrates and which foods do not contain carbohydrates. Explained carbohydrates are the main source of fuel for our bodies and the importance of choosing healthy sources such as whole grains, fruits and vegetables. Explained carbohydrates in food raise blood glucose and the importance of having balanced meals/snacks to help keep blood sugar steady. Discussed how eating a carbohydrate alone such as a banana versus a banana with peanut butter will affect blood sugar differently. Explained the components of a balanced meal using the MyPlate [id number]-6/6 plate fruits and/or vegetables, 1/4 plate protein and 1/4 plate starchy carbohydrates with 8 oz glass of low fat milk if desired. RD offered to mail educational handouts to pt to reinforce concepts discussed during phone conversation, pt was agreeable. RD verified patient's home address. 24-Hour Diet Recall  Breakfast  Consumed: [employer] [place] yogurt with granola, banana, 2 clementines     Lunch  Consumed: Lean Cuisine frozen meal     Dinner  Consumed: Biscuits and gravy OR spaghetti OR eggplant parmesan     Snacks  Consumed: Glucerna, sardines packed in hot sauce, popcorn     Beverages: Water, coffee with [employer] creamer    Frequency of meals away from home: Seldom     Exercise: Walk/paces at work throughout shift      Blood sugar checks: Often, wears a CGM     Nutrition Diagnosis:  #1 Problem Food and nutrition-related knowledge deficit       Etiology related to lack of prior nutrition education       Signs/Symptoms as evidenced by h/o hyperglycemia, elevated HgbA1C    Nutrition Intervention:     Estimated Needs  diabetic diet providing 2350 kcals to promote 2 lb weight loss/week ([address] [facility] based on CBW).     Estimated daily CHO Needs: 265-385 g (based on 45-65% total calorie intake)  Estimated daily Protein Needs: 110-130 g (based on 1.2-1.4 g/kg based on IBW)  Estimated daily Fluid Needs: 2300 mL fluid/d (based on 25 mL/kg based on IBW)    #1 Nutrition Information: Provided patient with Understanding Nutrition Labels, Physical Activity and DM, Managing Portions and Serving Sizes, Simple Swaps and Substitutions, Reading a Nutrition Facts Label, Know Your Numbers, Healthy Living Supermarket Roadmap, Power of Protein for People with DM, Pro/CHO Snacks, Sample 7 Day DM Meal Plan, What Can I Eat- Best Foods for DM, Healthy Snacking Tip Sheet handouts for reinforcement of concepts discussed during nutrition interview. #2 Nutrition Counseling: Used open-ended questions to assess patients perceived susceptibility and severity of disease state. Discussed potential impact of health threat on patient's lifestyle. Used open-ended questions to assess patient's perception regarding benefits of and barriers to implementation of nutrition therapy. #3 Nutrition Education: Clearly defined the benefits of nutrition therapy. Summarized and affirmed positive aspects of current nutrition patterns. Provided education regarding value of adherence to diabetic diet. Discussed ways to establish applying concepts of alternatives and choices regarding implementation of diet. Explored ideas for small, incremental goals to initiate behavior change. Monitoring and Evaluation:    Indicator/Goal Criteria   #1 Pair protein with carbohydrates  #1 I will have source of protein with source of carbohydrates   #2 Eat consistently throughout the day  #2 I will eat three meals per day or four to six small meals/snacks per day   #3 Follow MyPlate guidelines  #3 I will build balanced meals using the MyPlate reference: 1/2 plate fruits and/or vegetables, 1/4 plate protein, and 1/4 plate starchy carbohydrates with 8 oz glass of low fat milk if desired       Follow Up: RD will call pt in three to four weeks to follow up and make sure pt received handouts in mail. RD will answer any nutrition related questions at this time.      Ricardo Thurman, [address] [place] [address],    [phone]

## 2023-02-13 ENCOUNTER — CARE COORDINATION (OUTPATIENT)
Dept: CARE COORDINATION | Age: 43
End: 2023-02-13

## 2023-02-13 DIAGNOSIS — E11.65 TYPE 2 DIABETES MELLITUS WITH HYPERGLYCEMIA, WITH LONG-TERM CURRENT USE OF INSULIN (HCC): ICD-10-CM

## 2023-02-13 DIAGNOSIS — Z79.4 TYPE 2 DIABETES MELLITUS WITH HYPERGLYCEMIA, WITH LONG-TERM CURRENT USE OF INSULIN (HCC): ICD-10-CM

## 2023-02-13 RX ORDER — INSULIN GLARGINE 100 [IU]/ML
INJECTION, SOLUTION SUBCUTANEOUS
Qty: 18 ML | Refills: 0 | Status: SHIPPED | OUTPATIENT
Start: 2023-02-13

## 2023-02-13 RX ORDER — METOPROLOL SUCCINATE 50 MG/1
TABLET, EXTENDED RELEASE ORAL
Qty: 90 TABLET | Refills: 1 | OUTPATIENT
Start: 2023-02-13

## 2023-02-13 NOTE — TELEPHONE ENCOUNTER
Last Office Visit  -  1/27/23  Next Office Visit  -  2/28/23    Last Filled  -  1/13/23  Last UDS -    Contract -

## 2023-02-13 NOTE — CARE COORDINATION
Ambulatory Care Coordination Note  2/13/2023    Patient Current Location:  Home: [address] [place] [address]       ACM: Jerardo Van, RN    ACM outreach to touch base with patient and follow up on RD referral. Patient noted he is doing well, following RD plan. Patient BS at was 201 at last meter check. Patient encouraged to continue to follow RD plan and take medications as prescribed. Importance of monitoring, diet and exercise were reviewed. Patient notes his glucose meter was broken and wanted new test strips. Will send request to PCP to send to patient pharmacy. Patient prefers to have contour next [address] blood monitor if provider and specify. Patient denied any headache, dizziness, related to HTN. Taking medication as prescribed. Patient denied any other needs or concerns at this time. Offered patient enrollment in the Remote Patient Monitoring (RPM) program for in-home monitoring: Patient is not eligible for RPM program.    Lab Results       None            Care Coordination Interventions    Referral from Primary Care Provider: No  Suggested Interventions and Community Resources  Diabetes Education: Completed  Registered Dietician: In Process  Zone Management Tools: Completed (Comment: DM, Hypertension)          Goals Addressed                   This Visit's Progress     Conditions and Symptoms   On track     I will schedule office visits, as directed by my provider. I will keep my appointment or reschedule if I have to cancel. I will notify my provider of any barriers to my plan of care. I will follow my Zone Management tool to seek urgent or emergent care. I will notify my provider of any symptoms that indicate a worsening of my condition.     Barriers: lack of support and overwhelmed by complexity of regimen  Plan for overcoming my barriers: ACSHIMON RD  Confidence: 8/10  Anticipated Goal Completion Date: 4.23.23         Medication Management   On track     I will take my medication as directed. I will notify my provider of any problems with medications, like adverse effects or side effects. I will notify my provider/Care Coordinator if I am unable to afford my medications. I will notify my provider for advice before I stop taking any of my medication. Barriers: lack of support and overwhelmed by complexity of regimen  Plan for overcoming my barriers: ACM  Confidence: 8/10  Anticipated Goal Completion Date: 4.23.23       Self Monitoring   On track     Self-Monitored Blood Glucose - I will check my blood sugar Fasting blood sugar and blood sugars ac & HS    None Recently Recorded    Barriers: lack of support and lack of education  Plan for overcoming my barriers: ACM  Confidence: 9/10  Anticipated Goal Completion Date: 4.23.23                Future Appointments   Date Time Provider Lidia Fernandes   2/28/2023  1:00 PM COLIN Maldonado - [facility]   ,   Diabetes Assessment    Medic Alert ID: No  Meal Planning: Avoidance of concentrated sweets   How often do you test your blood sugar?: Daily (Comment: Dexcom)   Do you have barriers with adherence to non-pharmacologic self-management interventions?  (Nutrition/Exercise/Self-Monitoring): No   Have you ever had to go to the ED for symptoms of low blood sugar?: No       No patient-reported symptoms        , and   General Assessment    Do you have any symptoms that are causing concern?: No

## 2023-02-13 NOTE — TELEPHONE ENCOUNTER
Attempted to reach patient to clarify if he is taking metoprolol. Last OV [facility] OV note plan states:      Plan:  1. Continue taking cardiac medications as prescribed - tolerating Propanolol better than metoprolol   2. Follow up with PCP regarding blood pressure monitoring              -would recommend obtaining blood pressure cuff for home monitoring  Cardiac medications reviewed including indications and pertinent side effects. Medication list updated at this visit.    Check blood pressure and heart rate at home a few times per week- keep a log with dates and times and bring to office visit   Regular exercise and following a healthy diet encouraged   Follow up with me 1 year or sooner if needed

## 2023-02-13 NOTE — TELEPHONE ENCOUNTER
Pt returned phone call and stated he is no longer taking metoprolol medication and is unsure why pharmacy contacted us.

## 2023-02-14 RX ORDER — BLOOD-GLUCOSE METER
EACH MISCELLANEOUS
Qty: 1 KIT | Refills: 0 | Status: SHIPPED | OUTPATIENT
Start: 2023-02-14

## 2023-02-21 DIAGNOSIS — E11.65 TYPE 2 DIABETES MELLITUS WITH HYPERGLYCEMIA, WITH LONG-TERM CURRENT USE OF INSULIN (HCC): Primary | ICD-10-CM

## 2023-02-21 DIAGNOSIS — Z79.4 TYPE 2 DIABETES MELLITUS WITH HYPERGLYCEMIA, WITH LONG-TERM CURRENT USE OF INSULIN (HCC): Primary | ICD-10-CM

## 2023-02-21 RX ORDER — BLOOD-GLUCOSE METER
EACH MISCELLANEOUS
Qty: 1 KIT | Refills: 0 | Status: SHIPPED | OUTPATIENT
Start: 2023-02-21

## 2023-02-21 NOTE — TELEPHONE ENCOUNTER
Patient requesting refill for his Contour Next EZ Glucose Monitor and test strips. Sent on 2/14/23, but patient states that Ivan never received the script. Please advise.     [address] [place] [address], [address] [place] [address] [phone] - E [phone]

## 2023-02-22 RX ORDER — BLOOD-GLUCOSE METER
EACH MISCELLANEOUS
Qty: 1 EACH | Refills: 0 | Status: SHIPPED | OUTPATIENT
Start: 2023-02-22

## 2023-02-22 RX ORDER — GLUCOSAMINE HCL/CHONDROITIN SU 500-400 MG
CAPSULE ORAL
Qty: 200 STRIP | Refills: 0 | Status: SHIPPED | OUTPATIENT
Start: 2023-02-22

## 2023-02-22 NOTE — TELEPHONE ENCOUNTER
Pharmacy called and stated that they need separate scripts sent for the glucose monitor and the test strips. -- Also asking for the directions to indicate how many times the patient is testing. Please advise.

## 2023-02-27 ENCOUNTER — CARE COORDINATION (OUTPATIENT)
Dept: CARE COORDINATION | Age: 43
End: 2023-02-27

## 2023-02-27 NOTE — CARE COORDINATION
Leticia Running  2/27/2023    Registered Dietitian Progress Note for Care Coordination    Assessment: Arturo Valadez is a 37 y.o. male. RD referred for DM. RD spoke with patient for initial nutrition assessment on 2/3/23. RD called to follow up with patient today, 2/27/23. RD discussed previous goals with patient. Patient states he did not receive the nutrition handouts and Glucerna coupons RD sent in the mail. RD re-verified patient's home address and will resend handouts and coupons. Patient reports that his blood sugars continue to run high. Patient states that he has not been wearing his Dexcom because it reads high blood sugars approximately 50 points higher than finger pricks. For example, if patient's finger prick is 208 mg/dL, his Dexcom will read 258 mg/dL or thereabouts. Patient reports that he has been checking his blood sugars (via finger prick) twice daily, fasting and right before dinner. Patient reports a fasting blood sugar of 254 mg/dL this morning. Patient reports that he is eating three meals per day, see food recall below. Patient's meal are generally balanced, per his report and per food recall. Patient is pairing protein with carbohydrates. Patient is not snacking much, but if he does, will have [employer] popcorn, sardines (packed in water), or Glucerna oral nutrition supplement. Patient has been drinking only water throughout the day, approximately 85 oz/day. Patient's physical activity consists of pacing while at work and walking for 10-15 minutes every hour.      Nutrition Monitoring and Evaluation  Indicator/Goal Criteria Progress   #1 Pair protein with carbohydrates #1 I will have source of protein with source of carbohydrates #1 Patient is pairing protein with carbohydrates    #2  Eat consistently throughout the day  #2 I will eat three meals per day or four to six small meals/snacks per day #2 Patient is eating three meals/day    #3   Follow MyPlate guidelines  #3 I will build balanced meals using the MyPlate reference: 1/2 plate fruits and/or vegetables, 1/4 plate protein, and 1/4 plate starchy carbohydrates with 8 oz glass of low fat milk if desired #3 Patient is eating three meals/day      Plan of Care:  RD encouraged patient to keep working toward goals set. RD mailed educational handouts and Glucerna coupons, patient has not yet received them. RD will follow up with patient to ensure handouts were received, discuss any questions patient has and check the progress toward goals. Follow Up:    RD will call patient in four weeks to follow up and answer any nutrition related questions at that time.      Christina Singh, [address] [place] [address],    [phone]

## 2023-02-28 ENCOUNTER — OFFICE VISIT (OUTPATIENT)
Dept: FAMILY MEDICINE CLINIC | Age: 43
End: 2023-02-28
Payer: COMMERCIAL

## 2023-02-28 VITALS
HEART RATE: 77 BPM | OXYGEN SATURATION: 97 % | BODY MASS INDEX: 38.36 KG/M2 | SYSTOLIC BLOOD PRESSURE: 108 MMHG | DIASTOLIC BLOOD PRESSURE: 78 MMHG | WEIGHT: 315 LBS | HEIGHT: 76 IN | RESPIRATION RATE: 16 BRPM

## 2023-02-28 DIAGNOSIS — Z79.4 TYPE 2 DIABETES MELLITUS WITH HYPERGLYCEMIA, WITH LONG-TERM CURRENT USE OF INSULIN (HCC): Primary | ICD-10-CM

## 2023-02-28 DIAGNOSIS — E11.65 TYPE 2 DIABETES MELLITUS WITH HYPERGLYCEMIA, WITH LONG-TERM CURRENT USE OF INSULIN (HCC): Primary | ICD-10-CM

## 2023-02-28 DIAGNOSIS — E78.5 HYPERLIPIDEMIA, UNSPECIFIED HYPERLIPIDEMIA TYPE: ICD-10-CM

## 2023-02-28 DIAGNOSIS — Z12.5 SCREENING FOR PROSTATE CANCER: ICD-10-CM

## 2023-02-28 DIAGNOSIS — Z12.11 SCREENING FOR COLON CANCER: ICD-10-CM

## 2023-02-28 DIAGNOSIS — I10 PRIMARY HYPERTENSION: ICD-10-CM

## 2023-02-28 PROCEDURE — G8417 CALC BMI ABV UP PARAM F/U: HCPCS | Performed by: REGISTERED NURSE

## 2023-02-28 PROCEDURE — 2022F DILAT RTA XM EVC RTNOPTHY: CPT | Performed by: REGISTERED NURSE

## 2023-02-28 PROCEDURE — 1036F TOBACCO NON-USER: CPT | Performed by: REGISTERED NURSE

## 2023-02-28 PROCEDURE — 3078F DIAST BP <80 MM HG: CPT | Performed by: REGISTERED NURSE

## 2023-02-28 PROCEDURE — G8427 DOCREV CUR MEDS BY ELIG CLIN: HCPCS | Performed by: REGISTERED NURSE

## 2023-02-28 PROCEDURE — 3074F SYST BP LT 130 MM HG: CPT | Performed by: REGISTERED NURSE

## 2023-02-28 PROCEDURE — 99214 OFFICE O/P EST MOD 30 MIN: CPT | Performed by: REGISTERED NURSE

## 2023-02-28 PROCEDURE — G8484 FLU IMMUNIZE NO ADMIN: HCPCS | Performed by: REGISTERED NURSE

## 2023-02-28 PROCEDURE — 3046F HEMOGLOBIN A1C LEVEL >9.0%: CPT | Performed by: REGISTERED NURSE

## 2023-02-28 ASSESSMENT — ENCOUNTER SYMPTOMS
GASTROINTESTINAL NEGATIVE: 1
SHORTNESS OF BREATH: 0
BLURRED VISION: 0

## 2023-02-28 NOTE — PROGRESS NOTES
Patient: Gaurang Washburn is a 37 y.o. male who presents today with the following Chief Complaint(s):  Chief Complaint   Patient presents with    1 Month Follow-Up     DM2       Assessment/Plan:    1. Type 2 diabetes mellitus with hyperglycemia, with long-term current use of insulin (HCC)  Slowly improving. His blood sugars are mildly improved compared to last visit. Continue working with dietitian. Continue diabetic diet. Increase exercise to 30 minutes 5 times weekly. Continue current medications and monitoring blood sugar 3-4 times daily. If blood glucose levels are trending upward, return in one month. If blood glucose levels are trending downward- follow up in 2 months for A1c and diabetes follow up. - Diabetic Foot Exam- normal sensation except for bilateral heels- callus present. Follow up with your podiatrist.   - Microalbumin / Creatinine Urine Ratio  - Comprehensive Metabolic Panel; Future    2. Primary hypertension  Stable. Continue current medication. 3. Hyperlipidemia, unspecified hyperlipidemia type  Lipid panel from approximately 1 year ago was normal.  Continue atorvastatin as prescribed. Have  - Lipid Panel; Future    4. Screening for colon cancer  Declines colonoscopy. Agrees to Cologuard test.  - Fecal DNA Colorectal cancer screening (Cologuard)    5. Screening for prostate cancer  Report of frequent urination. Will check PSA level. - PSA, Prostatic Specific Antigen; Future      Return if symptoms worsen or fail to improve. HPI:     He is here for diabetes follow up. Diabetes  He has type 2 diabetes mellitus. His disease course has been worsening. Hypoglycemia symptoms include sweats. Pertinent negatives for hypoglycemia include no dizziness or headaches. Pertinent negatives for diabetes include no blurred vision, no chest pain, no fatigue, no polydipsia, no polyphagia and no polyuria. There are no hypoglycemic complications.  Pertinent negatives for hypoglycemia complications include no blackouts and no required assistance. Symptoms are improving. Risk factors for coronary artery disease include diabetes mellitus, dyslipidemia, hypertension, male sex and sedentary lifestyle. His weight is increasing steadily. He is following a diabetic diet. Meal planning includes carbohydrate counting. He has had a previous visit with a dietitian. He rarely participates in exercise. There is no change (104-380) in his home blood glucose trend. His breakfast blood glucose is taken between 6-7 am. His breakfast blood glucose range is generally >200 mg/dl. An ACE inhibitor/angiotensin II receptor blocker is being taken. Current Outpatient Medications   Medication Sig Dispense Refill    blood glucose monitor strips Test 2-4 times a day & as needed for symptoms of irregular blood glucose. Dispense sufficient amount for indicated testing frequency plus additional to accommodate PRN testing needs. 200 strip 0    Blood Glucose Monitoring Suppl (CONTOUR MONITOR) RAFAELA Contour Next EZ glucose monitor- check blood glucose 2-4 times daily 1 each 0    Blood Glucose Monitoring Suppl (CONTOUR NEXT EZ) w/Device KIT Please dispense test strips and lancets.  1 kit 0    LANTUS SOLOSTAR 100 UNIT/ML injection pen INJECT 60 UNITS INTO THE SKIN NIGHTLY 18 mL 0    Continuous Blood Gluc Sensor (DEXCOM G6 SENSOR) MISC 1 each by Does not apply route every 10 days 3 each 3    Continuous Blood Gluc Transmit (DEXCOM G6 TRANSMITTER) MISC Check blood sugar at least 4 times daily 1 each 3    atorvastatin (LIPITOR) 40 MG tablet TAKE 1 TABLET BY MOUTH EVERY DAY 30 tablet 3    propranolol (INDERAL LA) 80 MG extended release capsule TAKE 1 CAPSULE BY MOUTH EVERY DAY 90 capsule 3    benazepril (LOTENSIN) 40 MG tablet TAKE 1 TABLET BY MOUTH EVERY DAY 30 tablet 5    FLUoxetine (PROZAC) 20 MG capsule TAKE 1 CAPSULE BY MOUTH EVERY DAY 30 capsule 5    insulin lispro, 1 Unit Dial, 100 UNIT/ML SOPN INJECT 25 INTO THE SKIN 3 TIMES A DAY BEFORE MEALS 21 mL 1    albuterol sulfate HFA (VENTOLIN HFA) 108 (90 Base) MCG/ACT inhaler Inhale 2 puffs into the lungs every 6 hours as needed for Wheezing 18 g 2    miconazole (MICOTIN) 2 % cream Apply topically 2 times daily. For 2 weeks 113 g 1    aspirin EC 81 MG EC tablet Take 1 tablet by mouth daily 30 tablet 5    tadalafil (CIALIS) 5 MG tablet Take 1 tablet by mouth daily as needed for Erectile Dysfunction (Patient not taking: No sig reported) 30 tablet 3     No current facility-administered medications for this visit. Review of Systems   Constitutional:  Negative for activity change, appetite change and fatigue. Eyes:  Negative for blurred vision and visual disturbance. Respiratory:  Negative for shortness of breath. Cardiovascular:  Negative for chest pain, palpitations and leg swelling. Gastrointestinal: Negative. Endocrine: Negative for polydipsia, polyphagia and polyuria. Genitourinary: Negative. Musculoskeletal: Negative. Neurological:  Negative for dizziness, light-headedness and headaches. Psychiatric/Behavioral: Negative. Vitals:    02/28/23 1254   BP: 108/78   Site: Left Upper Arm   Position: Sitting   Cuff Size: Large Adult   Pulse: 77   Resp: 16   SpO2: 97%   Weight: (!) 318 lb (144.2 kg)   Height: 6' 4\" (1.93 m)     Physical Exam  Constitutional:       General: He is not in acute distress. Appearance: Normal appearance. He is not ill-appearing, toxic-appearing or diaphoretic. HENT:      Head: Normocephalic and atraumatic. Nose: Nose normal.      Mouth/Throat:      Mouth: Mucous membranes are moist.   Eyes:      General:         Right eye: No discharge. Left eye: No discharge. Extraocular Movements: Extraocular movements intact. Conjunctiva/sclera: Conjunctivae normal.      Pupils: Pupils are equal, round, and reactive to light. Cardiovascular:      Rate and Rhythm: Normal rate and regular rhythm. Pulses: Normal pulses. Dorsalis pedis pulses are 2+ on the right side and 2+ on the left side. Posterior tibial pulses are 2+ on the right side and 2+ on the left side. Heart sounds: Normal heart sounds. No murmur heard. No friction rub. No gallop. Pulmonary:      Effort: Pulmonary effort is normal. No respiratory distress. Breath sounds: No stridor. No wheezing, rhonchi or rales. Musculoskeletal:         General: Normal range of motion. Cervical back: Normal range of motion. Right lower leg: No edema. Left lower leg: No edema. Right foot: Normal range of motion. No bunion. Left foot: Normal range of motion. No bunion. Feet:      Right foot:      Protective Sensation: 8 sites tested. 7 sites sensed. Skin integrity: Callus (heel) present. No ulcer, skin breakdown, erythema or warmth. Toenail Condition: Right toenails are normal.      Left foot:      Protective Sensation: 8 sites tested. 7 sites sensed. Skin integrity: Callus (heel) present. No ulcer, skin breakdown, erythema or warmth. Toenail Condition: Left toenails are normal.   Skin:     General: Skin is warm and dry. Coloration: Skin is not jaundiced or pale. Findings: No erythema. Neurological:      General: No focal deficit present. Mental Status: He is alert and oriented to person, place, and time. Psychiatric:         Mood and Affect: Mood normal.         Behavior: Behavior normal.         Thought Content: Thought content normal.         Judgment: Judgment normal.        Patient's past medical history, surgical history, family history, medications,  and allergies  were all reviewed and updated as appropriate today.     Patient Active Problem List   Diagnosis    Hypertension    Depression    Chronic lumbar pain    Acute ITP (HCC)    Type 2 diabetes mellitus with hyperglycemia, with long-term current use of insulin (HCC)    Morbid obesity (HCC)    MARCO ANTONIO (obstructive sleep apnea) Snoring    Thrombocytopenia (HCC)    Acute idiopathic thrombocytopenic purpura (HCC)    OWEN (nonalcoholic steatohepatitis)    Cirrhosis of liver (HCC)    Pneumonia due to COVID-19 virus    Closed fracture of left distal fibula    Ventricular tachycardia     Past Medical History:   Diagnosis Date    Arthritis     left knee    Chronic lumbar pain     Closed fracture of left ankle with delayed healing 10/2021    COVID-19 08/02/2021    DDD (degenerative disc disease), lumbar     MRI 2012    Depression     Glucose intolerance (impaired glucose tolerance)     History of ITP     Hypertension     Idiopathic thrombocytopenic purpura (HCC)     OWEN (nonalcoholic steatohepatitis) 06/05/2021    Type 2 diabetes mellitus without complication, without long-term current use of insulin ([address] 08/30/2016      History reviewed. No pertinent surgical history. Family History   Problem Relation Age of Onset    Diabetes Mother     High Blood Pressure Mother     Depression Mother     Depression Father     High Blood Pressure Father     Other Father         sleep apnea      Allergies   Allergen Reactions    Chloraseptic Sore Throat [Acetaminophen] Hives     Lozenges     Decadron [Dexamethasone] Other (See Comments)     Weak, lethargic    Methocarbamol Diarrhea    Trilyte [Peg 3350-Kcl-Na Bicarb-Nacl]     Trulicity [Dulaglutide]     Pcn [Penicillins] Hives and Rash       This chart was generated using the [employer] dictation system. I created this record but it may contain dictation errors due to the limitation of the software. no ROM deficits were identified

## 2023-03-03 ENCOUNTER — CARE COORDINATION (OUTPATIENT)
Dept: CARE COORDINATION | Age: 43
End: 2023-03-03

## 2023-03-03 NOTE — CARE COORDINATION
Ambulatory Care Coordination Note  3/3/2023    Patient Current Location:  Home: [address] [place], [place] [id number]     ACM contacted the patient by telephone. Verified name and  with patient as identifiers. Provided introduction to self. ACM: JOHNNY Moss outreach to patient to touch base and f/u on glucose monitor. He states pharmacy has not called yet for him to pick this up, he will advise if they do not call soon. Patient denies any complications with his BS at this time. Noting he is following with RD and diet plan. Denies any SOB, HA, Dizziness related to his HTN. Patient is schedule for a f/u with PCP on 2023. Denies any needs or concerns at this time, all questions answered. Plan   Follow up one month to assess ongoing needs with RD  F/U with PCP notes reviewed      Offered patient enrollment in the Remote Patient Monitoring (RPM) program for in-home monitoring: Patient is not eligible for RPM program.    Lab Results       None            Care Coordination Interventions    Referral from Primary Care Provider: No  Suggested Interventions and Community Resources  Diabetes Education: Completed  Registered Dietician: In Process  Zone Management Tools: Completed (Comment: DM, Hypertension)          Goals Addressed                   This Visit's Progress     Conditions and Symptoms   On track     I will schedule office visits, as directed by my provider. I will keep my appointment or reschedule if I have to cancel. I will notify my provider of any barriers to my plan of care. I will follow my Zone Management tool to seek urgent or emergent care. I will notify my provider of any symptoms that indicate a worsening of my condition.     Barriers: lack of support and overwhelmed by complexity of regimen  Plan for overcoming my barriers: HILDA CHAPPELL  Confidence: 8/10  Anticipated Goal Completion Date: 23         Medication Management   On track     I will take my medication as directed. I will notify my provider of any problems with medications, like adverse effects or side effects. I will notify my provider/Care Coordinator if I am unable to afford my medications. I will notify my provider for advice before I stop taking any of my medication. Barriers: lack of support and overwhelmed by complexity of regimen  Plan for overcoming my barriers: ACM  Confidence: 8/10  Anticipated Goal Completion Date: 4.23.23       Self Monitoring   On track     Self-Monitored Blood Glucose - I will check my blood sugar Fasting blood sugar and blood sugars ac & HS    None Recently Recorded    Barriers: lack of support and lack of education  Plan for overcoming my barriers: ACM  Confidence: 9/10  Anticipated Goal Completion Date: 4.23.23                Future Appointments   Date Time Provider Lidia Fernandes   4/25/2023 10:20 AM [facility]   ,   Diabetes Assessment    Medic Alert ID: No  Meal Planning: Avoidance of concentrated sweets   How often do you test your blood sugar?: Daily (Comment: Dexcom)   Do you have barriers with adherence to non-pharmacologic self-management interventions?  (Nutrition/Exercise/Self-Monitoring): No   Have you ever had to go to the ED for symptoms of low blood sugar?: No       No patient-reported symptoms        , and   General Assessment    Do you have any symptoms that are causing concern?: No

## 2023-03-07 DIAGNOSIS — E78.5 HYPERLIPIDEMIA, UNSPECIFIED HYPERLIPIDEMIA TYPE: ICD-10-CM

## 2023-03-07 DIAGNOSIS — E11.65 TYPE 2 DIABETES MELLITUS WITH HYPERGLYCEMIA, WITH LONG-TERM CURRENT USE OF INSULIN (HCC): ICD-10-CM

## 2023-03-07 DIAGNOSIS — Z79.4 TYPE 2 DIABETES MELLITUS WITH HYPERGLYCEMIA, WITH LONG-TERM CURRENT USE OF INSULIN (HCC): ICD-10-CM

## 2023-03-07 DIAGNOSIS — Z12.5 SCREENING FOR PROSTATE CANCER: ICD-10-CM

## 2023-03-07 LAB
A/G RATIO: 1.3 (ref 1.1–2.2)
ALBUMIN SERPL-MCNC: 3.7 G/DL (ref 3.4–5)
ALP BLD-CCNC: 86 U/L (ref 40–129)
ALT SERPL-CCNC: 48 U/L (ref 10–40)
ANION GAP SERPL CALCULATED.3IONS-SCNC: 12 MMOL/L (ref 3–16)
AST SERPL-CCNC: 44 U/L (ref 15–37)
BILIRUB SERPL-MCNC: 1.2 MG/DL (ref 0–1)
BUN BLDV-MCNC: 15 MG/DL (ref 7–20)
CALCIUM SERPL-MCNC: 9.2 MG/DL (ref 8.3–10.6)
CHLORIDE BLD-SCNC: 103 MMOL/L (ref 99–110)
CHOLESTEROL, TOTAL: 178 MG/DL (ref 0–199)
CO2: 27 MMOL/L (ref 21–32)
CREAT SERPL-MCNC: 0.6 MG/DL (ref 0.9–1.3)
GFR SERPL CREATININE-BSD FRML MDRD: >60 ML/MIN/{1.73_M2}
GLUCOSE BLD-MCNC: 229 MG/DL (ref 70–99)
HDLC SERPL-MCNC: 41 MG/DL (ref 40–60)
LDL CHOLESTEROL CALCULATED: 104 MG/DL
POTASSIUM SERPL-SCNC: 5.3 MMOL/L (ref 3.5–5.1)
PROSTATE SPECIFIC ANTIGEN: 0.71 NG/ML (ref 0–4)
SODIUM BLD-SCNC: 142 MMOL/L (ref 136–145)
TOTAL PROTEIN: 6.5 G/DL (ref 6.4–8.2)
TRIGL SERPL-MCNC: 164 MG/DL (ref 0–150)
VLDLC SERPL CALC-MCNC: 33 MG/DL

## 2023-03-15 DIAGNOSIS — Z79.4 TYPE 2 DIABETES MELLITUS WITH HYPERGLYCEMIA, WITH LONG-TERM CURRENT USE OF INSULIN (HCC): ICD-10-CM

## 2023-03-15 DIAGNOSIS — F32.A DEPRESSION, UNSPECIFIED DEPRESSION TYPE: ICD-10-CM

## 2023-03-15 DIAGNOSIS — E11.65 TYPE 2 DIABETES MELLITUS WITH HYPERGLYCEMIA, WITH LONG-TERM CURRENT USE OF INSULIN (HCC): ICD-10-CM

## 2023-03-15 RX ORDER — FLUOXETINE HYDROCHLORIDE 20 MG/1
CAPSULE ORAL
Qty: 30 CAPSULE | Refills: 4 | Status: SHIPPED | OUTPATIENT
Start: 2023-03-15

## 2023-03-15 RX ORDER — INSULIN GLARGINE 100 [IU]/ML
INJECTION, SOLUTION SUBCUTANEOUS
Qty: 15 ML | Refills: 3 | Status: SHIPPED | OUTPATIENT
Start: 2023-03-15

## 2023-03-15 NOTE — TELEPHONE ENCOUNTER
LOV 2/28/2023    Future Appointments   Date Time Provider Lidia Fernandes   4/25/2023 10:20 AM [facility]

## 2023-03-28 ENCOUNTER — CARE COORDINATION (OUTPATIENT)
Dept: CARE COORDINATION | Age: 43
End: 2023-03-28

## 2023-03-28 NOTE — CARE COORDINATION
Contacted Zahra Culver and left voicemail regarding Dietitian follow up. Left call back number and will follow up as appropriate.        Jodi Sandoval, [facility],   [phone]

## 2023-04-03 ENCOUNTER — CARE COORDINATION (OUTPATIENT)
Dept: CARE COORDINATION | Age: 43
End: 2023-04-03

## 2023-04-03 NOTE — CARE COORDINATION
Ambulatory Care Coordination Note  4/3/2023    . ACM: Gina Moreno LPN    Spoke with [facility] attempted outreach. Left message and contact information for call back. We will try back at another time. Gina Moreno LPN  Care Coordinator     Plan   Follow up one week to assess ongoing needs with RD  F/U with PCP notes reviewed    Lab Results       None            Care Coordination Interventions    Referral from Primary Care Provider: No  Suggested Interventions and Community Resources  Diabetes Education: Completed  Registered Dietician:  In Process  Zone Management Tools: Completed (Comment: DM, Hypertension)          Goals Addressed    None         Future Appointments   Date Time Provider Lidia Fernandes   4/25/2023 10:20 AM Sarah Guerrero, APRN - CNP [facility]

## 2023-04-06 ENCOUNTER — CARE COORDINATION (OUTPATIENT)
Dept: CASE MANAGEMENT | Age: 43
End: 2023-04-06

## 2023-04-06 NOTE — CARE COORDINATION
Ambulatory Care Coordination Note  4/6/2023    ACM: Gina Moreno LPN    Spoke with [facility] attempted outreach for ACM follow up call. Left HIPPA compliant message and contact information for call back. Plan   Follow up one week to assess ongoing needs with RD  F/U with PCP notes reviewed    . Lab Results       None            Care Coordination Interventions    Referral from Primary Care Provider: No  Suggested Interventions and Community Resources  Diabetes Education: Completed  Registered Dietician:  In Process  Zone Management Tools: Completed (Comment: DM, Hypertension)          Goals Addressed    None         Future Appointments   Date Time Provider Lidia Fernandes   4/25/2023 10:20 AM Sarah Guerrero, APRN - CNP [facility] [place]

## 2023-04-13 DIAGNOSIS — E78.5 HYPERLIPIDEMIA, UNSPECIFIED HYPERLIPIDEMIA TYPE: ICD-10-CM

## 2023-04-18 RX ORDER — ATORVASTATIN CALCIUM 40 MG/1
TABLET, FILM COATED ORAL
Qty: 90 TABLET | Refills: 2 | Status: SHIPPED | OUTPATIENT
Start: 2023-04-18

## 2023-04-19 ENCOUNTER — CARE COORDINATION (OUTPATIENT)
Dept: CARE COORDINATION | Age: 43
End: 2023-04-19

## 2023-04-19 ENCOUNTER — CARE COORDINATION (OUTPATIENT)
Dept: CASE MANAGEMENT | Age: 43
End: 2023-04-19

## 2023-04-19 NOTE — CARE COORDINATION
Contacted Molly Shabazz and left voicemail regarding Dietitian follow up. RD has attempted to call patient three times. Left call back number. RD will continue to follow/assist with patient return call.        Rupa Armando, [address] [place] [address],    [phone]

## 2023-05-03 ENCOUNTER — CARE COORDINATION (OUTPATIENT)
Dept: CASE MANAGEMENT | Age: 43
End: 2023-05-03

## 2023-05-03 NOTE — CARE COORDINATION
Ambulatory Care Coordination Note  5/3/2023    Patient Current Location:  Home: [address] [place] [id number]     LPN CC contacted the patient by telephone. Verified name and  with patient as identifiers. Provided introduction to self, and explanation of the LPN CC role. Challenges to be reviewed by the provider   Additional needs identified to be addressed with provider: No  none               Method of communication with provider: none. ACM: Hai Martines LPN    Spoke to patient. He stated he is doing well. Appetite and fluid intake is good. He is following a low sodium, low carb diet. No issues with bladder or bowel elimination. He didn't check his FBS today. He denies sob, dizziness, ha or cp. Plan:  Follow up in 2 weeks       Offered patient enrollment in the Remote Patient Monitoring (RPM) program for in-home monitoring: NA. Lab Results       None            Care Coordination Interventions    Referral from Primary Care Provider: No  Suggested Interventions and Community Resources  Diabetes Education: Completed  Registered Dietician: In Process  Zone Management Tools: Completed (Comment: DM, Hypertension)          Goals Addressed    None         No future appointments.

## 2023-05-17 ENCOUNTER — CARE COORDINATION (OUTPATIENT)
Dept: CARE COORDINATION | Age: 43
End: 2023-05-17

## 2023-05-17 NOTE — CARE COORDINATION
ACM outreach to touch base with patient. Patient noted he was doing well. When asked for his last BS reading patient said yesterday it was 185. Patient asked for last BP and he was unable to provide when he took it last.   ACM encouraged patient to take BP several times a week and BS daily. Patient VU. ACM asked if patient had followed up with RD and patient noted he no longer needed follow up calls. ACM advised patient that this will be the final outreach and if any needs would arise to reach out. Patient VU. No further follow up at this time.

## 2023-06-27 DIAGNOSIS — E11.65 TYPE 2 DIABETES MELLITUS WITH HYPERGLYCEMIA, WITH LONG-TERM CURRENT USE OF INSULIN (HCC): ICD-10-CM

## 2023-06-27 DIAGNOSIS — Z79.4 TYPE 2 DIABETES MELLITUS WITH HYPERGLYCEMIA, WITH LONG-TERM CURRENT USE OF INSULIN (HCC): ICD-10-CM

## 2023-06-27 RX ORDER — PROCHLORPERAZINE 25 MG/1
SUPPOSITORY RECTAL
Qty: 3 EACH | Refills: 2 | Status: SHIPPED | OUTPATIENT
Start: 2023-06-27

## 2023-08-03 DIAGNOSIS — Z79.4 TYPE 2 DIABETES MELLITUS WITH HYPERGLYCEMIA, WITH LONG-TERM CURRENT USE OF INSULIN (HCC): ICD-10-CM

## 2023-08-03 DIAGNOSIS — E11.65 TYPE 2 DIABETES MELLITUS WITH HYPERGLYCEMIA, WITH LONG-TERM CURRENT USE OF INSULIN (HCC): ICD-10-CM

## 2023-08-03 RX ORDER — INSULIN GLARGINE 100 [IU]/ML
INJECTION, SOLUTION SUBCUTANEOUS
Qty: 15 ML | Refills: 2 | Status: SHIPPED | OUTPATIENT
Start: 2023-08-03

## 2023-09-06 DIAGNOSIS — F32.A DEPRESSION, UNSPECIFIED DEPRESSION TYPE: ICD-10-CM

## 2023-09-06 RX ORDER — FLUOXETINE HYDROCHLORIDE 20 MG/1
CAPSULE ORAL
Qty: 30 CAPSULE | Refills: 5 | Status: SHIPPED | OUTPATIENT
Start: 2023-09-06

## 2023-09-06 NOTE — TELEPHONE ENCOUNTER
Last Office Visit  -  2/28/23  Next Office Visit  -  NA    Last Filled  -  3/15/23  FLUOXETINE HCL 20MG CAPSULE

## 2023-09-08 NOTE — TELEPHONE ENCOUNTER
Pt sts he has a pocket ECG and 7 weeks ago he had Covid. He still has a cough and PCP wants to prescribe inhaler. This will increase his HR. Is it ok for pt to take or will this mess things up. no

## 2023-09-26 ENCOUNTER — OFFICE VISIT (OUTPATIENT)
Dept: FAMILY MEDICINE CLINIC | Age: 43
End: 2023-09-26
Payer: COMMERCIAL

## 2023-09-26 VITALS
WEIGHT: 309 LBS | HEART RATE: 52 BPM | RESPIRATION RATE: 16 BRPM | OXYGEN SATURATION: 96 % | BODY MASS INDEX: 37.63 KG/M2 | SYSTOLIC BLOOD PRESSURE: 116 MMHG | DIASTOLIC BLOOD PRESSURE: 60 MMHG | HEIGHT: 76 IN

## 2023-09-26 DIAGNOSIS — M54.50 CHRONIC LEFT-SIDED LOW BACK PAIN WITHOUT SCIATICA: Primary | ICD-10-CM

## 2023-09-26 DIAGNOSIS — M51.36 DEGENERATIVE DISC DISEASE, LUMBAR: ICD-10-CM

## 2023-09-26 DIAGNOSIS — G89.29 CHRONIC LEFT-SIDED LOW BACK PAIN WITHOUT SCIATICA: Primary | ICD-10-CM

## 2023-09-26 PROCEDURE — 3074F SYST BP LT 130 MM HG: CPT | Performed by: REGISTERED NURSE

## 2023-09-26 PROCEDURE — 3078F DIAST BP <80 MM HG: CPT | Performed by: REGISTERED NURSE

## 2023-09-26 PROCEDURE — G8427 DOCREV CUR MEDS BY ELIG CLIN: HCPCS | Performed by: REGISTERED NURSE

## 2023-09-26 PROCEDURE — 1036F TOBACCO NON-USER: CPT | Performed by: REGISTERED NURSE

## 2023-09-26 PROCEDURE — 99213 OFFICE O/P EST LOW 20 MIN: CPT | Performed by: REGISTERED NURSE

## 2023-09-26 PROCEDURE — G8417 CALC BMI ABV UP PARAM F/U: HCPCS | Performed by: REGISTERED NURSE

## 2023-09-26 RX ORDER — PREDNISONE 20 MG/1
TABLET ORAL
Qty: 9 TABLET | Refills: 0 | Status: SHIPPED | OUTPATIENT
Start: 2023-09-26 | End: 2023-10-03

## 2023-09-26 RX ORDER — CYCLOBENZAPRINE HCL 5 MG
5 TABLET ORAL 3 TIMES DAILY PRN
Qty: 30 TABLET | Refills: 0 | Status: SHIPPED | OUTPATIENT
Start: 2023-09-26 | End: 2023-10-06

## 2023-09-26 ASSESSMENT — ENCOUNTER SYMPTOMS
BOWEL INCONTINENCE: 0
ABDOMINAL PAIN: 0
SHORTNESS OF BREATH: 0
BACK PAIN: 1

## 2023-09-26 NOTE — PROGRESS NOTES
incontinence, chest pain, fever, leg pain, paresis, paresthesias or weakness. Risk factors include sedentary lifestyle. He has tried muscle relaxant and walking (tylenol) for the symptoms. The treatment provided no relief. Current Outpatient Medications   Medication Sig Dispense Refill    GINSENG PO Take by mouth      predniSONE (DELTASONE) 20 MG tablet Take 2 tablets by mouth daily for 3 days, THEN 1 tablet daily for 2 days, THEN 0.5 tablets daily for 2 days. 9 tablet 0    cyclobenzaprine (FLEXERIL) 5 MG tablet Take 1 tablet by mouth 3 times daily as needed for Muscle spasms 30 tablet 0    FLUoxetine (PROZAC) 20 MG capsule TAKE 1 CAPSULE BY MOUTH EVERY DAY 30 capsule 5    LANTUS SOLOSTAR 100 UNIT/ML injection pen INJECT 60 UNITS INTO THE SKIN NIGHTLY 15 mL 2    Continuous Blood Gluc Sensor (DEXCOM G6 SENSOR) MISC USE 1 SENSOR EVERY 10 DAYS 3 each 2    atorvastatin (LIPITOR) 40 MG tablet TAKE 1 TABLET BY MOUTH EVERY DAY 90 tablet 2    benazepril (LOTENSIN) 40 MG tablet TAKE 1 TABLET BY MOUTH EVERY DAY 30 tablet 4    Continuous Blood Gluc Transmit (DEXCOM G6 TRANSMITTER) MISC Check blood sugar at least 4 times daily 1 each 3    propranolol (INDERAL LA) 80 MG extended release capsule TAKE 1 CAPSULE BY MOUTH EVERY DAY 90 capsule 3    insulin lispro, 1 Unit Dial, 100 UNIT/ML SOPN INJECT 25 INTO THE SKIN 3 TIMES A DAY BEFORE MEALS 21 mL 1    albuterol sulfate HFA (VENTOLIN HFA) 108 (90 Base) MCG/ACT inhaler Inhale 2 puffs into the lungs every 6 hours as needed for Wheezing 18 g 2    miconazole (MICOTIN) 2 % cream Apply topically 2 times daily. For 2 weeks 113 g 1    aspirin EC 81 MG EC tablet Take 1 tablet by mouth daily 30 tablet 5     No current facility-administered medications for this visit. Review of Systems   Constitutional:  Negative for fatigue and fever. Respiratory:  Negative for shortness of breath. Cardiovascular:  Negative for chest pain and palpitations.    Gastrointestinal:  Negative for

## 2023-10-03 DIAGNOSIS — Z79.4 TYPE 2 DIABETES MELLITUS WITH HYPERGLYCEMIA, WITH LONG-TERM CURRENT USE OF INSULIN (HCC): ICD-10-CM

## 2023-10-03 DIAGNOSIS — I10 ESSENTIAL HYPERTENSION: ICD-10-CM

## 2023-10-03 DIAGNOSIS — E11.65 TYPE 2 DIABETES MELLITUS WITH HYPERGLYCEMIA, WITH LONG-TERM CURRENT USE OF INSULIN (HCC): ICD-10-CM

## 2023-10-03 RX ORDER — BENAZEPRIL HYDROCHLORIDE 40 MG/1
TABLET, FILM COATED ORAL
Qty: 30 TABLET | Refills: 1 | Status: SHIPPED | OUTPATIENT
Start: 2023-10-03

## 2023-10-03 RX ORDER — PROCHLORPERAZINE 25 MG/1
SUPPOSITORY RECTAL
Qty: 3 EACH | Refills: 5 | Status: SHIPPED | OUTPATIENT
Start: 2023-10-03

## 2023-10-03 RX ORDER — CALCIUM CITRATE/VITAMIN D3 200MG-6.25
TABLET ORAL
Qty: 200 STRIP | Refills: 0 | Status: SHIPPED | OUTPATIENT
Start: 2023-10-03

## 2023-10-11 ENCOUNTER — TELEPHONE (OUTPATIENT)
Dept: FAMILY MEDICINE CLINIC | Age: 43
End: 2023-10-11

## 2023-10-11 DIAGNOSIS — M54.41 CHRONIC RIGHT-SIDED LOW BACK PAIN WITH RIGHT-SIDED SCIATICA: Primary | ICD-10-CM

## 2023-10-11 DIAGNOSIS — M54.50 CHRONIC LEFT-SIDED LOW BACK PAIN WITHOUT SCIATICA: Primary | ICD-10-CM

## 2023-10-11 DIAGNOSIS — M51.36 DEGENERATIVE DISC DISEASE, LUMBAR: ICD-10-CM

## 2023-10-11 DIAGNOSIS — G89.29 CHRONIC LEFT-SIDED LOW BACK PAIN WITHOUT SCIATICA: Primary | ICD-10-CM

## 2023-10-11 DIAGNOSIS — G89.29 CHRONIC RIGHT-SIDED LOW BACK PAIN WITH RIGHT-SIDED SCIATICA: Primary | ICD-10-CM

## 2023-10-11 RX ORDER — CYCLOBENZAPRINE HCL 10 MG
10 TABLET ORAL 3 TIMES DAILY PRN
Qty: 21 TABLET | Refills: 0 | Status: SHIPPED | OUTPATIENT
Start: 2023-10-11 | End: 2023-10-20 | Stop reason: SDUPTHER

## 2023-10-11 NOTE — TELEPHONE ENCOUNTER
Pt called stating that he would like a higher dosage of flexeril sent in. He also stated the [address] [place] [address] Dr needs a MRI ordered before they will see him. Please advise.

## 2023-10-11 NOTE — TELEPHONE ENCOUNTER
Sent in a prescription for increased dose of Flexeril.   Will forward message about MRI to [facility]

## 2023-10-20 DIAGNOSIS — M54.50 CHRONIC LEFT-SIDED LOW BACK PAIN WITHOUT SCIATICA: ICD-10-CM

## 2023-10-20 DIAGNOSIS — G89.29 CHRONIC LEFT-SIDED LOW BACK PAIN WITHOUT SCIATICA: ICD-10-CM

## 2023-10-20 RX ORDER — CYCLOBENZAPRINE HCL 10 MG
10 TABLET ORAL 3 TIMES DAILY PRN
Qty: 21 TABLET | Refills: 0 | Status: SHIPPED | OUTPATIENT
Start: 2023-10-20 | End: 2023-10-30

## 2023-10-27 ENCOUNTER — ANCILLARY ORDERS (OUTPATIENT)
Dept: FAMILY MEDICINE CLINIC | Age: 43
End: 2023-10-27

## 2023-10-27 ENCOUNTER — HOSPITAL ENCOUNTER (OUTPATIENT)
Dept: MRI IMAGING | Age: 43
Discharge: HOME | End: 2023-10-27
Payer: COMMERCIAL

## 2023-10-27 DIAGNOSIS — M54.41 CHRONIC RIGHT-SIDED LOW BACK PAIN WITH RIGHT-SIDED SCIATICA: ICD-10-CM

## 2023-10-27 DIAGNOSIS — M51.36 DEGENERATIVE DISC DISEASE, LUMBAR: ICD-10-CM

## 2023-10-27 DIAGNOSIS — M54.42 CHRONIC LEFT-SIDED LOW BACK PAIN WITH LEFT-SIDED SCIATICA: ICD-10-CM

## 2023-10-27 DIAGNOSIS — G89.29 CHRONIC LEFT-SIDED LOW BACK PAIN WITH LEFT-SIDED SCIATICA: ICD-10-CM

## 2023-10-27 DIAGNOSIS — G89.29 CHRONIC RIGHT-SIDED LOW BACK PAIN WITH RIGHT-SIDED SCIATICA: ICD-10-CM

## 2023-10-27 PROCEDURE — 72148 MRI LUMBAR SPINE W/O DYE: CPT

## 2023-11-02 ENCOUNTER — TELEPHONE (OUTPATIENT)
Dept: FAMILY MEDICINE CLINIC | Age: 43
End: 2023-11-02

## 2023-11-02 NOTE — TELEPHONE ENCOUNTER
Patient has a longstanding history of DDD and chronic low back pain with radiculopathy. Most recently he was seen for exacerbation of these chronic issues. He presented with lumbar pain and bilateral radiculopathy and ws treated with medications and back exercises. His symptoms have been worsening. He has been seen for lumbar back pain and radiculopathy and DDD since 2012 per chart notes. He was last seen in 2018 by a neurologist-EMGs were abnormal and patient again had mononeuropathies and radiculopathies in right lower extremity. History of DDD with concern for further disc degeneration. He suffers from persistent lumbar radiculopathy and requires MRI evaluation and follow up with neurosurgery.

## 2023-11-02 NOTE — TELEPHONE ENCOUNTER
Adriana  from [address] #[phone]  Tracking # [id number]  This is in peer to peer status.  Please call    MRI Lumbar spine    Dr's request is denied, needs notes stating pt did at least what dr advised for 6 weeks, took medication, and did back stretches, PT or medically directed home program.     Can upload notes or download this denial @ [email or web address]

## 2023-11-06 DIAGNOSIS — M54.50 CHRONIC LEFT-SIDED LOW BACK PAIN WITHOUT SCIATICA: ICD-10-CM

## 2023-11-06 DIAGNOSIS — G89.29 CHRONIC LEFT-SIDED LOW BACK PAIN WITHOUT SCIATICA: ICD-10-CM

## 2023-11-06 RX ORDER — CYCLOBENZAPRINE HCL 10 MG
10 TABLET ORAL 3 TIMES DAILY PRN
Qty: 21 TABLET | Refills: 0 | Status: SHIPPED | OUTPATIENT
Start: 2023-11-06 | End: 2023-11-16

## 2023-11-06 NOTE — TELEPHONE ENCOUNTER
Last Office Visit  -  9/26/2023    Next Office Visit  -  No future appointments.       Last Filled  -  10/11/23

## 2023-11-09 DIAGNOSIS — Z79.4 TYPE 2 DIABETES MELLITUS WITH HYPERGLYCEMIA, WITH LONG-TERM CURRENT USE OF INSULIN (HCC): ICD-10-CM

## 2023-11-09 DIAGNOSIS — I47.20 VENTRICULAR TACHYCARDIA (HCC): ICD-10-CM

## 2023-11-09 DIAGNOSIS — E11.65 TYPE 2 DIABETES MELLITUS WITH HYPERGLYCEMIA, WITH LONG-TERM CURRENT USE OF INSULIN (HCC): ICD-10-CM

## 2023-11-09 RX ORDER — INSULIN GLARGINE 100 [IU]/ML
INJECTION, SOLUTION SUBCUTANEOUS
Qty: 15 ML | Refills: 2 | Status: SHIPPED | OUTPATIENT
Start: 2023-11-09

## 2023-11-09 NOTE — TELEPHONE ENCOUNTER
Last Office Visit  -  9/26/23  Next Office Visit  -  n/a    Last Filled  -  8/3/23  Last UDS -    Contract -

## 2023-11-10 RX ORDER — PROPRANOLOL HYDROCHLORIDE 80 MG/1
CAPSULE, EXTENDED RELEASE ORAL
Qty: 30 CAPSULE | Refills: 0 | Status: SHIPPED | OUTPATIENT
Start: 2023-11-10

## 2023-11-10 NOTE — TELEPHONE ENCOUNTER
11/15/2022 [facility]  No upcoming appt  3/7/2023 cmp  Please contact pt for yearly appt/med refills appt.

## 2023-11-10 NOTE — TELEPHONE ENCOUNTER
11/10 Called [phone]. Spoke to pt. Scheduled to see [facility] on 11/21/23 at 1:45.  Please send medication refill

## 2023-12-11 DIAGNOSIS — I10 ESSENTIAL HYPERTENSION: ICD-10-CM

## 2023-12-11 DIAGNOSIS — I47.20 VENTRICULAR TACHYCARDIA (HCC): ICD-10-CM

## 2023-12-11 RX ORDER — BENAZEPRIL HYDROCHLORIDE 40 MG/1
TABLET, FILM COATED ORAL
Qty: 30 TABLET | Refills: 2 | Status: SHIPPED | OUTPATIENT
Start: 2023-12-11

## 2023-12-11 RX ORDER — CALCIUM CITRATE/VITAMIN D3 200MG-6.25
TABLET ORAL
Qty: 200 STRIP | Refills: 0 | Status: SHIPPED | OUTPATIENT
Start: 2023-12-11

## 2023-12-11 RX ORDER — PROPRANOLOL HYDROCHLORIDE 80 MG/1
CAPSULE, EXTENDED RELEASE ORAL
Qty: 30 CAPSULE | Refills: 0 | Status: SHIPPED | OUTPATIENT
Start: 2023-12-11

## 2023-12-11 NOTE — TELEPHONE ENCOUNTER
11/15/2022 [facility]  1/10/2024 [facility] upcoming appt  3/7/2023 cmp

## 2023-12-11 NOTE — TELEPHONE ENCOUNTER
Last Office Visit  -  9/26/23  Next Office Visit  -  n/a    Last Filled  -  10/3/23  Last UDS -    Contract -

## 2024-01-09 DIAGNOSIS — I47.20 VENTRICULAR TACHYCARDIA (HCC): ICD-10-CM

## 2024-01-10 NOTE — TELEPHONE ENCOUNTER
Last OV 11/15/22  No upcoming OV  CMP 3/7/23  EKG 2/17/22    Plan:  1. Continue taking cardiac medications as prescribed - tolerating Propanolol better than metoprolol   2. Follow up with PCP regarding blood pressure monitoring              -would recommend obtaining blood pressure cuff for home monitoring  Cardiac medications reviewed including indications and pertinent side effects. Medication list updated at this visit.   Check blood pressure and heart rate at home a few times per week- keep a log with dates and times and bring to office visit   Regular exercise and following a healthy diet encouraged   Follow up with me 1 year or sooner if needed    Front - please schedule pt appt.

## 2024-01-12 NOTE — TELEPHONE ENCOUNTER
1/12- second attempt. called pt @[phone]. Unable to make contact. LVM informing pt to call back to schedule annual apt w/MG.  Next step is to send letter.

## 2024-01-15 RX ORDER — PROPRANOLOL HYDROCHLORIDE 80 MG/1
CAPSULE, EXTENDED RELEASE ORAL
Qty: 30 CAPSULE | Refills: 0 | Status: SHIPPED | OUTPATIENT
Start: 2024-01-15

## 2024-01-15 NOTE — TELEPHONE ENCOUNTER
1/15- Third attempt. called pt @[phone]. Unable to make contact. LVM informing pt to call back to schedule annual apt w/MG. Sent letter.

## 2024-02-12 DIAGNOSIS — Z79.4 TYPE 2 DIABETES MELLITUS WITH HYPERGLYCEMIA, WITH LONG-TERM CURRENT USE OF INSULIN (HCC): ICD-10-CM

## 2024-02-12 DIAGNOSIS — E78.5 HYPERLIPIDEMIA, UNSPECIFIED HYPERLIPIDEMIA TYPE: ICD-10-CM

## 2024-02-12 DIAGNOSIS — I47.20 VENTRICULAR TACHYCARDIA (HCC): ICD-10-CM

## 2024-02-12 DIAGNOSIS — E11.65 TYPE 2 DIABETES MELLITUS WITH HYPERGLYCEMIA, WITH LONG-TERM CURRENT USE OF INSULIN (HCC): ICD-10-CM

## 2024-02-12 DIAGNOSIS — F32.A DEPRESSION, UNSPECIFIED DEPRESSION TYPE: ICD-10-CM

## 2024-02-12 DIAGNOSIS — I10 ESSENTIAL HYPERTENSION: ICD-10-CM

## 2024-02-12 RX ORDER — INSULIN GLARGINE 100 [IU]/ML
INJECTION, SOLUTION SUBCUTANEOUS
Qty: 15 ML | Refills: 2 | Status: SHIPPED | OUTPATIENT
Start: 2024-02-12

## 2024-02-12 RX ORDER — FLUOXETINE HYDROCHLORIDE 20 MG/1
CAPSULE ORAL
Qty: 30 CAPSULE | Refills: 0 | Status: SHIPPED | OUTPATIENT
Start: 2024-02-12

## 2024-02-12 RX ORDER — BENAZEPRIL HYDROCHLORIDE 40 MG/1
TABLET ORAL
Qty: 30 TABLET | Refills: 2 | Status: SHIPPED | OUTPATIENT
Start: 2024-02-12

## 2024-02-12 NOTE — TELEPHONE ENCOUNTER
Last Office Visit  -  9/26/23  Next Office Visit  -  n/a    Last Filled  -    Last UDS -    Contract -

## 2024-02-13 RX ORDER — PROPRANOLOL HYDROCHLORIDE 80 MG/1
CAPSULE, EXTENDED RELEASE ORAL
Qty: 90 CAPSULE | Refills: 0 | Status: SHIPPED | OUTPATIENT
Start: 2024-02-13

## 2024-02-13 RX ORDER — ATORVASTATIN CALCIUM 40 MG/1
40 TABLET, FILM COATED ORAL DAILY
Qty: 90 TABLET | Refills: 0 | Status: SHIPPED | OUTPATIENT
Start: 2024-02-13

## 2024-02-13 RX ORDER — INSULIN LISPRO 100 [IU]/ML
INJECTION, SOLUTION INTRAVENOUS; SUBCUTANEOUS
Qty: 21 ML | Refills: 1 | OUTPATIENT
Start: 2024-02-13

## 2024-02-13 NOTE — TELEPHONE ENCOUNTER
Please call in the AM (2/14/24) and find out if he has been taking insulin with meals? If so, has someone else been prescribing for him? His insulin lispro has not been prescribed by our practice since 2021.

## 2024-02-13 NOTE — TELEPHONE ENCOUNTER
Last OV 11/15/22  No upcoming OV  LIPID 3/7/23  CMP 3/7/23        Plan:  1. Continue taking cardiac medications as prescribed - tolerating Propanolol better than metoprolol   2. Follow up with PCP regarding blood pressure monitoring              -would recommend obtaining blood pressure cuff for home monitoring  Cardiac medications reviewed including indications and pertinent side effects. Medication list updated at this visit.   Check blood pressure and heart rate at home a few times per week- keep a log with dates and times and bring to office visit   Regular exercise and following a healthy diet encouraged   Follow up with me 1 year or sooner if needed    Front - please schedule pt appt.

## 2024-02-13 NOTE — TELEPHONE ENCOUNTER
Future Appointments   Date Time Provider Department Center   2/20/2024  2:20 PM Sarah Castellano, COLIN - CNP [facility] [place] [facility]   3/5/2024  9:45 AM Dean Yang MD [facility]

## 2024-02-13 NOTE — TELEPHONE ENCOUNTER
2/13- called pt @[phone]. Pt is now scheduled 3/5/24 [place] [facility] [place]. Please review med request.

## 2024-02-14 RX ORDER — INSULIN LISPRO 100 [IU]/ML
INJECTION, SOLUTION INTRAVENOUS; SUBCUTANEOUS
Qty: 21 ML | Refills: 1 | Status: SHIPPED | OUTPATIENT
Start: 2024-02-14

## 2024-02-14 NOTE — TELEPHONE ENCOUNTER
Patient states e has been taking the insulin, and that no one else has been prescribing. He states the pharmacy has just been refilling it.

## 2024-02-21 SDOH — ECONOMIC STABILITY: FOOD INSECURITY: WITHIN THE PAST 12 MONTHS, YOU WORRIED THAT YOUR FOOD WOULD RUN OUT BEFORE YOU GOT MONEY TO BUY MORE.: SOMETIMES TRUE

## 2024-02-21 SDOH — ECONOMIC STABILITY: INCOME INSECURITY: HOW HARD IS IT FOR YOU TO PAY FOR THE VERY BASICS LIKE FOOD, HOUSING, MEDICAL CARE, AND HEATING?: HARD

## 2024-02-21 SDOH — ECONOMIC STABILITY: FOOD INSECURITY: WITHIN THE PAST 12 MONTHS, THE FOOD YOU BOUGHT JUST DIDN'T LAST AND YOU DIDN'T HAVE MONEY TO GET MORE.: SOMETIMES TRUE

## 2024-02-21 ASSESSMENT — PATIENT HEALTH QUESTIONNAIRE - PHQ9
1. LITTLE INTEREST OR PLEASURE IN DOING THINGS: NOT AT ALL
SUM OF ALL RESPONSES TO PHQ QUESTIONS 1-9: 2
SUM OF ALL RESPONSES TO PHQ QUESTIONS 1-9: 2
2. FEELING DOWN, DEPRESSED OR HOPELESS: NOT AT ALL
1. LITTLE INTEREST OR PLEASURE IN DOING THINGS: 0
9. THOUGHTS THAT YOU WOULD BE BETTER OFF DEAD, OR OF HURTING YOURSELF: NOT AT ALL
SUM OF ALL RESPONSES TO PHQ QUESTIONS 1-9: 2
4. FEELING TIRED OR HAVING LITTLE ENERGY: 1
SUM OF ALL RESPONSES TO PHQ9 QUESTIONS 1 & 2: 0
7. TROUBLE CONCENTRATING ON THINGS, SUCH AS READING THE NEWSPAPER OR WATCHING TELEVISION: NOT AT ALL
SUM OF ALL RESPONSES TO PHQ QUESTIONS 1-9: 2
3. TROUBLE FALLING OR STAYING ASLEEP: 1
8. MOVING OR SPEAKING SO SLOWLY THAT OTHER PEOPLE COULD HAVE NOTICED. OR THE OPPOSITE, BEING SO FIGETY OR RESTLESS THAT YOU HAVE BEEN MOVING AROUND A LOT MORE THAN USUAL: 0
6. FEELING BAD ABOUT YOURSELF - OR THAT YOU ARE A FAILURE OR HAVE LET YOURSELF OR YOUR FAMILY DOWN: 0
9. THOUGHTS THAT YOU WOULD BE BETTER OFF DEAD, OR OF HURTING YOURSELF: 0
5. POOR APPETITE OR OVEREATING: 0
8. MOVING OR SPEAKING SO SLOWLY THAT OTHER PEOPLE COULD HAVE NOTICED. OR THE OPPOSITE - BEING SO FIDGETY OR RESTLESS THAT YOU HAVE BEEN MOVING AROUND A LOT MORE THAN USUAL: NOT AT ALL
10. IF YOU CHECKED OFF ANY PROBLEMS, HOW DIFFICULT HAVE THESE PROBLEMS MADE IT FOR YOU TO DO YOUR WORK, TAKE CARE OF THINGS AT HOME, OR GET ALONG WITH OTHER PEOPLE: NOT DIFFICULT AT ALL
2. FEELING DOWN, DEPRESSED OR HOPELESS: 0
SUM OF ALL RESPONSES TO PHQ QUESTIONS 1-9: 2
3. TROUBLE FALLING OR STAYING ASLEEP: SEVERAL DAYS
5. POOR APPETITE OR OVEREATING: NOT AT ALL
6. FEELING BAD ABOUT YOURSELF - OR THAT YOU ARE A FAILURE OR HAVE LET YOURSELF OR YOUR FAMILY DOWN: NOT AT ALL
7. TROUBLE CONCENTRATING ON THINGS, SUCH AS READING THE NEWSPAPER OR WATCHING TELEVISION: 0
4. FEELING TIRED OR HAVING LITTLE ENERGY: SEVERAL DAYS
10. IF YOU CHECKED OFF ANY PROBLEMS, HOW DIFFICULT HAVE THESE PROBLEMS MADE IT FOR YOU TO DO YOUR WORK, TAKE CARE OF THINGS AT HOME, OR GET ALONG WITH OTHER PEOPLE: 0

## 2024-02-21 NOTE — TELEPHONE ENCOUNTER
1/10- called pt @[phone]. Unable to make contact. LVM informing pt to call back to schedule annual apt w/MG.    Consultation - [facility]  Pro Steele 86 y.o. male MRN: [id number]  Unit/Bed#: ED-11 Encounter: [id number]        Inpatient consult to gastroenterology  Consult performed by: Rocio Garrison DO  Consult ordered by: Jordan Nayak MD      ASSESSMENT AND PLAN:      1) hematochezia  2) hx of diverticulosis   3) hx of sandoval's esophagus  4) SUREKHA with azotemia  5) chronic anemia in the setting of CKD stage IV    Patient is an 86 y/o M with a PMHx of severe aortic stenosis, afib on xarelto, CKD, who presented to the ED this morning after 2 episodes of hematochezia. Last EGD/Colon in 11/2021 for hematochezia with sandoval's esophagus, diverticula, and small non bleeding internal hemorrhoids. Hb upon arrival of 12 (baseline 10-11) with SUREKHA and azotemia. Patient is normotensive and hemodynamically stable.     Ddx: diverticular bleed vs less likely AVM vs polyp vs hemorrhoidal bleeding    Suspect diverticular bleed as painless, acute onset bleeding in the setting of known diverticula and hemodynamic stability. Diverticular bleeding likely to improve without intervention. As patient is high risk with multiple co-morbidities including severe AS, current CHF exacerbation, and CKD IV shared decision making made with patient and family at bedside to hold off on urgent colonoscopy unless bleeding persists.     Plan:  Hold xarelto  Monitor Hb q12 hours  Transfuse for hb <7  Monitor hematochezia   Hold off on colonoscopy for now unless bleeding persists.   Patient will need risk vs benefit discussion on DOAC in setting of afib with cardiology   ______________________________________________________________________    HPI Patient is an 86 y/o M with a PMHx of aortic stenosis, afib on xarelto, CKD, who presented to the ED this morning after 2 episodes of hematochezia. Pt reports that at 3 am this morning, he awake and had one episodes of rectal bleeding described as bright red blood  that filled the toilet bowl. He subsequently had 2 additional episodes, prompting him to call EMS. He denies any bowel movements during these episodes. Patient was not straining and denies any diarrhea, constipation, abdominal pain, nausea, or vomiting. He notes prior to today, he was experiencing generalized weakness with a productive cough and poor PO intake. Denies any fever, chills, or chest pain but does endorse orthopnea and increased MIXON over the past several days. He admits to recent gain. He denies a hx of diverticulitis but notes hx of internal hemorrhoids. He denies any melena, NSAID use, or reflux symptoms.     REVIEW OF SYSTEMS:    CONSTITUTIONAL: Denies any fever, chills, rigors, and weight loss.  HEENT: No earache or tinnitus. Denies hearing loss or visual disturbances.  CARDIOVASCULAR: No chest pain or palpitations.   RESPIRATORY: Denies any cough, hemoptysis, shortness of breath or dyspnea on exertion.  GASTROINTESTINAL: As noted in the History of Present Illness.   GENITOURINARY: No problems with urination. Denies any hematuria or dysuria.  NEUROLOGIC: No dizziness or vertigo, denies headaches.   MUSCULOSKELETAL: Denies any muscle or joint pain.   SKIN: Denies skin rashes or itching.   ENDOCRINE: Denies excessive thirst. Denies intolerance to heat or cold.  PSYCHOSOCIAL: Denies depression or anxiety. Denies any recent memory loss.       Historical Information   Past Medical History:   Diagnosis Date    Aortic stenosis, severe 11/01/2021    Atrial fibrillation (HCC)     CHF (congestive heart failure) (HCC)     Colon polyp     GERD (gastroesophageal reflux disease)     Hearing loss     last assessed 11/8/17    Hypertension     Rheumatic fever     Stenosis of aorta      Past Surgical History:   Procedure Laterality Date    APPENDECTOMY      BACK SURGERY      lower    CARDIAC SURGERY      ablation    CATARACT EXTRACTION      JOINT REPLACEMENT Left     knee    KNEE SURGERY Left     MI PROSTATE NEEDLE  BIOPSY ANY APPROACH N/A 3/16/2021    Procedure: Transperineal prostate biopsy with biplanar transrectal ultrasound, using the perineal logic kit;  Surgeon: Derrick Jackson MD;  Location: [facility];  Service: Urology    TONSILLECTOMY AND ADENOIDECTOMY       Social History   Social History     Substance and Sexual Activity   Alcohol Use Yes    Comment: occ     Social History     Substance and Sexual Activity   Drug Use No     Social History     Tobacco Use   Smoking Status Former    Current packs/day: 0.00    Average packs/day: 1 pack/day for 16.0 years (16.0 ttl pk-yrs)    Types: Cigarettes    Start date:     Quit date:     Years since quittin.1   Smokeless Tobacco Never     Family History   Problem Relation Age of Onset    Other Mother         old age    Esophageal cancer Father     Other Father         old age    Alcohol abuse Son         alcoholism       Meds/Allergies     (Not in a hospital admission)    No current facility-administered medications for this encounter.       Allergies   Allergen Reactions    Apixaban Other (See Comments)           Objective     Blood pressure 126/96, pulse 90, temperature 97.6 °F (36.4 °C), temperature source Oral, resp. rate 20, SpO2 95%. There is no height or weight on file to calculate BMI.      Intake/Output Summary (Last 24 hours) at 2024 0935  Last data filed at 2024 0825  Gross per 24 hour   Intake 500 ml   Output --   Net 500 ml         PHYSICAL EXAM:      General Appearance:   Alert, cooperative, no distress   HEENT:   Normocephalic, atraumatic, anicteric.     Neck:  Supple, symmetrical, trachea midline   Lungs:   Clear to auscultation bilaterally; no rales, rhonchi or wheezing; respirations unlabored    Heart::   Regular rate and rhythm; no murmur, rub, or gallop.   Abdomen:   Soft, non-tender, non-distended; normal bowel sounds; no masses, no organomegaly    Genitalia:   Deferred    Rectal:   BRBPR; no anal fissures, or hemorrhoids appreciated on exam.     Extremities:  No cyanosis, clubbing or edema    Pulses:  2+ and symmetric all extremities    Skin:  No jaundice, rashes, or lesions    Lymph nodes:  No palpable cervical lymphadenopathy        Lab Results:   Admission on 02/21/2024   Component Date Value    Ventricular Rate 02/21/2024 98     Atrial Rate 02/21/2024 93     QRSD Interval 02/21/2024 100     QT Interval 02/21/2024 380     QTC Interval 02/21/2024 485     QRS Axis 02/21/2024 269     T Wave Axis 02/21/2024 8     WBC 02/21/2024 8.33     RBC 02/21/2024 4.34     Hemoglobin 02/21/2024 12.2     Hematocrit 02/21/2024 39.2     MCV 02/21/2024 90     MCH 02/21/2024 28.1     MCHC 02/21/2024 31.1 (L)     RDW 02/21/2024 16.7 (H)     MPV 02/21/2024 11.1     Platelets 02/21/2024 199     nRBC 02/21/2024 0     Neutrophils Relative 02/21/2024 64     Immat GRANS % 02/21/2024 1     Lymphocytes Relative 02/21/2024 16     Monocytes Relative 02/21/2024 13 (H)     Eosinophils Relative 02/21/2024 5     Basophils Relative 02/21/2024 1     Neutrophils Absolute 02/21/2024 5.43     Immature Grans Absolute 02/21/2024 0.04     Lymphocytes Absolute 02/21/2024 1.29     Monocytes Absolute 02/21/2024 1.06     Eosinophils Absolute 02/21/2024 0.45     Basophils Absolute 02/21/2024 0.06     Protime 02/21/2024 27.0 (H)     INR 02/21/2024 2.49 (H)     PTT 02/21/2024 36     ABO Grouping 02/21/2024 O     Rh Factor 02/21/2024 Negative     Antibody Screen 02/21/2024 Negative     Specimen Expiration Date 02/21/2024 20240224     Sodium 02/21/2024 138     Potassium 02/21/2024 4.5     Chloride 02/21/2024 103     CO2 02/21/2024 27     ANION GAP 02/21/2024 8     BUN 02/21/2024 63 (H)     Creatinine 02/21/2024 2.53 (H)     Glucose 02/21/2024 101     Calcium 02/21/2024 9.6     Corrected Calcium 02/21/2024 10.1     AST 02/21/2024 33     ALT 02/21/2024 29     Alkaline Phosphatase 02/21/2024 97     Total Protein 02/21/2024 6.8     Albumin 02/21/2024 3.4 (L)     Total Bilirubin 02/21/2024 0.91     eGFR  02/21/2024 21     Magnesium 02/21/2024 2.1     hs TnI 0hr 02/21/2024 126 (H)     BNP 02/21/2024 1,168 (H)     hs TnI 2hr 02/21/2024 120 (H)     Delta 2hr hsTnI 02/21/2024 -6     Ventricular Rate 02/21/2024 95     Atrial Rate 02/21/2024 75     QRSD Interval 02/21/2024 102     QT Interval 02/21/2024 402     QTC Interval 02/21/2024 505     QRS Axis 02/21/2024 220     T Wave Pace 02/21/2024 15        Imaging Studies: I have personally reviewed pertinent imaging studies.

## 2024-02-23 ENCOUNTER — OFFICE VISIT (OUTPATIENT)
Dept: FAMILY MEDICINE CLINIC | Age: 44
End: 2024-02-23

## 2024-02-23 VITALS
WEIGHT: 298 LBS | SYSTOLIC BLOOD PRESSURE: 104 MMHG | OXYGEN SATURATION: 97 % | BODY MASS INDEX: 36.29 KG/M2 | HEART RATE: 57 BPM | HEIGHT: 76 IN | DIASTOLIC BLOOD PRESSURE: 62 MMHG

## 2024-02-23 DIAGNOSIS — Z00.00 ENCOUNTER FOR WELL ADULT EXAM WITHOUT ABNORMAL FINDINGS: Primary | ICD-10-CM

## 2024-02-23 DIAGNOSIS — F32.A DEPRESSION, UNSPECIFIED DEPRESSION TYPE: ICD-10-CM

## 2024-02-23 DIAGNOSIS — Z71.89 ACP (ADVANCE CARE PLANNING): ICD-10-CM

## 2024-02-23 DIAGNOSIS — M51.36 DEGENERATIVE DISC DISEASE, LUMBAR: ICD-10-CM

## 2024-02-23 DIAGNOSIS — E11.65 TYPE 2 DIABETES MELLITUS WITH HYPERGLYCEMIA, WITH LONG-TERM CURRENT USE OF INSULIN (HCC): ICD-10-CM

## 2024-02-23 DIAGNOSIS — Z79.4 TYPE 2 DIABETES MELLITUS WITH HYPERGLYCEMIA, WITH LONG-TERM CURRENT USE OF INSULIN (HCC): ICD-10-CM

## 2024-02-23 DIAGNOSIS — I10 PRIMARY HYPERTENSION: ICD-10-CM

## 2024-02-23 DIAGNOSIS — E78.5 HYPERLIPIDEMIA, UNSPECIFIED HYPERLIPIDEMIA TYPE: ICD-10-CM

## 2024-02-23 LAB — HBA1C MFR BLD: 9.3 %

## 2024-02-23 RX ORDER — CYCLOBENZAPRINE HCL 10 MG
10 TABLET ORAL 2 TIMES DAILY PRN
COMMUNITY
Start: 2024-01-04

## 2024-02-23 RX ORDER — TIRZEPATIDE 2.5 MG/.5ML
2.5 INJECTION, SOLUTION SUBCUTANEOUS WEEKLY
Qty: 2 ML | Refills: 0 | Status: SHIPPED | OUTPATIENT
Start: 2024-02-23

## 2024-02-23 ASSESSMENT — ENCOUNTER SYMPTOMS
SHORTNESS OF BREATH: 0
EYES NEGATIVE: 1
GASTROINTESTINAL NEGATIVE: 1

## 2024-02-23 NOTE — PATIENT INSTRUCTIONS
while \"distracted\" (such as eating while watching TV)   Stop eating when you feel full   Drink liquids at least 30 minutes before or after eating   Avoid foods high in fat or sugar   Take vitamin supplements, as recommended  It can take several months to learn to listen to your body so that you know when you are hungry and when you are full. You may dislike foods you previously loved, and you may begin to prefer new foods. This can be a frustrating process for some people, so talk to your dietitian if you are having trouble.  It usually takes between one and two years to lose weight after surgery. After reaching their goal weight, some people have plastic surgery (called \"body contouring\") to remove excess skin from the body, particularly in the abdominal area.  Before you decide to have weight loss surgery, you must commit to staying healthy for life. This includes following up with your healthcare team, exercising most days of the week, and eating a sensible diet every day. It can be difficult to develop new eating and exercise habits after weight loss surgery, and you will have to work hard to stick to your goals.  Recovering from surgery and losing weight can be stressful and emotional, and it is important to have the support of family and friends. Working with a , therapist, or support group can help you through the ups and downs.  WHERE TO GET MORE INFORMATION -- Your healthcare provider is the best source of information for questions and concerns related to your medical problem.  This article will be updated as needed every four months on our Web site ([email or web address])

## 2024-02-23 NOTE — PROGRESS NOTES
of Mounjaro.  Patient education materials noted in AVS.  Continue exercise.  Follow-up in 3 months.  -     POCT glycosylated hemoglobin (Hb A1C)  -     Microalbumin / Creatinine Urine Ratio; Future  -      DIABETES FOOT EXAM  - Tirzepatide (MOUNJARO) 2.5 MG/0.5ML SOPN SC injection; Inject 0.5 mLs into the skin once a week  Dispense: 2 mL; Refill: 0  3. ACP (advance care planning)  Encouraged him to complete advance directives additional information included in AVS.  4. Depression, unspecified depression type  Stable.    5. Primary hypertension  Stable.  Continue current medications.  -     Comprehensive Metabolic Panel; Future  -     TSH with Reflex to FT4; Future  6. Degenerative disc disease, lumbar  Managed by pain management specialist.  Follow-up with pain management as planned.  7. Hyperlipidemia, unspecified hyperlipidemia type  -     Lipid Panel; Future          Personalized Preventive Plan   Current Health Maintenance Status  Immunization History   Administered Date(s) Administered    INFLUENZA, INTRADERMAL, QUADRIVALENT, PRESERVATIVE FREE 11/14/2017        Health Maintenance   Topic Date Due    Hepatitis B vaccine (1 of 3 - 3-dose series) Never done    COVID-19 Vaccine (1) Never done    Pneumococcal 0-64 years Vaccine (1 - PCV) Never done    Diabetic retinal exam  Never done    Hepatitis A vaccine (1 of 2 - Risk 2-dose series) Never done    DTaP/Tdap/Td vaccine (1 - Tdap) Never done    Colorectal Cancer Screen  Never done    Flu vaccine (1) 08/01/2023    Diabetic Alb to Cr ratio (uACR) test  03/07/2024    Lipids  03/07/2024    GFR test (Diabetes, CKD 3-4, OR last GFR 15-59)  03/07/2024    Depression Monitoring  02/21/2025    Diabetic foot exam  02/23/2025    A1C test (Diabetic or Prediabetic)  02/23/2025    Hepatitis C screen  Completed    HIV screen  Completed    Hib vaccine  Aged Out    HPV vaccine  Aged Out    Polio vaccine  Aged Out    Meningococcal (ACWY) vaccine  Aged Out     Recommendations for

## 2024-03-05 ENCOUNTER — OFFICE VISIT (OUTPATIENT)
Dept: CARDIOLOGY CLINIC | Age: 44
End: 2024-03-05
Payer: COMMERCIAL

## 2024-03-05 VITALS
SYSTOLIC BLOOD PRESSURE: 108 MMHG | WEIGHT: 296 LBS | HEART RATE: 64 BPM | DIASTOLIC BLOOD PRESSURE: 70 MMHG | HEIGHT: 76 IN | BODY MASS INDEX: 36.04 KG/M2 | OXYGEN SATURATION: 97 %

## 2024-03-05 DIAGNOSIS — I25.10 CORONARY ARTERY DISEASE INVOLVING NATIVE CORONARY ARTERY OF NATIVE HEART WITHOUT ANGINA PECTORIS: ICD-10-CM

## 2024-03-05 DIAGNOSIS — I10 PRIMARY HYPERTENSION: Primary | ICD-10-CM

## 2024-03-05 DIAGNOSIS — E78.2 MIXED HYPERLIPIDEMIA: ICD-10-CM

## 2024-03-05 PROCEDURE — 3074F SYST BP LT 130 MM HG: CPT | Performed by: INTERNAL MEDICINE

## 2024-03-05 PROCEDURE — 3078F DIAST BP <80 MM HG: CPT | Performed by: INTERNAL MEDICINE

## 2024-03-05 PROCEDURE — 99214 OFFICE O/P EST MOD 30 MIN: CPT | Performed by: INTERNAL MEDICINE

## 2024-03-05 NOTE — PATIENT INSTRUCTIONS
Plan:  ~Labs when you see your PCP   Cardiac medications reviewed including indications and pertinent side effects. Medication list updated at this visit.   Check blood pressure and heart rate at home daily at rest and with dizziness- keep a log with dates and times and bring to office visit   Regular exercise and following a healthy diet encouraged   ~Send copy of blood pressure after one month   ~May consider decreasing benazepril   Follow up with me in 1 year

## 2024-03-05 NOTE — PROGRESS NOTES
mild concentric left ventricular hypertrophy.  Normal left ventricular diastolic filling pressure.  Mild posterior mitral annular calcification is present.  Aortic valve sclerosis without aortic stenosis.    LEXISCAN 9/28/21  Summary  There is an inferolateral fixed defect consistent with diaphragmatic attenuation. No clear inducible ischemia or scar. Normal myocardial thickening, normal LV function with post-stress LVEF 52%.      30 Day Monitor 10/20/21  Predominantly NSR, AT, NSVT     Latest Reference Range & Units 03/07/23 11:13   Cholesterol, Total 0 - 199 mg/dL 178   HDL Cholesterol 40 - 60 mg/dL 41   LDL Calculated <100 mg/dL 104 (H)   Triglycerides 0 - 150 mg/dL 164 (H)   VLDL Cholesterol Calculated Not Established mg/dL 33      Latest Reference Range & Units 03/07/23 11:13   Albumin 3.4 - 5.0 g/dL 3.7   Albumin/Globulin Ratio 1.1 - 2.2  1.3   Alk Phos 40 - 129 U/L 86   ALT 10 - 40 U/L 48 (H)   AST 15 - 37 U/L 44 (H)   BILIRUBIN TOTAL 0.0 - 1.0 mg/dL 1.2 (H)   Total Protein 6.4 - 8.2 g/dL 6.5   Triglycerides 0 - 150 mg/dL 164 (H)     Assessment:   Dizziness - may be related to low BP  Coronary artery disease- noted on CT chest 9/8/2021. Stable w/o angina  Wide complex tachycardia  - symptoms resolved w/ BBlk  Abnormal EKG   COVID 2021  Hypertension - controlled. BP low. Will monitor at home and reduce benazepril to 20 mg daily if persistent low   Diabetes mellitus   History of idiopathic thrombocytopenia    History of OWEN     Plan:  ~Labs when you see your PCP   Cardiac medications reviewed including indications and pertinent side effects. Medication list updated at this visit.   Check blood pressure and heart rate at home daily at rest and with dizziness- keep a log with dates and times and bring to office visit   Regular exercise and following a healthy diet encouraged   ~Send copy of blood pressure after one month   ~May consider decreasing benazepril   Follow up with me in 1 year       [place]

## 2024-03-18 RX ORDER — CALCIUM CITRATE/VITAMIN D3 200MG-6.25
TABLET ORAL
Qty: 200 STRIP | Refills: 2 | Status: SHIPPED | OUTPATIENT
Start: 2024-03-18

## 2024-03-18 NOTE — TELEPHONE ENCOUNTER
Last Office Visit  -  2/23/24  Next Office Visit  -  n/a    Last Filled  -    Last UDS -    Contract -

## 2024-04-17 DIAGNOSIS — I47.20 VENTRICULAR TACHYCARDIA (HCC): ICD-10-CM

## 2024-04-17 DIAGNOSIS — Z79.4 TYPE 2 DIABETES MELLITUS WITH HYPERGLYCEMIA, WITH LONG-TERM CURRENT USE OF INSULIN (HCC): ICD-10-CM

## 2024-04-17 DIAGNOSIS — F32.A DEPRESSION, UNSPECIFIED DEPRESSION TYPE: ICD-10-CM

## 2024-04-17 DIAGNOSIS — E11.65 TYPE 2 DIABETES MELLITUS WITH HYPERGLYCEMIA, WITH LONG-TERM CURRENT USE OF INSULIN (HCC): ICD-10-CM

## 2024-04-17 RX ORDER — FLUOXETINE HYDROCHLORIDE 20 MG/1
CAPSULE ORAL
Qty: 30 CAPSULE | Refills: 5 | Status: SHIPPED | OUTPATIENT
Start: 2024-04-17

## 2024-04-17 RX ORDER — PROPRANOLOL HYDROCHLORIDE 80 MG/1
CAPSULE, EXTENDED RELEASE ORAL
Qty: 90 CAPSULE | Refills: 0 | Status: SHIPPED | OUTPATIENT
Start: 2024-04-17

## 2024-04-17 NOTE — TELEPHONE ENCOUNTER
Last Office Visit  -  /23/24  Next Office Visit  -  none    Last Filled  -  2/12/24  Last UDS -    Contract -

## 2024-04-17 NOTE — TELEPHONE ENCOUNTER
3/5/2024 [facility] [place]  No upcoming appt  Labs ordered at 3/5/24 visit,Per Pt HIPAA from can leave results on machine. LMOM relaying message to have fasting labs completed.  Pt to call the office with any concerns.

## 2024-04-17 NOTE — TELEPHONE ENCOUNTER
Refill request for Tirzepatide (MOUNJARO) 2.5 MG/0.5ML SOPN SC injection  medication.     Name of Pharmacy- [facility]      Last visit - 2/23/24     Pending visit - None    Last refill - 2/23/24      Medication Contract signed - PDMP Monitoring:    Last PDMP Leo as Reviewed:  Review User Review Instant Review Result          [unfilled]  Urine Drug Screenings (1 yr)    No resulted procedures found.       Medication Contract and Consent for Opioid Use Documents Filed        No documents found                   Last Oarrs ran-         Additional Comments

## 2024-05-01 ENCOUNTER — OFFICE VISIT (OUTPATIENT)
Dept: FAMILY MEDICINE CLINIC | Age: 44
End: 2024-05-01
Payer: COMMERCIAL

## 2024-05-01 VITALS
BODY MASS INDEX: 35.54 KG/M2 | SYSTOLIC BLOOD PRESSURE: 118 MMHG | OXYGEN SATURATION: 97 % | DIASTOLIC BLOOD PRESSURE: 78 MMHG | HEART RATE: 58 BPM | WEIGHT: 292 LBS

## 2024-05-01 DIAGNOSIS — E11.65 UNCONTROLLED DIABETES MELLITUS WITH HYPERGLYCEMIA, WITH LONG-TERM CURRENT USE OF INSULIN (HCC): ICD-10-CM

## 2024-05-01 DIAGNOSIS — F32.A DEPRESSION, UNSPECIFIED DEPRESSION TYPE: ICD-10-CM

## 2024-05-01 DIAGNOSIS — Z79.4 UNCONTROLLED DIABETES MELLITUS WITH HYPERGLYCEMIA, WITH LONG-TERM CURRENT USE OF INSULIN (HCC): ICD-10-CM

## 2024-05-01 DIAGNOSIS — R68.82 DECREASED LIBIDO: Primary | ICD-10-CM

## 2024-05-01 PROCEDURE — 3046F HEMOGLOBIN A1C LEVEL >9.0%: CPT | Performed by: REGISTERED NURSE

## 2024-05-01 PROCEDURE — 3078F DIAST BP <80 MM HG: CPT | Performed by: REGISTERED NURSE

## 2024-05-01 PROCEDURE — 3074F SYST BP LT 130 MM HG: CPT | Performed by: REGISTERED NURSE

## 2024-05-01 PROCEDURE — 99214 OFFICE O/P EST MOD 30 MIN: CPT | Performed by: REGISTERED NURSE

## 2024-05-01 RX ORDER — BUPROPION HYDROCHLORIDE 150 MG/1
150 TABLET ORAL EVERY MORNING
Qty: 30 TABLET | Refills: 3 | Status: SHIPPED | OUTPATIENT
Start: 2024-05-01

## 2024-05-01 ASSESSMENT — ENCOUNTER SYMPTOMS
SHORTNESS OF BREATH: 0
GASTROINTESTINAL NEGATIVE: 1
EYES NEGATIVE: 1

## 2024-05-01 NOTE — PROGRESS NOTES
Patient: Antoni Nelson is a 44 y.o. male who presents today with the following Chief Complaint(s):  Chief Complaint   Patient presents with    Medication Adjustment     Wants to change his anti depressant        Assessment/Plan:    1. Decreased libido  Plan is to continue Prozac and start bupropion daily to help with decreased libido.  He has tolerated bupropion well in the past.  Encouraged him to continue with communication and connecting with his wife.  - buPROPion (WELLBUTRIN XL) 150 MG extended release tablet; Take 1 tablet by mouth every morning  Dispense: 30 tablet; Refill: 3    2. Depression, unspecified depression type  Stable.  Well-managed with Prozac.    3. Uncontrolled diabetes mellitus with hyperglycemia, with long-term current use of insulin (HCC)  Trending downward.  He has also lost 22 pounds since last visit in February 2024.  Continue insulin as prescribed.  Continue following a diabetic diet.  Continue increased exercise.  Follow-up in 1 month for A1c recheck.      Return in about 4 weeks (around 5/29/2024) for Medication check, Diabetes follow up.    HPI:     He is here to discuss his depression/anxiety. He feels the prozac is affecting his libido.  He has taken Wellbutrin previously but did not feel this worked well for his depression.    He says he has no interest in intimacy.  He has had difficulty with erectile dysfunction in the past and took Cialis.  The Cialis caused headaches.    He stopped Mounjaro due to severe diarrhea.  He has had similar symptoms with Trulicity and Ozempic.  He is working on nutrition and increasing his activity/exercise.  He continues his insulin as prescribed.  He has lost significant amount of weight since his last appointment-approximately 22 pounds.        Current Outpatient Medications   Medication Sig Dispense Refill    buPROPion (WELLBUTRIN XL) 150 MG extended release tablet Take 1 tablet by mouth every morning 30 tablet 3    FLUoxetine (PROZAC) 20 MG

## 2024-05-02 DIAGNOSIS — E87.5 SERUM POTASSIUM ELEVATED: Primary | ICD-10-CM

## 2024-05-29 DIAGNOSIS — Z79.4 TYPE 2 DIABETES MELLITUS WITH HYPERGLYCEMIA, WITH LONG-TERM CURRENT USE OF INSULIN (HCC): ICD-10-CM

## 2024-05-29 DIAGNOSIS — E11.65 TYPE 2 DIABETES MELLITUS WITH HYPERGLYCEMIA, WITH LONG-TERM CURRENT USE OF INSULIN (HCC): ICD-10-CM

## 2024-05-29 DIAGNOSIS — E78.5 HYPERLIPIDEMIA, UNSPECIFIED HYPERLIPIDEMIA TYPE: ICD-10-CM

## 2024-05-29 RX ORDER — INSULIN LISPRO 100 [IU]/ML
INJECTION, SOLUTION INTRAVENOUS; SUBCUTANEOUS
Qty: 15 ML | Refills: 2 | Status: SHIPPED | OUTPATIENT
Start: 2024-05-29

## 2024-05-29 RX ORDER — ATORVASTATIN CALCIUM 40 MG/1
40 TABLET, FILM COATED ORAL DAILY
Qty: 90 TABLET | Refills: 2 | Status: SHIPPED | OUTPATIENT
Start: 2024-05-29

## 2024-06-24 ENCOUNTER — COMMUNITY OUTREACH (OUTPATIENT)
Dept: FAMILY MEDICINE CLINIC | Age: 44
End: 2024-06-24

## 2024-07-05 DIAGNOSIS — E11.65 TYPE 2 DIABETES MELLITUS WITH HYPERGLYCEMIA, WITH LONG-TERM CURRENT USE OF INSULIN (HCC): ICD-10-CM

## 2024-07-05 DIAGNOSIS — I10 ESSENTIAL HYPERTENSION: ICD-10-CM

## 2024-07-05 DIAGNOSIS — Z79.4 TYPE 2 DIABETES MELLITUS WITH HYPERGLYCEMIA, WITH LONG-TERM CURRENT USE OF INSULIN (HCC): ICD-10-CM

## 2024-07-05 RX ORDER — BENAZEPRIL HYDROCHLORIDE 40 MG/1
TABLET ORAL
Qty: 30 TABLET | Refills: 2 | Status: SHIPPED | OUTPATIENT
Start: 2024-07-05

## 2024-07-05 RX ORDER — INSULIN GLARGINE 100 [IU]/ML
INJECTION, SOLUTION SUBCUTANEOUS
Qty: 15 ML | Refills: 2 | Status: SHIPPED | OUTPATIENT
Start: 2024-07-05

## 2024-07-05 NOTE — TELEPHONE ENCOUNTER
Last Office Visit  -  5/1/24  Next Office Visit  -  none    Last Filled  -    Last UDS -    Contract -

## 2024-07-05 NOTE — TELEPHONE ENCOUNTER
Last Office Visit  -  5/1/24  Next Office Visit  -  n/a    Last Filled  -  2/12/24  Last UDS -    Contract -

## 2024-09-11 DIAGNOSIS — R68.82 DECREASED LIBIDO: ICD-10-CM

## 2024-09-11 RX ORDER — BUPROPION HYDROCHLORIDE 150 MG/1
TABLET ORAL
Qty: 30 TABLET | Refills: 3 | Status: SHIPPED | OUTPATIENT
Start: 2024-09-11

## 2024-09-18 ENCOUNTER — OFFICE VISIT (OUTPATIENT)
Dept: FAMILY MEDICINE CLINIC | Age: 44
End: 2024-09-18

## 2024-09-18 VITALS
HEART RATE: 66 BPM | BODY MASS INDEX: 35.18 KG/M2 | SYSTOLIC BLOOD PRESSURE: 112 MMHG | DIASTOLIC BLOOD PRESSURE: 62 MMHG | WEIGHT: 289 LBS | OXYGEN SATURATION: 96 %

## 2024-09-18 DIAGNOSIS — Z79.4 UNCONTROLLED DIABETES MELLITUS WITH HYPERGLYCEMIA, WITH LONG-TERM CURRENT USE OF INSULIN (HCC): Primary | ICD-10-CM

## 2024-09-18 DIAGNOSIS — E11.65 UNCONTROLLED DIABETES MELLITUS WITH HYPERGLYCEMIA, WITH LONG-TERM CURRENT USE OF INSULIN (HCC): Primary | ICD-10-CM

## 2024-09-18 DIAGNOSIS — L98.9 SKIN LESION: ICD-10-CM

## 2024-09-18 DIAGNOSIS — F41.9 ANXIETY: ICD-10-CM

## 2024-09-18 DIAGNOSIS — H60.392 OTHER INFECTIVE ACUTE OTITIS EXTERNA OF LEFT EAR: ICD-10-CM

## 2024-09-18 DIAGNOSIS — R68.82 DECREASED LIBIDO: ICD-10-CM

## 2024-09-18 DIAGNOSIS — I10 PRIMARY HYPERTENSION: ICD-10-CM

## 2024-09-18 DIAGNOSIS — F34.1 PERSISTENT DEPRESSIVE DISORDER: ICD-10-CM

## 2024-09-18 LAB — HBA1C MFR BLD: 8.6 %

## 2024-09-18 RX ORDER — BUPROPION HYDROCHLORIDE 300 MG/1
300 TABLET ORAL EVERY MORNING
Qty: 30 TABLET | Refills: 3 | Status: SHIPPED | OUTPATIENT
Start: 2024-09-18

## 2024-09-18 RX ORDER — OFLOXACIN 3 MG/ML
10 SOLUTION AURICULAR (OTIC) DAILY
Qty: 3.5 ML | Refills: 0 | Status: SHIPPED | OUTPATIENT
Start: 2024-09-18 | End: 2024-09-25

## 2024-09-18 ASSESSMENT — ENCOUNTER SYMPTOMS
GASTROINTESTINAL NEGATIVE: 1
SHORTNESS OF BREATH: 0

## 2024-09-30 ENCOUNTER — OFFICE VISIT (OUTPATIENT)
Dept: PSYCHIATRY | Age: 44
End: 2024-09-30
Payer: COMMERCIAL

## 2024-09-30 VITALS
HEART RATE: 74 BPM | BODY MASS INDEX: 35.31 KG/M2 | DIASTOLIC BLOOD PRESSURE: 74 MMHG | SYSTOLIC BLOOD PRESSURE: 122 MMHG | HEIGHT: 76 IN | WEIGHT: 290 LBS

## 2024-09-30 DIAGNOSIS — F41.9 ANXIETY: ICD-10-CM

## 2024-09-30 DIAGNOSIS — F33.1 MDD (MAJOR DEPRESSIVE DISORDER), RECURRENT EPISODE, MODERATE (HCC): Primary | ICD-10-CM

## 2024-09-30 PROCEDURE — 3074F SYST BP LT 130 MM HG: CPT | Performed by: STUDENT IN AN ORGANIZED HEALTH CARE EDUCATION/TRAINING PROGRAM

## 2024-09-30 PROCEDURE — 3078F DIAST BP <80 MM HG: CPT | Performed by: STUDENT IN AN ORGANIZED HEALTH CARE EDUCATION/TRAINING PROGRAM

## 2024-09-30 PROCEDURE — 99205 OFFICE O/P NEW HI 60 MIN: CPT | Performed by: STUDENT IN AN ORGANIZED HEALTH CARE EDUCATION/TRAINING PROGRAM

## 2024-09-30 RX ORDER — BUPROPION HYDROCHLORIDE 150 MG/1
150 TABLET ORAL EVERY MORNING
Qty: 30 TABLET | Refills: 5 | Status: SHIPPED | OUTPATIENT
Start: 2024-09-30

## 2024-09-30 RX ORDER — TADALAFIL 5 MG/1
5 TABLET ORAL DAILY PRN
Qty: 30 TABLET | Refills: 5 | Status: SHIPPED | OUTPATIENT
Start: 2024-09-30 | End: 2025-09-30

## 2024-09-30 RX ORDER — BUPROPION HYDROCHLORIDE 300 MG/1
300 TABLET ORAL EVERY MORNING
Qty: 30 TABLET | Refills: 5 | Status: SHIPPED | OUTPATIENT
Start: 2024-09-30

## 2024-09-30 ASSESSMENT — PATIENT HEALTH QUESTIONNAIRE - PHQ9
3. TROUBLE FALLING OR STAYING ASLEEP: SEVERAL DAYS
5. POOR APPETITE OR OVEREATING: SEVERAL DAYS
8. MOVING OR SPEAKING SO SLOWLY THAT OTHER PEOPLE COULD HAVE NOTICED. OR THE OPPOSITE, BEING SO FIGETY OR RESTLESS THAT YOU HAVE BEEN MOVING AROUND A LOT MORE THAN USUAL: SEVERAL DAYS
4. FEELING TIRED OR HAVING LITTLE ENERGY: SEVERAL DAYS
1. LITTLE INTEREST OR PLEASURE IN DOING THINGS: SEVERAL DAYS
SUM OF ALL RESPONSES TO PHQ QUESTIONS 1-9: 10
2. FEELING DOWN, DEPRESSED OR HOPELESS: MORE THAN HALF THE DAYS
6. FEELING BAD ABOUT YOURSELF - OR THAT YOU ARE A FAILURE OR HAVE LET YOURSELF OR YOUR FAMILY DOWN: MORE THAN HALF THE DAYS
7. TROUBLE CONCENTRATING ON THINGS, SUCH AS READING THE NEWSPAPER OR WATCHING TELEVISION: SEVERAL DAYS
SUM OF ALL RESPONSES TO PHQ9 QUESTIONS 1 & 2: 3
9. THOUGHTS THAT YOU WOULD BE BETTER OFF DEAD, OR OF HURTING YOURSELF: NOT AT ALL
SUM OF ALL RESPONSES TO PHQ QUESTIONS 1-9: 10

## 2024-09-30 ASSESSMENT — ANXIETY QUESTIONNAIRES
6. BECOMING EASILY ANNOYED OR IRRITABLE: NEARLY EVERY DAY
2. NOT BEING ABLE TO STOP OR CONTROL WORRYING: MORE THAN HALF THE DAYS
4. TROUBLE RELAXING: MORE THAN HALF THE DAYS
1. FEELING NERVOUS, ANXIOUS, OR ON EDGE: SEVERAL DAYS
3. WORRYING TOO MUCH ABOUT DIFFERENT THINGS: MORE THAN HALF THE DAYS
7. FEELING AFRAID AS IF SOMETHING AWFUL MIGHT HAPPEN: SEVERAL DAYS
5. BEING SO RESTLESS THAT IT IS HARD TO SIT STILL: SEVERAL DAYS
GAD7 TOTAL SCORE: 12

## 2024-09-30 NOTE — PROGRESS NOTES
New Patient is establishing in person today, and would like to address anxiety.      Referred By: Sarah  Work:    Family: Lives with wife and 13 yr old niece   Children: 27, 24, 22, 22 & grandchildren   Education: Associates   Legal History or Arrest: No  Social Support: Not currently   Access to Firearms or Weapons: Yes   Pets: 2 dogs, 1 cat, 1 pig, 1 raccoon, 7 chickens   Hobbies: Fish, Hunt,   Abuse Trauma History: Currently Grief     Plans or Goals: Would like a diagnosis   Alcohol Use: No   Smoke: No  Medicinal Marijuana or Controlled Substances: No  Any Attempted Suicide: No  Any Previous Psychiatric Admissions: No  Previous Diagnosis: Depression Anxiety   Family Psychiatric History: Majority of family (Substance Abuse)     PHQ:10  NU:12    Psychiatric Medications tried in past :   Wellbutrin (Currently working ok)   Prozac (Not Effective)   Clonidine (Not sure)   Cymbalta (Zombied out)   Zoloft (Not effective enough)   Effexor (Not sure)

## 2024-09-30 NOTE — PROGRESS NOTES
Outpatient Psychiatry Integrated Care   [facility]     Patient name: Antoni Nelson  YOB: 1980  MRN: [id number]  Day of Service: 9/30/2024      Referring Primary Care Clinician: Sarah Castellano APRN - CNP      Reason for Visit:     Chief Complaint   Patient presents with    New Patient       CC: depression    HPI:     Antoni Nelson is a 44 y.o. White (non-) male with a history of depression, anxiety, type II DM, MARCO ANTONIO, liver cirrhosis, chronic lumbar pain DDD, HTN who presents to outpatient primary care psychiatry on 9/30/2024 for psychiatric medication management.      Today, the patient reports he has been \"an emotional basket case.\" Crying one minute, angry the next. He asked Sarah for a mood stabilizer and was referred to me for further eval. His mood swings started around 2 years ago. Also, he lost his mom and dad last year, which made his emotions worse. Doesn't know why he became so emotional 2 years ago. Does recall 2 yeasrs ago he had covid and a life-threatening experience where his O2 sats go down to 40%. Is back at work now.     Spends his time taking niece to soccer and cheerleading. Is a  during the week. Has to keep his mind busy, or he starts to get to a dark place; he starts to think about his dad and grief. Also a lot of arguing in his marriage, which has escalating over time.    He wants to be happy. Feels hollow inside. Grandkids born a month ago and it didn't make him happy.  Buying a boat didn't make him happy.     Wellbutrin was increased to 300 mg 2 weeks ago. Feels a \"little bit happier.\" Hasn't been as angry. Has been a little bit more laid back. No side effects, no changes in sleep, no increase in anxiety. Sleep does get worse when he is anxious or depressed d/t racing thoughts.     Interested in Cilias for sexual side effects from Prozac, which still haven't gone away since stopping the Prozac in March.

## 2024-10-14 ENCOUNTER — PATIENT MESSAGE (OUTPATIENT)
Dept: FAMILY MEDICINE CLINIC | Age: 44
End: 2024-10-14

## 2024-10-14 DIAGNOSIS — I47.20 VENTRICULAR TACHYCARDIA (HCC): ICD-10-CM

## 2024-10-14 DIAGNOSIS — E11.65 TYPE 2 DIABETES MELLITUS WITH HYPERGLYCEMIA, WITH LONG-TERM CURRENT USE OF INSULIN (HCC): ICD-10-CM

## 2024-10-14 DIAGNOSIS — I10 ESSENTIAL HYPERTENSION: ICD-10-CM

## 2024-10-14 DIAGNOSIS — Z79.4 TYPE 2 DIABETES MELLITUS WITH HYPERGLYCEMIA, WITH LONG-TERM CURRENT USE OF INSULIN (HCC): ICD-10-CM

## 2024-10-15 RX ORDER — INSULIN GLARGINE 100 [IU]/ML
INJECTION, SOLUTION SUBCUTANEOUS
Qty: 15 ML | Refills: 2 | Status: SHIPPED | OUTPATIENT
Start: 2024-10-15

## 2024-10-15 RX ORDER — BENAZEPRIL HYDROCHLORIDE 40 MG/1
TABLET ORAL
Qty: 30 TABLET | Refills: 2 | Status: SHIPPED | OUTPATIENT
Start: 2024-10-15

## 2024-10-15 RX ORDER — INSULIN LISPRO 100 [IU]/ML
INJECTION, SOLUTION INTRAVENOUS; SUBCUTANEOUS
Qty: 15 ADJUSTABLE DOSE PRE-FILLED PEN SYRINGE | Refills: 0 | Status: SHIPPED | OUTPATIENT
Start: 2024-10-15

## 2024-10-15 RX ORDER — PROPRANOLOL HYDROCHLORIDE 80 MG/1
CAPSULE, EXTENDED RELEASE ORAL
Qty: 90 CAPSULE | Refills: 1 | Status: SHIPPED | OUTPATIENT
Start: 2024-10-15

## 2024-10-15 NOTE — TELEPHONE ENCOUNTER
Last ov: 3/5/24 [facility] [place]  Upcoming ov: no Follow up with me in 1 year     Front can you make a yearly f/u appt   Rx pend

## 2024-10-16 NOTE — TELEPHONE ENCOUNTER
10/16- called pt @[phone] pt stated he prefers to schedule his appt via [employer] and will schedule appt soon.

## 2024-10-18 NOTE — TELEPHONE ENCOUNTER
10/18- second attempt. Called pt @[phone] [facility] informing pt to schedule annual appt with [facility] [place].

## 2024-10-18 NOTE — TELEPHONE ENCOUNTER
Pt sent [employer] message to schedule yearly. Appt on 3.6.2025 w/ [facility]

## 2024-10-31 ENCOUNTER — PATIENT MESSAGE (OUTPATIENT)
Dept: FAMILY MEDICINE CLINIC | Age: 44
End: 2024-10-31

## 2024-10-31 NOTE — TELEPHONE ENCOUNTER
Recommend continuing Wellbutrin 300 mg daily.  1 option would be to restart Prozac.  This can be taken in conjunction with Wellbutrin.  Otherwise please make an appointment to discuss treatment options.  Can schedule with me or wait for Dr. Kauffman.

## 2024-11-20 ENCOUNTER — PROCEDURE VISIT (OUTPATIENT)
Dept: FAMILY MEDICINE CLINIC | Age: 44
End: 2024-11-20

## 2024-11-20 VITALS
HEART RATE: 73 BPM | SYSTOLIC BLOOD PRESSURE: 126 MMHG | WEIGHT: 284 LBS | BODY MASS INDEX: 34.57 KG/M2 | OXYGEN SATURATION: 96 % | DIASTOLIC BLOOD PRESSURE: 84 MMHG

## 2024-11-20 DIAGNOSIS — I25.10 CORONARY ARTERY DISEASE INVOLVING NATIVE CORONARY ARTERY OF NATIVE HEART WITHOUT ANGINA PECTORIS: ICD-10-CM

## 2024-11-20 DIAGNOSIS — Z79.4 UNCONTROLLED DIABETES MELLITUS WITH HYPERGLYCEMIA, WITH LONG-TERM CURRENT USE OF INSULIN (HCC): Primary | ICD-10-CM

## 2024-11-20 DIAGNOSIS — F34.1 PERSISTENT DEPRESSIVE DISORDER: ICD-10-CM

## 2024-11-20 DIAGNOSIS — E11.65 UNCONTROLLED DIABETES MELLITUS WITH HYPERGLYCEMIA, WITH LONG-TERM CURRENT USE OF INSULIN (HCC): Primary | ICD-10-CM

## 2024-11-20 DIAGNOSIS — L98.9 SKIN LESION: ICD-10-CM

## 2024-11-20 DIAGNOSIS — I10 PRIMARY HYPERTENSION: ICD-10-CM

## 2024-11-20 ASSESSMENT — ENCOUNTER SYMPTOMS
RESPIRATORY NEGATIVE: 1
GASTROINTESTINAL NEGATIVE: 1

## 2024-11-20 NOTE — PROGRESS NOTES
Punch Biopsy Procedure Note    Pre-operative Diagnosis: Suspicious lesion    Post-operative Diagnosis: normal    Locations:left leg    Indications: Suspicious lesion    Anesthesia: Lidocaine 2% with epinephrine     Procedure Details   History of allergy to iodine: no  Patient informed of the risks (including bleeding and infection) and benefits of the   procedure and Written informed consent obtained.    The lesion and surrounding area was given a sterile prep using betadine and draped in the usual sterile fashion. The skin was then stretched perpendicular to the skin tension lines and the lesion removed using the 3mm punch. The resulting ellipse was then cauterized with silver nitrate. Antibiotic ointment and a sterile dressing applied. The specimen was sent for pathologic examination. The patient tolerated the procedure well.    EBL: 0 ml    Findings:  none    Condition:  Stable    Complications:  none.    Plan:  1. Instructed to keep the wound dry and covered for 24-48h and clean thereafter.  2. Warning signs of infection were reviewed.    3. Recommended that the patient use OTC acetaminophen as needed for pain.

## 2024-11-20 NOTE — PROGRESS NOTES
Patient: Antoni Nelson is a 44 y.o. male who presents today with the following Chief Complaint(s):  Chief Complaint   Patient presents with    Biopsy       Assessment/Plan:    1. Uncontrolled diabetes mellitus with hyperglycemia, with long-term current use of insulin ([facility])  Stressed the importance of adhering to schedule for insulin.  He is taking his Lantus nightly but does not take mealtime insulin with every meal.  Strongly encouraged him to follow a low carbohydrate diet and start regular exercise.  Educated on the risks associated with uncontrolled diabetes.  -Track nutrition- MyFitnessPal  -Limit carbs  -Set an alarm to remind you to take insulin.   -Only take Humalog if you are about to eat.   -Eat small meals throughout the day.   -Wear Dexcom.   -Stay active- exercise 30 minutes 5 times weekly.     2. Skin lesion  See procedure note.    3. Coronary artery disease involving native coronary artery of native heart without angina pectoris  Managed by cardiology.  Follow-up as planned.    4. Primary hypertension  Stable.  Currently well-controlled.    5. Persistent depressive disorder  He has a follow-up with psychiatry tomorrow for depression/anxiety.  He is tolerating Wellbutrin 150 mg daily but does not feel that this is helping much with his depression/anxiety.      Return in about 4 weeks (around 12/18/2024).    HPI:     He is here for diabetes follow up and biopsy.    He has not been taking insulin regularly.  He says that he forgets.  He is thinking about setting an alarm to help him remember.    Fasting blood sugars have been between 160-180.    He has a busy life and says that is difficult for him to avoid eating at home.  No meal planning or focused nutrition plan.  No regular exercise    Diabetes  He presents for his follow-up diabetic visit. He has type 2 diabetes mellitus. Disease course: no improvement. There are no hypoglycemic associated symptoms. Associated symptoms include weight loss (lost

## 2024-11-20 NOTE — PATIENT INSTRUCTIONS
-Track nutrition- MyFitnessPal  -Limit carbs  -Set an alarm to remind you to take insulin.   -Only take Humalog if you are about to eat.   -Eat small meals throughout the day.   -Wear Dexcom.   -Stay active- exercise 30 minutes 5 times weekly.

## 2024-11-21 ENCOUNTER — TELEMEDICINE (OUTPATIENT)
Dept: PSYCHIATRY | Age: 44
End: 2024-11-21

## 2024-11-21 DIAGNOSIS — F33.1 MDD (MAJOR DEPRESSIVE DISORDER), RECURRENT EPISODE, MODERATE (HCC): Primary | ICD-10-CM

## 2024-11-21 DIAGNOSIS — F41.9 ANXIETY: ICD-10-CM

## 2024-11-21 PROCEDURE — 99214 OFFICE O/P EST MOD 30 MIN: CPT | Performed by: STUDENT IN AN ORGANIZED HEALTH CARE EDUCATION/TRAINING PROGRAM

## 2024-11-21 RX ORDER — ESCITALOPRAM OXALATE 10 MG/1
10 TABLET ORAL DAILY
Qty: 30 TABLET | Refills: 3 | Status: SHIPPED | OUTPATIENT
Start: 2024-11-21

## 2024-11-21 RX ORDER — TADALAFIL 10 MG/1
10 TABLET ORAL PRN
Qty: 30 TABLET | Refills: 3 | Status: SHIPPED | OUTPATIENT
Start: 2024-11-21

## 2024-11-21 ASSESSMENT — PATIENT HEALTH QUESTIONNAIRE - PHQ9
8. MOVING OR SPEAKING SO SLOWLY THAT OTHER PEOPLE COULD HAVE NOTICED. OR THE OPPOSITE, BEING SO FIGETY OR RESTLESS THAT YOU HAVE BEEN MOVING AROUND A LOT MORE THAN USUAL: NOT AT ALL
5. POOR APPETITE OR OVEREATING: NOT AT ALL
SUM OF ALL RESPONSES TO PHQ9 QUESTIONS 1 & 2: 0
3. TROUBLE FALLING OR STAYING ASLEEP: MORE THAN HALF THE DAYS
SUM OF ALL RESPONSES TO PHQ QUESTIONS 1-9: 3
4. FEELING TIRED OR HAVING LITTLE ENERGY: SEVERAL DAYS
SUM OF ALL RESPONSES TO PHQ QUESTIONS 1-9: 3
1. LITTLE INTEREST OR PLEASURE IN DOING THINGS: NOT AT ALL
6. FEELING BAD ABOUT YOURSELF - OR THAT YOU ARE A FAILURE OR HAVE LET YOURSELF OR YOUR FAMILY DOWN: NOT AT ALL
2. FEELING DOWN, DEPRESSED OR HOPELESS: NOT AT ALL
7. TROUBLE CONCENTRATING ON THINGS, SUCH AS READING THE NEWSPAPER OR WATCHING TELEVISION: NOT AT ALL
SUM OF ALL RESPONSES TO PHQ QUESTIONS 1-9: 3
9. THOUGHTS THAT YOU WOULD BE BETTER OFF DEAD, OR OF HURTING YOURSELF: NOT AT ALL
SUM OF ALL RESPONSES TO PHQ QUESTIONS 1-9: 3

## 2024-11-21 ASSESSMENT — ANXIETY QUESTIONNAIRES
7. FEELING AFRAID AS IF SOMETHING AWFUL MIGHT HAPPEN: NOT AT ALL
6. BECOMING EASILY ANNOYED OR IRRITABLE: SEVERAL DAYS
4. TROUBLE RELAXING: SEVERAL DAYS
1. FEELING NERVOUS, ANXIOUS, OR ON EDGE: SEVERAL DAYS
5. BEING SO RESTLESS THAT IT IS HARD TO SIT STILL: NOT AT ALL
GAD7 TOTAL SCORE: 5
3. WORRYING TOO MUCH ABOUT DIFFERENT THINGS: SEVERAL DAYS
2. NOT BEING ABLE TO STOP OR CONTROL WORRYING: SEVERAL DAYS

## 2024-11-21 NOTE — PROGRESS NOTES
Patient is following up today via virtually, is now on 150 of Wellbutrin because he mentioned his heart was racing when he was on the higher doses. He is not sure if he would like to continue this medication.     PHQ:3  NU:5    Previous Scores from Initial OV on 9.3.2024  PHQ:10  NU:12    Psychiatric Medications tried in past :   Wellbutrin (Currently working ok)   Prozac (Not Effective)   Clonidine (Not sure)   Cymbalta (Zombied out)   Zoloft (Not effective enough)   Effexor (Not sure)     
restlessness, difficulty concentrating, irritability, and sleep disturbance    PANIC: denies      PSYCHOSIS: denies AH/VH or delusions.    DISORDERED EATING: denies        Past Psychiatric History:     Previous Diagnoses: depression, anxiety  Previous medication trials:   Prozac 20 mg (possibly caused sexual side effects, not effective)  Clonidine (Not sure)   Cymbalta (Zombied out)   Zoloft 100 mg (helpful at first, but then lost effect over time, no lasting side effects)   Effexor (Not sure)   Wellbutrin  mg (caused increased HR at 450 and 300 mg, so reduced to 150 mg with no benefit; also caused more irritability)  Previous Hospitalizations: no  Suicide Attempts: no  History of self-harm: no  History of aggression/violence: none stated   Access to guns, firearms, or weapons: elder    Substance Use History:     Social History     Tobacco Use    Smoking status: Never    Smokeless tobacco: Never   Vaping Use    Vaping status: Never Used   Substance Use Topics    Alcohol use: No    Drug use: No       Nicotine: no  Alcohol: no  Illicit: no    Social/Developmental History:     Relationship:  to wife  Children:adult children  Housing: lives with wife and teenage niece  Occupation/Income:   Education: associates degree  Abuse/Trauma history: medical trauma r/t severe covid; grief from loss of parents  Legal history: no  Social support:not currently  Hobbies:fishing, hunting    Family History:     Family History   Problem Relation Age of Onset    Diabetes Mother     High Blood Pressure Mother     Depression Mother     Depression Father     High Blood Pressure Father     Other Father         sleep apnea    Heart Attack Father        Family Psych Dx: Majority of family (Substance Abuse)   Family Suicide Attempts: none stated    Allergies:     Allergies   Allergen Reactions    Chloraseptic Sore Throat [Acetaminophen] Hives     Lozenges     Decadron [Dexamethasone] Other (See Comments)     Weak,

## 2024-11-25 ENCOUNTER — OFFICE VISIT (OUTPATIENT)
Dept: FAMILY MEDICINE CLINIC | Age: 44
End: 2024-11-25
Payer: COMMERCIAL

## 2024-11-25 VITALS
SYSTOLIC BLOOD PRESSURE: 126 MMHG | DIASTOLIC BLOOD PRESSURE: 70 MMHG | HEART RATE: 72 BPM | HEIGHT: 76 IN | BODY MASS INDEX: 34.7 KG/M2 | WEIGHT: 285 LBS | OXYGEN SATURATION: 96 %

## 2024-11-25 DIAGNOSIS — L03.90 CELLULITIS, UNSPECIFIED CELLULITIS SITE: Primary | ICD-10-CM

## 2024-11-25 PROCEDURE — 3078F DIAST BP <80 MM HG: CPT | Performed by: FAMILY MEDICINE

## 2024-11-25 PROCEDURE — 3074F SYST BP LT 130 MM HG: CPT | Performed by: FAMILY MEDICINE

## 2024-11-25 PROCEDURE — 99213 OFFICE O/P EST LOW 20 MIN: CPT | Performed by: FAMILY MEDICINE

## 2024-11-25 RX ORDER — DOXYCYCLINE HYCLATE 100 MG
100 TABLET ORAL 2 TIMES DAILY
Qty: 14 TABLET | Refills: 0 | Status: SHIPPED | OUTPATIENT
Start: 2024-11-25 | End: 2024-12-02

## 2024-11-25 RX ORDER — MUPIROCIN 20 MG/G
OINTMENT TOPICAL
Qty: 22 G | Refills: 0 | Status: SHIPPED | OUTPATIENT
Start: 2024-11-25 | End: 2024-12-02

## 2025-01-23 DIAGNOSIS — E11.65 TYPE 2 DIABETES MELLITUS WITH HYPERGLYCEMIA, WITH LONG-TERM CURRENT USE OF INSULIN (HCC): ICD-10-CM

## 2025-01-23 DIAGNOSIS — Z79.4 TYPE 2 DIABETES MELLITUS WITH HYPERGLYCEMIA, WITH LONG-TERM CURRENT USE OF INSULIN (HCC): ICD-10-CM

## 2025-01-23 DIAGNOSIS — I10 ESSENTIAL HYPERTENSION: ICD-10-CM

## 2025-01-23 RX ORDER — INSULIN GLARGINE 100 [IU]/ML
INJECTION, SOLUTION SUBCUTANEOUS
Qty: 15 ML | Refills: 2 | Status: SHIPPED | OUTPATIENT
Start: 2025-01-23

## 2025-01-23 RX ORDER — BENAZEPRIL HYDROCHLORIDE 40 MG/1
TABLET ORAL
Qty: 30 TABLET | Refills: 2 | Status: SHIPPED | OUTPATIENT
Start: 2025-01-23

## 2025-01-29 ENCOUNTER — TELEMEDICINE (OUTPATIENT)
Dept: PSYCHIATRY | Age: 45
End: 2025-01-29
Payer: COMMERCIAL

## 2025-01-29 DIAGNOSIS — F41.9 ANXIETY: ICD-10-CM

## 2025-01-29 DIAGNOSIS — F33.1 MDD (MAJOR DEPRESSIVE DISORDER), RECURRENT EPISODE, MODERATE (HCC): Primary | ICD-10-CM

## 2025-01-29 PROCEDURE — 99214 OFFICE O/P EST MOD 30 MIN: CPT | Performed by: STUDENT IN AN ORGANIZED HEALTH CARE EDUCATION/TRAINING PROGRAM

## 2025-01-29 PROCEDURE — 90833 PSYTX W PT W E/M 30 MIN: CPT | Performed by: STUDENT IN AN ORGANIZED HEALTH CARE EDUCATION/TRAINING PROGRAM

## 2025-01-29 RX ORDER — SILDENAFIL 50 MG/1
50 TABLET, FILM COATED ORAL PRN
Qty: 30 TABLET | Refills: 1 | Status: SHIPPED | OUTPATIENT
Start: 2025-01-29

## 2025-01-29 RX ORDER — DESVENLAFAXINE 50 MG/1
50 TABLET, FILM COATED, EXTENDED RELEASE ORAL DAILY
Qty: 30 TABLET | Refills: 3 | Status: SHIPPED | OUTPATIENT
Start: 2025-01-29

## 2025-01-29 ASSESSMENT — PATIENT HEALTH QUESTIONNAIRE - PHQ9
SUM OF ALL RESPONSES TO PHQ QUESTIONS 1-9: 10
7. TROUBLE CONCENTRATING ON THINGS, SUCH AS READING THE NEWSPAPER OR WATCHING TELEVISION: SEVERAL DAYS
3. TROUBLE FALLING OR STAYING ASLEEP: NOT AT ALL
SUM OF ALL RESPONSES TO PHQ QUESTIONS 1-9: 10
1. LITTLE INTEREST OR PLEASURE IN DOING THINGS: SEVERAL DAYS
8. MOVING OR SPEAKING SO SLOWLY THAT OTHER PEOPLE COULD HAVE NOTICED. OR THE OPPOSITE, BEING SO FIGETY OR RESTLESS THAT YOU HAVE BEEN MOVING AROUND A LOT MORE THAN USUAL: NOT AT ALL
SUM OF ALL RESPONSES TO PHQ QUESTIONS 1-9: 9
2. FEELING DOWN, DEPRESSED OR HOPELESS: NEARLY EVERY DAY
SUM OF ALL RESPONSES TO PHQ QUESTIONS 1-9: 10
4. FEELING TIRED OR HAVING LITTLE ENERGY: SEVERAL DAYS
6. FEELING BAD ABOUT YOURSELF - OR THAT YOU ARE A FAILURE OR HAVE LET YOURSELF OR YOUR FAMILY DOWN: NEARLY EVERY DAY
9. THOUGHTS THAT YOU WOULD BE BETTER OFF DEAD, OR OF HURTING YOURSELF: SEVERAL DAYS
5. POOR APPETITE OR OVEREATING: NOT AT ALL
SUM OF ALL RESPONSES TO PHQ9 QUESTIONS 1 & 2: 4

## 2025-01-29 ASSESSMENT — ANXIETY QUESTIONNAIRES
2. NOT BEING ABLE TO STOP OR CONTROL WORRYING: MORE THAN HALF THE DAYS
1. FEELING NERVOUS, ANXIOUS, OR ON EDGE: MORE THAN HALF THE DAYS
4. TROUBLE RELAXING: MORE THAN HALF THE DAYS
6. BECOMING EASILY ANNOYED OR IRRITABLE: NEARLY EVERY DAY
5. BEING SO RESTLESS THAT IT IS HARD TO SIT STILL: SEVERAL DAYS
3. WORRYING TOO MUCH ABOUT DIFFERENT THINGS: MORE THAN HALF THE DAYS
7. FEELING AFRAID AS IF SOMETHING AWFUL MIGHT HAPPEN: SEVERAL DAYS
GAD7 TOTAL SCORE: 13

## 2025-01-29 NOTE — PROGRESS NOTES
Outpatient Psychiatry Integrated Care   [facility]     Patient name: Antoni Nelson  YOB: 1980  MRN: [id number]  Day of Service: 1/29/2025      Referring Primary Care Clinician: Sarah Castellano, APRN - DEEJAY    Antoni Nelson, was evaluated through a synchronous (real-time) audio-video encounter. The patient (or guardian if applicable) is aware that this is a billable service, which includes applicable co-pays. This Virtual Visit was conducted with patient's (and/or legal guardian's) consent. Patient identification was verified, and a caregiver was present when appropriate.   The patient was located at [address] [place] [address] in [place], [place]  Provider was located at Home (Appt Dept State): [place]  Confirm you are appropriately licensed, registered, or certified to deliver care in the state where the patient is located as indicated above. If you are not or unsure, please re-schedule the visit: Yes, I confirm.       Reason for Visit:     Chief Complaint   Patient presents with    Follow-up       CC: depression    HPI:     Antoni Nelson is a 45 y.o. White (non-) male with a history of depression, anxiety, type II DM, MARCO ANTONIO, liver cirrhosis, chronic lumbar pain DDD, HTN who presents to outpatient primary care psychiatry on 1/29/2025 for psychiatric medication management.      Today, the patient reports he had restless legs every time he took the Lexapro, so he stopped it after a month. No effects in his mood.     He continues to feel very irritable, as he has felt for the past couple of years. Sleeping well lately.    Heart racing stopped after stopping Wellbutrin.     He is open to trying something different for the mood/irritability. Wants to feel happy, not hollow.     He has never tried Pristiq or Trintellix. Is open to trying either one of these.    The tadalafil 10 mg has not helped at all with the persistent sexual side effects from his Prozac, but he has not

## 2025-01-29 NOTE — PROGRESS NOTES
Patient is following up today via virtual and would like to discuss other medication alternatives:    Previous meds taken: Wellbutrin, Zoloft and Cymbalta (did not like)    Current medications & dose: Lexapro 10 (stopped)   Medication side effects: restless legs one hour after taking medicine    PHQ:10 (for last question he mentioned that he has contemplated a plan, on some occasions)   NU:13    Previous Scores from  on 11.21.2024  PHQ:3  NU:12

## 2025-01-29 NOTE — PATIENT INSTRUCTIONS
[facility] Outpatient counseling and addiction assessment and services, including individual, group, and family/couple's therapy. Accepts many private insurance plans and offers a tiered payment plan for self-pay patients. Does Not accept [facility] or [place].  Call Scheduling [phone]   [place]: [address] Floor, [facility], [address]   [place]:  [address], [facility], [address]     [place]: [address] [facility] Lupe Slater, [address],  is a national team of psychiatrists, psychologists, nurse practitioners, and licenses therapists who provide mental health treatment services for patients of all ages, telehealth and in-person appointments. Takes MOST private Insurance.  Call Scheduling [phone] or [phone]  [place]: [address], [address] [facility], [address]  [place]: [address], [address] [facility], [address]  [place]: [address] [facility], [address]   [place]: [address] [facility], [address]   [place]: [address] [place] [address] [address] [facility], [address] TAKES MOST MEDICAID  [place]: [address] [facility], [address] [facility], [address] TAKES MOST MEDICAID

## 2025-02-24 DIAGNOSIS — I47.20 VENTRICULAR TACHYCARDIA (HCC): ICD-10-CM

## 2025-02-24 NOTE — TELEPHONE ENCOUNTER
Last Office Visit: 3/5/2024 Provider: MANJIT  **Is provider OOT? Yes    Next Office Visit: 3/6/25 Provider: MANJIT           
20

## 2025-02-25 RX ORDER — PROPRANOLOL HYDROCHLORIDE 80 MG/1
CAPSULE, EXTENDED RELEASE ORAL
Qty: 90 CAPSULE | Refills: 3 | Status: SHIPPED | OUTPATIENT
Start: 2025-02-25

## 2025-03-06 ENCOUNTER — OFFICE VISIT (OUTPATIENT)
Age: 45
End: 2025-03-06
Payer: COMMERCIAL

## 2025-03-06 VITALS
HEART RATE: 57 BPM | SYSTOLIC BLOOD PRESSURE: 106 MMHG | DIASTOLIC BLOOD PRESSURE: 70 MMHG | BODY MASS INDEX: 35.44 KG/M2 | OXYGEN SATURATION: 97 % | HEIGHT: 76 IN | WEIGHT: 291 LBS

## 2025-03-06 DIAGNOSIS — I25.10 CORONARY ARTERY DISEASE INVOLVING NATIVE CORONARY ARTERY OF NATIVE HEART WITHOUT ANGINA PECTORIS: Primary | ICD-10-CM

## 2025-03-06 DIAGNOSIS — E78.5 HYPERLIPIDEMIA, UNSPECIFIED HYPERLIPIDEMIA TYPE: ICD-10-CM

## 2025-03-06 DIAGNOSIS — I10 PRIMARY HYPERTENSION: ICD-10-CM

## 2025-03-06 DIAGNOSIS — E78.2 MIXED HYPERLIPIDEMIA: ICD-10-CM

## 2025-03-06 PROCEDURE — 99214 OFFICE O/P EST MOD 30 MIN: CPT | Performed by: INTERNAL MEDICINE

## 2025-03-06 PROCEDURE — 3074F SYST BP LT 130 MM HG: CPT | Performed by: INTERNAL MEDICINE

## 2025-03-06 PROCEDURE — 3078F DIAST BP <80 MM HG: CPT | Performed by: INTERNAL MEDICINE

## 2025-03-06 RX ORDER — ATORVASTATIN CALCIUM 80 MG/1
80 TABLET, FILM COATED ORAL DAILY
Qty: 90 TABLET | Refills: 3 | Status: SHIPPED | OUTPATIENT
Start: 2025-03-06

## 2025-03-06 NOTE — PROGRESS NOTES
[facility]   Cardiac Followup    Referring Provider:  Sarah Castellano, APRN - CNP     Chief Complaint   Patient presents with    Follow-up    Hypertension    Coronary Artery Disease    Hyperlipidemia        Antoni Nelson   1980      History of Present Illness:    Antoni Nelson is a 45 y.o. male who is here today for follow up for shortness of breath and VT. He has a past medical history of coronary artery disease- noted on CT chest, hypertension, OWEN, and idiopathic thrombocytopenia. He was admitted to the hospital in 8/2021 due to cough with shortness of breath. He was found to be COVID- 19 positive with a pulmonary infection. Patient was noted to have one minute of wide complex tachycardia- rate 170 bpm.    At last OV 11/9/21, He noted that he has almost passed out in the past but this has been when he has been sick.  While wearing his monitor had episode felt heart racing and lightheaded. Checked his pulse and was > 200. Resolved spontaneously. No assocuated chest pain. Has not recurred. Lasted few mins.    Since last OV, patient has followed up with Dr Francine KAMARA who discussed loop monitor for long term monitoring but patient decided to defer loop for now. Metoprolol was stopped and patient started on propranolol 80 mg daily.   Today he states he has been feeling well overall. States he was having palpitations so stopped his ASA and antidepressant thinking one if these medications  was causing his symptoms. Patient currently denies any weight gain, edema, chest pain, shortness of breath, dizziness, and syncope.      Past Medical History:   has a past medical history of Anxiety, Arthritis, Chronic lumbar pain, Closed fracture of left ankle with delayed healing, COVID-19, DDD (degenerative disc disease), lumbar, Depression, Glucose intolerance (impaired glucose tolerance), History of ITP, Hypertension, Idiopathic thrombocytopenic purpura (HCC), OWEN (nonalcoholic steatohepatitis), Sleep 
decision making performed by me.    Dr. Dean Yang MD        Thank you for allowing me to participate in the care of this individual.      Dean Yang M.D., [facility]

## 2025-03-06 NOTE — PATIENT INSTRUCTIONS
~Increase Lipitor to 80 mg daily   ~repeat fasting lipids and CMP in 2 months   ~restart Aspirin 81 mg daily     Cardiac medications reviewed including indications and pertinent side effects. Medication list updated at this visit.   Patient verbalizes understanding of the need for treatment and education has been provided at today's visit. Additional education material will be provided in after visit summary.    Check blood pressure and heart rate at home a few times per week- keep a log with dates and times and bring to office visit   Regular exercise and following a healthy diet encouraged   Follow up with me in 1 year

## 2025-04-02 ENCOUNTER — TELEMEDICINE (OUTPATIENT)
Dept: PSYCHIATRY | Age: 45
End: 2025-04-02
Payer: COMMERCIAL

## 2025-04-02 DIAGNOSIS — F33.1 MDD (MAJOR DEPRESSIVE DISORDER), RECURRENT EPISODE, MODERATE (HCC): Primary | ICD-10-CM

## 2025-04-02 DIAGNOSIS — F41.9 ANXIETY: ICD-10-CM

## 2025-04-02 PROCEDURE — 99214 OFFICE O/P EST MOD 30 MIN: CPT | Performed by: STUDENT IN AN ORGANIZED HEALTH CARE EDUCATION/TRAINING PROGRAM

## 2025-04-02 RX ORDER — DESVENLAFAXINE 25 MG/1
25 TABLET, EXTENDED RELEASE ORAL DAILY
Qty: 7 TABLET | Refills: 0 | Status: SHIPPED | OUTPATIENT
Start: 2025-04-02

## 2025-04-02 RX ORDER — SILDENAFIL 100 MG/1
100 TABLET, FILM COATED ORAL PRN
Qty: 30 TABLET | Refills: 2 | Status: SHIPPED | OUTPATIENT
Start: 2025-04-02

## 2025-04-02 ASSESSMENT — PATIENT HEALTH QUESTIONNAIRE - PHQ9
5. POOR APPETITE OR OVEREATING: MORE THAN HALF THE DAYS
6. FEELING BAD ABOUT YOURSELF - OR THAT YOU ARE A FAILURE OR HAVE LET YOURSELF OR YOUR FAMILY DOWN: NOT AT ALL
3. TROUBLE FALLING OR STAYING ASLEEP: MORE THAN HALF THE DAYS
8. MOVING OR SPEAKING SO SLOWLY THAT OTHER PEOPLE COULD HAVE NOTICED. OR THE OPPOSITE, BEING SO FIGETY OR RESTLESS THAT YOU HAVE BEEN MOVING AROUND A LOT MORE THAN USUAL: NOT AT ALL
SUM OF ALL RESPONSES TO PHQ QUESTIONS 1-9: 9
SUM OF ALL RESPONSES TO PHQ QUESTIONS 1-9: 9
9. THOUGHTS THAT YOU WOULD BE BETTER OFF DEAD, OR OF HURTING YOURSELF: NOT AT ALL
4. FEELING TIRED OR HAVING LITTLE ENERGY: MORE THAN HALF THE DAYS
SUM OF ALL RESPONSES TO PHQ QUESTIONS 1-9: 9
SUM OF ALL RESPONSES TO PHQ QUESTIONS 1-9: 9
1. LITTLE INTEREST OR PLEASURE IN DOING THINGS: MORE THAN HALF THE DAYS
7. TROUBLE CONCENTRATING ON THINGS, SUCH AS READING THE NEWSPAPER OR WATCHING TELEVISION: NOT AT ALL
2. FEELING DOWN, DEPRESSED OR HOPELESS: SEVERAL DAYS

## 2025-04-02 ASSESSMENT — ANXIETY QUESTIONNAIRES
GAD7 TOTAL SCORE: 8
6. BECOMING EASILY ANNOYED OR IRRITABLE: SEVERAL DAYS
5. BEING SO RESTLESS THAT IT IS HARD TO SIT STILL: SEVERAL DAYS
3. WORRYING TOO MUCH ABOUT DIFFERENT THINGS: MORE THAN HALF THE DAYS
7. FEELING AFRAID AS IF SOMETHING AWFUL MIGHT HAPPEN: NOT AT ALL
4. TROUBLE RELAXING: MORE THAN HALF THE DAYS
1. FEELING NERVOUS, ANXIOUS, OR ON EDGE: NOT AT ALL
2. NOT BEING ABLE TO STOP OR CONTROL WORRYING: MORE THAN HALF THE DAYS

## 2025-04-02 NOTE — PROGRESS NOTES
Patient is following up today via virtual and would like to address chronic anxiety:    PHQ:9  NU:8    Previous Scores from OV on 1.29.2025  PHQ:10  NU:13

## 2025-04-02 NOTE — PROGRESS NOTES
Outpatient Psychiatry Integrated Care   [facility]     Patient name: Antoni Nelson  YOB: 1980  MRN: [id number]  Day of Service: 4/2/2025      Referring Primary Care Clinician: Sarah Castellano, APRN - CNP    Antoni Nelson, was evaluated through a synchronous (real-time) audio-video encounter. The patient (or guardian if applicable) is aware that this is a billable service, which includes applicable co-pays. This Virtual Visit was conducted with patient's (and/or legal guardian's) consent. Patient identification was verified, and a caregiver was present when appropriate.   The patient was located at at work in [place], [place]  Provider was located at Home (Appt Dept State): [place]  Confirm you are appropriately licensed, registered, or certified to deliver care in the state where the patient is located as indicated above. If you are not or unsure, please re-schedule the visit: Yes, I confirm.       Reason for Visit:     Chief Complaint   Patient presents with    Follow-up       CC: depression    HPI:     Antoni Nelson is a 45 y.o. White (non-) male with a history of depression, anxiety, type II DM, MARCO ANTONIO, liver cirrhosis, chronic lumbar pain DDD, HTN who presents to outpatient primary care psychiatry on 4/2/2025 for psychiatric medication management.      Today, the patient reports he is still taking the Pristiq, but it makes him more anxious and higher appetite and really tired. When he misses a dose, he feel lightheaded. Although the Pristiq has been helping him feel more laid back.     He is open to trying something different for the mood/irritability. Wants to feel happy, not hollow.     He has never tried Trintellix or TCAs. Is open to trying anything.    The Viagra 50 mg has not helped at all with the persistent sexual side effects from his Prozac. No dizziness from Viagra. Willing to try 100 mg next time.    Patient denies active suicidal

## 2025-05-02 DIAGNOSIS — I10 ESSENTIAL HYPERTENSION: ICD-10-CM

## 2025-05-02 RX ORDER — BENAZEPRIL HYDROCHLORIDE 40 MG/1
TABLET ORAL
Qty: 30 TABLET | Refills: 3 | Status: SHIPPED | OUTPATIENT
Start: 2025-05-02

## 2025-05-02 NOTE — TELEPHONE ENCOUNTER
Refill Request       Last Seen: Last Seen Department: 11/25/2024  Last Seen by PCP: 11/20/2024    Last Written: 01/23/2025      Next Appointment:   No future appointments.    Requested Prescriptions     Pending Prescriptions Disp Refills    benazepril (LOTENSIN) 40 MG tablet [Pharmacy Med Name: BENAZEPRIL HYDROCHLORIDE 40MG TABLET] 30 tablet 2     Sig: TAKE ONE (1) TABLET BY MOUTH EVERY DAY

## 2025-05-19 DIAGNOSIS — E11.65 TYPE 2 DIABETES MELLITUS WITH HYPERGLYCEMIA, WITH LONG-TERM CURRENT USE OF INSULIN (HCC): ICD-10-CM

## 2025-05-19 DIAGNOSIS — Z79.4 TYPE 2 DIABETES MELLITUS WITH HYPERGLYCEMIA, WITH LONG-TERM CURRENT USE OF INSULIN (HCC): ICD-10-CM

## 2025-05-20 RX ORDER — INSULIN GLARGINE 100 [IU]/ML
60 INJECTION, SOLUTION SUBCUTANEOUS NIGHTLY
Qty: 15 ML | Refills: 2 | Status: SHIPPED | OUTPATIENT
Start: 2025-05-20

## 2025-06-11 ENCOUNTER — PATIENT MESSAGE (OUTPATIENT)
Dept: FAMILY MEDICINE CLINIC | Age: 45
End: 2025-06-11

## 2025-06-11 DIAGNOSIS — Z79.4 TYPE 2 DIABETES MELLITUS WITH HYPERGLYCEMIA, WITH LONG-TERM CURRENT USE OF INSULIN (HCC): ICD-10-CM

## 2025-06-11 DIAGNOSIS — E11.65 TYPE 2 DIABETES MELLITUS WITH HYPERGLYCEMIA, WITH LONG-TERM CURRENT USE OF INSULIN (HCC): ICD-10-CM

## 2025-06-11 RX ORDER — INSULIN LISPRO 100 [IU]/ML
INJECTION, SOLUTION INTRAVENOUS; SUBCUTANEOUS
Qty: 15 ML | Refills: 0 | OUTPATIENT
Start: 2025-06-11

## 2025-06-11 RX ORDER — INSULIN LISPRO 100 [IU]/ML
INJECTION, SOLUTION INTRAVENOUS; SUBCUTANEOUS
Qty: 15 ADJUSTABLE DOSE PRE-FILLED PEN SYRINGE | Refills: 0 | Status: SHIPPED | OUTPATIENT
Start: 2025-06-11

## 2025-06-11 RX ORDER — INSULIN ASPART 100 [IU]/ML
15 INJECTION, SOLUTION INTRAVENOUS; SUBCUTANEOUS
Status: CANCELLED | OUTPATIENT
Start: 2025-06-11

## 2025-06-11 NOTE — TELEPHONE ENCOUNTER
He needs an in person appointment as soon as possible.  The with his A1c being significantly elevated and his diabetes being uncontrolled he should be following up every 3 months.  Will likely refer him to endocrinology if his blood sugar continues to be higher than 500 and he is not feeling well, he should go to the ED    A refill of his mealtime insulin was sent to the pharmacy.  Because he has not been taking this regularly the dose has been changed.  He will take 10 units with meals 3 times a day.  Continue Lantus 60 units nightly.    Follow a low carbohydrate/diabetic diet.  Exercise 150 minutes weekly.  Take insulin as prescribed.

## 2025-06-12 ENCOUNTER — TELEPHONE (OUTPATIENT)
Dept: FAMILY MEDICINE CLINIC | Age: 45
End: 2025-06-12

## 2025-06-12 ENCOUNTER — TELEPHONE (OUTPATIENT)
Dept: CARDIOLOGY CLINIC | Age: 45
End: 2025-06-12

## 2025-06-12 DIAGNOSIS — I47.20 VENTRICULAR TACHYCARDIA (HCC): ICD-10-CM

## 2025-06-12 NOTE — TELEPHONE ENCOUNTER
At this point given his recent blood sugars and being off of insulin and his new symptoms of dizziness, nauseousness and a heart rate of 55-I recommend that he go to the emergency department for evaluation.  Concern is that he could be in diabetic ketoacidosis.

## 2025-06-12 NOTE — TELEPHONE ENCOUNTER
Unable to see today   HR 55 would not be expected to cause these symptoms. But those symptoms can cause HR to be low  Something besides his heart is causing the dizzy and nausea  Can try holding the propanolol for a day to see if helps the HR, and if continues low, we can reduce the dose   He should contact his PCP or go to urgent care if persistent nausea and dizziness

## 2025-06-12 NOTE — TELEPHONE ENCOUNTER
Zara: Called and informed pt of Sarah's note -- he did not want to go to emergency room due to financial and no longer wants to come in due to now feeling better. States that his cardiologist will be lowering his BP meds as he believes that was the issue this morning. States that if he feels bad tomorrow he would come, otherwise not coming in

## 2025-06-12 NOTE — TELEPHONE ENCOUNTER
Pt called to see if [facility] would be able to see him today.  Stated that his hr 54-55, really dizzy and nauseas. Pt denies SOB, Chest Pain or Chest pressure.  Pt also stated oxygen level ok.  Pt concern of the low heart rate, dizziness and nausea.  Please contact pt and advise if able to come and see [facility] today at [facility].

## 2025-06-12 NOTE — TELEPHONE ENCOUNTER
Pt called in today for appointment complaining of  dizzy,nauseous , tierd, HR 55. I got him in @2pm with TL, did know if he needed to been seen earlier  Advise

## 2025-06-12 NOTE — TELEPHONE ENCOUNTER
Spoke to pt today and after being informed to go to emergency room per Sarah he declined and will not be coming to scheduled appt today 6/12

## 2025-07-21 DIAGNOSIS — E11.65 TYPE 2 DIABETES MELLITUS WITH HYPERGLYCEMIA, WITH LONG-TERM CURRENT USE OF INSULIN (HCC): ICD-10-CM

## 2025-07-21 DIAGNOSIS — Z79.4 TYPE 2 DIABETES MELLITUS WITH HYPERGLYCEMIA, WITH LONG-TERM CURRENT USE OF INSULIN (HCC): ICD-10-CM

## 2025-07-21 RX ORDER — INSULIN LISPRO 100 [IU]/ML
INJECTION, SOLUTION INTRAVENOUS; SUBCUTANEOUS
Qty: 15 ML | Refills: 0 | Status: SHIPPED | OUTPATIENT
Start: 2025-07-21

## 2025-07-21 NOTE — TELEPHONE ENCOUNTER
Last Office Visit  -  11/25/2024  Next Office Visit  -  n/a    Last Filled  -  06/11/2025  Last UDS -    Contract -

## 2025-08-04 DIAGNOSIS — Z79.4 TYPE 2 DIABETES MELLITUS WITH HYPERGLYCEMIA, WITH LONG-TERM CURRENT USE OF INSULIN (HCC): ICD-10-CM

## 2025-08-04 DIAGNOSIS — E11.65 TYPE 2 DIABETES MELLITUS WITH HYPERGLYCEMIA, WITH LONG-TERM CURRENT USE OF INSULIN (HCC): ICD-10-CM

## 2025-08-04 RX ORDER — INSULIN GLARGINE 100 [IU]/ML
60 INJECTION, SOLUTION SUBCUTANEOUS NIGHTLY
Qty: 15 ML | Refills: 2 | Status: SHIPPED | OUTPATIENT
Start: 2025-08-04

## 2025-08-18 ENCOUNTER — TELEMEDICINE (OUTPATIENT)
Dept: FAMILY MEDICINE CLINIC | Age: 45
End: 2025-08-18
Payer: COMMERCIAL

## 2025-08-18 DIAGNOSIS — R05.3 PERSISTENT COUGH: Primary | ICD-10-CM

## 2025-08-18 PROCEDURE — 99213 OFFICE O/P EST LOW 20 MIN: CPT | Performed by: NURSE PRACTITIONER

## 2025-08-18 RX ORDER — DEXTROMETHORPHAN HYDROBROMIDE AND PROMETHAZINE HYDROCHLORIDE 15; 6.25 MG/5ML; MG/5ML
5 SYRUP ORAL 4 TIMES DAILY PRN
Qty: 180 ML | Refills: 0 | Status: SHIPPED | OUTPATIENT
Start: 2025-08-18

## 2025-08-18 RX ORDER — ALBUTEROL SULFATE 90 UG/1
2 INHALANT RESPIRATORY (INHALATION) 4 TIMES DAILY PRN
Qty: 54 G | Refills: 1 | Status: SHIPPED | OUTPATIENT
Start: 2025-08-18

## 2025-08-18 RX ORDER — AZITHROMYCIN 250 MG/1
TABLET, FILM COATED ORAL
Qty: 1 PACKET | Refills: 0 | Status: SHIPPED | OUTPATIENT
Start: 2025-08-18

## 2025-08-18 ASSESSMENT — ENCOUNTER SYMPTOMS
SORE THROAT: 1
COUGH: 1
SINUS PAIN: 0
SINUS PRESSURE: 0
SHORTNESS OF BREATH: 0
GASTROINTESTINAL NEGATIVE: 1
WHEEZING: 0